# Patient Record
Sex: FEMALE | Race: WHITE | NOT HISPANIC OR LATINO | Employment: OTHER | URBAN - METROPOLITAN AREA
[De-identification: names, ages, dates, MRNs, and addresses within clinical notes are randomized per-mention and may not be internally consistent; named-entity substitution may affect disease eponyms.]

---

## 2017-03-09 ENCOUNTER — TELEPHONE (OUTPATIENT)
Dept: FAMILY MEDICINE | Facility: CLINIC | Age: 82
End: 2017-03-09

## 2017-03-09 DIAGNOSIS — R73.09 ELEVATED GLUCOSE: Primary | ICD-10-CM

## 2017-03-09 NOTE — TELEPHONE ENCOUNTER
Patient agrees with PCP recommendation and will have this done after October with her physical.  Naomie Mercer RN

## 2017-03-09 NOTE — TELEPHONE ENCOUNTER
Reason for Call:  Other     Detailed comments: pt is wanting to get orders for diabetic testing.  Family history of diabetes.    Would this be lab only or should she make an appointment?    Phone Number Patient can be reached at: Home number on file [phone] (home)    Best Time: any time    Can we leave a detailed message on this number? YES    Call taken on 3/9/2017 at 10:12 AM by Leeanna Cloud  .

## 2017-03-09 NOTE — TELEPHONE ENCOUNTER
To PCP:  Patient states she has her Hbg A1C tested every 6 months due to Strong Family history of diabetes.  Last A1C done in September.  Patient is asking for lab to check for diabetes.  Okay to A1C?  Thank you.  Tresa Mejia RN

## 2017-04-05 DIAGNOSIS — I10 BENIGN ESSENTIAL HYPERTENSION: Primary | ICD-10-CM

## 2017-04-05 RX ORDER — AMLODIPINE BESYLATE 2.5 MG/1
TABLET ORAL
Qty: 90 TABLET | Refills: 2 | Status: SHIPPED | OUTPATIENT
Start: 2017-04-05 | End: 2018-01-03

## 2017-04-05 NOTE — TELEPHONE ENCOUNTER
amLODIPine (NORVASC) 2.5 MG tablet      Last Written Prescription Date:  ?  Last Fill Quantity: ?,   # refills: ?  Last Office Visit with [facility], [facility] or [facility] prescribing provider: 10/7/2016  Future Office visit:       Routing refill request to provider for review/approval because:  Medication is reported/historical

## 2017-04-24 ENCOUNTER — TRANSFERRED RECORDS (OUTPATIENT)
Dept: HEALTH INFORMATION MANAGEMENT | Facility: CLINIC | Age: 82
End: 2017-04-24

## 2017-07-01 DIAGNOSIS — I10 BENIGN ESSENTIAL HYPERTENSION: Primary | ICD-10-CM

## 2017-07-03 RX ORDER — METOPROLOL SUCCINATE 50 MG/1
TABLET, EXTENDED RELEASE ORAL
Qty: 45 TABLET | Refills: 2 | Status: SHIPPED | OUTPATIENT
Start: 2017-07-03 | End: 2017-11-25

## 2017-07-03 RX ORDER — TRIAMTERENE/HYDROCHLOROTHIAZID 37.5-25 MG
TABLET ORAL
Qty: 45 TABLET | Refills: 2 | Status: SHIPPED | OUTPATIENT
Start: 2017-07-03 | End: 2018-05-06

## 2017-07-03 NOTE — TELEPHONE ENCOUNTER
I called and spoke with patient to clarify dosing of Metoprolol and Triamterene-HCTZ 37.5-25 mg    Verified Metoprolol 50 mg XL--taking 1/2 tablet daily  Verified Triamterene-HCTZ 37.5-25 mg--taking 1/2 tablet daily    I pended both medication for approval    Edwige Green RN

## 2017-07-03 NOTE — TELEPHONE ENCOUNTER
Medication Detail      Disp Refills Start End ELIOT   metoprolol (TOPROL-XL) 50 MG 24 hr tablet  3 6/29/2016  --   Sig: TAKE 1/2 TABLET BY MOUTH ONCE A DAY   Class: Historical       Last Office Visit with [facility], [facility] or  Health prescribing provider:  10/7/16   Future Office Visit:    Next 5 appointments (look out 90 days)     Sep 08, 2017  9:00 AM CDT   [facility]    [address] [facility] [address]   [phone]                    BP Readings from Last 3 Encounters:   10/27/16 106/56   10/07/16 138/79   09/01/16 121/67     Medication Detail      Disp Refills Start End ELIOT   triamterene-hydrochlorothiazide (MAXZIDE-25) 37.5-25 MG per tablet  2 5/15/2016  --   Sig: Take 1 tablet by mouth every morning   Class: Historical       Last Office Visit with [facility] or [facility] prescribing provider: 10/7/16  Next 5 appointments (look out 90 days)     Sep 08, 2017  9:00 AM CDT   [facility]    [address] [facility] [address]   [phone]                   Potassium   Date Value Ref Range Status   09/07/2016 3.8 3.4 - 5.3 mmol/L Final     Creatinine   Date Value Ref Range Status   09/07/2016 0.87 0.52 - 1.04 mg/dL Final     BP Readings from Last 3 Encounters:   10/27/16 106/56   10/07/16 138/79   09/01/16 121/67

## 2017-09-05 ENCOUNTER — DOCUMENTATION ONLY (OUTPATIENT)
Dept: LAB | Facility: CLINIC | Age: 82
End: 2017-09-05

## 2017-09-05 DIAGNOSIS — I10 BENIGN ESSENTIAL HYPERTENSION: ICD-10-CM

## 2017-09-05 DIAGNOSIS — R73.01 IMPAIRED FASTING GLUCOSE: ICD-10-CM

## 2017-09-05 NOTE — PROGRESS NOTES
Please review, associate diagnosis, and sign pending pre physical lab orders for patient's upcoming 9/8/17 lab appointment.     Thank you,  Lab

## 2017-09-08 DIAGNOSIS — R73.01 IMPAIRED FASTING GLUCOSE: ICD-10-CM

## 2017-09-08 DIAGNOSIS — I10 BENIGN ESSENTIAL HYPERTENSION: ICD-10-CM

## 2017-09-08 LAB
ALBUMIN SERPL-MCNC: 3.5 G/DL (ref 3.4–5)
ALP SERPL-CCNC: 90 U/L (ref 40–150)
ALT SERPL W P-5'-P-CCNC: 23 U/L (ref 0–50)
ANION GAP SERPL CALCULATED.3IONS-SCNC: 7 MMOL/L (ref 3–14)
AST SERPL W P-5'-P-CCNC: 25 U/L (ref 0–45)
BILIRUB SERPL-MCNC: 0.4 MG/DL (ref 0.2–1.3)
BUN SERPL-MCNC: 28 MG/DL (ref 7–30)
CALCIUM SERPL-MCNC: 9.8 MG/DL (ref 8.5–10.1)
CHLORIDE SERPL-SCNC: 105 MMOL/L (ref 94–109)
CHOLEST SERPL-MCNC: 238 MG/DL
CO2 SERPL-SCNC: 30 MMOL/L (ref 20–32)
CREAT SERPL-MCNC: 0.84 MG/DL (ref 0.52–1.04)
ERYTHROCYTE [DISTWIDTH] IN BLOOD BY AUTOMATED COUNT: 13.7 % (ref 10–15)
GFR SERPL CREATININE-BSD FRML MDRD: 65 ML/MIN/1.7M2
GLUCOSE SERPL-MCNC: 94 MG/DL (ref 70–99)
HBA1C MFR BLD: 5.4 % (ref 4.3–6)
HCT VFR BLD AUTO: 39.4 % (ref 35–47)
HDLC SERPL-MCNC: 124 MG/DL
HGB BLD-MCNC: 12.6 G/DL (ref 11.7–15.7)
LDLC SERPL CALC-MCNC: 98 MG/DL
MCH RBC QN AUTO: 29.7 PG (ref 26.5–33)
MCHC RBC AUTO-ENTMCNC: 32 G/DL (ref 31.5–36.5)
MCV RBC AUTO: 93 FL (ref 78–100)
NONHDLC SERPL-MCNC: 114 MG/DL
PLATELET # BLD AUTO: 251 10E9/L (ref 150–450)
POTASSIUM SERPL-SCNC: 4.4 MMOL/L (ref 3.4–5.3)
PROT SERPL-MCNC: 7.3 G/DL (ref 6.8–8.8)
RBC # BLD AUTO: 4.24 10E12/L (ref 3.8–5.2)
SODIUM SERPL-SCNC: 142 MMOL/L (ref 133–144)
TRIGL SERPL-MCNC: 78 MG/DL
TSH SERPL DL<=0.005 MIU/L-ACNC: 4.66 MU/L (ref 0.4–4)
WBC # BLD AUTO: 4.9 10E9/L (ref 4–11)

## 2017-09-08 PROCEDURE — 80061 LIPID PANEL: CPT | Performed by: INTERNAL MEDICINE

## 2017-09-08 PROCEDURE — 36415 COLL VENOUS BLD VENIPUNCTURE: CPT | Performed by: INTERNAL MEDICINE

## 2017-09-08 PROCEDURE — 85027 COMPLETE CBC AUTOMATED: CPT | Performed by: INTERNAL MEDICINE

## 2017-09-08 PROCEDURE — 83036 HEMOGLOBIN GLYCOSYLATED A1C: CPT | Performed by: INTERNAL MEDICINE

## 2017-09-08 PROCEDURE — 80053 COMPREHEN METABOLIC PANEL: CPT | Performed by: INTERNAL MEDICINE

## 2017-09-08 PROCEDURE — 84443 ASSAY THYROID STIM HORMONE: CPT | Performed by: INTERNAL MEDICINE

## 2017-09-08 NOTE — LETTER
[facility]  [address]  [address]  [place]  [address]  Tel: [phone]    September 28, 2017    Maryam Belen Quentin  [address] [facility] [address]        Dear Ms. Saavedra,    The following letter pertains to your most recent diagnostic tests:    As we discussed in clinic your lab results are stable.      If you have any further questions or problems, please contact our office.      Sincerely,    Truman Norman MD/ [place] MCNULTY CMA  Results for orders placed or performed in visit on 09/08/17   Lipid panel reflex to direct LDL   Result Value Ref Range    Cholesterol 238 (H) <200 mg/dL    Triglycerides 78 <150 mg/dL    HDL Cholesterol 124 >49 mg/dL    LDL Cholesterol Calculated 98 <100 mg/dL    Non HDL Cholesterol 114 <130 mg/dL   Comprehensive metabolic panel   Result Value Ref Range    Sodium 142 133 - 144 mmol/L    Potassium 4.4 3.4 - 5.3 mmol/L    Chloride 105 94 - 109 mmol/L    Carbon Dioxide 30 20 - 32 mmol/L    Anion Gap 7 3 - 14 mmol/L    Glucose 94 70 - 99 mg/dL    Urea Nitrogen 28 7 - 30 mg/dL    Creatinine 0.84 0.52 - 1.04 mg/dL    GFR Estimate 65 >60 mL/min/1.7m2    GFR Estimate If Black 78 >60 mL/min/1.7m2    Calcium 9.8 8.5 - 10.1 mg/dL    Bilirubin Total 0.4 0.2 - 1.3 mg/dL    Albumin 3.5 3.4 - 5.0 g/dL    Protein Total 7.3 6.8 - 8.8 g/dL    Alkaline Phosphatase 90 40 - 150 U/L    ALT 23 0 - 50 U/L    AST 25 0 - 45 U/L   CBC with platelets   Result Value Ref Range    WBC 4.9 4.0 - 11.0 10e9/L    RBC Count 4.24 3.8 - 5.2 10e12/L    Hemoglobin 12.6 11.7 - 15.7 g/dL    Hematocrit 39.4 35.0 - 47.0 %    MCV 93 78 - 100 fl    MCH 29.7 26.5 - 33.0 pg    MCHC 32.0 31.5 - 36.5 g/dL    RDW 13.7 10.0 - 15.0 %    Platelet Count 251 150 - 450 10e9/L   TSH   Result Value Ref Range    TSH 4.66 (H) 0.40 - 4.00 mU/L   Hemoglobin A1c   Result Value Ref Range    Hemoglobin A1C 5.4 4.3 - 6.0 %               Enclosure: Lab Results

## 2017-09-28 ENCOUNTER — OFFICE VISIT (OUTPATIENT)
Dept: FAMILY MEDICINE | Facility: CLINIC | Age: 82
End: 2017-09-28
Payer: MEDICARE

## 2017-09-28 VITALS
RESPIRATION RATE: 16 BRPM | SYSTOLIC BLOOD PRESSURE: 134 MMHG | WEIGHT: 102 LBS | HEIGHT: 61 IN | OXYGEN SATURATION: 97 % | HEART RATE: 66 BPM | DIASTOLIC BLOOD PRESSURE: 74 MMHG | BODY MASS INDEX: 19.26 KG/M2 | TEMPERATURE: 97 F

## 2017-09-28 DIAGNOSIS — I10 BENIGN ESSENTIAL HYPERTENSION: ICD-10-CM

## 2017-09-28 DIAGNOSIS — Z23 NEED FOR PROPHYLACTIC VACCINATION AND INOCULATION AGAINST INFLUENZA: ICD-10-CM

## 2017-09-28 DIAGNOSIS — Z91.81 AT RISK FOR FALLING: ICD-10-CM

## 2017-09-28 DIAGNOSIS — M53.3 SACROILIAC JOINT PAIN: ICD-10-CM

## 2017-09-28 DIAGNOSIS — Z23 NEED FOR VACCINATION: ICD-10-CM

## 2017-09-28 DIAGNOSIS — Z00.00 ROUTINE GENERAL MEDICAL EXAMINATION AT A HEALTH CARE FACILITY: Primary | ICD-10-CM

## 2017-09-28 DIAGNOSIS — Z23 NEED FOR PROPHYLACTIC VACCINATION AGAINST STREPTOCOCCUS PNEUMONIAE (PNEUMOCOCCUS): ICD-10-CM

## 2017-09-28 PROCEDURE — G0008 ADMIN INFLUENZA VIRUS VAC: HCPCS | Performed by: INTERNAL MEDICINE

## 2017-09-28 PROCEDURE — G0009 ADMIN PNEUMOCOCCAL VACCINE: HCPCS | Performed by: INTERNAL MEDICINE

## 2017-09-28 PROCEDURE — G0439 PPPS, SUBSEQ VISIT: HCPCS | Performed by: INTERNAL MEDICINE

## 2017-09-28 PROCEDURE — 90732 PPSV23 VACC 2 YRS+ SUBQ/IM: CPT | Performed by: INTERNAL MEDICINE

## 2017-09-28 PROCEDURE — 90662 IIV NO PRSV INCREASED AG IM: CPT | Performed by: INTERNAL MEDICINE

## 2017-09-28 RX ORDER — TRAMADOL HYDROCHLORIDE 50 MG/1
50 TABLET ORAL EVERY 8 HOURS PRN
Qty: 30 TABLET | Refills: 0 | Status: SHIPPED | OUTPATIENT
Start: 2017-09-28 | End: 2018-05-14

## 2017-09-28 NOTE — NURSING NOTE
Screening Questionnaire for Adult Immunization    Are you sick today?   No   Do you have allergies to medications, food, a vaccine component or latex?   No   Have you ever had a serious reaction after receiving a vaccination?   No   Do you have a long-term health problem with heart disease, lung disease, asthma, kidney disease, metabolic disease (e.g. diabetes), anemia, or other blood disorder?   No   Do you have cancer, leukemia, HIV/AIDS, or any other immune system problem?   No   In the past 3 months, have you taken medications that affect  your immune system, such as prednisone, other steroids, or anticancer drugs; drugs for the treatment of rheumatoid arthritis, Crohn s disease, or psoriasis; or have you had radiation treatments?   No   Have you had a seizure, or a brain or other nervous system problem?   No   During the past year, have you received a transfusion of blood or blood     products, or been given immune (gamma) globulin or antiviral drug?   No   For women: Are you pregnant or is there a chance you could become        pregnant during the next month?   No   Have you received any vaccinations in the past 4 weeks?   No     Immunization questionnaire answers were all negative.        Per orders of Dr. Norman, injection of Pneumovax 23 given by July Morris. Patient instructed to remain in clinic for 15 minutes afterwards, and to report any adverse reaction to me immediately.       Screening performed by July Morris on 9/28/2017 at 9:49 AM.      Prior to injection verified patient identity using patient's name and date of birth.

## 2017-09-28 NOTE — PROGRESS NOTES
The following letter pertains to your most recent diagnostic tests:    As we discussed in clinic your lab results are stable.          Sincerely,    Dr. Norman

## 2017-09-28 NOTE — NURSING NOTE
"Chief Complaint   Patient presents with     Wellness Visit     Not Fasting       Initial /74 (BP Location: Right arm, Patient Position: Chair, Cuff Size: Adult Small)  Pulse 66  Temp 97  F (36.1  C) (Oral)  Resp 16  Ht 5' 0.75\" (1.543 m)  Wt 102 lb (46.3 kg)  SpO2 97%  BMI 19.43 kg/m2 Estimated body mass index is 19.43 kg/(m^2) as calculated from the following:    Height as of this encounter: 5' 0.75\" (1.543 m).    Weight as of this encounter: 102 lb (46.3 kg).  Medication Reconciliation: complete   July Morris CMA (AAMA)      "

## 2017-09-28 NOTE — MR AVS SNAPSHOT
After Visit Summary   9/28/2017    Maryam Saavedra    MRN: [id number]           Patient Information     Date Of Birth          6/9/1932        Visit Information        Provider Department      9/28/2017 9:00 AM Truman Norman MD [facility]        Today's Diagnoses     Routine general medical examination at a health care facility    -  1    Sacroiliac joint pain        At risk for falling        Need for prophylactic vaccination and inoculation against influenza        Need for prophylactic vaccination against Streptococcus pneumoniae (pneumococcus)        Benign essential hypertension          Care Instructions      Preventive Health Recommendations    Female Ages 65 +    Yearly exam:     See your health care provider every year in order to  o Review health changes.   o Discuss preventive care.    o Review your medicines if your doctor has prescribed any.      You no longer need a yearly Pap test unless you've had an abnormal Pap test in the past 10 years. If you have vaginal symptoms, such as bleeding or discharge, be sure to talk with your provider about a Pap test.      Every 1 to 2 years, have a mammogram.  If you are over 69, talk with your health care provider about whether or not you want to continue having screening mammograms.      Every 10 years, have a colonoscopy. Or, have a yearly FIT test (stool test). These exams will check for colon cancer.       Have a cholesterol test every 5 years, or more often if your doctor advises it.       Have a diabetes test (fasting glucose) every three years. If you are at risk for diabetes, you should have this test more often.       At age 65, have a bone density scan (DEXA) to check for osteoporosis (brittle bone disease).    Shots:    Get a flu shot each year.    Get a tetanus shot every 10 years.    Talk to your doctor about your pneumonia vaccines. There are now two you should receive - Pneumovax (PPSV 23) and Prevnar (PCV 13).    Talk  "to your doctor about the shingles vaccine.    Talk to your doctor about the hepatitis B vaccine.    Nutrition:     Eat at least 5 servings of fruits and vegetables each day.      Eat whole-grain bread, whole-wheat pasta and brown rice instead of white grains and rice.      Talk to your provider about Calcium and Vitamin D.     Lifestyle    Exercise at least 150 minutes a week (30 minutes a day, 5 days a week). This will help you control your weight and prevent disease.      Limit alcohol to one drink per day.      No smoking.       Wear sunscreen to prevent skin cancer.       See your dentist twice a year for an exam and cleaning.      See your eye doctor every 1 to 2 years to screen for conditions such as glaucoma, macular degeneration and cataracts.          Follow-ups after your visit        Follow-up notes from your care team     Return in about 1 year (around 9/28/2018) for Physical Exam.      Who to contact     If you have questions or need follow up information about today's clinic visit or your schedule please contact [facility] directly at [phone].  Normal or non-critical lab and imaging results will be communicated to you by [employer], letter or phone within 4 business days after the clinic has received the results. If you do not hear from us within 7 days, please contact the clinic through [employer] or phone. If you have a critical or abnormal lab result, we will notify you by phone as soon as possible.  Submit refill requests through [employer] or call your pharmacy and they will forward the refill request to us. Please allow 3 business days for your refill to be completed.          Additional Information About Your Visit        [employer] Information     [employer] lets you send messages to your doctor, view your test results, renew your prescriptions, schedule appointments and more. To sign up, go to [email or web address] . Click on \"Log in\" on the left side of the screen, which will take you to " "the Welcome page. Then click on \"Sign up Now\" on the right side of the page.     You will be asked to enter the access code listed below, as well as some personal information. Please follow the directions to create your username and password.     Your access code is: [email or web address]  Expires: 2017  9:46 AM     Your access code will  in 90 days. If you need help or a new code, please call your [facility] or [phone].        Care EveryWhere ID     This is your Care EveryWhere ID. This could be used by other organizations to access your [place] medical records  [id number]        Your Vitals Were     Pulse Temperature Respirations Height Pulse Oximetry BMI (Body Mass Index)    66 97  F (36.1  C) (Oral) 16 5' 0.75\" (1.543 m) 97% 19.43 kg/m2       Blood Pressure from Last 3 Encounters:   17 134/74   10/27/16 106/56   10/07/16 138/79    Weight from Last 3 Encounters:   17 102 lb (46.3 kg)   10/27/16 102 lb (46.3 kg)   10/07/16 103 lb (46.7 kg)              We Performed the Following     ADMIN INFLUENZA (For MEDICARE Patients ONLY) []     FLU VACCINE, INCREASED ANTIGEN, PRESV FREE, AGE 65+ [13837]          Today's Medication Changes          These changes are accurate as of: 17  9:46 AM.  If you have any questions, ask your nurse or doctor.               Stop taking these medicines if you haven't already. Please contact your care team if you have questions.     HYDROcodone-acetaminophen 5-325 MG per tablet   Commonly known as:  NORCO   Stopped by:  Truman Norman MD                Where to get your medicines      Some of these will need a paper prescription and others can be bought over the counter.  Ask your nurse if you have questions.     Bring a paper prescription for each of these medications     traMADol 50 MG tablet                Primary Care Provider Office Phone # Fax #    Truman Norman [phone]       [place] [address]  [facility] [address]        Equal Access to Services     [facility] : Hadii jefferson trivedi srinath Somichoacano, waaxda luqadaha, qaybta kaalmagómez obrien, ben austin. So [facility] [phone].    ATENCIÓN: Si habla español, tiene a bond disposición servicios gratuitos de asistencia lingüística. Bean al [phone].    We comply with applicable federal civil rights laws and [place] laws. We do not discriminate on the basis of race, color, national origin, age, disability sex, sexual orientation or gender identity.            Thank you!     Thank you for choosing [facility]  for your care. Our goal is always to provide you with excellent care. Hearing back from our patients is one way we can continue to improve our services. Please take a few minutes to complete the written survey that you may receive in the mail after your visit with us. Thank you!             Your Updated Medication List - Protect others around you: Learn how to safely use, store and throw away your medicines at www.disposemymeds.org.          This list is accurate as of: 9/28/17  9:46 AM.  Always use your most recent med list.                   Brand Name Dispense Instructions for use Diagnosis    amLODIPine 2.5 MG tablet    NORVASC    90 tablet    TAKE 1 TABLET BY MOUTH EVERY DAY    Benign essential hypertension       aspirin 81 MG chewable tablet      Take 2 tablets (162 mg) by mouth daily        calcium-vitamin D 600-400 MG-UNIT per tablet    CALTRATE     Take 1 tablet by mouth 2 times daily.        metoprolol 50 MG 24 hr tablet    TOPROL-XL    45 tablet    TAKE 1/2 TABLET BY MOUTH ONCE A DAY    Benign essential hypertension       traMADol 50 MG tablet    ULTRAM    30 tablet    Take 1 tablet (50 mg) by mouth every 8 hours as needed for severe pain    Sacroiliac joint pain       triamterene-hydrochlorothiazide 37.5-25 MG per tablet    MAXZIDE-25    45 tablet    TAKE 1/2 TABLET BY MOUTH ONCE A DAY    Benign  essential hypertension

## 2017-09-28 NOTE — PROGRESS NOTES
SUBJECTIVE:   Maryam Saavedra is a 85 year old female who presents for Preventive Visit.    Are you in the first 12 months of your Medicare Part B coverage?  No    Healthy Habits:    Do you get at least three servings of calcium containing foods daily (dairy, green leafy vegetables, etc.)? no, taking calcium and/or vitamin D supplement: yes -     Amount of exercise or daily activities, outside of work: walking    Problems taking medications regularly No    Medication side effects: No    Have you had an eye exam in the past two years? yes    Do you see a dentist twice per year? yes    Do you have sleep apnea, excessive snoring or daytime drowsiness?no    COGNITIVE SCREEN  1) Repeat 3 items (Banana, Sunrise, Chair)    2) Clock draw: ABNORMAL   3) 3 item recall: Recalls 1 object   Results: ABNORMAL clock, 1-2 items recalled: PROBABLE COGNITIVE IMPAIRMENT, **INFORM PROVIDER**    Mini-CogTM Copyright S [employer]ey[employer]. Licensed by the author for use in [facility]; reprinted with permission (stacey@[facility]). All rights reserved.      Memory screen abnormal  Patient has no concerns about her memory.  She has not gotten lost driving, or left stove on or forgotten to pay bills      Reviewed and updated as needed this visit by clinical staffTobacco  Allergies  Meds  Med Hx  Surg Hx  Fam Hx  Soc Hx        Reviewed and updated as needed this visit by Provider        Social History   Substance Use Topics     Smoking status: Former Smoker     Smokeless tobacco: Former User     Quit date: 1/1/1997     Alcohol use 0.0 oz/week     0 Standard drinks or equivalent per week      Comment: occasional       The patient does not drink >3 drinks per day nor >7 drinks per week.    Today's PHQ-2 Score:   PHQ-2 ( 1999 Pf[employer]) 9/28/2017 9/1/2016   Q1: Little interest or pleasure in doing things 0 0   Q2: Feeling down, depressed or hopeless 0 0   PHQ-2 Score 0 0       Do you feel safe in your environment - Yes    Do you have a Health  Care Directive?: Yes: Advance Directive has been received and scanned.    Current providers sharing in care for this patient include:   Patient Care Team:  Truman Norman MD as PCP - General (Internal Medicine)      Hearing impairment: No    Ability to successfully perform activities of daily living: Yes, no assistance needed     Fall risk:  Fallen 2 or more times in the past year?: No  Any fall with injury in the past year?: No      Home safety:  none identified      The following health maintenance items are reviewed in Epic and correct as of today:Health Maintenance   Topic Date Due     ADVANCE DIRECTIVE PLANNING Q5 YRS  06/09/1987     INFLUENZA VACCINE (SYSTEM ASSIGNED)  09/01/2017     FALL RISK ASSESSMENT  10/07/2017     PNEUMOCOCCAL (2 of 2 - PPSV23) 10/07/2017     TETANUS IMMUNIZATION (SYSTEM ASSIGNED)  06/26/2023     Patient Active Problem List   Diagnosis     Benign essential hypertension     Impaired fasting glucose     Acute right-sided low back pain without sciatica     Past Surgical History:   Procedure Laterality Date     EYE SURGERY  cataracts     MOHS MICROGRAPHIC PROCEDURE      Left lower eyelid      MOHS MICROGRAPHIC PROCEDURE Left 10/27/2016    Procedure: MOHS MICROGRAPHIC PROCEDURE;  Surgeon: Jose Torrez MD;  Location: [facility]     ORTHOPEDIC SURGERY      bilateral wrists       Social History   Substance Use Topics     Smoking status: Former Smoker     Smokeless tobacco: Former User     Quit date: 1/1/1997     Alcohol use 0.0 oz/week     0 Standard drinks or equivalent per week      Comment: occasional     Family History   Problem Relation Age of Onset     DIABETES Mother      DIABETES Father          Current Outpatient Prescriptions   Medication Sig Dispense Refill     aspirin 81 MG chewable tablet Take 2 tablets (162 mg) by mouth daily       metoprolol (TOPROL-XL) 50 MG 24 hr tablet TAKE 1/2 TABLET BY MOUTH ONCE A DAY 45 tablet 2     triamterene-hydrochlorothiazide (MAXZIDE-25) 37.5-25 MG  "per tablet TAKE 1/2 TABLET BY MOUTH ONCE A DAY 45 tablet 2     amLODIPine (NORVASC) 2.5 MG tablet TAKE 1 TABLET BY MOUTH EVERY DAY 90 tablet 2     traMADol (ULTRAM) 50 MG tablet Take 1 tablet (50 mg) by mouth every 8 hours as needed for severe pain 30 tablet 0     calcium-vitamin D (CALTRATE) 600-400 MG-UNIT per tablet Take 1 tablet by mouth 2 times daily.       Allergies   Allergen Reactions     Ace Inhibitors      Angioedema       Cyclobenzaprine      Angioedema             ROS:  A 12 organ systems ROS is negative    OBJECTIVE:   /74 (BP Location: Right arm, Patient Position: Chair, Cuff Size: Adult Small)  Pulse 66  Temp 97  F (36.1  C) (Oral)  Resp 16  Ht 5' 0.75\" (1.543 m)  Wt 102 lb (46.3 kg)  SpO2 97%  BMI 19.43 kg/m2 Estimated body mass index is 19.43 kg/(m^2) as calculated from the following:    Height as of this encounter: 5' 0.75\" (1.543 m).    Weight as of this encounter: 102 lb (46.3 kg).  EXAM:   GENERAL APPEARANCE: healthy, alert and no distress  EYES: Eyes grossly normal to inspection, PERRL and conjunctivae and sclerae normal  HENT: ear canals and TM's normal, nose and mouth without ulcers or lesions, oropharynx clear and oral mucous membranes moist  NECK: no adenopathy, no asymmetry, masses, or scars and thyroid normal to palpation  RESP: lungs clear to auscultation - no rales, rhonchi or wheezes  BREAST: normal without masses, tenderness or nipple discharge and no palpable axillary masses or adenopathy  CV: regular rate and rhythm, normal S1 S2, no S3 or S4, no murmur, click or rub, no peripheral edema and peripheral pulses strong  ABDOMEN: soft, nontender, no hepatosplenomegaly, no masses and bowel sounds normal  MS: no musculoskeletal defects are noted and gait is age appropriate without ataxia  SKIN: no suspicious lesions or rashes  NEURO: Normal strength and tone, sensory exam grossly normal, mentation intact and speech normal  PSYCH: mentation appears normal and affect " "normal/bright    ASSESSMENT / PLAN:   1. Routine general medical examination at a health care facility    Discussed memory clinic referral  She thinks this will make her very nervous  She does not think he memory is a serious issue    I suspect that she failed the screening because she became very anxious about the test  I see no gross memory deficits upon my exam     2. Sacroiliac joint pain  She uses very small amount of tramadol for severe pain episodes without side effects   - traMADol (ULTRAM) 50 MG tablet; Take 1 tablet (50 mg) by mouth every 8 hours as needed for severe pain  Dispense: 30 tablet; Refill: 0    3. At risk for falling      4. Need for prophylactic vaccination and inoculation against influenza    - FLU VACCINE, INCREASED ANTIGEN, PRESV FREE, AGE 65+ [78651]  - ADMIN INFLUENZA (For MEDICARE Patients ONLY) []    5. Need for prophylactic vaccination against Streptococcus pneumoniae (pneumococcus)  P23 today     6. Benign essential hypertension  Well controlled today     She inquired about medications for OAB and I gave her some literature about myrbetriq; she will consider this       End of Life Planning:  Patient currently has an advanced directive: Yes.  Practitioner is supportive of decision.    COUNSELING:  Reviewed preventive health counseling, as reflected in patient instructions  Special attention given to:       Regular exercise       Healthy diet/nutrition       Immunizations    Vaccinated for: Influenza and Pneumococcal          Estimated body mass index is 19.43 kg/(m^2) as calculated from the following:    Height as of this encounter: 5' 0.75\" (1.543 m).    Weight as of this encounter: 102 lb (46.3 kg).     reports that she has quit smoking. She quit smokeless tobacco use about 20 years ago.        Appropriate preventive services were discussed with this patient, including applicable screening as appropriate for cardiovascular disease, diabetes, osteopenia/osteoporosis, and " glaucoma.  As appropriate for age/gender, discussed screening for colorectal cancer, prostate cancer, breast cancer, and cervical cancer. Checklist reviewing preventive services available has been given to the patient.    Reviewed patients plan of care and provided an AVS. The Basic Care Plan (routine screening as documented in Health Maintenance) for Maryam meets the Care Plan requirement. This Care Plan has been established and reviewed with the Patient.    Counseling Resources:  ATP IV Guidelines  Pooled Cohorts Equation Calculator  Breast Cancer Risk Calculator  FRAX Risk Assessment  ICSI Preventive Guidelines  Dietary Guidelines for Americans, 2010  PT [place] [employer]'s MyPlate  ASA Prophylaxis  Lung CA Screening    Truman Norman MD  [facility] EDINAInjectable Influenza Immunization Documentation    1.  Is the person to be vaccinated sick today?   No    2. Does the person to be vaccinated have an allergy to a component   of the vaccine?   No    3. Has the person to be vaccinated ever had a serious reaction   to influenza vaccine in the past?   No    4. Has the person to be vaccinated ever had Guillain-Barré syndrome?   No    Form completed by patient

## 2017-10-18 ENCOUNTER — HOSPITAL ENCOUNTER (OUTPATIENT)
Dept: MAMMOGRAPHY | Facility: CLINIC | Age: 82
Discharge: HOME | End: 2017-10-18
Attending: INTERNAL MEDICINE | Admitting: INTERNAL MEDICINE
Payer: MEDICARE

## 2017-10-18 DIAGNOSIS — Z12.31 ENCOUNTER FOR SCREENING MAMMOGRAM FOR HIGH-RISK PATIENT: ICD-10-CM

## 2017-10-18 PROCEDURE — G0202 SCR MAMMO BI INCL CAD: HCPCS

## 2017-11-25 ENCOUNTER — APPOINTMENT (OUTPATIENT)
Dept: MRI IMAGING | Facility: CLINIC | Age: 82
End: 2017-11-25
Attending: INTERNAL MEDICINE
Payer: MEDICARE

## 2017-11-25 ENCOUNTER — HOSPITAL ENCOUNTER (OUTPATIENT)
Facility: CLINIC | Age: 82
Setting detail: OBSERVATION
Discharge: HOME | End: 2017-11-26
Attending: EMERGENCY MEDICINE | Admitting: INTERNAL MEDICINE
Payer: MEDICARE

## 2017-11-25 DIAGNOSIS — R29.898 LEG WEAKNESS, BILATERAL: ICD-10-CM

## 2017-11-25 LAB
ALBUMIN SERPL-MCNC: 3.5 G/DL (ref 3.4–5)
ALBUMIN UR-MCNC: NEGATIVE MG/DL
ALP SERPL-CCNC: 101 U/L (ref 40–150)
ALT SERPL W P-5'-P-CCNC: 26 U/L (ref 0–50)
ANION GAP SERPL CALCULATED.3IONS-SCNC: 9 MMOL/L (ref 3–14)
APPEARANCE UR: CLEAR
AST SERPL W P-5'-P-CCNC: 28 U/L (ref 0–45)
BASOPHILS # BLD AUTO: 0 10E9/L (ref 0–0.2)
BASOPHILS NFR BLD AUTO: 0.2 %
BILIRUB SERPL-MCNC: 0.3 MG/DL (ref 0.2–1.3)
BILIRUB UR QL STRIP: NEGATIVE
BUN SERPL-MCNC: 40 MG/DL (ref 7–30)
CALCIUM SERPL-MCNC: 9.7 MG/DL (ref 8.5–10.1)
CHLORIDE SERPL-SCNC: 102 MMOL/L (ref 94–109)
CO2 SERPL-SCNC: 30 MMOL/L (ref 20–32)
COLOR UR AUTO: YELLOW
CREAT SERPL-MCNC: 0.8 MG/DL (ref 0.52–1.04)
CRP SERPL-MCNC: 4.2 MG/L (ref 0–8)
DIFFERENTIAL METHOD BLD: ABNORMAL
EOSINOPHIL # BLD AUTO: 0 10E9/L (ref 0–0.7)
EOSINOPHIL NFR BLD AUTO: 0 %
ERYTHROCYTE [DISTWIDTH] IN BLOOD BY AUTOMATED COUNT: 13.6 % (ref 10–15)
GFR SERPL CREATININE-BSD FRML MDRD: 68 ML/MIN/1.7M2
GLUCOSE SERPL-MCNC: 107 MG/DL (ref 70–99)
GLUCOSE UR STRIP-MCNC: NEGATIVE MG/DL
HCT VFR BLD AUTO: 34.7 % (ref 35–47)
HGB BLD-MCNC: 11.6 G/DL (ref 11.7–15.7)
HGB UR QL STRIP: NEGATIVE
IMM GRANULOCYTES # BLD: 0 10E9/L (ref 0–0.4)
IMM GRANULOCYTES NFR BLD: 0.1 %
KETONES UR STRIP-MCNC: NEGATIVE MG/DL
LEUKOCYTE ESTERASE UR QL STRIP: NEGATIVE
LYMPHOCYTES # BLD AUTO: 0.7 10E9/L (ref 0.8–5.3)
LYMPHOCYTES NFR BLD AUTO: 7.3 %
MCH RBC QN AUTO: 30.3 PG (ref 26.5–33)
MCHC RBC AUTO-ENTMCNC: 33.4 G/DL (ref 31.5–36.5)
MCV RBC AUTO: 91 FL (ref 78–100)
MONOCYTES # BLD AUTO: 0.6 10E9/L (ref 0–1.3)
MONOCYTES NFR BLD AUTO: 6 %
NEUTROPHILS # BLD AUTO: 8.4 10E9/L (ref 1.6–8.3)
NEUTROPHILS NFR BLD AUTO: 86.4 %
NITRATE UR QL: NEGATIVE
NRBC # BLD AUTO: 0 10*3/UL
NRBC BLD AUTO-RTO: 0 /100
PH UR STRIP: 7.5 PH (ref 5–7)
PLATELET # BLD AUTO: 236 10E9/L (ref 150–450)
POTASSIUM SERPL-SCNC: 3.7 MMOL/L (ref 3.4–5.3)
PROT SERPL-MCNC: 7 G/DL (ref 6.8–8.8)
RBC # BLD AUTO: 3.83 10E12/L (ref 3.8–5.2)
SODIUM SERPL-SCNC: 141 MMOL/L (ref 133–144)
SOURCE: ABNORMAL
SP GR UR STRIP: 1.02 (ref 1–1.03)
UROBILINOGEN UR STRIP-ACNC: 0.2 EU/DL (ref 0.2–1)
WBC # BLD AUTO: 9.7 10E9/L (ref 4–11)

## 2017-11-25 PROCEDURE — 99207 ZZC CDG-MDM COMPONENT: MEETS LOW - DOWN CODED: CPT | Performed by: INTERNAL MEDICINE

## 2017-11-25 PROCEDURE — 99285 EMERGENCY DEPT VISIT HI MDM: CPT | Mod: 25

## 2017-11-25 PROCEDURE — 84443 ASSAY THYROID STIM HORMONE: CPT | Performed by: INTERNAL MEDICINE

## 2017-11-25 PROCEDURE — 86140 C-REACTIVE PROTEIN: CPT | Performed by: EMERGENCY MEDICINE

## 2017-11-25 PROCEDURE — 36415 COLL VENOUS BLD VENIPUNCTURE: CPT | Performed by: INTERNAL MEDICINE

## 2017-11-25 PROCEDURE — 81003 URINALYSIS AUTO W/O SCOPE: CPT | Performed by: INTERNAL MEDICINE

## 2017-11-25 PROCEDURE — A9270 NON-COVERED ITEM OR SERVICE: HCPCS | Mod: GY | Performed by: INTERNAL MEDICINE

## 2017-11-25 PROCEDURE — 70553 MRI BRAIN STEM W/O & W/DYE: CPT

## 2017-11-25 PROCEDURE — 25000128 H RX IP 250 OP 636: Performed by: EMERGENCY MEDICINE

## 2017-11-25 PROCEDURE — A9585 GADOBUTROL INJECTION: HCPCS | Performed by: EMERGENCY MEDICINE

## 2017-11-25 PROCEDURE — G0378 HOSPITAL OBSERVATION PER HR: HCPCS

## 2017-11-25 PROCEDURE — 25000132 ZZH RX MED GY IP 250 OP 250 PS 637: Mod: GY | Performed by: INTERNAL MEDICINE

## 2017-11-25 PROCEDURE — 25000128 H RX IP 250 OP 636: Performed by: INTERNAL MEDICINE

## 2017-11-25 PROCEDURE — 80053 COMPREHEN METABOLIC PANEL: CPT | Performed by: EMERGENCY MEDICINE

## 2017-11-25 PROCEDURE — 80061 LIPID PANEL: CPT | Performed by: INTERNAL MEDICINE

## 2017-11-25 PROCEDURE — 99219 ZZC INITIAL OBSERVATION CARE,LEVL II: CPT | Performed by: INTERNAL MEDICINE

## 2017-11-25 PROCEDURE — 85025 COMPLETE CBC W/AUTO DIFF WBC: CPT | Performed by: EMERGENCY MEDICINE

## 2017-11-25 PROCEDURE — 86140 C-REACTIVE PROTEIN: CPT | Performed by: INTERNAL MEDICINE

## 2017-11-25 PROCEDURE — 82306 VITAMIN D 25 HYDROXY: CPT | Performed by: INTERNAL MEDICINE

## 2017-11-25 PROCEDURE — 84439 ASSAY OF FREE THYROXINE: CPT | Performed by: INTERNAL MEDICINE

## 2017-11-25 RX ORDER — ACETAMINOPHEN 650 MG/1
650 SUPPOSITORY RECTAL EVERY 4 HOURS PRN
Status: DISCONTINUED | OUTPATIENT
Start: 2017-11-25 | End: 2017-11-26 | Stop reason: HOSPADM

## 2017-11-25 RX ORDER — AMOXICILLIN 250 MG
1 CAPSULE ORAL 2 TIMES DAILY PRN
Status: DISCONTINUED | OUTPATIENT
Start: 2017-11-25 | End: 2017-11-26 | Stop reason: HOSPADM

## 2017-11-25 RX ORDER — SODIUM CHLORIDE 9 MG/ML
INJECTION, SOLUTION INTRAVENOUS ONCE
Status: COMPLETED | OUTPATIENT
Start: 2017-11-25 | End: 2017-11-25

## 2017-11-25 RX ORDER — POTASSIUM CL/LIDO/0.9 % NACL 10MEQ/0.1L
10 INTRAVENOUS SOLUTION, PIGGYBACK (ML) INTRAVENOUS
Status: DISCONTINUED | OUTPATIENT
Start: 2017-11-25 | End: 2017-11-26 | Stop reason: HOSPADM

## 2017-11-25 RX ORDER — POTASSIUM CHLORIDE 29.8 MG/ML
20 INJECTION INTRAVENOUS
Status: DISCONTINUED | OUTPATIENT
Start: 2017-11-25 | End: 2017-11-25 | Stop reason: RX

## 2017-11-25 RX ORDER — TRAMADOL HYDROCHLORIDE 50 MG/1
50 TABLET ORAL EVERY 8 HOURS PRN
Status: DISCONTINUED | OUTPATIENT
Start: 2017-11-25 | End: 2017-11-26 | Stop reason: HOSPADM

## 2017-11-25 RX ORDER — GADOBUTROL 604.72 MG/ML
4.5 INJECTION INTRAVENOUS ONCE
Status: COMPLETED | OUTPATIENT
Start: 2017-11-25 | End: 2017-11-25

## 2017-11-25 RX ORDER — POLYETHYLENE GLYCOL 3350 17 G/17G
17 POWDER, FOR SOLUTION ORAL DAILY PRN
Status: DISCONTINUED | OUTPATIENT
Start: 2017-11-25 | End: 2017-11-26 | Stop reason: HOSPADM

## 2017-11-25 RX ORDER — ONDANSETRON 4 MG/1
4 TABLET, ORALLY DISINTEGRATING ORAL EVERY 6 HOURS PRN
Status: DISCONTINUED | OUTPATIENT
Start: 2017-11-25 | End: 2017-11-26 | Stop reason: HOSPADM

## 2017-11-25 RX ORDER — HYDROMORPHONE HYDROCHLORIDE 1 MG/ML
0.2 INJECTION, SOLUTION INTRAMUSCULAR; INTRAVENOUS; SUBCUTANEOUS
Status: DISCONTINUED | OUTPATIENT
Start: 2017-11-25 | End: 2017-11-26 | Stop reason: HOSPADM

## 2017-11-25 RX ORDER — NALOXONE HYDROCHLORIDE 0.4 MG/ML
.1-.4 INJECTION, SOLUTION INTRAMUSCULAR; INTRAVENOUS; SUBCUTANEOUS
Status: DISCONTINUED | OUTPATIENT
Start: 2017-11-25 | End: 2017-11-26 | Stop reason: HOSPADM

## 2017-11-25 RX ORDER — POTASSIUM CHLORIDE 1500 MG/1
20-40 TABLET, EXTENDED RELEASE ORAL
Status: DISCONTINUED | OUTPATIENT
Start: 2017-11-25 | End: 2017-11-26 | Stop reason: HOSPADM

## 2017-11-25 RX ORDER — SODIUM CHLORIDE 9 MG/ML
INJECTION, SOLUTION INTRAVENOUS CONTINUOUS
Status: DISCONTINUED | OUTPATIENT
Start: 2017-11-25 | End: 2017-11-26 | Stop reason: HOSPADM

## 2017-11-25 RX ORDER — HYDRALAZINE HYDROCHLORIDE 20 MG/ML
10-20 INJECTION INTRAMUSCULAR; INTRAVENOUS
Status: DISCONTINUED | OUTPATIENT
Start: 2017-11-25 | End: 2017-11-26 | Stop reason: HOSPADM

## 2017-11-25 RX ORDER — ACETAMINOPHEN 325 MG/1
650 TABLET ORAL EVERY 4 HOURS PRN
Status: DISCONTINUED | OUTPATIENT
Start: 2017-11-25 | End: 2017-11-26 | Stop reason: HOSPADM

## 2017-11-25 RX ORDER — AMOXICILLIN 250 MG
2 CAPSULE ORAL 2 TIMES DAILY PRN
Status: DISCONTINUED | OUTPATIENT
Start: 2017-11-25 | End: 2017-11-26 | Stop reason: HOSPADM

## 2017-11-25 RX ORDER — POTASSIUM CHLORIDE 1.5 G/1.58G
20-40 POWDER, FOR SOLUTION ORAL
Status: DISCONTINUED | OUTPATIENT
Start: 2017-11-25 | End: 2017-11-26 | Stop reason: HOSPADM

## 2017-11-25 RX ORDER — ONDANSETRON 2 MG/ML
4 INJECTION INTRAMUSCULAR; INTRAVENOUS EVERY 6 HOURS PRN
Status: DISCONTINUED | OUTPATIENT
Start: 2017-11-25 | End: 2017-11-26 | Stop reason: HOSPADM

## 2017-11-25 RX ORDER — LABETALOL HYDROCHLORIDE 5 MG/ML
10-40 INJECTION, SOLUTION INTRAVENOUS EVERY 10 MIN PRN
Status: DISCONTINUED | OUTPATIENT
Start: 2017-11-25 | End: 2017-11-26 | Stop reason: HOSPADM

## 2017-11-25 RX ORDER — POTASSIUM CHLORIDE 7.45 MG/ML
10 INJECTION INTRAVENOUS
Status: DISCONTINUED | OUTPATIENT
Start: 2017-11-25 | End: 2017-11-26 | Stop reason: HOSPADM

## 2017-11-25 RX ORDER — ASPIRIN 300 MG/1
300 SUPPOSITORY RECTAL DAILY
Status: DISCONTINUED | OUTPATIENT
Start: 2017-11-25 | End: 2017-11-26 | Stop reason: HOSPADM

## 2017-11-25 RX ADMIN — GADOBUTROL 4.5 ML: 604.72 INJECTION INTRAVENOUS at 22:17

## 2017-11-25 RX ADMIN — ASPIRIN 325 MG: 325 TABLET, DELAYED RELEASE ORAL at 22:55

## 2017-11-25 RX ADMIN — SODIUM CHLORIDE: 9 INJECTION, SOLUTION INTRAVENOUS at 18:53

## 2017-11-25 RX ADMIN — SODIUM CHLORIDE: 9 INJECTION, SOLUTION INTRAVENOUS at 22:55

## 2017-11-25 ASSESSMENT — ENCOUNTER SYMPTOMS
JOINT SWELLING: 1
FEVER: 0
DIFFICULTY URINATING: 0
MYALGIAS: 0
COUGH: 0
SORE THROAT: 0
WEAKNESS: 1
RHINORRHEA: 0
NUMBNESS: 0
ABDOMINAL PAIN: 0

## 2017-11-25 NOTE — IP AVS SNAPSHOT
[facility]    [address]    [place] [address]    Phone:  [phone]                                       After Visit Summary   11/25/2017    Maryam Saavedra    MRN: [id number]           After Visit Summary Signature Page     I have received my discharge instructions, and my questions have been answered. I have discussed any challenges I see with this plan with the nurse or doctor.    ..........................................................................................................................................  Patient/Patient Representative Signature      ..........................................................................................................................................  Patient Representative Print Name and Relationship to Patient    ..................................................               ................................................  Date                                            Time    ..........................................................................................................................................  Reviewed by Signature/Title    ...................................................              ..............................................  Date                                                            Time

## 2017-11-25 NOTE — IP AVS SNAPSHOT
MRN:[id number]                      After Visit Summary   11/25/2017    Maryam Saavedra    MRN: [id number]           Thank you!     Thank you for choosing [facility] for your care. Our goal is always to provide you with excellent care. Hearing back from our patients is one way we can continue to improve our services. Please take a few minutes to complete the written survey that you may receive in the mail after you visit with us. Thank you!        Patient Information     Date Of Birth          6/9/1932        Designated Caregiver       Most Recent Value    Caregiver    Will someone help with your care after discharge? yes    Name of designated caregiver clare    Phone number of caregiver [phone]    Caregiver address [address] [facility] [address]      About your hospital stay     You were admitted on:  November 25, 2017 You last received care in the:  [facility] [address] [facility]    You were discharged on:  November 26, 2017        Reason for your hospital stay       Impaired gait/balance.                  Who to Call     For medical emergencies, please call 911.  For non-urgent questions about your medical care, please call your primary care provider or clinic, [phone]          Attending Provider     Provider Specialty    Lashell Mak MD Emergency Medicine    ArbabiTiffany MD Internal Medicine       Primary Care Provider Office Phone # Fax #    Truman Norman [phone]       When to contact your care team       Call your primary doctor if you have any of the following: Recurrence of symptoms.                  After Care Instructions     Activity       Your activity upon discharge: activity as tolerated            Diet       Follow this diet upon discharge: Orders Placed This Encounter      Regular Diet Adult                  Follow-up Appointments     Follow-up and recommended labs and tests        Follow up with primary care provider,  "Truman Norman, within 7 days for hospital follow- up.  No follow up labs or test are needed.                  Additional Services     Occupational Therapy Referral       *This therapy referral will be filtered to a centralized scheduling office at [facility] and the patient will receive a call to schedule an appointment at a [facility] location most convenient for them. *     [facility] provides Occupational Therapy evaluation and treatment and many specialty services across the [facility] system.  If requesting a specialty program, please choose from the list below.    If you have not heard from the scheduling office within 2 business days, please call [phone] for all locations, with the exception of [place], please call [phone].     Treatment: Evaluation & Treatment  Special Instructions/Modalities: to assess balance and gait deficits       Please be aware that coverage of these services is subject to the terms and limitations of your health insurance plan.  Call member services at your health plan with any benefit or coverage questions.      **Note to Provider:  If you are referring outside of [facility] for the therapy appointment, please list the name of the location in the \"special instructions\" above, print the referral and give to the patient to schedule the appointment.            Physical Therapy Referral       *This therapy referral will be filtered to a centralized scheduling office at [facility] and the patient will receive a call to schedule an appointment at a [facility] location most convenient for them. *     [facility] provides Physical Therapy evaluation and treatment and many specialty services across the [facility].  If requesting a specialty program, please choose from the list below.    If you have not heard from the scheduling office within 2 business days, please call [phone] for all " "locations, with the exception of [place], please call [phone].  Treatment: Evaluation & Treatment  Special Instructions/Modalities: skilled OT services to increase safety and IND in ADLs and IADLs and assist with recommendations for level of assist required      Please be aware that coverage of these services is subject to the terms and limitations of your health insurance plan.  Call member services at your health plan with any benefit or coverage questions.      **Note to Provider:  If you are referring outside of [facility] for the therapy appointment, please list the name of the location in the \"special instructions\" above, print the referral and give to the patient to schedule the appointment.                  Pending Results     Date and Time Order Name Status Description    11/26/2017 0954 Vitamin B12 In process             Statement of Approval     Ordered          11/26/17 1436  I have reviewed and agree with all the recommendations and orders detailed in this document.  EFFECTIVE NOW     Approved and electronically signed by:  Mert Mar MD             Admission Information     Date & Time Provider Department Dept. Phone    11/25/2017 Tiffany Rausch MD [address] [facility] [phone]      Your Vitals Were     Blood Pressure Pulse Temperature Respirations Height Weight    138/72 (BP Location: Left arm) 66 97.7  F (36.5  C) (Oral) 16 1.549 m (5' 1\") 47.5 kg (104 lb 12.8 oz)    Pulse Oximetry BMI (Body Mass Index)                99% 19.8 kg/m2          [employer] Information     [employer] lets you send messages to your doctor, view your test results, renew your prescriptions, schedule appointments and more. To sign up, go to [email or web address] . Click on \"Log in\" on the left side of the screen, which will take you to the Welcome page. Then click on \"Sign up Now\" on the right side of the page.     You will be asked to enter the access code listed below, as well as some " personal information. Please follow the directions to create your username and password.     Your access code is: [email or web address]  Expires: 2017  8:46 AM     Your access code will  in 90 days. If you need help or a new code, please call your [facility] clinic or [phone].        Care EveryWhere ID     This is your Care EveryWhere ID. This could be used by other organizations to access your [facility] medical records  [id number]        Equal Access to Services     MACKENZIE SCHULTZ : Hadii jefferson trivedi hadasho Soomaali, waaxda luqadaha, qaybta kaalmada adeegyada, waxyahaira paz . So [facility] [phone].    ATENCIÓN: Si habla español, tiene a bond disposición servicios gratuitos de asistencia lingüística. Llame al [phone].    We comply with applicable federal civil rights laws and [place] laws. We do not discriminate on the basis of race, color, national origin, age, disability, sex, sexual orientation, or gender identity.               Review of your medicines      START taking        Dose / Directions    order for DME   Used for:  Leg weakness, bilateral   Notes to Patient:  Please use your wheeled walker for your safety!        Equipment being ordered: Walker Wheels () and Walker () Treatment Diagnosis: impaired balance   Quantity:  1 each   Refills:  0         CONTINUE these medicines which have NOT CHANGED        Dose / Directions    amLODIPine 2.5 MG tablet   Commonly known as:  NORVASC   Used for:  Benign essential hypertension        TAKE 1 TABLET BY MOUTH EVERY DAY   Quantity:  90 tablet   Refills:  2       aspirin 81 MG chewable tablet        Dose:  162 mg   Take 2 tablets (162 mg) by mouth daily   Refills:  0       calcium-vitamin D 600-400 MG-UNIT per tablet   Commonly known as:  CALTRATE        Dose:  1 tablet   Take 1 tablet by mouth 2 times daily.   Refills:  0       TOPROL XL PO        Dose:  25 mg   Take 25 mg by mouth daily   Refills:  0       traMADol 50 MG  tablet   Commonly known as:  ULTRAM   Used for:  Sacroiliac joint pain        Dose:  50 mg   Take 1 tablet (50 mg) by mouth every 8 hours as needed for severe pain   Quantity:  30 tablet   Refills:  0       triamterene-hydrochlorothiazide 37.5-25 MG per tablet   Commonly known as:  MAXZIDE-25   Used for:  Benign essential hypertension        TAKE 1/2 TABLET BY MOUTH ONCE A DAY   Quantity:  45 tablet   Refills:  2            Where to get your medicines      Some of these will need a paper prescription and others can be bought over the counter. Ask your nurse if you have questions.     Bring a paper prescription for each of these medications     order for DME                Protect others around you: Learn how to safely use, store and throw away your medicines at www.disposemymeds.org.             Medication List: This is a list of all your medications and when to take them. Check marks below indicate your daily home schedule. Keep this list as a reference.      Medications           Morning Afternoon Evening Bedtime As Needed    amLODIPine 2.5 MG tablet   Commonly known as:  NORVASC   TAKE 1 TABLET BY MOUTH EVERY DAY   Last time this was given:  2.5 mg on 11/26/2017  8:52 AM   Next Dose Due:  Tomorrow morning.                                   aspirin 81 MG chewable tablet   Take 2 tablets (162 mg) by mouth daily   Next Dose Due:  Tomorrow morning.                                   calcium-vitamin D 600-400 MG-UNIT per tablet   Commonly known as:  CALTRATE   Take 1 tablet by mouth 2 times daily.   Next Dose Due:  This evening.                                      order for DME   Equipment being ordered: Walker Wheels () and Walker () Treatment Diagnosis: impaired balance   Notes to Patient:  Please use your wheeled walker for your safety!                                TOPROL XL PO   Take 25 mg by mouth daily   Last time this was given:  25 mg on 11/26/2017  8:52 AM   Next Dose Due:  Tomorrow morning.                                    traMADol 50 MG tablet   Commonly known as:  ULTRAM   Take 1 tablet (50 mg) by mouth every 8 hours as needed for severe pain   Next Dose Due:  Anytime today as needed for pain.                            Anytime today as needed for pain.         triamterene-hydrochlorothiazide 37.5-25 MG per tablet   Commonly known as:  MAXZIDE-25   TAKE 1/2 TABLET BY MOUTH ONCE A DAY   Next Dose Due:  Tomorrow morning.                                             More Information        Peripheral Neuropathy  Peripheral neuropathy is a condition that affects the nerves of the arms or legs. It causes a change in physical feeling. Sometimes it causes weakness in the muscles. You may feel tingling, numbness or shooting pains. Symptoms may be more common at night. Skin may be extra sensitive to light touch or temperature changes.  Neuropathy may be a complication of a chronic disease such as diabetes. A ruptured disk with pressure on the spinal nerve may also lead to the problem. Certain vitamin deficiencies may lead to it. It may also be caused by exposure to certain drugs or chemicals.  Home care    Tell the healthcare provider about all medicines you take. This includes prescription and over-the-counter medicines, vitamins, and herbs. Ask if any of the medicines may be causing your problems. Do not make any changes to prescription medicines without talking to your healthcare provider first.    You may be prescribed medicines to help relieve the tingling feeling or for pain. Take all medicines as directed.    A numb hand or foot may be more prone to injury. To help protect it:    Always use oven mitts.    Test water with an unaffected hand or foot.    Use caution when trimming nails. File sharp areas.    Wear shoes that fit well to avoid pressure points, blisters, and ulcers.    Inspect your hands and feet carefully (including the soles of your feet and between your toes) at least once a week. If you see  red areas, sores, or other problems, tell your healthcare provider.  Follow-up care  Follow up with your doctor or as advised by our staff. You may need further testing or evaluation.  When to seek medical advice  Call your healthcare provider right away if any of the following occur:    Redness, swelling, cracking, or ulcer on any numb area, especially the feet    New symptoms of numbness or muscle weakness numbness    Loss of bowel or bladder control    Slurred speech, confusion, or trouble speaking, walking, or seeing  Date Last Reviewed: 9/26/2015 2000-2017 [employer]. [address] [place] [address]. All rights reserved. This information is not intended as a substitute for professional medical care. Always follow your healthcare professional's instructions.                What is Peripheral Neuropathy?     Peripheral neuropathy symptoms often start in the toes and move up the foot.   Peripheral neuropathy is a disease of the nerves. It most often starts in your feet and may also eventually affect the arms. Sensory, motor, or both functions may be affected.  It may cause pain or make you unable to sense pain. Sometimes, weakness occurs as well. Lack of pain and weakness makes you more likely to injure yourself without knowing it. But you can learn ways to protect your feet from injury.  When nerves are diseased  Nerves in your feet carry signals to your brain. Your brain reads those signals and interprets them as sensations. When nerves in your feet are diseased, signals may be disrupted or changed. The result may be a lack of feeling (numbness) in your feet or other symptoms (tingling or pain) of peripheral neuropathy.  Symptoms mask pain  Symptoms of peripheral neuropathy usually begin in your toes. The symptoms slowly spread up your feet and legs as more nerve is affected. These symptoms may decrease sensation in your feet or mask pain. Without pain, you may not notice a cut or  even a bone fracture. Cuts may become infected. Fractures may heal poorly and lead to foot deformity.  Common causes of peripheral neuropathy  Some common causes of peripheral neuropathy include:    Diabetes or other endocrine disorders    Toxins (such as alcohol)    Nutritional deficiencies (such as Vitamin B-12)    Kidney disease    Injury    Repetitive stress (such as carpal tunnel syndrome)    Autoimmune disease    Cancer and tumors    Infection  Diagnosis and treatment  Diagnosis of peripheral neuropathy includes a complete history and physical exam.  Lab tests including blood work and imaging often help determine the cause.  Special nerve tests are often helpful including nerve conduction velocity studies (NCV), and electromyelography (EMG).  Treatment focuses on teating the underlying disorder and treating the symptoms using medications, injections, TENS (transcutaneous electrical nerve stimulation), acupuncture, massage, and others.  Date Last Reviewed: 10/19/2015    7945-4740 [employer]. [address] [place] [address]. All rights reserved. This information is not intended as a substitute for professional medical care. Always follow your healthcare professional's instructions.                Fall Prevention  Falls often occur due to slipping, tripping or losing your balance. Millions of people fall every year and injure themselves. Here are ways to reduce your risk of falling again.    Think about your fall, was there anything that caused your fall that can be fixed, removed, or replaced?    Make your home safe by keeping walkways clear of objects you may trip over.    Use non-slip pads under rugs. Do not use area rugs or small throw rugs.    Use non-slip mats in bathtubs and showers.    Install handrails and lights on staircases.    Do not walk in poorly lit areas.    Do not stand on chairs or wobbly ladders.    Use caution when reaching overhead or looking upward. This position  can cause a loss of balance.    Be sure your shoes fit properly, have non-slip bottoms and are in good condition.     Wear shoes both inside and out. Avoid going barefoot or wearing slippers.    Be cautious when going up and down stairs, curbs, and when walking on uneven sidewalks.    If your balance is poor, consider using a cane or walker.    If your fall was related to alcohol use, stop or limit alcohol intake.     If your fall was related to use of sleeping medicines, talk to your doctor about this. You may need to reduce your dosage at bedtime if you awaken during the night to go to the bathroom.      To reduce the need for nighttime bathroom trips:    Avoid drinking fluids for several hours before going to bed    Empty your bladder before going to bed    Men can keep a urinal at the bedside    Stay as active as you can. Balance, flexibility, strength, and endurance all come from exercise. They all play a role in preventing falls. Ask your healthcare provider which types of activity are right for you.    Get your vision checked on a regular basis.    If you have pets, know where they are before you stand up or walk so you don't trip over them.    Use night lights.  Date Last Reviewed: 11/5/2015 2000-2017 [employer]. [address] [facility], [place] [address]. All rights reserved. This information is not intended as a substitute for professional medical care. Always follow your healthcare professional's instructions.

## 2017-11-26 ENCOUNTER — APPOINTMENT (OUTPATIENT)
Dept: OCCUPATIONAL THERAPY | Facility: CLINIC | Age: 82
End: 2017-11-26
Attending: INTERNAL MEDICINE
Payer: MEDICARE

## 2017-11-26 ENCOUNTER — APPOINTMENT (OUTPATIENT)
Dept: PHYSICAL THERAPY | Facility: CLINIC | Age: 82
End: 2017-11-26
Attending: INTERNAL MEDICINE
Payer: MEDICARE

## 2017-11-26 VITALS
HEART RATE: 66 BPM | DIASTOLIC BLOOD PRESSURE: 72 MMHG | SYSTOLIC BLOOD PRESSURE: 138 MMHG | WEIGHT: 104.8 LBS | OXYGEN SATURATION: 99 % | BODY MASS INDEX: 19.79 KG/M2 | HEIGHT: 61 IN | RESPIRATION RATE: 16 BRPM | TEMPERATURE: 97.7 F

## 2017-11-26 LAB
ANION GAP SERPL CALCULATED.3IONS-SCNC: 9 MMOL/L (ref 3–14)
BUN SERPL-MCNC: 24 MG/DL (ref 7–30)
CALCIUM SERPL-MCNC: 9.2 MG/DL (ref 8.5–10.1)
CHLORIDE SERPL-SCNC: 105 MMOL/L (ref 94–109)
CHOLEST SERPL-MCNC: 216 MG/DL
CO2 SERPL-SCNC: 28 MMOL/L (ref 20–32)
CREAT SERPL-MCNC: 0.74 MG/DL (ref 0.52–1.04)
CRP SERPL-MCNC: 5 MG/L (ref 0–8)
DEPRECATED CALCIDIOL+CALCIFEROL SERPL-MC: 51 UG/L (ref 20–75)
ERYTHROCYTE [DISTWIDTH] IN BLOOD BY AUTOMATED COUNT: 13.9 % (ref 10–15)
GFR SERPL CREATININE-BSD FRML MDRD: 75 ML/MIN/1.7M2
GLUCOSE BLDC GLUCOMTR-MCNC: 113 MG/DL (ref 70–99)
GLUCOSE BLDC GLUCOMTR-MCNC: 88 MG/DL (ref 70–99)
GLUCOSE SERPL-MCNC: 106 MG/DL (ref 70–99)
HCT VFR BLD AUTO: 35.4 % (ref 35–47)
HDLC SERPL-MCNC: 120 MG/DL
HGB BLD-MCNC: 11.6 G/DL (ref 11.7–15.7)
INTERPRETATION ECG - MUSE: NORMAL
LDLC SERPL CALC-MCNC: 85 MG/DL
MCH RBC QN AUTO: 29.7 PG (ref 26.5–33)
MCHC RBC AUTO-ENTMCNC: 32.8 G/DL (ref 31.5–36.5)
MCV RBC AUTO: 91 FL (ref 78–100)
NONHDLC SERPL-MCNC: 96 MG/DL
PLATELET # BLD AUTO: 217 10E9/L (ref 150–450)
POTASSIUM SERPL-SCNC: 3.5 MMOL/L (ref 3.4–5.3)
RBC # BLD AUTO: 3.9 10E12/L (ref 3.8–5.2)
SODIUM SERPL-SCNC: 142 MMOL/L (ref 133–144)
T4 FREE SERPL-MCNC: 1.05 NG/DL (ref 0.76–1.46)
TRIGL SERPL-MCNC: 55 MG/DL
TSH SERPL DL<=0.005 MIU/L-ACNC: 4.2 MU/L (ref 0.4–4)
VIT B12 SERPL-MCNC: 1055 PG/ML (ref 193–986)
WBC # BLD AUTO: 6.3 10E9/L (ref 4–11)

## 2017-11-26 PROCEDURE — G8988 SELF CARE GOAL STATUS: HCPCS | Mod: GO,CJ | Performed by: OCCUPATIONAL THERAPIST

## 2017-11-26 PROCEDURE — 40000133 ZZH STATISTIC OT WARD VISIT: Performed by: OCCUPATIONAL THERAPIST

## 2017-11-26 PROCEDURE — 97116 GAIT TRAINING THERAPY: CPT | Mod: GP | Performed by: PHYSICAL THERAPIST

## 2017-11-26 PROCEDURE — A9270 NON-COVERED ITEM OR SERVICE: HCPCS | Mod: GY | Performed by: INTERNAL MEDICINE

## 2017-11-26 PROCEDURE — 80048 BASIC METABOLIC PNL TOTAL CA: CPT | Performed by: INTERNAL MEDICINE

## 2017-11-26 PROCEDURE — 97535 SELF CARE MNGMENT TRAINING: CPT | Mod: GO | Performed by: OCCUPATIONAL THERAPIST

## 2017-11-26 PROCEDURE — 85027 COMPLETE CBC AUTOMATED: CPT | Performed by: INTERNAL MEDICINE

## 2017-11-26 PROCEDURE — 00000146 ZZHCL STATISTIC GLUCOSE BY METER IP

## 2017-11-26 PROCEDURE — 99217 ZZC OBSERVATION CARE DISCHARGE: CPT | Performed by: HOSPITALIST

## 2017-11-26 PROCEDURE — 82607 VITAMIN B-12: CPT | Performed by: PSYCHIATRY & NEUROLOGY

## 2017-11-26 PROCEDURE — 97161 PT EVAL LOW COMPLEX 20 MIN: CPT | Mod: GP | Performed by: PHYSICAL THERAPIST

## 2017-11-26 PROCEDURE — G8987 SELF CARE CURRENT STATUS: HCPCS | Mod: GO,CJ | Performed by: OCCUPATIONAL THERAPIST

## 2017-11-26 PROCEDURE — 40000193 ZZH STATISTIC PT WARD VISIT: Performed by: PHYSICAL THERAPIST

## 2017-11-26 PROCEDURE — 40000895 ZZH STATISTIC SLP IP EVAL DEFER: Performed by: SPEECH-LANGUAGE PATHOLOGIST

## 2017-11-26 PROCEDURE — G0378 HOSPITAL OBSERVATION PER HR: HCPCS

## 2017-11-26 PROCEDURE — 36415 COLL VENOUS BLD VENIPUNCTURE: CPT | Performed by: INTERNAL MEDICINE

## 2017-11-26 PROCEDURE — 97530 THERAPEUTIC ACTIVITIES: CPT | Mod: GP | Performed by: PHYSICAL THERAPIST

## 2017-11-26 PROCEDURE — 25000132 ZZH RX MED GY IP 250 OP 250 PS 637: Mod: GY | Performed by: INTERNAL MEDICINE

## 2017-11-26 PROCEDURE — 97165 OT EVAL LOW COMPLEX 30 MIN: CPT | Mod: GO | Performed by: OCCUPATIONAL THERAPIST

## 2017-11-26 PROCEDURE — G8989 SELF CARE D/C STATUS: HCPCS | Mod: GO,CJ | Performed by: OCCUPATIONAL THERAPIST

## 2017-11-26 PROCEDURE — 25000128 H RX IP 250 OP 636: Performed by: INTERNAL MEDICINE

## 2017-11-26 PROCEDURE — 36415 COLL VENOUS BLD VENIPUNCTURE: CPT | Performed by: PSYCHIATRY & NEUROLOGY

## 2017-11-26 RX ORDER — METOPROLOL SUCCINATE 25 MG/1
25 TABLET, EXTENDED RELEASE ORAL DAILY
Status: DISCONTINUED | OUTPATIENT
Start: 2017-11-26 | End: 2017-11-26 | Stop reason: HOSPADM

## 2017-11-26 RX ORDER — AMLODIPINE BESYLATE 2.5 MG/1
2.5 TABLET ORAL DAILY
Status: DISCONTINUED | OUTPATIENT
Start: 2017-11-26 | End: 2017-11-26 | Stop reason: HOSPADM

## 2017-11-26 RX ADMIN — SODIUM CHLORIDE: 9 INJECTION, SOLUTION INTRAVENOUS at 06:00

## 2017-11-26 RX ADMIN — METOPROLOL SUCCINATE 25 MG: 25 TABLET, EXTENDED RELEASE ORAL at 08:52

## 2017-11-26 RX ADMIN — AMLODIPINE BESYLATE 2.5 MG: 2.5 TABLET ORAL at 08:52

## 2017-11-26 RX ADMIN — ASPIRIN 325 MG: 325 TABLET, DELAYED RELEASE ORAL at 08:56

## 2017-11-26 NOTE — PLAN OF CARE
Problem: Patient Care Overview  Goal: Plan of Care/Patient Progress Review  PT: Orders received, eval and treatment completed.  Pt admitted after episode of unsteady gait, work up so far unremarkable.  Pt resides in house with son (son works during the day and pt home alone during these times); reports IND with all functional mobility and ADLs at baseline.  Admits to x1 fall in last 6 months.    Discharge Planner PT   Patient plan for discharge: None stated  Current status: IND supine <> sit, SBA/CGA sit <> stand, CGA with ambulation without AD, with FWW, progressed to SBA x130'.  CGA x3 steps with 1-2 rails  Barriers to return to prior living situation: None anticipated  Recommendations for discharge: Home with assist, use FWW, and OPPT   Rationale for recommendations: Pt would benefit from assistance at home with the stairs and OPPT to address balance and gait deficits.  Pt will need FWW prior to discharge, order placed in chart.  Please call [phone] prior to pt's departure and before 1630 to have rehab aide issue.       Entered by: Fany Silvestre 11/26/2017 9:59 AM     Physical Therapy Discharge Summary    Reason for therapy discharge:    All goals and outcomes met, no further needs identified.    Progress towards therapy goal(s). See goals on Care Plan in [facility] electronic health record for goal details.  Goals met    Therapy recommendation(s):    Continued therapy is recommended.  Rationale/Recommendations:  OPPT to address balance and gait deficits..

## 2017-11-26 NOTE — ED NOTES
"[facility]  ED Nurse Handoff Report    ED Chief complaint: Extremity Weakness      ED Diagnosis:   Final diagnoses:   Leg weakness, bilateral       Code Status: Full Code    Allergies:   Allergies   Allergen Reactions     Ace Inhibitors      Angioedema       Cyclobenzaprine      Angioedema       Activity level - Baseline/Home:  Independent    Activity Level - Current:   Stand with Assist of 2     Needed?: No    Isolation: No  Infection: Not Applicable    Bariatric?: No    Vital Signs:   Vitals:    11/25/17 1833 11/25/17 2032   BP: 143/89 (!) 143/101   Pulse: 96 101   Resp: 20 16   Temp: 98.2  F (36.8  C)    TempSrc: Oral    SpO2: 93% 96%   Weight: 45.4 kg (100 lb)    Height: 1.6 m (5' 3\")        Cardiac Rhythm: ,        Pain level:      Is this patient confused?: No    Patient Report: Initial Complaint: leg weakness  Focused Assessment: Maryam Saavedra is a 85 year old female, with a history of hypertension, osteoarthritis, and on baby Aspirin, who presents with her daughter to the ED for evaluation of lower extremity weakness. Per EMS, the patient felt weak today. The patient went shopping and came home at 2:00PM with increased weakness. The patient has bilateral leg weakness that caused ambulation problems; the patient does not use a cane or walker. The patient could lift her legs, but needed assistance with ambulation. The patient s upper body strength is normal; the patient reports she feels strong. During transport, the patient s vitals were temp 97, blood glucose 151, and blood pressure 130/60. Upon evaluation, the patient reports she felt fine yesterday. The patient notes her weakness began today. The patient reports her ankles are more swollen than normal, left more than right. The patient notes she had ear pain a couple days ago which resolved. The patient denies any fall, loss of consciousness, double vision, fevers, cough, sore throat, congestion, rhinorrhea, arm pain, " abdominal pain, difficulty urinating, or genital numbness.   Tests Performed: blood work, ekg  Abnormal Results: none  Treatments provided: IV fluids, walked and continues to have generalized weakness    Family Comments: daughter at bedside    OBS brochure/video discussed/provided to patient: Yes    ED Medications:   Medications   0.9% sodium chloride infusion ( Intravenous New Bag 11/25/17 2326)       Drips infusing?:  Yes      ED NURSE PHONE NUMBER: *[phone]

## 2017-11-26 NOTE — PROGRESS NOTES
[facility]      OUTPATIENT OCCUPATIONAL THERAPY  EVALUATION  PLAN OF TREATMENT FOR OUTPATIENT REHABILITATION  (COMPLETE FOR INITIAL CLAIMS ONLY)  Patient's Last Name, First Name, M.I.  YOB: 1932  Maryam Saavedra                          Provider's Name  [facility] Medical Record No.  [id number]                               Onset Date:     11/25/17 Start of Care Date:   11/26/17     Type:     ___PT   _X_OT   ___SLP Medical Diagnosis:   LE weakness                        OT Diagnosis:     Reduced IND in ADLs Visits from SOC:  1   _________________________________________________________________________________  Plan of Treatment/Functional Goals    Planned Interventions:  ,       Goals: See Occupational Therapy Goals on Care Plan in [facility] electronic health record.    Therapy Frequency:    Predicted Duration of Therapy Intervention:    _________________________________________________________________________________    I CERTIFY THE NEED FOR THESE SERVICES FURNISHED UNDER        THIS PLAN OF TREATMENT AND WHILE UNDER MY CARE     (Physician co-signature of this document indicates review and certification of the therapy plan).                   ,                   Initial Assessment        See Occupational Therapy evaluation dated   in Epic electronic health record.

## 2017-11-26 NOTE — PLAN OF CARE
A/O x4; needs repetition.VSS, CMS intact, LS are clear, BS present, denies pain. RIGHT leg weakness as baseline. Regular diet; up A1 +GB. Tele NSR. Plan to DC home with outside PT/OT

## 2017-11-26 NOTE — PLAN OF CARE
Problem: Patient Care Overview  Goal: Plan of Care/Patient Progress Review  Outcome: No Change  Patient arrived at the unit around 2230 with the daughter. Patient was settled. NIH done. High Bp. Patient passed swallowing test. Regular diet. Denied pain. Continue to monitor

## 2017-11-26 NOTE — PHARMACY-ADMISSION MEDICATION HISTORY
Admission medication history interview status for the 11/25/2017  admission is complete. See EPIC admission navigator for prior to admission medications     Medication history source reliability:Good    Actions taken by pharmacist (provider contacted, etc):None     Additional medication history information not noted on PTA med list :None    Medication reconciliation/reorder completed by provider prior to medication history? No    Time spent in this activity: 15 minutes    Prior to Admission medications    Medication Sig Last Dose Taking? Auth Provider   Metoprolol Succinate (TOPROL XL PO) Take 25 mg by mouth daily 11/24/2017 at pm Yes Unknown, Entered By History   traMADol (ULTRAM) 50 MG tablet Take 1 tablet (50 mg) by mouth every 8 hours as needed for severe pain 11/25/2017 at am Yes Truman Norman MD   aspirin 81 MG chewable tablet Take 2 tablets (162 mg) by mouth daily 11/24/2017 at pm Yes Truman Norman MD   triamterene-hydrochlorothiazide (MAXZIDE-25) 37.5-25 MG per tablet TAKE 1/2 TABLET BY MOUTH ONCE A DAY 11/25/2017 at am Yes Truman Norman MD   amLODIPine (NORVASC) 2.5 MG tablet TAKE 1 TABLET BY MOUTH EVERY DAY 11/25/2017 at am Yes Truman Norman MD   calcium-vitamin D (CALTRATE) 600-400 MG-UNIT per tablet Take 1 tablet by mouth 2 times daily. 11/25/2017 at am Yes Reported, Patient

## 2017-11-26 NOTE — PLAN OF CARE
Problem: Patient Care Overview  Goal: Plan of Care/Patient Progress Review    SLP - Chart was reviewed, and RN was consulted this am.  RN reports no speech-language or swallowing deficits.  Patient is currently tolerating a regular diet.  MRI was also negative for CVI.  Plan to discontinue SLP orders given no SLP needs.

## 2017-11-26 NOTE — ED NOTES
Bed: ED26  Expected date:   Expected time:   Means of arrival:   Comments:  425 increase leg weakness

## 2017-11-26 NOTE — PROGRESS NOTES
11/26/17 0900   Quick Adds   Type of Visit Initial PT Evaluation   Living Environment   Lives With child(staci), adult   Living Arrangements house   Home Accessibility stairs to enter home   Number of Stairs to Enter Home 3  (no rail)   Number of Stairs Within Home (needs met on main level)   Transportation Available car;family or friend will provide  (pt drives)   Living Environment Comment Lives with adult son, son works and pt is home alone during those times, pt still drives this date   Self-Care   Usual Activity Tolerance good   Current Activity Tolerance moderate   Equipment Currently Used at Home none   Functional Level Prior   Ambulation 0-->independent   Transferring 0-->independent   Toileting 0-->independent   Bathing 0-->independent   Dressing 0-->independent   Eating 0-->independent   Communication 0-->understands/communicates without difficulty   Swallowing 0-->swallows foods/liquids without difficulty   Cognition 0 - no cognition issues reported   Fall history within last six months yes   Number of times patient has fallen within last six months 1   Which of the above functional risks had a recent onset or change? ambulation;fall history   Prior Functional Level Comment Reports IND with functional mobility and ADLs at baseline, admits to having difficulty with vern-cares recently   General Information   Onset of Illness/Injury or Date of Surgery - Date 11/25/17   Referring Physician Tiffany Rausch MD   Patient/Family Goals Statement none stated   Pertinent History of Current Problem (include personal factors and/or comorbidities that impact the POC) Pt admitted after episode of unsteady gait, so far, work up unremarkable, see chart for PMH   Precautions/Limitations fall precautions   General Info Comments Activity: Ambulate with assist   Cognitive Status Examination   Orientation orientation to person, place and time   Level of Consciousness alert   Follows Commands and Answers Questions 75% of  "the time   Personal Safety and Judgment impaired   Memory impaired   Cognitive Comment Noted to have difficulty sequencing vern-cares, clothing management, and washing hands in bathroom, defer to OT for higher level cog assessment   Pain Assessment   Patient Currently in Pain No   Posture    Posture Forward head position;Protracted shoulders;Kyphosis   Range of Motion (ROM)   ROM Comment WFL in BLE   Strength   Strength Comments Appeared WFL in BLE, reports RLE baseline weaker than LLE d/t past leg injuries   Bed Mobility   Bed Mobility Comments IND supine <> sit   Transfer Skills   Transfer Comments SBA sit <> Stand, supervision with FWW   Gait   Gait Comments CGA without AD, SBA with FWW x130', SBA on steps with 1 rail   Balance   Balance Comments poor without AD, improved overal balance with FWW, less path deviations noted   Sensory Examination   Sensory Perception Comments denies N/T   General Therapy Interventions   Planned Therapy Interventions balance training;gait training;risk factor education;home program guidelines;progressive activity/exercise   Clinical Impression   Criteria for Skilled Therapeutic Intervention yes, treatment indicated   PT Diagnosis impaired balance   Influenced by the following impairments functional weakness, balance, cognition?   Functional limitations due to impairments ambulation, stairs   Clinical Presentation Stable/Uncomplicated   Clinical Presentation Rationale stable clinical picture this date (pain, vitals, neuro)   Clinical Decision Making (Complexity) Low complexity   Therapy Frequency` daily   Predicted Duration of Therapy Intervention (days/wks) Eval + 1 treat   Anticipated Equipment Needs at Discharge walker   Anticipated Discharge Disposition Home with Assist;Home with Outpatient Therapy   Risk & Benefits of therapy have been explained Yes   Patient, Family & other staff in agreement with plan of care Yes   [facility] AM-PAC TM \"6 Clicks\"   2016, Trustees of " "[facility], under license to [employer].  All rights reserved.   6 Clicks Short Forms Basic Mobility Inpatient Short Form   [facility] AM-PAC  \"6 Clicks\" V.2 Basic Mobility Inpatient Short Form   1. Turning from your back to your side while in a flat bed without using bedrails? 4 - None   2. Moving from lying on your back to sitting on the side of a flat bed without using bedrails? 4 - None   3. Moving to and from a bed to a chair (including a wheelchair)? 3 - A Little   4. Standing up from a chair using your arms (e.g., wheelchair, or bedside chair)? 3 - A Little   5. To walk in hospital room? 3 - A Little   6. Climbing 3-5 steps with a railing? 3 - A Little   Basic Mobility Raw Score (Score out of 24.Lower scores equate to lower levels of function) 20   Total Evaluation Time   Total Evaluation Time (Minutes) 21     "

## 2017-11-26 NOTE — CONSULTS
[facility]  [facility]    Neurology Consultation    Maryam Saavedra MRN# [id number]   YOB: 1932 Age: 85 year old    Code Status:Full Code   Date of Admission: 11/25/2017  Date of Consult:11/26/2017                                                                                       Assessment and Plan:                                         #.Acute decompensation of balance/walking/weakness yesterday, much improved today, but not at baseline.    Findings of significant peripheral neuropathy, likely chronic.  Lumbar degenerative spine and osteoarthritis are contributory.   --As workup is negative for acute stroke, and she is returning to her usual baseline, it is reasonable for her to be discharged  And follow up with her primary provider as an outpatient.  As workup of Peripheral neuropathy, I have added on vitamin B-12.    Agree with plan for use of walker and physical therapyAs outpatient.  I reviewed the diagnosis of peripheral neuropathy and Gait disorder with the patient and her daughter.  We discussed safety concerns with regard to fall risk  And other long-term concerns of peripheral neuropathy.  Impaired glucose tolerance has been noted on the patient's previous record-prediabetes can be associated with peripheral neuropathy and would recommend that this continue to be optimized    Reasonable for patient to be discharged, if Dr. Norman or  patient/her family would find it necessary, I can see and follow up as an outpatient        ----------------------------------------------------------------------------------  ----------------------------------------------------------------------------------  Reason for consult: I was asked by Dr. Rausch  to evaluate this patient for acute walking difficulty.       Chief Complaint:   Balance difficulty  History is obtained from the patient / chart       History of Present Illness:   This patient is a 85 year  old female who presents with acute balance change yesterday at about 2pm. She had had a busy morning and had not had anything to eat since the night before.  She began to feel quite shaky and weak and found that her balance was quite severely affected.  She also recalls that earlier that morning she had back pain for which he took a dose of tramadol.  Patient and her daughter were interviewed at the bedside.  She has had balance difficulty and a few falls over the last few years.  She has chronic issues with low back pain and arthritis affecting the right greater than left leg.  She does not use a cane or a walker.  She lives with her son.  Her living situation does not require her to go up stairs.  Denies any neck pain.  Denies any focal weakness, slurred speech or visual changes.   Overnight, She reports that she has much improved.   She was up with physical therapy using a walker.  The physical therapist has recommended that she use a walker consistently for balance..       Past Medical History:     Past Medical History:   Diagnosis Date     Benign essential hypertension 9/1/2016     Chronic low back pain      Hypertension      Impaired fasting glucose     borderline     Osteoarthritis      Tricuspid regurgitation          Past Surgical History:     Past Surgical History:   Procedure Laterality Date     EYE SURGERY  cataracts     MOHS MICROGRAPHIC PROCEDURE      Left lower eyelid      MOHS MICROGRAPHIC PROCEDURE Left 10/27/2016    Procedure: MOHS MICROGRAPHIC PROCEDURE;  Surgeon: Jose Torrez MD;  Location: [facility]     ORTHOPEDIC SURGERY      bilateral wrists          Social History:     Social History     Social History     Marital status:      Spouse name: N/A     Number of children: N/A     Years of education: N/A     Social History Main Topics     Smoking status: Former Smoker     Smokeless tobacco: Former User     Quit date: 1/1/1997     Alcohol use 0.0 oz/week     0 Standard drinks or equivalent  per week      Comment: occasional     Drug use: No     Sexual activity: No     Other Topics Concern     Not on file     Social History Narrative    Lives with son     Patient denies smoking, no significant alcohol intake, denies illicit drugs use       Family History:     Family History   Problem Relation Age of Onset     DIABETES Mother      DIABETES Father      Reviewed and not felt to be contributory.        Home Medications:     Prior to Admission Medications   Prescriptions Last Dose Informant Patient Reported? Taking?   Metoprolol Succinate (TOPROL XL PO) 11/24/2017 at pm Self Yes Yes   Sig: Take 25 mg by mouth daily   amLODIPine (NORVASC) 2.5 MG tablet 11/25/2017 at am Self No Yes   Sig: TAKE 1 TABLET BY MOUTH EVERY DAY   aspirin 81 MG chewable tablet 11/24/2017 at pm Self Yes Yes   Sig: Take 2 tablets (162 mg) by mouth daily   calcium-vitamin D (CALTRATE) 600-400 MG-UNIT per tablet 11/25/2017 at am Self Yes Yes   Sig: Take 1 tablet by mouth 2 times daily.   traMADol (ULTRAM) 50 MG tablet 11/25/2017 at am Self No Yes   Sig: Take 1 tablet (50 mg) by mouth every 8 hours as needed for severe pain   triamterene-hydrochlorothiazide (MAXZIDE-25) 37.5-25 MG per tablet 11/25/2017 at am Self No Yes   Sig: TAKE 1/2 TABLET BY MOUTH ONCE A DAY      Facility-Administered Medications: None          Allergy:     Allergies   Allergen Reactions     Ace Inhibitors      Angioedema       Cyclobenzaprine      Angioedema          Inpatient Medications:   Scheduled Meds:    amLODIPine  2.5 mg Oral Daily     metoprolol (TOPROL-XL) 24 hr tablet 25 mg  25 mg Oral Daily     aspirin EC  325 mg Oral Daily    Or     aspirin  300 mg Rectal Daily     PRN Meds: acetaminophen, acetaminophen, naloxone, ondansetron **OR** ondansetron, - MEDICATION INSTRUCTIONS -, senna-docusate **OR** senna-docusate, polyethylene glycol, labetalol, hydrALAZINE, HYDROmorphone, melatonin, potassium chloride, potassium chloride, potassium chloride, potassium  "chloride with lidocaine, traMADol        Review of Systems    Chronic bladder control problems for which he uses several pads per day-has done so for the last few years, denies memory problem  A comprehensive review of  10 systems was performed and found to be negative except as described in this note  CONSTITUTIONAL: negative for fever, chills, change in weight  INTEGUMENTARY/SKIN: no rash or obvious new lesions  ENT/MOUTH: no sore throat, new sinus pain or nasal drainage, no neck mass noted  RESP: No pleuretic pain, No cough, no hemoptysis, No SOB   CV: negative for chest pain, palpitations or peripheral edema  GI: no nausea, vomiting, change in stools  : no dysuria or hematuria  MUSCULOSKELETAL: no myalgias, arthralgias or join efffusion  ENDOCRINE: no history of polyuria, polydyspsia or symptoms of thyroid dysfunction  PSYCHIATRIC: no change in mood stable  LYMPHATIC: no new lymphadenopathy  HEME: no bleeding or easy bruisability  NEURO: see HPI       Physical Exam:   Physical Exam   Vitals:  Height:5' 1\"  Weight:104 lbs 12.8 oz   Temp: 98.4  F (36.9  C) Temp src: Oral BP: (!) 141/96 (prior to medication) Pulse: 68   Resp: 17 SpO2: 95 % O2 Device: None (Room air)    General Appearance:  No acute distress  Neuro:       Mental Status Exam:    Alert and oriented.  Language and speech intact       Cranial Nerves:   Two through 12 are intact.  Fundus technically difficult         Motor:   Interossei/digit extensors 4/4,deltoid 4/3, biceps, triceps 5/5, toe extensors 4/4, right proximal leg weakness related to pain otherwise lower extremity strength intact.  No atrophy, toe normal ×4          Reflexes:  Absent       Sensory:   Reflexes at the knees and ankles, reduced in the arms symmetrically, plantar signs normal bilaterally, no clonus.  Vibration  Is absent at the ankles and toes bilaterally, present at the knees and hands, pinprick is reduced in a stocking-like distribution bilaterally                 " Coordination:   Mild ataxia with heel-to-shin testing, finger to nose is intact       Gait:  With walker-unsteady,, but walks independently with a walker, antalgic  Neck: no nuchal rigidity, normal thyroid. No carotid bruits.    Cardiovascular: Regular rate and rhythm, no m/r/g    Extremities: No clubbing, no cyanosis, no edema       Data:   ROUTINE IP LABS   CBC RESULTS:     Recent Labs  Lab 11/26/17 0702 11/25/17 1846   WBC 6.3 9.7   RBC 3.90 3.83   HGB 11.6* 11.6*   HCT 35.4 34.7*    236     Basic Metabolic Panel:   Recent Labs   Lab Test  11/26/17 0702 11/25/17 1846 09/08/17   0900   NA  142  141  142   POTASSIUM  3.5  3.7  4.4   CHLORIDE  105  102  105   CO2  28  30  30   BUN  24  40*  28   CR  0.74  0.80  0.84   GLC  106*  107*  94   NOHEMY  9.2  9.7  9.8     Liver panel:  Recent Labs   Lab Test  11/25/17 1846 09/08/17   0900   PROTTOTAL  7.0  7.3   ALBUMIN  3.5  3.5   BILITOTAL  0.3  0.4   ALKPHOS  101  90   AST  28  25   ALT  26  23     INR:No lab results found.   Lipid Profile:  Recent Labs   Lab Test  11/25/17 2027 09/08/17   0900 09/07/16   0841 08/28/15 04/11/14   CHOL  216*  238*  262*  258*  255*   HDL  120  124  124  132  132   LDL  85  98  123*  113  111*   TRIG  55  78  75  66  62     Thyroid Panel:  Recent Labs   Lab Test  11/25/17 2027 09/08/17   0900   TSH  4.20*  4.66*   T4  1.05   --       A1C:   Recent Labs   Lab Test  09/08/17   0900  09/07/16   0841 04/13/15 10/08/14 10/09/13   A1C  5.4  5.6  4.9  5.4  5.5     Troponin I: No lab results found.  CRP inflammation:   Recent Labs   Lab Test  11/25/17 2027 11/25/17 1846   CRP  5.0  4.2     ESR: No lab results found.    MORA: No lab results found.    ANCA: No lab results found.      IMAGING:   All imaging studies were reviewed personally    MRI brain:   MRI BRAIN WITHOUT AND WITH CONTRAST  11/25/2017 10:19 PM     HISTORY: Stroke/TIA.     TECHNIQUE: Multiplanar, multisequence MRI of the brain without and  with 4.5 mL  Gadavist.     COMPARISON: None.     FINDINGS: There is no evidence of hemorrhage, mass, acute infarct, or  anomaly. Moderate diffuse parenchymal volume loss. Moderate burden of  patchy deep and subcortical white matter T2 hyperintensities which are  nonspecific, but likely related to chronic microvascular ischemic  disease. Ventricular size is proportional to the cerebral sulci. There  are no gadolinium enhancing lesions.     The facial structures appear normal. The arteries at the base of the  brain and the dural venous sinuses appear patent.          IMPRESSION:    1. No evidence of acute infarct, mass, hemorrhage, or herniation.    2. Moderate diffuse parenchymal volume loss and moderate white matter  changes likely due to chronic microvascular ischemic disease

## 2017-11-26 NOTE — ED PROVIDER NOTES
History     Chief Complaint:  Extremity Weakness    HPI   Maryam Saavedra is a 85 year old female, with a history of hypertension, osteoarthritis, and on baby Aspirin, who presents with her daughter to the ED for evaluation of lower extremity weakness. Per EMS, the patient felt weak today. The patient went shopping and came home at 2:00PM with increased weakness. The patient has bilateral leg weakness that caused ambulation problems; the patient does not use a cane or walker. The patient could lift her legs, but needed assistance with ambulation. The patient s upper body strength is normal; the patient reports she feels strong. During transport, the patient s vitals were temp 97, blood glucose 151, and blood pressure 130/60. Upon evaluation, the patient reports she felt fine yesterday. The patient notes her weakness began today. The patient reports her ankles are more swollen than normal, left more than right. The patient notes she had ear pain a couple days ago which resolved. The patient denies any fall, loss of consciousness, double vision, fevers, cough, sore throat, congestion, rhinorrhea, arm pain, abdominal pain, difficulty urinating, or genital numbness.     Allergies:  Ace inhibitors  Cyclobenzaprine     Medications:    traMADol (ULTRAM) 50 MG tablet   aspirin 81 MG chewable tablet   metoprolol (TOPROL-XL) 50 MG 24 hr tablet   triamterene-hydrochlorothiazide (MAXZIDE-25) 37.5-25 MG per tablet   amLODIPine (NORVASC) 2.5 MG tablet   calcium-vitamin D (CALTRATE) 600-400 MG-UNIT per tablet     Past Medical History:    HTN  Chronic low back pain  Osteoarthritis  Tricuspid regurgitation     Past Surgical History:    Bilateral wrists surgery  Left lower eyelid MOHS   Cataracts surgery    Family History:    Diabetes     Social History:  Smoking status: Former smoker, Quit date 1/1/1997  Alcohol use: Occasional  Presents to ED with daughter   Marital Status:   [5]     Review of Systems   Constitutional:  "Negative for fever.   HENT: Negative for congestion, rhinorrhea and sore throat.    Eyes: Negative for visual disturbance.   Respiratory: Negative for cough.    Gastrointestinal: Negative for abdominal pain.   Genitourinary: Negative for difficulty urinating.   Musculoskeletal: Positive for gait problem and joint swelling. Negative for myalgias.   Neurological: Positive for weakness. Negative for syncope and numbness.   All other systems reviewed and are negative.    Physical Exam     Patient Vitals for the past 24 hrs:   BP Temp Temp src Pulse Resp SpO2 Height Weight   11/25/17 2032 (!) 143/101 - - 101 16 96 % - -   11/25/17 1833 143/89 98.2  F (36.8  C) Oral 96 20 93 % 1.6 m (5' 3\") 45.4 kg (100 lb)     Physical Exam  Nursing note and vitals reviewed.  Constitutional:  Appears well-developed and well-nourished, comfortable.   HENT:   Head:   No evidence of facial or scalp trauma.  Mouth/Throat:  Mucosa is moist.  Eyes:    Conjunctivae are normal.      Pupils are equal, round, and reactive to light.      Right eye exhibits no discharge. Left eye exhibits no discharge.      No scleral icterus.   Cardiovascular:  Normal rate, regular rhythm.      Normal heart sounds and intact distal pulses.       No murmur heard.  Pulmonary/Chest:  Effort normal and breath sounds normal. No respiratory distress.     No wheezes. No rales. No chest wall tenderness. No stridor.   Abdominal:   Soft. No distension and no mass. No tenderness.      No rebound and no guarding. No flank pain.  Musculoskeletal:  Normal range of motion.      No edema and no tenderness.                                       Neck supple, no midline cervical tenderness.   Neurological:   GCS 15. NIH stroke scale score 0. O X 3.  No sensory deficit. Normal strength and sensation. Normal coordination. Normal finger to nose.      Normal heel-knee-shin. No hand drift. No leg drift. Visual fields full. EOMs intact. No diplopia. No facial droop. Reflex of the left knee " was one to 2+. I could not get a reflex of the right knee but she has an abnormal patellar tendon on the right. Ankle    jerks were absent bilaterally. She had good gluteal muscle contractions and sphincter tone indirectly.       No slurred speech. Mental status and memory normal. Comprehension and expression of speech is normal. First few steps of her gait was normal, however her legs became weak after 2 or 3 steps and could not bear her weight.  Skin:    Skin is warm and dry. No rash noted. No diaphoresis.      No erythema. No pallor.   Psychiatric:   Behavior is normal. Judgment and thought content normal.     Emergency Department Course     Laboratory:  CRP inflammation: 4.2  CBC: HGB 11.6(L) o/w WNL (WBC 9.7, )  CMP: Glucose 107(H), BUN 40(H), o/w WNL (Creatinine 0.80)    UA: In process     Interventions:  1853: NS 125mL/hr Bolus IV     Emergency Department Course:  Patient arrived by EMS.     1823: I performed an exam of the patient and obtained history, as documented above.    Past medical records, nursing notes, and vitals reviewed.    IV inserted and blood drawn.    1954: I rechecked the patient. Explained findings to patient and daughter.     2004: I consulted with Dr. Degroot of neurology.     2021: I spoke to Dr. Rausch of the hospitalist service who accepts the patient for admission.     2043: I spoke with Dr. Rausch after his evaluation of the patient in the ED.     Findings and plan explained to the Patient and daughter who consents to admission. Discussed the patient with Dr. Rausch, who will admit the patient to a neuro obs bed for further monitoring, evaluation, and treatment.     Impression & Plan      Medical Decision Making:  The patient has bilateral leg weakness. She is not having fevers, no findings to suggest Guillain-Barré syndrome. She has some intermittent back pain which is chronic and took a tramadol this morning for that and her knee pain, but it was no different than normal  and she has no pain at this time. No urinary retention or saddle anesthesia symptoms, she has good ability to contract her sphincter. Her neurologic exam is normal while she lays on the bed with a NIH score of 0. No findings to suggest a stroke. She does not have a headache. However when she got up to walk, after a couple of steps, her legs became very weak and could not bear her weight so she went back into the bed.  It seemed like weakness to me rather than a coordination problem.  Finger to nose and heel knee shin are normal.  Her blood work is normal including her electrolytes and calcium, CBC is normal. I discussed the case with Dr. Degroot from neurology.  Further workup with more testing may be indicated such as an MRI.  Frequent neurologic checks overnight and he will see her in the morning. Dr. Rausch is here seeing the patient from medicine.      Diagnosis:    ICD-10-CM   1. Leg weakness, bilateral R29.898     Disposition: Patient admitted to a neuro obs bed by Dr. Rausch.  Frequent neuro checks, neurology consultation in the morning.      Laura Goncalves  11/25/2017    EMERGENCY DEPARTMENT    I, Laura Goncalves, am serving as a scribe at 6:23 PM on 11/25/2017 to document services personally performed by Lashell Mak MD based on my observations and the provider's statements to me.        Lashell Mak MD  11/25/17 2057

## 2017-11-26 NOTE — PLAN OF CARE
Problem: Patient Care Overview  Goal: Plan of Care/Patient Progress Review  OT: Orders received, eval and treatment completed.  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  Pt resides in house with son (son works during the day and pt home alone during these times); reports IND with all functional mobility and ADLs at baseline.  Admits to x1 fall in last 6 months. Pt manages her own meds, and drives (self limits). Pt has a tub/shower combo.     Discharge Planner OT   Patient plan for discharge: Home with assist from family  Current status: Pt demonstrates difficulty with sequencing, family reports noticing some difficulty. Pt completed Alex Cognitive Assessment with a score of 20/30, indicative of moderate cognitive impairment. Pt demonstrated difficulty with visuospacial/executive function, naming, sequencing/attention, and delayed recall. Extensive ed provided regarding results of testing, pt and family verbalized understanding.   Barriers to return to prior living situation: Cognition, increased safety risk  Recommendations for discharge: OP OT for continued assessment of cognition, Increased supervision/check ins during day, medication management supervision, 's assessment prior to resumption of driving. AE for ADLs, family plans to follow up with OP OT for equipment needs.   Rationale for recommendations: Pt would benefit from skilled OT services to increase safety and IND in ADLs and IADLs and assist with recommendations for level of assist required.        Entered by: Sobeida Garcia 11/26/2017 12:22 PM     Occupational Therapy Discharge Summary    Reason for therapy discharge:    Discharged to home with outpatient therapy.    Progress towards therapy goal(s). See goals on Care Plan in [facility] electronic health record for goal details.  Goals met    Therapy recommendation(s):    Continued therapy is recommended.  Rationale/Recommendations:  To maximize  safety and IND in ADLs/IADLs.

## 2017-11-26 NOTE — H&P
PRIMARY CARE PHYSICIAN:  Dr. Truman Norman.      CHIEF COMPLAINT:  Unsteady gait.      HISTORY OF PRESENT ILLNESS:  Ms. Maryam Saavedra is a delightful 85-year-old  lady with a past medical history notable for hypertension, chronic low back pain, osteoarthritis of the hip and knee, mild mitral valve prolapse and benign vertigo who has presented to the Emergency Department via ambulance for evaluation of unsteady gait.  She appears to be a reliable historian.  She states earlier she went out shopping at the [employer].  Later, around 2-3 p.m. while she was trying to get shopping items out of the car, she felt unsteady and wobbly to the point that she was not able to stand up.  Her symptoms were persistent, which eventually prompted her daughter to call 911.  She was subsequently transported to the Emergency Department at [facility].  At baseline, she has some right leg weakness due to osteoarthritis. She also has previous history of vertigo, but her symptoms today are quite distinct from her previous dizzy spells.  On direct questioning, she reports no abnormal speech, double vision, blurry vision, focal weakness or paresthesia.  She also reports no chest pain, palpitations, nausea, vomiting, recent fall or any trauma.      In the Emergency Department, the patient has been evaluated by Dr. Lashell Mak, the ER attending physician.  The chemistry and hematology profile are essentially unremarkable, except for a slightly low hemoglobin of 11.6.  Blood glucose is 107.  C-reactive protein is 4.2 and normal.  Liver function panel is normal.  She is being admitted to the hospital under observation status.      PAST MEDICAL HISTORY:   1.  Hypertension.   2.  Benign vertigo.   3.  Chronic low back pain.   4.  Osteoarthritis of the hip and knee, right more than left.   5.  Mild mitral valve prolapse.      PAST SURGICAL HISTORY:   1.  Bilateral wrist open reduction and internal fixation for  fracture.   2.  Left lower eyelid Moh's procedure for BCC.   4.  Bilateral cataract surgery.      FAMILY HISTORY:  Significant for diabetes.      SOCIAL HISTORY:  The patient is .  She is a former smoker and quit smoking in 01/1997.  She drinks alcohol on occasions.  She does not use any assistive devices for ambulation.      MEDICATIONS:    Prior to Admission Medications   Prescriptions Last Dose Informant Patient Reported? Taking?   Metoprolol Succinate (TOPROL XL PO) 11/24/2017 at pm Self Yes Yes   Sig: Take 25 mg by mouth daily   amLODIPine (NORVASC) 2.5 MG tablet 11/25/2017 at am Self No Yes   Sig: TAKE 1 TABLET BY MOUTH EVERY DAY   aspirin 81 MG chewable tablet 11/24/2017 at pm Self Yes Yes   Sig: Take 2 tablets (162 mg) by mouth daily   calcium-vitamin D (CALTRATE) 600-400 MG-UNIT per tablet 11/25/2017 at am Self Yes Yes   Sig: Take 1 tablet by mouth 2 times daily.   traMADol (ULTRAM) 50 MG tablet 11/25/2017 at am Self No Yes   Sig: Take 1 tablet (50 mg) by mouth every 8 hours as needed for severe pain   triamterene-hydrochlorothiazide (MAXZIDE-25) 37.5-25 MG per tablet 11/25/2017 at am Self No Yes   Sig: TAKE 1/2 TABLET BY MOUTH ONCE A DAY      Facility-Administered Medications: None      ALLERGIES AND INTOLERANCES:  Ace inhibitors with reaction of angioedema and cyclobenzaprine with reaction of angioedema.      REVIEW OF SYSTEMS:  A 10-point review of system was performed thoroughly and was negative, except as stated in history of present illness.      PHYSICAL EXAMINATION:   GENERAL:  This patient is a very pleasant elderly lady who is in no acute distress.   VITAL SIGNS:  Blood pressure 143/89, heart rate 96, respiratory rate 20, temperature 98.2 degrees Fahrenheit, oxygen saturation 93% on room air.   HEENT:  The pupils are round, equal, reactive to light bilaterally.  There is no conjunctival pallor or scleral icterus.  The oral mucosa appears slightly dry.   NECK:  Supple.  There is no elevated  JVP.   LUNGS:  Clear to auscultation bilaterally.  No crackles, wheezing or rhonchi.   CARDIOVASCULAR:  There is normal S1 and S2 with regular rate and rhythm.  No S3, S4 or murmur is audible.   ABDOMEN:  Soft, nontender, nondistended.  Bowel sounds are present.   EXTREMITIES:  There is no calf tenderness, joint swelling or lower extremity edema.   SKIN:  There is no jaundice, cyanosis or acute rash.   NEUROLOGIC:  She is awake, alert, oriented x3.  On examination of cranial nerves II-XII:  The speech is normal.  There is no facial asymmetry.  She is able to protrude her tongue in the midline with no deviations.  There is no nystagmus.  The motor strength is 4+/5 on the right lower extremity and 5/5 for the remainder of extremities.  The finger-to-nose test is slightly dysmetric on the left side.  The heel-to-shin test is essentially normal.  On gait examination, the patient is quite unsteady and has difficulty keeping her balance without assistance.      LABORATORY DATA:  Chemistry profile:  Sodium 141, potassium 2.7, BUN 40, creatinine 0.8, calcium 9.7, albumin 3.5, total protein 7, total bilirubin 0.3, alkaline phos is 101, ALT 26, AST 28, C-reactive protein 4.2, glucose 107.      Hematology profile:  White count 9.7, hemoglobin 11.6, platelet count 236,000, MCV 91.  Differential 86.4% neutrophils.      ASSESSMENT AND PLAN:  Ms. Maryam Saavedra is a delightful 85-year-old  lady with a past medical history notable for hypertension, benign vertigo, chronic low back pain, osteoarthritis of the hip and knee and mild mitral valve prolapse who has presented with acute onset of unsteady gait, which has been persistent.  She is being admitted to the hospital under observation status.   1.  Unsteady gait/difficulty with balance:  The etiology is unclear; however, this is quite concerning for possible transient ischemic attack/stroke in the posterior circulation and possibly in the cerebellum.  The initial workup,  including CBC, LFT, BMP, are unremarkable.  I asked the emergency room attending physician to obtain a 12-lead electrocardiogram; however, on my examination, the heart sounds regular.  I will place the patient on full dose ASA. I will order MRI of the brain with and without contrast stat.  I will place a consult for Neurology service.  I will order fall precautions as well as telemetry.  I will hold her prior to admission blood pressure medication to rule out acute stroke for permissive hypertension.  I will order physical therapy/occupational therapy/swallow evaluations.  If MRI of the brain confirms a diagnosis of an acute stroke, will proceed with echocardiogram with bubble study as well as MR angiography of the head and neck. Notably, the case has been communicated to on-call neurologist, Dr. Degroot.  In the differential, we have to rule out lumber spine pathologies and further imaging might be indicated if MRI of the brain is unremarkable.  I will await further input from Neurology.  At this juncture, I have a low suspicion for Guillain-[place] syndrome.   2.  Dehydration:  The patient is mild to moderately volume depleted, which is also reflected in elevated BUN:creatinine ratio of 40:0.8. She will be treated with intravenous normal saline at a rate of 100 mL per hour.  I will hold her prior to admission Maxzide.   3.  Hypertension:  The blood pressure is currently 143/89.  Until we rule out acute stroke, I will hold her prior to admission amlodipine 2.5 mg p.o. daily; metoprolol XL 25 mg p.o. daily and Maxzide 37.5/25 mg p.o. daily.  I will have IV labetalol and hydralazine available p.r.n. per acute stroke protocol.  Her blood pressure will be closely monitored.   4.  Osteoarthritis of the hip and knee:  This is more severe on the right side.  I will have p.r.n. analgesics and narcotics available.   5.  Deep venous thrombosis prophylaxis:  Pneumatic compression device.   6.  Code status:  It was discussed  and a full code was established.   7.  Disposition:  The patient will be admitted under observation status, but if MRI of the brain shows acute stroke, she will be transitioned to inpatient.     I would like to thank Dr. Truman Norman for allowing the hospitalist service to participate in the care of this patient.         SRINI ARGUETA MD             D: 2017 21:11   T: 2017 22:25   MT: TD      Name:     DONATO CARMONA   MRN:      [id number]        Account:      [id number]   :      1932           Admitted:     [id number]      Document: [id number]       cc: Truman Norman MD

## 2017-11-26 NOTE — PLAN OF CARE
Problem: Fall Risk (Adult)  Goal: Identify Related Risk Factors and Signs and Symptoms  Related risk factors and signs and symptoms are identified upon initiation of Human Response Clinical Practice Guideline (CPG).   Outcome: Adequate for Discharge Date Met: 11/26/17  Patient will discharge to home via daughter.   All patient belongings accounted for and sent home with patient.  IV access removed.   Patient educated on DC instructions and AVS.  Given handouts regarding peripheral neuropathy and falls.  Patient verbalized understanding and had no further questions.

## 2017-11-26 NOTE — PROGRESS NOTES
11/26/17 1300   Quick Adds   Type of Visit Initial Occupational Therapy Evaluation   Living Environment   Lives With child(staci), adult   Living Arrangements house   Home Accessibility stairs to enter home   Number of Stairs to Enter Home 3  (no rail)   Number of Stairs Within Home (needs met on main level)   Self-Care   Usual Activity Tolerance good   Functional Level Prior   Ambulation 0-->independent   Transferring 0-->independent   Toileting 0-->independent   Bathing 0-->independent   Dressing 0-->independent   Eating 0-->independent   Communication 0-->understands/communicates without difficulty   Swallowing 0-->swallows foods/liquids without difficulty   Cognition 0 - no cognition issues reported   Fall history within last six months yes   Number of times patient has fallen within last six months 1   Which of the above functional risks had a recent onset or change? ambulation;fall history   Prior Functional Level Comment Reports IND with functional mobility and ADLs at baseline, admits to having difficulty with vern-cares recently   General Information   Onset of Illness/Injury or Date of Surgery - Date 11/25/17   Referring Physician Tiffany Rausch MD   Patient/Family Goals Statement To go home   Additional Occupational Profile Info/Pertinent History of Current Problem Pt is an 84 yo female who presented to ER with LE weakness   Precautions/Limitations fall precautions   Cognitive Status Examination   Orientation orientation to person, place and time   Cognitive Comment PT noted difficulty with sequencing and cognitive concerns   Visual Perception   Visual Perception Wears glasses   Range of Motion (ROM)   ROM Comment BUE WFL   Strength   Strength Comments BUE WFL   Coordination   Upper Extremity Coordination No deficits were identified   Mobility   Bed Mobility Comments SBA   Transfer Skill: Bed to Chair/Chair to Bed   Level of Chase: Bed to Chair stand-by assist   Physical Assist/Nonphysical  "Assist: Bed to Chair verbal cues;supervision   Weight-Bearing Restrictions full weight-bearing   Assistive Device - Transfer Skill Bed to Chair Chair to Bed Rehab Eval rolling walker   Lower Body Dressing   Level of Jackman: Dress Lower Body stand-by assist   Toileting   Level of Jackman: Toilet minimum assist (75% patients effort)   Physical Assist/Nonphysical Assist: Toilet verbal cues;supervision   General Therapy Interventions   Planned Therapy Interventions cognition   Clinical Impression   Criteria for Skilled Therapeutic Interventions Met yes, treatment indicated   OT Diagnosis REduced cognition with reduced IND in ADLs and IADLs   Influenced by the following impairments cognition, LE weakness, reduced balance   Assessment of Occupational Performance 3-5 Performance Deficits   Identified Performance Deficits dressing, toleting, transfers/ambulation   Clinical Decision Making (Complexity) Low complexity   Therapy Frequency other (see comments)  (1 time/obs)   Predicted Duration of Therapy Intervention (days/wks) 1 day   Anticipated Discharge Disposition Home with Outpatient Therapy   Risks and Benefits of Treatment have been explained. Yes   Patient, Family & other staff in agreement with plan of care Yes   [facility] [employer] TM \"6 Clicks\"   2016, Trustees of [facility], under license to [employer].  All rights reserved.   6 Clicks Short Forms Basic Mobility Inpatient Short Form   [facility] AM-PAC  \"6 Clicks\" Daily Activity Inpatient Short Form   1. Putting on and taking off regular lower body clothing? 3 - A Little   2. Bathing (including washing, rinsing, drying)? 3 - A Little   3. Toileting, which includes using toilet, bedpan or urinal? 3 - A Little   4. Putting on and taking off regular upper body clothing? 4 - None   5. Taking care of personal grooming such as brushing teeth? 4 - None   6. Eating meals? 4 - None   Daily Activity Raw Score (Score out of 24.Lower scores " equate to lower levels of function) 21   Total Evaluation Time   Total Evaluation Time (Minutes) 5

## 2017-11-26 NOTE — ED NOTES
ERT road tested pt per order to hallway and back to room. Pt walked with walker with assistance of ERT and family member. Pt was able to standup and walk to door with assistive device and assistance. Pt started having difficulty maintaining balance. Pt is back in bed with family by bedside. Notified Rn

## 2017-11-26 NOTE — PLAN OF CARE
Problem: Patient Care Overview  Goal: Plan of Care/Patient Progress Review  Outcome: No Change  Pt A&O RA. VSS CMS intact neurons stable with exception of lower extremities weakness. Regular diet Tolerated. Up with 1 with WGB . void adequately,  denies pain. Tele NSR. Continue monitor

## 2017-11-26 NOTE — ED NOTES
Brought in by [facility] from home for generalized leg weakness.  Was out shopping earlier today and when returned home legs started to become weak and shaky.  EMS had to carry to cot.  Upper body strength is there.  May have had a similar episode a while back.  Blood glucose 151 mg/dL.  Does have some slight swelling to ankles with more to left ankle.

## 2017-11-27 ENCOUNTER — TELEPHONE (OUTPATIENT)
Dept: FAMILY MEDICINE | Facility: CLINIC | Age: 82
End: 2017-11-27

## 2017-11-27 NOTE — DISCHARGE SUMMARY
DATE OF ADMISSION:  11/25/2017       DATE OF DISCHARGE:  11/26/2017      PRIMARY CARE PHYSICIAN:  Dr. Truman Norman.      DISCHARGE DIAGNOSES:     Unsteady gait and difficulty with balance probably due to dehydration from poor PO intake, acute deconditioning, peripheral neuropathy and osteoarthritis.        ADDITIONAL DIAGNOSES MANAGED IN HOSPITAL:      1.  Benign essential hypertension.   2.  Benign vertigo.   3.  Chronic low back pain.   4.  Osteoarthritis left hip and knee.   5.  Mitral valve prolapse.      SIGNIFICANT TESTS DONE IN HOSPITAL:  Included labs which included normal BNP, vitamin B12 level, normal CBC except mildly decreased hemoglobin of 11.6, negative UA.  A 12-lead EKG without findings of ischemia and MRI of the brain which did not show acute intracranial process especially stroke or mass lesion.      HOSPITAL COURSE:  Maryam Saavedra is an 85-year-old very pleasant woman with medical history significant for chronic low back pain, osteoarthritis of hips and knees and benign vertigo who presented to Emergency Department for evaluation of unsteady gait.  Patient felt unsteady and wobbly [place]  She does have a history of vertigo but this episode was different than those.  Patient reported that she had decreased p.o. intake through that day.  She appeared somewhat dehydrated though most of the lab workup were unremarkable.  Given the concern for TIA as well, she was placed in observation and monitored with serial neuro check, telemetry and was evaluated by PT, OT and speech therapy.  MRI was done which was negative and neuro service as well was consulted and evaluated patient.  It was felt that the episode of unsteadiness probably was acute decompensation of her chronic issues including arthritis, neuropathy and also due to decreased p.o. intake.  Patient improved today and felt near her baseline.  The only intervention that was done since hospitalization was IV hydration  alone.  She was discharged home with outpatient PT and OT.      All of her prior to admission medications were continued as listed below and no change was made.      PHYSICAL EXAMINATION:  On day of discharge:     GENERAL:  Alert, awake, oriented x3, does not appear to be in distress.   VITAL SIGNS:  Blood pressure 138/72, pulse 66, temperature 99.7, respirations 16, pulse ox 99 on room air.   HEENT:  PERRLA, EOMI.  Oral mucosa dry.   NECK:  Supple.   LUNGS:  Bilateral equal air entry, clear to auscultation.   CARDIOVASCULAR:  S1, S2, no murmur, rub, gallop.   ABDOMEN:  Soft, nontender, bowel sounds active.   MUSCULOSKELETAL:  No edema.   NEUROLOGIC:  Nonfocal.      DISCHARGE MEDICATIONS:  Included:   1.  Amlodipine 2.5 mg by mouth daily.   2.  Aspirin 81 mg 2 tablets by mouth daily.   3.  Calcium and vitamin D 600 mg/400 units 1 tab by mouth twice a day.   4.  Metoprolol XL25mg p.o. daily   5.  Tramadol 50 mg by mouth every 8 hours as needed.   6.  Triamterene and hydrochlorothiazide combination 37.5/25 mg half tablet by mouth once a day.      DISCHARGE PLAN:     1.  Patient was discharged home, was evaluated by OT for cognitive assessment and was found to have cognitive impairment, referred to primary care provider to assess further and to determine if she should continue to drive.  She was suggested not to drive until evaluated by primary care provider.   2.  Time spent on day of discharge was greater than 35 minutes.         MAJO MORGAN MD             D: 2017 20:43   T: 2017 22:08   MT: TD      Name:     DONATO CARMONA   MRN:      [id number]        Account:        [id number]   :      1932           Admit Date:     [id number]                                  Discharge Date: 2017      Document: [id number]       cc: Truman Norman MD

## 2017-11-28 ENCOUNTER — HOSPITAL ENCOUNTER (OUTPATIENT)
Dept: PHYSICAL THERAPY | Facility: CLINIC | Age: 82
Setting detail: THERAPIES SERIES
End: 2017-11-28
Attending: HOSPITALIST
Payer: MEDICARE

## 2017-11-28 PROCEDURE — 40000185 ZZHC STATISTIC PT OUTPT VISIT

## 2017-11-28 PROCEDURE — G8979 MOBILITY GOAL STATUS: HCPCS | Mod: GP,CI

## 2017-11-28 PROCEDURE — 97110 THERAPEUTIC EXERCISES: CPT | Mod: GP

## 2017-11-28 PROCEDURE — G8978 MOBILITY CURRENT STATUS: HCPCS | Mod: GP,CJ

## 2017-11-28 PROCEDURE — 97161 PT EVAL LOW COMPLEX 20 MIN: CPT | Mod: GP

## 2017-11-28 NOTE — TELEPHONE ENCOUNTER
"ED/Discharge Protocol    \"Hi, my name is Duyen Livingston, a registered nurse, and I am calling on behalf of Dr. Norman's office at [facility].  I am calling to follow up and see how things are going for you after your recent visit.\"    \"I see that you were in the (ER/UC/IP) on 11/25 -11/26   How are you doing now that you are home?\" I am doing just fine    Is patient experiencing symptoms that may require a hospital visit?  No    Discharge Instructions    \"Let's review your discharge instructions.  What is/are the follow-up recommendations?  Pt. Response: Verbalizes understanding. Denies any questions    \"Were you instructed to make a follow-up appointment?\"  Pt. Response: Yes.  Has appointment been made?   No.  \"Can I help you schedule that appointment?\" Patient wants to wait until she sees her daughter today and will have daughter call to scheduled hospital follow up      \"When you see the provider, I would recommend that you bring your discharge instructions with you.    Medications    \"How many new medications are you on since your hospitalization/ED visit?\"    0-1  \"How many of your current medicines changed (dose, timing, name, etc.) while you were in the hospital/ED visit?\"   0-1  \"Do you have questions about your medications?\"   No  \"Were you newly diagnosed with heart failure, COPD, diabetes or did you have a heart attack?\"   No  For patients on insulin: \"Did you start on insulin in the hospital or did you have your insulin dose changed?\"   No    Medication reconciliation completed? Yes    Was MTM referral placed (*Make sure to put transitions as reason for referral)?   No    Call Summary    \"Do you have any questions or concerns about your condition or care plan at the moment?\"    No      Patient was in ER Once in the past year (assess appropriateness of ER visits.)      \"If you have questions or things don't continue to improve, we encourage you contact us through the main clinic number,  [phone].  " "Even if the clinic is not open, triage nurses are available 24/7 to help you.     We would like you to know that our clinic has extended hours (provide information).  We also have urgent care (provide details on closest location and hours/contact info)\"      \"Thank you for your time and take care!\"  Amy Livingston RN          "

## 2017-11-28 NOTE — PROGRESS NOTES
11/28/17 1500   Quick Adds   Quick Adds Certification   Type of Visit Initial OP PT Evaluation   General Information   Start of Care Date 11/28/17   Referring Physician Mert Mar MD   Orders Evaluate and Treat as Indicated   Additional Orders to assess balance and gait deficits   Order Date 11/26/17   Medical Diagnosis Leg weakness, bilateral R29.898    Onset of illness/injury or Date of Surgery 11/25/17   Precautions/Limitations fall precautions   Surgical/Medical history reviewed Yes   Pertinent history of current problem (include personal factors and/or comorbidities that impact the POC) Maryam Saavedra is an 85-year-old very pleasant woman with medical history significant for chronic low back pain, osteoarthritis of hips and knees and benign vertigo who presented to Emergency Department for evaluation of unsteady gait. There was concern for TIA so she was placed in observation and monitored with serial neuro check, telemetry and was evaluated by PT, OT and speech therapy.  MRI was done which was negative and neuro service as well was consulted and evaluated patient.  It was felt that the episode of unsteadiness probably was acute decompensation of her chronic issues including arthritis, neuropathy and also due to decreased p.o. intake.  Patient improved and felt to be near her baseline and was discharged home with OP PT and OT.    Pertinent Visual History  Wears glasses.   Prior level of function comment Independent with mobility and ADL's and was driving.  When pt was in the hospital, she was instructed to have an OT do a driving assessment before she resumes driving.    Current Community Support Family/friend caregiver  (lives with son, has a supportive daughter who is here at apt)   Patient role/Employment history Retired   Living environment House/[facility]   Home/Community Accessibility Comments Pt lives with her son in a home with 2 stairs without handrails to enter (they plan to install a grab  "bar).  There is a full flight of stairs with a handrail to access the basement where pt does her laundry.  Pt's son works during the day so she is at home alone.     Current Assistive Devices (none - has FWW)   ADL Devices (none )   Assistive Devices Comments Pt reports she was using a FWW in the hospital and was discharged home with one and used it for the first day home but not since.    Patient/Family Goals Statement To be able to confidently walk.   General Information Comments Pt reports she was grocery shopping on 11/25/17 and she felt \"sluggish.\"  She also notes she hadn't eaten much that day and had taken some pain medication.  When she returned home, she was carrying her groceries inside her home and she felt really weak and felt like her whole body was shaking and she called her daughter and was taken to the hospital.     Fall Risk Screen   Fall screen completed by PT   Have you fallen 2 or more times in the past year? Yes   Have you fallen and had an injury in the past year? No   Timed Up and Go score (seconds) 11.72   Is patient a fall risk? Yes   Pain   Patient currently in pain No   Pain comments Pt reports she is not in pain today but she often has R hip and knee pain and back pain due to arthritis.    Cognitive Status Examination   Orientation orientation to person, place and time   Level of Consciousness alert   Follows Commands and Answers Questions 100% of the time   Personal Safety and Judgment intact   Posture   Posture Forward head position;Protracted shoulders   Strength   Strength Comments functional weakness demonstrated with sit to stand assessment.    Transfer Skills   Transfer Comments IND   Gait   Gait Comments Pt ambulates independently demonstrating decreased stance time on right LE and moderate pace.  No balance deficits noted without balance challenges.    Gait Special Tests   Gait Special Tests DYNAMIC GAIT INDEX   Gait Special Tests Dynamic Gait Index   Score out of 24 18/24 " "  Balance Special Tests   Balance Special Tests Timed up and go;Sit to stand reps   Balance Special Tests Timed Up and Go   Seconds 11.72 Seconds   Balance Special Tests Sit to Stand Reps in 30 Seconds   Reps in 30 seconds 6   Height 16\"   Sensory Examination   Sensory Perception no deficits were identified   Sensory Perception Comments Pt reports a hx of peripheral neuropathy and says she has been told she has diminished sensation in her feet.  Based on PT assessment, sensation is intact to light touch in bilateral LE's including feet.    Planned Therapy Interventions   Planned Therapy Interventions balance training;gait training;neuromuscular re-education;stretching;strengthening   Clinical Impression   Criteria for Skilled Therapeutic Interventions Met yes, treatment indicated   PT Diagnosis impaired balance, impaired gait, generalized weakness.   Influenced by the following impairments impaired balance, impaired gait, generalized weakness.   Functional limitations due to impairments impaired functional mobility, at risk for falls.   Clinical Presentation Stable/Uncomplicated   Clinical Presentation Rationale medically stable.   Clinical Decision Making (Complexity) Low complexity   Therapy Frequency 2 times/Week   Predicted Duration of Therapy Intervention (days/wks) 4 weeks   Risk & Benefits of therapy have been explained Yes   Patient, Family & other staff in agreement with plan of care Yes   Education Assessment   Barriers to Learning No barriers   GOALS   PT Eval Goals 1;2;3;4   Goal 1   Goal Identifier 1   Goal Description Pt will complete at least 10 sit to stands in 30 seconds demonstrating improved functional strength to allow completion of daily tasks with greater ease.    Target Date 12/26/17   Goal 2   Goal Identifier 2   Goal Description Pt will complete TUG in 10 seconds or less demonstrating decreased fall risk to allow safe community ambulation.   Target Date 12/26/17   Goal 3   Goal Identifier 3 "   Goal Description Pt will score at least 20/24 on DGI demonstrating low fall risk to allow safe community ambulation.    Target Date 12/26/17   Goal 4   Goal Identifier 4   Goal Description Pt will demonstrate (I) with strengthening and balance HEP to facilitate decreased fall risk and safe community ambulation.    Target Date 12/26/17   Total Evaluation Time   Total Evaluation Time (Minutes) 51   Therapy Certification   Certification date from 11/28/17   Certification date to 01/09/18   Medical Diagnosis Leg weakness, bilateral R29.898

## 2017-11-28 NOTE — PROGRESS NOTES
[facility]        OUTPATIENT PHYSICAL THERAPY FUNCTIONAL EVALUATION  PLAN OF TREATMENT FOR OUTPATIENT REHABILITATION  (COMPLETE FOR INITIAL CLAIMS ONLY)  Patient's Last Name, First Name, M.I.  YOB: 1932  Maryam Saavedra     Provider's Name   [facility]   Medical Record No.  [id number]     Start of Care Date:  11/28/17   Onset Date:  11/25/17   Type:     _X__PT   ____OT  ____SLP Medical Diagnosis:  Leg weakness, bilateral R29.898      PT Diagnosis:  impaired balance, impaired gait, generalized weakness. Visits from SOC:  1                              __________________________________________________________________________________  Plan of Treatment/Functional Goals:  balance training, gait training, neuromuscular re-education, stretching, strengthening           GOALS  1  Pt will complete at least 10 sit to stands in 30 seconds demonstrating improved functional strength to allow completion of daily tasks with greater ease.   12/26/17    2  Pt will complete TUG in 10 seconds or less demonstrating decreased fall risk to allow safe community ambulation.  12/26/17    3  Pt will score at least 20/24 on DGI demonstrating low fall risk to allow safe community ambulation.   12/26/17    4  Pt will demonstrate (I) with strengthening and balance HEP to facilitate decreased fall risk and safe community ambulation.   12/26/17                                                Therapy Frequency:  2 times/Week   Predicted Duration of Therapy Intervention:  4 weeks    Jennifer Mejia, PT                                    I CERTIFY THE NEED FOR THESE SERVICES FURNISHED UNDER        THIS PLAN OF TREATMENT AND WHILE UNDER MY CARE     (Physician co-signature of this document indicates review and certification of the therapy plan).                  Certification Date From:  11/28/17    Certification Date To:  01/09/18    Referring Provider:  Mert Mar MD    Initial Assessment  See Epic Evaluation- Start of Care Date: 11/28/17

## 2017-11-30 ENCOUNTER — OFFICE VISIT (OUTPATIENT)
Dept: FAMILY MEDICINE | Facility: CLINIC | Age: 82
End: 2017-11-30
Payer: MEDICARE

## 2017-11-30 VITALS
HEIGHT: 61 IN | DIASTOLIC BLOOD PRESSURE: 68 MMHG | WEIGHT: 106 LBS | OXYGEN SATURATION: 100 % | HEART RATE: 59 BPM | BODY MASS INDEX: 20.01 KG/M2 | SYSTOLIC BLOOD PRESSURE: 124 MMHG | TEMPERATURE: 97 F

## 2017-11-30 DIAGNOSIS — R27.0 ATAXIA: ICD-10-CM

## 2017-11-30 DIAGNOSIS — I10 BENIGN ESSENTIAL HYPERTENSION: ICD-10-CM

## 2017-11-30 DIAGNOSIS — R41.3 MEMORY LOSS: Primary | ICD-10-CM

## 2017-11-30 PROCEDURE — 99496 TRANSJ CARE MGMT HIGH F2F 7D: CPT | Performed by: INTERNAL MEDICINE

## 2017-11-30 NOTE — MR AVS SNAPSHOT
After Visit Summary   11/30/2017    Maryam Saavedra    MRN: [id number]           Patient Information     Date Of Birth          6/9/1932        Visit Information        Provider Department      11/30/2017 12:30 PM Truman Norman MD [facility]        Today's Diagnoses     Memory loss    -  1    Ataxia        Benign essential hypertension           Follow-ups after your visit        Additional Services     MEMORY CLINIC REFERRAL       Your provider has referred you to: [facility] - [phone]    Please answer the following questions to ensure a successful referral:    Has the patient been diagnosed with any positive neuro-cognitive impairment? Yes, if so what abnormal MINICOG, ABNORMAL MOCA TEST    Primary objective or goal of consultation (please provide some detail):  Other abnormal memory screening tests    What is the name of the person that should be contacted to schedule the appt?  [phone]     What is the relationship to the patient? Other relative :    What is the best number to reach them at?  Sinan So     The patient/family will be contacted within 1-2 business days to schedule your appointment. If you haven't heard from us within that timeframe, please call the number above.     Please be aware that coverage of these services is subject to the terms and limitations of your health insurance plan.  Call member services at your health plan with any benefit or coverage questions.      Please bring the following to your appointment:  >>   Any x-rays, CTs or MRIs which have been performed.  Contact the facility where they were done to arrange for  prior to your scheduled appointment.  Any new CT, MRI or other procedures ordered by your specialist must be performed at a [facility] facility or coordinated by your clinic's referral office.    >>   List of current medications   >>   This referral request   >>   Any documents/labs given to you for  this referral                  Your next 10 appointments already scheduled     Dec 05, 2017  1:30 PM CST   Neuro Treatment with Jennifer Mejia, PT   [facility] ([facility])    [address]  [address]  [place] [address]   [phone]            Dec 07, 2017  3:30 PM CST   Neuro Treatment with Jennifer Mejia, PT   [facility] ([facility])    [address]  [address]  [place] [address]   [phone]            Dec 12, 2017  2:15 PM CST   Neuro Treatment with Jennifer Mejia, PT   [facility] ([facility])    [address]  [address]  [place] [address]   [phone]            Dec 14, 2017  2:15 PM CST   Neuro Treatment with Jennifer Mejia, PT   [facility] ([facility])    [address]  [address]  [place] [address]   [phone]            Dec 19, 2017 11:45 AM CST   Neuro Treatment with Jennifer Mejia, PT   [facility] ([facility])    [address]  [address]  [place] [address]   [phone]            Dec 21, 2017  3:00 PM CST   Neuro Treatment with Jennifer Mejia, PT   [facility] ([facility])    [address]  [address]  [place] [address]   [phone]            Dec 26, 2017  2:15 PM CST   Neuro Treatment with Jennifer Mejia, PT   [facility] ([facility])    [address]  [address]  [place] [address]   [phone]              Who to contact     If you have questions or need follow up information about today's clinic visit or your schedule please contact [facility] directly at [phone].  Normal or non-critical lab and imaging results will be communicated to you by MyChart, letter or phone within 4 business days after the clinic has received the results. If you do not hear from us within 7 days, please  "contact the clinic through [employer] or phone. If you have a critical or abnormal lab result, we will notify you by phone as soon as possible.  Submit refill requests through [employer] or call your pharmacy and they will forward the refill request to us. Please allow 3 business days for your refill to be completed.          Additional Information About Your Visit        [address] Information     [employer] lets you send messages to your doctor, view your test results, renew your prescriptions, schedule appointments and more. To sign up, go to [email or web address] . Click on \"Log in\" on the left side of the screen, which will take you to the Welcome page. Then click on \"Sign up Now\" on the right side of the page.     You will be asked to enter the access code listed below, as well as some personal information. Please follow the directions to create your username and password.     Your access code is: [email or web address]  Expires: 2017  8:46 AM     Your access code will  in 90 days. If you need help or a new code, please call your [facility] clinic or [phone].        Care EveryWhere ID     This is your Care EveryWhere ID. This could be used by other organizations to access your [facility] medical records  [id number]        Your Vitals Were     Pulse Temperature Height Pulse Oximetry Breastfeeding? BMI (Body Mass Index)    59 97  F (36.1  C) (Oral) 5' 1\" (1.549 m) 100% No 20.03 kg/m2       Blood Pressure from Last 3 Encounters:   17 124/68   17 138/72   17 134/74    Weight from Last 3 Encounters:   17 106 lb (48.1 kg)   17 104 lb 12.8 oz (47.5 kg)   17 102 lb (46.3 kg)              We Performed the Following     MEMORY CLINIC REFERRAL        Primary Care Provider Office Phone # Fax #    Truman Norman [phone]       [facility] [address]  [place] [address]        Equal Access to Services     MACKENZIE SCHULTZ AH: Yen Dey, " wakathyda luclementadaha, qaybta kaleydi obrien, ben parraavincent ah. So [facility] [phone].    ATENCIÓN: Si carolyn mcfadden, tiene a bond disposición servicios gratuitos de asistencia lingüística. Bean al [phone].    We comply with applicable federal civil rights laws and [place] laws. We do not discriminate on the basis of race, color, national origin, age, disability, sex, sexual orientation, or gender identity.            Thank you!     Thank you for choosing [facility]  for your care. Our goal is always to provide you with excellent care. Hearing back from our patients is one way we can continue to improve our services. Please take a few minutes to complete the written survey that you may receive in the mail after your visit with us. Thank you!             Your Updated Medication List - Protect others around you: Learn how to safely use, store and throw away your medicines at www.disposemymeds.org.          This list is accurate as of: 11/30/17  1:17 PM.  Always use your most recent med list.                   Brand Name Dispense Instructions for use Diagnosis    amLODIPine 2.5 MG tablet    NORVASC    90 tablet    TAKE 1 TABLET BY MOUTH EVERY DAY    Benign essential hypertension       aspirin 81 MG chewable tablet      Take 2 tablets (162 mg) by mouth daily        calcium-vitamin D 600-400 MG-UNIT per tablet    CALTRATE     Take 1 tablet by mouth 2 times daily.        order for DME     1 each    Equipment being ordered: Walker Wheels () and Walker () Treatment Diagnosis: impaired balance    Leg weakness, bilateral       TOPROL XL PO      Take 25 mg by mouth daily        traMADol 50 MG tablet    ULTRAM    30 tablet    Take 1 tablet (50 mg) by mouth every 8 hours as needed for severe pain    Sacroiliac joint pain       triamterene-hydrochlorothiazide 37.5-25 MG per tablet    MAXZIDE-25    45 tablet    TAKE 1/2 TABLET BY MOUTH ONCE A DAY    Benign essential hypertension

## 2017-11-30 NOTE — NURSING NOTE
"Chief Complaint   Patient presents with     Hospital F/U       Initial /83 (BP Location: Left arm)  Pulse 59  Temp 97  F (36.1  C) (Oral)  Ht 5' 1\" (1.549 m)  Wt 106 lb (48.1 kg)  SpO2 100%  Breastfeeding? No  BMI 20.03 kg/m2 Estimated body mass index is 20.03 kg/(m^2) as calculated from the following:    Height as of this encounter: 5' 1\" (1.549 m).    Weight as of this encounter: 106 lb (48.1 kg).  Medication Reconciliation: complete     UYEN Limon      "

## 2017-11-30 NOTE — PROGRESS NOTES
SUBJECTIVE:   Maryam Saavedra is a 85 year old female who presents to clinic today for the following health issues:          Hospital Follow-up Visit:    Hospital/Nursing Home/IP Rehab Facility: [facility]  Date of Admission: 11/25/17  Date of Discharge: 11/26/17  Reason(s) for Admission: bilateral leg weakness            Problems taking medications regularly:  None       Medication changes since discharge: See Med List       Problems adhering to non-medication therapy:  None    Summary of hospitalization:  [facility] discharge summary reviewed  Diagnostic Tests/Treatments reviewed.  Follow up needed: none  Other Healthcare Providers Involved in Patient s Care:         None  Update since discharge: improved.     Post Discharge Medication Reconciliation: discharge medications reconciled and changed, per note/orders (see AVS).  Plan of care communicated with patient and family     Coding guidelines for this visit:  Type of Medical   Decision Making Face-to-Face Visit       within 7 Days of discharge Face-to-Face Visit        within 14 days of discharge   Moderate Complexity [id number]   High Complexity [id number]              Hospitalized for unsteadiness, weakness  Extensive evaluation  Neuro consult  Brain imaging  No specific diagnosis to explain decompensation  Dehydration was contributing factor  Cognitive testing abnormal  Concern by family about patient driving     Problem list and histories reviewed & adjusted, as indicated.  Additional history: as documented    Patient Active Problem List   Diagnosis     Benign essential hypertension     Impaired fasting glucose     Acute right-sided low back pain without sciatica     Unsteady gait     Past Surgical History:   Procedure Laterality Date     EYE SURGERY  cataracts     MOHS MICROGRAPHIC PROCEDURE      Left lower eyelid      MOHS MICROGRAPHIC PROCEDURE Left 10/27/2016    Procedure: MOHS MICROGRAPHIC PROCEDURE;  Surgeon: Shan  "MD Jose;  Location: [facility]     ORTHOPEDIC SURGERY      bilateral wrists       Social History   Substance Use Topics     Smoking status: Former Smoker     Smokeless tobacco: Former User     Quit date: 1/1/1997     Alcohol use 0.0 oz/week     0 Standard drinks or equivalent per week      Comment: occasional     Family History   Problem Relation Age of Onset     DIABETES Mother      DIABETES Father          Current Outpatient Prescriptions   Medication Sig Dispense Refill     order for DME Equipment being ordered: Walker Wheels () and Walker ()  Treatment Diagnosis: impaired balance 1 each 0     Metoprolol Succinate (TOPROL XL PO) Take 25 mg by mouth daily       traMADol (ULTRAM) 50 MG tablet Take 1 tablet (50 mg) by mouth every 8 hours as needed for severe pain 30 tablet 0     aspirin 81 MG chewable tablet Take 2 tablets (162 mg) by mouth daily       triamterene-hydrochlorothiazide (MAXZIDE-25) 37.5-25 MG per tablet TAKE 1/2 TABLET BY MOUTH ONCE A DAY 45 tablet 2     amLODIPine (NORVASC) 2.5 MG tablet TAKE 1 TABLET BY MOUTH EVERY DAY 90 tablet 2     calcium-vitamin D (CALTRATE) 600-400 MG-UNIT per tablet Take 1 tablet by mouth 2 times daily.       Allergies   Allergen Reactions     Ace Inhibitors      Angioedema       Cyclobenzaprine      Angioedema         Reviewed and updated as needed this visit by clinical staff       Reviewed and updated as needed this visit by Provider         ROS:  Constitutional, HEENT, cardiovascular, pulmonary, gi and gu systems are negative, except as otherwise noted.      OBJECTIVE:   /83 (BP Location: Left arm)  Pulse 59  Temp 97  F (36.1  C) (Oral)  Ht 5' 1\" (1.549 m)  Wt 106 lb (48.1 kg)  SpO2 100%  Breastfeeding? No  BMI 20.03 kg/m2  Body mass index is 20.03 kg/(m^2).  GENERAL: healthy, alert and no distress  EYES: Eyes grossly normal to inspection, PERRL and conjunctivae and sclerae normal  HENT: ear canals and TM's normal, nose and mouth without ulcers or " "lesions  NECK: no adenopathy, no asymmetry, masses, or scars and thyroid normal to palpation  RESP: lungs clear to auscultation - no rales, rhonchi or wheezes  CV: Heart with regular rate and rhythm  ABDOMEN: soft, nontender, no hepatosplenomegaly, no masses and bowel sounds normal  MS: Trace edema in both legs  NEURO: Alert and oriented to person, place and time. No obvious memory deficits.  Cranial nerves 2-12 appear grossly intact.   No pronator drift.  Normal strength in both legs.  DTRrs2+ bilateral at patella tendons.  She FAILS the \"Get up and go\" test   PSYCH: mentation appears normal, affect normal/bright    Diagnostic Test Results:  Results for orders placed or performed during the hospital encounter of 11/25/17   MR Brain w/o & w Contrast    Narrative    MRI BRAIN WITHOUT AND WITH CONTRAST  11/25/2017 10:19 PM    HISTORY: Stroke/TIA.    TECHNIQUE: Multiplanar, multisequence MRI of the brain without and  with 4.5 mL Gadavist.    COMPARISON: None.    FINDINGS: There is no evidence of hemorrhage, mass, acute infarct, or  anomaly. Moderate diffuse parenchymal volume loss. Moderate burden of  patchy deep and subcortical white matter T2 hyperintensities which are  nonspecific, but likely related to chronic microvascular ischemic  disease. Ventricular size is proportional to the cerebral sulci. There  are no gadolinium enhancing lesions.    The facial structures appear normal. The arteries at the base of the  brain and the dural venous sinuses appear patent.       Impression    IMPRESSION:    1. No evidence of acute infarct, mass, hemorrhage, or herniation.    2. Moderate diffuse parenchymal volume loss and moderate white matter  changes likely due to chronic microvascular ischemic disease.      FREDA FORBES MD   Comprehensive metabolic panel   Result Value Ref Range    Sodium 141 133 - 144 mmol/L    Potassium 3.7 3.4 - 5.3 mmol/L    Chloride 102 94 - 109 mmol/L    Carbon Dioxide 30 20 - 32 mmol/L    Anion Gap " 9 3 - 14 mmol/L    Glucose 107 (H) 70 - 99 mg/dL    Urea Nitrogen 40 (H) 7 - 30 mg/dL    Creatinine 0.80 0.52 - 1.04 mg/dL    GFR Estimate 68 >60 mL/min/1.7m2    GFR Estimate If Black 82 >60 mL/min/1.7m2    Calcium 9.7 8.5 - 10.1 mg/dL    Bilirubin Total 0.3 0.2 - 1.3 mg/dL    Albumin 3.5 3.4 - 5.0 g/dL    Protein Total 7.0 6.8 - 8.8 g/dL    Alkaline Phosphatase 101 40 - 150 U/L    ALT 26 0 - 50 U/L    AST 28 0 - 45 U/L   CBC with platelets + differential   Result Value Ref Range    WBC 9.7 4.0 - 11.0 10e9/L    RBC Count 3.83 3.8 - 5.2 10e12/L    Hemoglobin 11.6 (L) 11.7 - 15.7 g/dL    Hematocrit 34.7 (L) 35.0 - 47.0 %    MCV 91 78 - 100 fl    MCH 30.3 26.5 - 33.0 pg    MCHC 33.4 31.5 - 36.5 g/dL    RDW 13.6 10.0 - 15.0 %    Platelet Count 236 150 - 450 10e9/L    Diff Method Automated Method     % Neutrophils 86.4 %    % Lymphocytes 7.3 %    % Monocytes 6.0 %    % Eosinophils 0.0 %    % Basophils 0.2 %    % Immature Granulocytes 0.1 %    Nucleated RBCs 0 0 /100    Absolute Neutrophil 8.4 (H) 1.6 - 8.3 10e9/L    Absolute Lymphocytes 0.7 (L) 0.8 - 5.3 10e9/L    Absolute Monocytes 0.6 0.0 - 1.3 10e9/L    Absolute Eosinophils 0.0 0.0 - 0.7 10e9/L    Absolute Basophils 0.0 0.0 - 0.2 10e9/L    Abs Immature Granulocytes 0.0 0 - 0.4 10e9/L    Absolute Nucleated RBC 0.0    CRP inflammation   Result Value Ref Range    CRP Inflammation 4.2 0.0 - 8.0 mg/L   *UA reflex to Microscopic   Result Value Ref Range    Color Urine Yellow     Appearance Urine Clear     Glucose Urine Negative NEG^Negative mg/dL    Bilirubin Urine Negative NEG^Negative    Ketones Urine Negative NEG^Negative mg/dL    Specific Gravity Urine 1.020 1.003 - 1.035    Blood Urine Negative NEG^Negative    pH Urine 7.5 (H) 5.0 - 7.0 pH    Protein Albumin Urine Negative NEG^Negative mg/dL    Urobilinogen Urine 0.2 0.2 - 1.0 EU/dL    Nitrite Urine Negative NEG^Negative    Leukocyte Esterase Urine Negative NEG^Negative    Source Midstream Urine    CRP inflammation    Result Value Ref Range    CRP Inflammation 5.0 0.0 - 8.0 mg/L   Lipid panel reflex to direct LDL   Result Value Ref Range    Cholesterol 216 (H) <200 mg/dL    Triglycerides 55 <150 mg/dL    HDL Cholesterol 120 >49 mg/dL    LDL Cholesterol Calculated 85 <100 mg/dL    Non HDL Cholesterol 96 <130 mg/dL   Vitamin D Deficiency   Result Value Ref Range    Vitamin D Deficiency screening 51 20 - 75 ug/L   TSH with free T4 reflex   Result Value Ref Range    TSH 4.20 (H) 0.40 - 4.00 mU/L   Basic metabolic panel   Result Value Ref Range    Sodium 142 133 - 144 mmol/L    Potassium 3.5 3.4 - 5.3 mmol/L    Chloride 105 94 - 109 mmol/L    Carbon Dioxide 28 20 - 32 mmol/L    Anion Gap 9 3 - 14 mmol/L    Glucose 106 (H) 70 - 99 mg/dL    Urea Nitrogen 24 7 - 30 mg/dL    Creatinine 0.74 0.52 - 1.04 mg/dL    GFR Estimate 75 >60 mL/min/1.7m2    GFR Estimate If Black >90 >60 mL/min/1.7m2    Calcium 9.2 8.5 - 10.1 mg/dL   CBC with platelets   Result Value Ref Range    WBC 6.3 4.0 - 11.0 10e9/L    RBC Count 3.90 3.8 - 5.2 10e12/L    Hemoglobin 11.6 (L) 11.7 - 15.7 g/dL    Hematocrit 35.4 35.0 - 47.0 %    MCV 91 78 - 100 fl    MCH 29.7 26.5 - 33.0 pg    MCHC 32.8 31.5 - 36.5 g/dL    RDW 13.9 10.0 - 15.0 %    Platelet Count 217 150 - 450 10e9/L   T4 free   Result Value Ref Range    T4 Free 1.05 0.76 - 1.46 ng/dL   Glucose by meter   Result Value Ref Range    Glucose 113 (H) 70 - 99 mg/dL   Glucose by meter   Result Value Ref Range    Glucose 88 70 - 99 mg/dL   Vitamin B12   Result Value Ref Range    Vitamin B12 1055 (H) 193 - 986 pg/mL   EKG 12 lead   Result Value Ref Range    Interpretation ECG Click View Image link to view waveform and result    Neurology IP Consult: Patient to be seen: Routine - within 24 hours; Acute onset of unsteady gait; Consultant may enter orders: Yes    Narrative    Tahmina Elliott MD     11/26/2017 10:17 AM            [facility]  [facility]    Neurology  Consultation    Maryam Saavedra MRN# [id number]   YOB: 1932 Age: 85 year old    Code Status:Full Code   Date of Admission: 11/25/2017  Date of Consult:11/26/2017                                                                                         Assessment and Plan:                                         #.Acute decompensation of balance/walking/weakness yesterday,   much improved today, but not at baseline.    Findings of significant peripheral neuropathy, likely chronic.    Lumbar degenerative spine and osteoarthritis are contributory.   --As workup is negative for acute stroke, and she is returning to   her usual baseline, it is reasonable for her to be discharged    And follow up with her primary provider as an outpatient.  As workup of Peripheral neuropathy, I have added on vitamin B-12.    Agree with plan for use of walker and physical therapyAs   outpatient.  I reviewed the diagnosis of peripheral neuropathy and Gait   disorder with the patient and her daughter.  We discussed safety concerns with regard to fall risk  And other   long-term concerns of peripheral neuropathy.  Impaired glucose tolerance has been noted on the patient's   previous record-prediabetes can be associated with peripheral   neuropathy and would recommend that this continue to be optimized    Reasonable for patient to be discharged, if Dr. Norman or    patient/her family would find it necessary, I can see and follow   up as an outpatient        ------------------------------------------------------------------  ----------------  ------------------------------------------------------------------  ----------------  Reason for consult: I was asked by Dr. Rausch  to evaluate this   patient for acute walking difficulty.       Chief Complaint:   Balance difficulty  History is obtained from the patient / chart       History of Present Illness:   This patient is a 85 year old female who presents with acute   balance change  yesterday at about 2pm. She had had a busy morning   and had not had anything to eat since the night before.  She   began to feel quite shaky and weak and found that her balance was   quite severely affected.  She also recalls that earlier that   morning she had back pain for which he took a dose of tramadol.  Patient and her daughter were interviewed at the bedside.  She   has had balance difficulty and a few falls over the last few   years.  She has chronic issues with low back pain and arthritis   affecting the right greater than left leg.  She does not use a   cane or a walker.  She lives with her son.  Her living situation   does not require her to go up stairs.  Denies any neck pain.  Denies any focal weakness, slurred speech   or visual changes.   Overnight, She reports that she has much improved.   She was up   with physical therapy using a walker.  The physical therapist has   recommended that she use a walker consistently for balance..       Past Medical History:     Past Medical History:   Diagnosis Date     Benign essential hypertension 9/1/2016     Chronic low back pain      Hypertension      Impaired fasting glucose     borderline     Osteoarthritis      Tricuspid regurgitation          Past Surgical History:     Past Surgical History:   Procedure Laterality Date     EYE SURGERY  cataracts     MOHS MICROGRAPHIC PROCEDURE      Left lower eyelid      MOHS MICROGRAPHIC PROCEDURE Left 10/27/2016    Procedure: MOHS MICROGRAPHIC PROCEDURE;  Surgeon: Jose Torrez MD;  Location: [facility]     ORTHOPEDIC SURGERY      bilateral wrists          Social History:     Social History     Social History     Marital status:      Spouse name: N/A     Number of children: N/A     Years of education: N/A     Social History Main Topics     Smoking status: Former Smoker     Smokeless tobacco: Former User     Quit date: 1/1/1997     Alcohol use 0.0 oz/week     0 Standard drinks or equivalent per week      Comment:  occasional     Drug use: No     Sexual activity: No     Other Topics Concern     Not on file     Social History Narrative    Lives with son     Patient denies smoking, no significant alcohol intake, denies   illicit drugs use       Family History:     Family History   Problem Relation Age of Onset     DIABETES Mother      DIABETES Father      Reviewed and not felt to be contributory.        Home Medications:     Prior to Admission Medications   Prescriptions Last Dose Informant Patient Reported? Taking?   Metoprolol Succinate (TOPROL XL PO) 11/24/2017 at pm Self Yes Yes     Sig: Take 25 mg by mouth daily   amLODIPine (NORVASC) 2.5 MG tablet 11/25/2017 at am Self No Yes   Sig: TAKE 1 TABLET BY MOUTH EVERY DAY   aspirin 81 MG chewable tablet 11/24/2017 at pm Self Yes Yes   Sig: Take 2 tablets (162 mg) by mouth daily   calcium-vitamin D (CALTRATE) 600-400 MG-UNIT per tablet   11/25/2017 at am Self Yes Yes   Sig: Take 1 tablet by mouth 2 times daily.   traMADol (ULTRAM) 50 MG tablet 11/25/2017 at am Self No Yes   Sig: Take 1 tablet (50 mg) by mouth every 8 hours as needed for   severe pain   triamterene-hydrochlorothiazide (MAXZIDE-25) 37.5-25 MG per   tablet 11/25/2017 at am Self No Yes   Sig: TAKE 1/2 TABLET BY MOUTH ONCE A DAY      Facility-Administered Medications: None          Allergy:     Allergies   Allergen Reactions     Ace Inhibitors      Angioedema       Cyclobenzaprine      Angioedema          Inpatient Medications:   Scheduled Meds:    amLODIPine  2.5 mg Oral Daily     metoprolol (TOPROL-XL) 24 hr tablet 25 mg  25 mg Oral Daily     aspirin EC  325 mg Oral Daily    Or     aspirin  300 mg Rectal Daily     PRN Meds: acetaminophen, acetaminophen, naloxone, ondansetron   **OR** ondansetron, - MEDICATION INSTRUCTIONS -, senna-docusate   **OR** senna-docusate, polyethylene glycol, labetalol,   hydrALAZINE, HYDROmorphone, melatonin, potassium chloride,   potassium chloride, potassium chloride, potassium chloride  "with   lidocaine, traMADol        Review of Systems    Chronic bladder control problems for which he uses several pads   per day-has done so for the last few years, denies memory problem  A comprehensive review of  10 systems was performed and found to   be negative except as described in this note  CONSTITUTIONAL: negative for fever, chills, change in weight  INTEGUMENTARY/SKIN: no rash or obvious new lesions  ENT/MOUTH: no sore throat, new sinus pain or nasal drainage, no   neck mass noted  RESP: No pleuretic pain, No cough, no hemoptysis, No SOB   CV: negative for chest pain, palpitations or peripheral edema  GI: no nausea, vomiting, change in stools  : no dysuria or hematuria  MUSCULOSKELETAL: no myalgias, arthralgias or join efffusion  ENDOCRINE: no history of polyuria, polydyspsia or symptoms of   thyroid dysfunction  PSYCHIATRIC: no change in mood stable  LYMPHATIC: no new lymphadenopathy  HEME: no bleeding or easy bruisability  NEURO: see HPI       Physical Exam:   Physical Exam   Vitals:  Height:5' 1\"  Weight:104 lbs 12.8 oz   Temp: 98.4  F (36.9  C) Temp src: Oral BP: (!) 141/96 (prior to   medication) Pulse: 68   Resp: 17 SpO2: 95 % O2 Device: None (Room   air)    General Appearance:  No acute distress  Neuro:       Mental Status Exam:    Alert and oriented.  Language and   speech intact       Cranial Nerves:   Two through 12 are intact.  Fundus   technically difficult         Motor:   Interossei/digit extensors 4/4,deltoid 4/3, biceps,   triceps 5/5, toe extensors 4/4, right proximal leg weakness   related to pain otherwise lower extremity strength intact.  No   atrophy, toe normal ×4          Reflexes:  Absent       Sensory:   Reflexes at the knees and ankles, reduced in the   arms symmetrically, plantar signs normal bilaterally, no clonus.    Vibration  Is absent at the ankles and toes bilaterally, present   at the knees and hands, pinprick is reduced in a stocking-like   distribution bilaterally     "             Coordination:   Mild ataxia with heel-to-shin testing,   finger to nose is intact       Gait:  With walker-unsteady,, but walks independently with a   walker, antalgic  Neck: no nuchal rigidity, normal thyroid. No carotid bruits.    Cardiovascular: Regular rate and rhythm, no m/r/g    Extremities: No clubbing, no cyanosis, no edema       Data:   ROUTINE IP LABS   CBC RESULTS:     Recent Labs  Lab 11/26/17 0702 11/25/17 1846   WBC 6.3 9.7   RBC 3.90 3.83   HGB 11.6* 11.6*   HCT 35.4 34.7*    236     Basic Metabolic Panel:   Recent Labs   Lab Test  11/26/17 0702 11/25/17 1846 09/08/17   0900   NA  142  141  142   POTASSIUM  3.5  3.7  4.4   CHLORIDE  105  102  105   CO2  28  30  30   BUN  24  40*  28   CR  0.74  0.80  0.84   GLC  106*  107*  94   NOHEMY  9.2  9.7  9.8     Liver panel:  Recent Labs   Lab Test  11/25/17 1846 09/08/17   0900   PROTTOTAL  7.0  7.3   ALBUMIN  3.5  3.5   BILITOTAL  0.3  0.4   ALKPHOS  101  90   AST  28  25   ALT  26  23     INR:No lab results found.   Lipid Profile:  Recent Labs   Lab Test  11/25/17 2027 09/08/17   0900 09/07/16   0841 08/28/15 04/11/14   CHOL  216*  238*  262*  258*  255*   HDL  120  124  124  132  132   LDL  85  98  123*  113  111*   TRIG  55  78  75  66  62     Thyroid Panel:  Recent Labs   Lab Test  11/25/17 2027 09/08/17   0900   TSH  4.20*  4.66*   T4  1.05   --       A1C:   Recent Labs   Lab Test  09/08/17   0900  09/07/16   0841 04/13/15 10/08/14 10/09/13   A1C  5.4  5.6  4.9  5.4  5.5     Troponin I: No lab results found.  CRP inflammation:   Recent Labs   Lab Test  11/25/17 2027 11/25/17 1846   CRP  5.0  4.2     ESR: No lab results found.    MORA: No lab results found.    ANCA: No lab results found.      IMAGING:   All imaging studies were reviewed personally    MRI brain:   MRI BRAIN WITHOUT AND WITH CONTRAST  11/25/2017 10:19 PM     HISTORY: Stroke/TIA.     TECHNIQUE: Multiplanar, multisequence MRI of the brain without    and  with 4.5 mL Gadavist.     COMPARISON: None.     FINDINGS: There is no evidence of hemorrhage, mass, acute   infarct, or  anomaly. Moderate diffuse parenchymal volume loss. Moderate   burden of  patchy deep and subcortical white matter T2 hyperintensities   which are  nonspecific, but likely related to chronic microvascular ischemic  disease. Ventricular size is proportional to the cerebral sulci.   There  are no gadolinium enhancing lesions.     The facial structures appear normal. The arteries at the base of   the  brain and the dural venous sinuses appear patent.          IMPRESSION:    1. No evidence of acute infarct, mass, hemorrhage, or herniation.      2. Moderate diffuse parenchymal volume loss and moderate white   matter  changes likely due to chronic microvascular ischemic disease         ASSESSMENT/PLAN:       1. Memory loss  Memory clinic referral could be useful to characterize cognitive screen abnormalities and inform prognosis and treatment if amenable    OT driving test is strongly recommended to patient and daughter   - MEMORY CLINIC REFERRAL    2. Ataxia  Continue PT    Strongly recommended use of walker at very least cane, discussed at length with daughter and patient     3. Benign essential hypertension  Under good control on second check       FUTURE APPOINTMENTS:       - Pending memory clinic findings and symptoms     Truman Norman MD  [facility]

## 2017-12-07 ENCOUNTER — HOSPITAL ENCOUNTER (OUTPATIENT)
Dept: PHYSICAL THERAPY | Facility: CLINIC | Age: 82
Setting detail: THERAPIES SERIES
End: 2017-12-07
Attending: HOSPITALIST
Payer: MEDICARE

## 2017-12-07 PROCEDURE — 40000185 ZZHC STATISTIC PT OUTPT VISIT

## 2017-12-07 PROCEDURE — 97112 NEUROMUSCULAR REEDUCATION: CPT | Mod: GP

## 2017-12-07 PROCEDURE — 97110 THERAPEUTIC EXERCISES: CPT | Mod: GP

## 2017-12-12 ENCOUNTER — HOSPITAL ENCOUNTER (OUTPATIENT)
Dept: PHYSICAL THERAPY | Facility: CLINIC | Age: 82
Setting detail: THERAPIES SERIES
End: 2017-12-12
Attending: HOSPITALIST
Payer: MEDICARE

## 2017-12-12 PROCEDURE — 97110 THERAPEUTIC EXERCISES: CPT | Mod: GP

## 2017-12-12 PROCEDURE — 97112 NEUROMUSCULAR REEDUCATION: CPT | Mod: GP

## 2017-12-12 PROCEDURE — 40000185 ZZHC STATISTIC PT OUTPT VISIT

## 2017-12-14 ENCOUNTER — HOSPITAL ENCOUNTER (OUTPATIENT)
Dept: PHYSICAL THERAPY | Facility: CLINIC | Age: 82
Setting detail: THERAPIES SERIES
End: 2017-12-14
Attending: HOSPITALIST
Payer: MEDICARE

## 2017-12-14 PROCEDURE — 97116 GAIT TRAINING THERAPY: CPT | Mod: GP

## 2017-12-14 PROCEDURE — 97110 THERAPEUTIC EXERCISES: CPT | Mod: GP

## 2017-12-14 PROCEDURE — 40000185 ZZHC STATISTIC PT OUTPT VISIT

## 2017-12-21 ENCOUNTER — HOSPITAL ENCOUNTER (OUTPATIENT)
Dept: PHYSICAL THERAPY | Facility: CLINIC | Age: 82
Setting detail: THERAPIES SERIES
End: 2017-12-21
Attending: HOSPITALIST
Payer: MEDICARE

## 2017-12-21 PROCEDURE — 97110 THERAPEUTIC EXERCISES: CPT | Mod: GP

## 2017-12-21 PROCEDURE — 40000185 ZZHC STATISTIC PT OUTPT VISIT

## 2017-12-26 ENCOUNTER — HOSPITAL ENCOUNTER (OUTPATIENT)
Dept: PHYSICAL THERAPY | Facility: CLINIC | Age: 82
Setting detail: THERAPIES SERIES
End: 2017-12-26
Attending: HOSPITALIST
Payer: MEDICARE

## 2017-12-26 PROCEDURE — 97116 GAIT TRAINING THERAPY: CPT | Mod: GP

## 2017-12-26 PROCEDURE — 40000185 ZZHC STATISTIC PT OUTPT VISIT

## 2017-12-26 PROCEDURE — 97750 PHYSICAL PERFORMANCE TEST: CPT | Mod: GP

## 2017-12-26 NOTE — PROGRESS NOTES
12/26/17 1400   Signing Clinician's Name / Credentials   Signing clinician's name / credentials Jennifer Mejia, PT, DPT   Dynamic Gait Index (Tomi and Simon Ale, 1995)   Gait Level Surface 3   Change in Gait Speed 2   Gait and Horizontal Head Turns 2   Gait with Vertical Head Turns 3   Gait and Pivot Turns 3   Step Over Obstacle 3   Step Around Obstacles 2   Steps 2   Total Dynamic Gait Index Score  (A score of 19 or less has been correlated to an increased risk of falls in community dwelling older adults, patients with vestibular disorders, and patients with MS.)   Total Score (out of 24) 20

## 2017-12-26 NOTE — DISCHARGE SUMMARY
"Outpatient Physical Therapy Discharge Note     Patient: Maryam Saavedra  : 1932    Beginning/End Dates of Reporting Period:  17 to 2017    Referring Provider:  Mert Mar MD    Therapy Diagnosis:  Impaired balance, impaired gait, generalized weakness.       Client Self Report: Pt reports she is having pain in both of her hips now as well as some low back pain.  Planning to contact [facility] for x-rays.     Objective Measurements:  Objective Measure: TUG  Details: 9.59  Objective Measure: 30 second sit to stand  Details: 9 reps from 19\", unable to perform any reps from 16\".   Objective Measure: DGI  Details:      Goals:  Goal Identifier 1   Goal Description Pt will complete at least 10 sit to stands in 30 seconds demonstrating improved functional strength to allow completion of daily tasks with greater ease.    Target Date 17   Date Met      Progress:      Goal Identifier 2   Goal Description Pt will complete TUG in 10 seconds or less demonstrating decreased fall risk to allow safe community ambulation.   Target Date 17 (goal met 17)   Date Met  17    Progress:     Goal Identifier 3   Goal Description Pt will score at least 20/24 on DGI demonstrating low fall risk to allow safe community ambulation.    Target Date 17 (goal met 17)   Date Met  17    Progress:     Goal Identifier 4   Goal Description Pt will demonstrate (I) with strengthening and balance HEP to facilitate decreased fall risk and safe community ambulation.    Target Date 17 (goal met 17 )   Date Met  17    Progress:     Goal Identifier     Goal Description     Target Date     Date Met      Progress:     Goal Identifier     Goal Description     Target Date     Date Met      Progress:     Goal Identifier     Goal Description     Target Date     Date Met      Progress:     Goal Identifier     Goal Description     Target Date     Date Met      Progress:     Progress Toward " "Goals:   Progress this reporting period:  Pt improved her TUG and DGI scores and met these goals.  Pt was unable to complete sit to stands without UE use from 16\" chair today but was able to complete 6 reps on day of initial evaluation.  This is most likely due to pain.         Plan:  Discharge from therapy.    Discharge:  Yes    Reason for Discharge:  Pt has met all goals with the exception of goal 1 which is most likely due to pain.      Equipment Issued:  None - pt's daughter bought her a standard cane.      Discharge Plan: Patient to continue home program.  Pt to continue home program within her tolerance (stop if having increased pain).  Plans to contact [facility] for x-rays of her back, hips, and knees due to increased pain.    "

## 2018-01-03 DIAGNOSIS — I10 BENIGN ESSENTIAL HYPERTENSION: ICD-10-CM

## 2018-01-03 RX ORDER — AMLODIPINE BESYLATE 2.5 MG/1
TABLET ORAL
Qty: 90 TABLET | Refills: 2 | Status: SHIPPED | OUTPATIENT
Start: 2018-01-03 | End: 2018-09-30

## 2018-01-03 NOTE — TELEPHONE ENCOUNTER
Prescription approved per [facility] Refill Protocol.    Nathalie SHANKS RN    Requested Prescriptions   Signed Prescriptions Disp Refills     amLODIPine (NORVASC) 2.5 MG tablet 90 tablet 2     Sig: TAKE 1 TABLET BY MOUTH EVERY DAY    Calcium Channel Blockers Protocol  Passed    1/3/2018  1:15 PM       Passed - Blood pressure under 140/90    BP Readings from Last 3 Encounters:   11/30/17 124/68   11/26/17 138/72   09/28/17 134/74                Passed - Recent or future visit with authorizing provider    Patient had office visit in the last year or has a visit in the next 30 days with authorizing provider.  See chart review.              Passed - Patient is age 18 or older       Passed - No active pregnancy on record       Passed - Normal serum creatinine on file in past 12 months    Recent Labs   Lab Test  11/26/17   0702   CR  0.74            Passed - No positive pregnancy test in past 12 months

## 2018-01-03 NOTE — TELEPHONE ENCOUNTER
Requested Prescriptions   Pending Prescriptions Disp Refills     amLODIPine (NORVASC) 2.5 MG tablet [Pharmacy Med Name: AMLODIPINE BESYLATE 2.5 MG TAB] 90 tablet 2    Last Written Prescription Date:  04/05/17  Last Fill Quantity: 90,  # refills: 2   Last Office Visit with [facility], [facility] or [facility] prescribing provider:  11/30/17   Future Office Visit:      Sig: TAKE 1 TABLET BY MOUTH EVERY DAY    Calcium Channel Blockers Protocol  Passed    1/3/2018 12:59 AM       Passed - Blood pressure under 140/90    BP Readings from Last 3 Encounters:   11/30/17 124/68   11/26/17 138/72   09/28/17 134/74                Passed - Recent or future visit with authorizing provider    Patient had office visit in the last year or has a visit in the next 30 days with authorizing provider.  See chart review.              Passed - Patient is age 18 or older       Passed - No active pregnancy on record       Passed - Normal serum creatinine on file in past 12 months    Recent Labs   Lab Test  11/26/17   0702   CR  0.74            Passed - No positive pregnancy test in past 12 months

## 2018-03-26 DIAGNOSIS — I10 BENIGN ESSENTIAL HYPERTENSION: ICD-10-CM

## 2018-03-26 RX ORDER — METOPROLOL SUCCINATE 50 MG/1
TABLET, EXTENDED RELEASE ORAL
Qty: 45 TABLET | Refills: 2 | Status: SHIPPED | OUTPATIENT
Start: 2018-03-26 | End: 2018-11-09

## 2018-03-26 NOTE — TELEPHONE ENCOUNTER
Disp Refills Start End ELIOT   Metoprolol Succinate (TOPROL XL PO)     --   Sig: Take 25 mg by mouth daily   Class: Historical     Last Written Prescription Date:  HISTORICAL entry 11/2017  Last Fill Quantity: --,  # refills: ---   Last office visit: 11/30/2017 with prescribing provider:     Future Office Visit:      Routing refill request to provider for review/approval because:  Medication is reported/historical

## 2018-05-14 ENCOUNTER — OFFICE VISIT (OUTPATIENT)
Dept: FAMILY MEDICINE | Facility: CLINIC | Age: 83
End: 2018-05-14
Payer: MEDICARE

## 2018-05-14 VITALS
DIASTOLIC BLOOD PRESSURE: 70 MMHG | SYSTOLIC BLOOD PRESSURE: 127 MMHG | HEART RATE: 61 BPM | OXYGEN SATURATION: 99 % | WEIGHT: 107 LBS | BODY MASS INDEX: 19.69 KG/M2 | HEIGHT: 62 IN | TEMPERATURE: 97 F

## 2018-05-14 DIAGNOSIS — Z01.818 PREOP GENERAL PHYSICAL EXAM: Primary | ICD-10-CM

## 2018-05-14 DIAGNOSIS — R73.01 IMPAIRED FASTING GLUCOSE: ICD-10-CM

## 2018-05-14 DIAGNOSIS — C44.320 SCC (SQUAMOUS CELL CARCINOMA), FACE: ICD-10-CM

## 2018-05-14 LAB
ANION GAP SERPL CALCULATED.3IONS-SCNC: 6 MMOL/L (ref 3–14)
BUN SERPL-MCNC: 29 MG/DL (ref 7–30)
CALCIUM SERPL-MCNC: 9.9 MG/DL (ref 8.5–10.1)
CHLORIDE SERPL-SCNC: 105 MMOL/L (ref 94–109)
CO2 SERPL-SCNC: 29 MMOL/L (ref 20–32)
CREAT SERPL-MCNC: 0.85 MG/DL (ref 0.52–1.04)
GFR SERPL CREATININE-BSD FRML MDRD: 63 ML/MIN/1.7M2
GLUCOSE SERPL-MCNC: 93 MG/DL (ref 70–99)
HBA1C MFR BLD: 5.9 % (ref 0–5.6)
HGB BLD-MCNC: 13.3 G/DL (ref 11.7–15.7)
POTASSIUM SERPL-SCNC: 4 MMOL/L (ref 3.4–5.3)
SODIUM SERPL-SCNC: 140 MMOL/L (ref 133–144)

## 2018-05-14 PROCEDURE — 85018 HEMOGLOBIN: CPT | Performed by: NURSE PRACTITIONER

## 2018-05-14 PROCEDURE — 99214 OFFICE O/P EST MOD 30 MIN: CPT | Performed by: NURSE PRACTITIONER

## 2018-05-14 PROCEDURE — 36415 COLL VENOUS BLD VENIPUNCTURE: CPT | Performed by: NURSE PRACTITIONER

## 2018-05-14 PROCEDURE — 83036 HEMOGLOBIN GLYCOSYLATED A1C: CPT | Performed by: NURSE PRACTITIONER

## 2018-05-14 PROCEDURE — 80048 BASIC METABOLIC PNL TOTAL CA: CPT | Performed by: NURSE PRACTITIONER

## 2018-05-14 NOTE — MR AVS SNAPSHOT
After Visit Summary   5/14/2018    Maryam Saavedra    MRN: [id number]           Patient Information     Date Of Birth          6/9/1932        Visit Information        Provider Department      5/14/2018 10:00 AM Shaylee Levine APRN CNP [facility]        Today's Diagnoses     Preop general physical exam    -  1    SCC (squamous cell carcinoma), face        Impaired fasting glucose          Care Instructions      Before Your Surgery      Call your surgeon if there is any change in your health. This includes signs of a cold or flu (such as a sore throat, runny nose, cough, rash or fever).    Do not smoke, drink alcohol or take over the counter medicine (unless your surgeon or primary care doctor tells you to) for the 24 hours before and after surgery.    If you take prescribed drugs: Follow your doctor s orders about which medicines to take and which to stop until after surgery.    Eating and drinking prior to surgery: follow the instructions from your surgeon    Take a shower or bath the night before surgery. Use the soap your surgeon gave you to gently clean your skin. If you do not have soap from your surgeon, use your regular soap. Do not shave or scrub the surgery site.  Wear clean pajamas and have clean sheets on your bed.           Follow-ups after your visit        Your next 10 appointments already scheduled     May 24, 2018   Procedure with Jose Torrez MD   [facility] (--)    [address]  [facility] [id number] [phone]              Who to contact     If you have questions or need follow up information about today's clinic visit or your schedule please contact [facility] directly at [phone].  Normal or non-critical lab and imaging results will be communicated to you by MyChart, letter or phone within 4 business days after the clinic has received the results. If you do not hear from us within 7 days, please contact  "the clinic through [employer] or phone. If you have a critical or abnormal lab result, we will notify you by phone as soon as possible.  Submit refill requests through [employer] or call your pharmacy and they will forward the refill request to us. Please allow 3 business days for your refill to be completed.          Additional Information About Your Visit        [employer] Information     [employer] gives you secure access to your electronic health record. If you see a primary care provider, you can also send messages to your care team and make appointments. If you have questions, please call your primary care clinic.  If you do not have a primary care provider, please call [phone] and they will assist you.        Care EveryWhere ID     This is your Care EveryWhere ID. This could be used by other organizations to access your [facility] medical records  [id number]        Your Vitals Were     Pulse Temperature Height Pulse Oximetry BMI (Body Mass Index)       61 97  F (36.1  C) (Tympanic) 5' 1.5\" (1.562 m) 99% 19.89 kg/m2        Blood Pressure from Last 3 Encounters:   05/14/18 127/70   11/30/17 124/68   11/26/17 138/72    Weight from Last 3 Encounters:   05/14/18 107 lb (48.5 kg)   11/30/17 106 lb (48.1 kg)   11/25/17 104 lb 12.8 oz (47.5 kg)              We Performed the Following     Basic metabolic panel  (Ca, Cl, CO2, Creat, Gluc, K, Na, BUN)     Hemoglobin A1c     Hemoglobin        Primary Care Provider Office Phone # Fax #    Truman C MD [place] [phone] [address] [facility] [address]  [facility] [address]        Equal Access to Services     [facility] HospitalSYLWIA : Hadii jefferson Dey, wakathyda leslie, qaybta ben ramirez. So [facility] [phone].    ATENCIÓN: Si habla español, tiene a bond disposición servicios gratuitos de asistencia lingüística. Bean al [phone].    We comply with applicable federal civil rights laws and [place] laws. We do not " discriminate on the basis of race, color, national origin, age, disability, sex, sexual orientation, or gender identity.            Thank you!     Thank you for choosing [facility]  for your care. Our goal is always to provide you with excellent care. Hearing back from our patients is one way we can continue to improve our services. Please take a few minutes to complete the written survey that you may receive in the mail after your visit with us. Thank you!             Your Updated Medication List - Protect others around you: Learn how to safely use, store and throw away your medicines at www.disposemymeds.org.          This list is accurate as of 5/14/18 11:59 PM.  Always use your most recent med list.                   Brand Name Dispense Instructions for use Diagnosis    amLODIPine 2.5 MG tablet    NORVASC    90 tablet    TAKE 1 TABLET BY MOUTH EVERY DAY    Benign essential hypertension       aspirin 81 MG chewable tablet      Take 2 tablets (162 mg) by mouth daily        calcium-vitamin D 600-400 MG-UNIT per tablet    CALTRATE     Take 1 tablet by mouth 2 times daily.        metoprolol succinate 50 MG 24 hr tablet    TOPROL-XL    45 tablet    TAKE 1/2 TABLET BY MOUTH ONCE A DAY    Benign essential hypertension       triamterene-hydrochlorothiazide 37.5-25 MG per tablet    MAXZIDE-25    45 tablet    TAKE 1/2 TABLET BY MOUTH ONCE A DAY    Benign essential hypertension

## 2018-05-14 NOTE — PROGRESS NOTES
[facility]  [address] [facility] [address]  [phone]  Dept: [phone]    PRE-OP EVALUATION:  Today's date: 2018    Maryam Saavedra (: 1932) presents for pre-operative evaluation assessment as requested by Jose Cooper MD.  She requires evaluation and anesthesia risk assessment prior to undergoing RIGHT MEDIAL CANTHUS MOHS CLOSURE    Proposed Surgery/ Procedure: RIGHT MEDIAL CANTHUS MOHS CLOSURE  Date of Surgery/ Procedure: 18  Time of Surgery/ Procedure: 12:50  Hospital/Surgical Facility: [facility] SAME DAY SURGERY [phone]  Primary Physician: Truman Norman  Type of Anesthesia Anticipated: MAC    Patient has a Health Care Directive or Living Will:  YES     1. NO - Do you have a history of heart attack, stroke, stent, bypass or surgery on an artery in the head, neck, heart or legs?  2. NO - Do you ever have any pain or discomfort in your chest?  3. NO - Do you have a history of  Heart Failure?  4. NO - Are you troubled by shortness of breath when: walking on the level, up a slight hill or at night?  5. NO - Do you currently have a cold, bronchitis or other respiratory infection?  6. NO - Do you have a cough, shortness of breath or wheezing?  7. NO - Do you sometimes get pains in the calves of your legs when you walk?  8. NO - Do you or anyone in your family have previous history of blood clots?  9. NO - Do you or does anyone in your family have a serious bleeding problem such as prolonged bleeding following surgeries or cuts?  10. NO - Have you ever had problems with anemia or been told to take iron pills?  11. NO - Have you had any abnormal blood loss such as black, tarry or bloody stools, or abnormal vaginal bleeding?  12. NO - Have you ever had a blood transfusion?  13. NO - Have you or any of your relatives ever had problems with anesthesia?  14. NO - Do you have sleep apnea, excessive snoring or daytime drowsiness?  15. NO - Do you have any  prosthetic heart valves?  16. NO - Do you have prosthetic joints?  17. NO - Is there any chance that you may be pregnant?      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  Problems all longstanding and stable, except as noted/documented.  See ROS for pertinent symptoms related to these conditions.                                                                                                                                                          .    MEDICAL HISTORY:     Patient Active Problem List    Diagnosis Date Noted     Unsteady gait 11/25/2017     Priority: Medium     Acute right-sided low back pain without sciatica 09/18/2016     Priority: Medium     Benign essential hypertension 09/01/2016     Priority: Medium     Impaired fasting glucose 09/01/2016     Priority: Medium      Past Medical History:   Diagnosis Date     Benign essential hypertension 9/1/2016     Chronic low back pain      Hypertension      Impaired fasting glucose     borderline     Osteoarthritis      Tricuspid regurgitation      Past Surgical History:   Procedure Laterality Date     EYE SURGERY  cataracts     MOHS MICROGRAPHIC PROCEDURE      Left lower eyelid      MOHS MICROGRAPHIC PROCEDURE Left 10/27/2016    Procedure: MOHS MICROGRAPHIC PROCEDURE;  Surgeon: Jose Torrez MD;  [facility]: [facility]     ORTHOPEDIC SURGERY      bilateral wrists     Current Outpatient Prescriptions   Medication Sig Dispense Refill     amLODIPine (NORVASC) 2.5 MG tablet TAKE 1 TABLET BY MOUTH EVERY DAY 90 tablet 2     aspirin 81 MG chewable tablet Take 2 tablets (162 mg) by mouth daily       calcium-vitamin D (CALTRATE) 600-400 MG-UNIT per tablet Take 1 tablet by mouth 2 times daily.       metoprolol succinate (TOPROL-XL) 50 MG 24 hr tablet TAKE 1/2 TABLET BY MOUTH ONCE A DAY 45 tablet 2     triamterene-hydrochlorothiazide (MAXZIDE-25) 37.5-25 MG per tablet TAKE  "1/2 TABLET BY MOUTH ONCE A DAY 45 tablet 1     [DISCONTINUED] Metoprolol Succinate (TOPROL XL PO) Take 25 mg by mouth daily       OTC products: None, except as noted above    Allergies   Allergen Reactions     Ace Inhibitors      Angioedema       Cyclobenzaprine      Angioedema      Latex Allergy: NO    Social History   Substance Use Topics     Smoking status: Former Smoker     Smokeless tobacco: Former User     Quit date: 1997     Alcohol use 0.0 oz/week     0 Standard drinks or equivalent per week      Comment: occasional     History   Drug Use No       REVIEW OF SYSTEMS:   CONSTITUTIONAL: NEGATIVE for fever, chills, change in weight  ENT/MOUTH: NEGATIVE for ear, mouth and throat problems  RESP: NEGATIVE for significant cough or SOB  CV: NEGATIVE for chest pain, palpitations or peripheral edema  ROS otherwise negative    EXAM:   /70 (BP Location: Right arm, Patient Position: Chair, Cuff Size: Adult Regular)  Pulse 61  Temp 97  F (36.1  C) (Tympanic)  Ht 5' 1.5\" (1.562 m)  Wt 107 lb (48.5 kg)  SpO2 99%  BMI 19.89 kg/m2    GENERAL APPEARANCE: healthy, alert and no distress  HENT: ear canals and TM's normal and nose and mouth without ulcers or lesions  RESP: lungs clear to auscultation - no rales, rhonchi or wheezes  CV: regular rate and rhythm, normal S1 S2, no S3 or S4 and no murmur, click or rub   ABDOMEN: soft, nontender, no HSM or masses and bowel sounds normal  MS: right hip and knee pain with instability  NEURO: Normal strength and tone, sensory exam grossly normal, mentation intact and speech normal    DIAGNOSTICS:   EKG not required for non vascular low risk surgery in asymptomatic patient without CAD  EK/17 in [facility]      Recent Labs   Lab Test  17   0702  17   1846  17   0900   16   0841   HGB  11.6*  11.6*  12.6   < >   --    PLT  217  236  251   < >   --    NA  142  141  142   --   140   POTASSIUM  3.5  3.7  4.4   --   3.8   CR  0.74  0.80  0.84   --   0.87 "   A1C   --    --   5.4   --   5.6    < > = values in this interval not displayed.     Results for orders placed or performed in visit on 05/14/18   Hemoglobin   Result Value Ref Range    Hemoglobin 13.3 11.7 - 15.7 g/dL   Basic metabolic panel  (Ca, Cl, CO2, Creat, Gluc, K, Na, BUN)   Result Value Ref Range    Sodium 140 133 - 144 mmol/L    Potassium 4.0 3.4 - 5.3 mmol/L    Chloride 105 94 - 109 mmol/L    Carbon Dioxide 29 20 - 32 mmol/L    Anion Gap 6 3 - 14 mmol/L    Glucose 93 70 - 99 mg/dL    Urea Nitrogen 29 7 - 30 mg/dL    Creatinine 0.85 0.52 - 1.04 mg/dL    GFR Estimate 63 >60 mL/min/1.7m2    GFR Estimate If Black 77 >60 mL/min/1.7m2    Calcium 9.9 8.5 - 10.1 mg/dL   Hemoglobin A1c   Result Value Ref Range    Hemoglobin A1C 5.9 (H) 0 - 5.6 %        IMPRESSION:   Reason for surgery/procedure: SCC of right inner canthus  Diagnosis/reason for consult: preop consult    The proposed surgical procedure is considered LOW risk.    REVISED CARDIAC RISK INDEX  The patient has the following serious cardiovascular risks for perioperative complications such as (MI, PE, VFib and 3  AV Block):  No serious cardiac risks  INTERPRETATION: 0 risks: Class I (very low risk - 0.4% complication rate)    The patient has the following additional risks for perioperative complications:  No identified additional risks      ICD-10-CM    1. Preop general physical exam Z01.818    2. 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    [facility] Preop Guidelines    Revised Cardiac Risk Index

## 2018-05-22 NOTE — H&P (VIEW-ONLY)
[facility]  [address] [facility] [address]  [phone]  Dept: [phone]    PRE-OP EVALUATION:  Today's date: 2018    Maryam Saavedra (: 1932) presents for pre-operative evaluation assessment as requested by Jose Cooper MD.  She requires evaluation and anesthesia risk assessment prior to undergoing RIGHT MEDIAL CANTHUS MOHS CLOSURE    Proposed Surgery/ Procedure: RIGHT MEDIAL CANTHUS MOHS CLOSURE  Date of Surgery/ Procedure: 18  Time of Surgery/ Procedure: 12:50  Hospital/Surgical Facility: [facility] SAME DAY SURGERY [phone]  Primary Physician: Truman Norman  Type of Anesthesia Anticipated: MAC    Patient has a Health Care Directive or Living Will:  YES     1. NO - Do you have a history of heart attack, stroke, stent, bypass or surgery on an artery in the head, neck, heart or legs?  2. NO - Do you ever have any pain or discomfort in your chest?  3. NO - Do you have a history of  Heart Failure?  4. NO - Are you troubled by shortness of breath when: walking on the level, up a slight hill or at night?  5. NO - Do you currently have a cold, bronchitis or other respiratory infection?  6. NO - Do you have a cough, shortness of breath or wheezing?  7. NO - Do you sometimes get pains in the calves of your legs when you walk?  8. NO - Do you or anyone in your family have previous history of blood clots?  9. NO - Do you or does anyone in your family have a serious bleeding problem such as prolonged bleeding following surgeries or cuts?  10. NO - Have you ever had problems with anemia or been told to take iron pills?  11. NO - Have you had any abnormal blood loss such as black, tarry or bloody stools, or abnormal vaginal bleeding?  12. NO - Have you ever had a blood transfusion?  13. NO - Have you or any of your relatives ever had problems with anesthesia?  14. NO - Do you have sleep apnea, excessive snoring or daytime drowsiness?  15. NO - Do you have any  prosthetic heart valves?  16. NO - Do you have prosthetic joints?  17. NO - Is there any chance that you may be pregnant?      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  Problems all longstanding and stable, except as noted/documented.  See ROS for pertinent symptoms related to these conditions.                                                                                                                                                          .    MEDICAL HISTORY:     Patient Active Problem List    Diagnosis Date Noted     Unsteady gait 11/25/2017     Priority: Medium     Acute right-sided low back pain without sciatica 09/18/2016     Priority: Medium     Benign essential hypertension 09/01/2016     Priority: Medium     Impaired fasting glucose 09/01/2016     Priority: Medium      Past Medical History:   Diagnosis Date     Benign essential hypertension 9/1/2016     Chronic low back pain      Hypertension      Impaired fasting glucose     borderline     Osteoarthritis      Tricuspid regurgitation      Past Surgical History:   Procedure Laterality Date     EYE SURGERY  cataracts     MOHS MICROGRAPHIC PROCEDURE      Left lower eyelid      MOHS MICROGRAPHIC PROCEDURE Left 10/27/2016    Procedure: MOHS MICROGRAPHIC PROCEDURE;  Surgeon: Jose Torrez MD;  Location: [facility]     ORTHOPEDIC SURGERY      bilateral wrists     Current Outpatient Prescriptions   Medication Sig Dispense Refill     amLODIPine (NORVASC) 2.5 MG tablet TAKE 1 TABLET BY MOUTH EVERY DAY 90 tablet 2     aspirin 81 MG chewable tablet Take 2 tablets (162 mg) by mouth daily       calcium-vitamin D (CALTRATE) 600-400 MG-UNIT per tablet Take 1 tablet by mouth 2 times daily.       metoprolol succinate (TOPROL-XL) 50 MG 24 hr tablet TAKE 1/2 TABLET BY MOUTH ONCE A DAY 45 tablet 2     triamterene-hydrochlorothiazide (MAXZIDE-25) 37.5-25 MG per tablet TAKE  "1/2 TABLET BY MOUTH ONCE A DAY 45 tablet 1     [DISCONTINUED] Metoprolol Succinate (TOPROL XL PO) Take 25 mg by mouth daily       OTC products: None, except as noted above    Allergies   Allergen Reactions     Ace Inhibitors      Angioedema       Cyclobenzaprine      Angioedema      Latex Allergy: NO    Social History   Substance Use Topics     Smoking status: Former Smoker     Smokeless tobacco: Former User     Quit date: 1997     Alcohol use 0.0 oz/week     0 Standard drinks or equivalent per week      Comment: occasional     History   Drug Use No       REVIEW OF SYSTEMS:   CONSTITUTIONAL: NEGATIVE for fever, chills, change in weight  ENT/MOUTH: NEGATIVE for ear, mouth and throat problems  RESP: NEGATIVE for significant cough or SOB  CV: NEGATIVE for chest pain, palpitations or peripheral edema  ROS otherwise negative    EXAM:   /70 (BP Location: Right arm, Patient Position: Chair, Cuff Size: Adult Regular)  Pulse 61  Temp 97  F (36.1  C) (Tympanic)  Ht 5' 1.5\" (1.562 m)  Wt 107 lb (48.5 kg)  SpO2 99%  BMI 19.89 kg/m2    GENERAL APPEARANCE: healthy, alert and no distress  HENT: ear canals and TM's normal and nose and mouth without ulcers or lesions  RESP: lungs clear to auscultation - no rales, rhonchi or wheezes  CV: regular rate and rhythm, normal S1 S2, no S3 or S4 and no murmur, click or rub   ABDOMEN: soft, nontender, no HSM or masses and bowel sounds normal  MS: right hip and knee pain with instability  NEURO: Normal strength and tone, sensory exam grossly normal, mentation intact and speech normal    DIAGNOSTICS:   EKG not required for non vascular low risk surgery in asymptomatic patient without CAD  EK/17 in [facility]      Recent Labs   Lab Test  17   0702  17   1846  17   0900   16   0841   HGB  11.6*  11.6*  12.6   < >   --    PLT  217  236  251   < >   --    NA  142  141  142   --   140   POTASSIUM  3.5  3.7  4.4   --   3.8   CR  0.74  0.80  0.84   --   0.87 "   A1C   --    --   5.4   --   5.6    < > = values in this interval not displayed.     Results for orders placed or performed in visit on 05/14/18   Hemoglobin   Result Value Ref Range    Hemoglobin 13.3 11.7 - 15.7 g/dL   Basic metabolic panel  (Ca, Cl, CO2, Creat, Gluc, K, Na, BUN)   Result Value Ref Range    Sodium 140 133 - 144 mmol/L    Potassium 4.0 3.4 - 5.3 mmol/L    Chloride 105 94 - 109 mmol/L    Carbon Dioxide 29 20 - 32 mmol/L    Anion Gap 6 3 - 14 mmol/L    Glucose 93 70 - 99 mg/dL    Urea Nitrogen 29 7 - 30 mg/dL    Creatinine 0.85 0.52 - 1.04 mg/dL    GFR Estimate 63 >60 mL/min/1.7m2    GFR Estimate If Black 77 >60 mL/min/1.7m2    Calcium 9.9 8.5 - 10.1 mg/dL   Hemoglobin A1c   Result Value Ref Range    Hemoglobin A1C 5.9 (H) 0 - 5.6 %        IMPRESSION:   Reason for surgery/procedure: SCC of right inner canthus  Diagnosis/reason for consult: preop consult    The proposed surgical procedure is considered LOW risk.    REVISED CARDIAC RISK INDEX  The patient has the following serious cardiovascular risks for perioperative complications such as (MI, PE, VFib and 3  AV Block):  No serious cardiac risks  INTERPRETATION: 0 risks: Class I (very low risk - 0.4% complication rate)    The patient has the following additional risks for perioperative complications:  No identified additional risks      ICD-10-CM    1. Preop general physical exam Z01.818    2. 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    [facility] Preop Guidelines    Revised Cardiac Risk Index

## 2018-05-24 ENCOUNTER — SURGERY (OUTPATIENT)
Age: 83
End: 2018-05-24

## 2018-05-24 ENCOUNTER — ANESTHESIA (OUTPATIENT)
Dept: SURGERY | Facility: CLINIC | Age: 83
End: 2018-05-24
Payer: MEDICARE

## 2018-05-24 ENCOUNTER — HOSPITAL ENCOUNTER (OUTPATIENT)
Facility: CLINIC | Age: 83
Discharge: HOME | End: 2018-05-24
Attending: OPHTHALMOLOGY | Admitting: OPHTHALMOLOGY
Payer: MEDICARE

## 2018-05-24 ENCOUNTER — ANESTHESIA EVENT (OUTPATIENT)
Dept: SURGERY | Facility: CLINIC | Age: 83
End: 2018-05-24
Payer: MEDICARE

## 2018-05-24 VITALS
DIASTOLIC BLOOD PRESSURE: 83 MMHG | WEIGHT: 106.1 LBS | TEMPERATURE: 97.9 F | SYSTOLIC BLOOD PRESSURE: 146 MMHG | HEIGHT: 60 IN | OXYGEN SATURATION: 98 % | BODY MASS INDEX: 20.83 KG/M2 | RESPIRATION RATE: 16 BRPM

## 2018-05-24 DIAGNOSIS — Z98.890 POSTOPERATIVE EYE STATE: Primary | ICD-10-CM

## 2018-05-24 PROCEDURE — 37000009 ZZH ANESTHESIA TECHNICAL FEE, EACH ADDTL 15 MIN: Performed by: OPHTHALMOLOGY

## 2018-05-24 PROCEDURE — 71000012 ZZH RECOVERY PHASE 1 LEVEL 1 FIRST HR: Performed by: OPHTHALMOLOGY

## 2018-05-24 PROCEDURE — 40000170 ZZH STATISTIC PRE-PROCEDURE ASSESSMENT II: Performed by: OPHTHALMOLOGY

## 2018-05-24 PROCEDURE — 71000027 ZZH RECOVERY PHASE 2 EACH 15 MINS: Performed by: OPHTHALMOLOGY

## 2018-05-24 PROCEDURE — 25000128 H RX IP 250 OP 636: Performed by: NURSE ANESTHETIST, CERTIFIED REGISTERED

## 2018-05-24 PROCEDURE — 37000008 ZZH ANESTHESIA TECHNICAL FEE, 1ST 30 MIN: Performed by: OPHTHALMOLOGY

## 2018-05-24 PROCEDURE — 25000125 ZZHC RX 250: Performed by: NURSE ANESTHETIST, CERTIFIED REGISTERED

## 2018-05-24 PROCEDURE — 27210794 ZZH OR GENERAL SUPPLY STERILE: Performed by: OPHTHALMOLOGY

## 2018-05-24 PROCEDURE — 25000125 ZZHC RX 250: Performed by: OPHTHALMOLOGY

## 2018-05-24 PROCEDURE — 36000052 ZZH SURGERY LEVEL 2 EA 15 ADDTL MIN: Performed by: OPHTHALMOLOGY

## 2018-05-24 PROCEDURE — 36000050 ZZH SURGERY LEVEL 2 1ST 30 MIN: Performed by: OPHTHALMOLOGY

## 2018-05-24 RX ORDER — ERYTHROMYCIN 5 MG/G
OINTMENT OPHTHALMIC PRN
Status: DISCONTINUED | OUTPATIENT
Start: 2018-05-24 | End: 2018-05-24 | Stop reason: HOSPADM

## 2018-05-24 RX ORDER — PHYSOSTIGMINE SALICYLATE 1 MG/ML
1.2 INJECTION INTRAVENOUS
Status: DISCONTINUED | OUTPATIENT
Start: 2018-05-24 | End: 2018-05-24 | Stop reason: HOSPADM

## 2018-05-24 RX ORDER — HYDROCODONE BITARTRATE AND ACETAMINOPHEN 5; 325 MG/1; MG/1
1 TABLET ORAL EVERY 6 HOURS PRN
Qty: 10 TABLET | Refills: 0 | Status: SHIPPED | OUTPATIENT
Start: 2018-05-24 | End: 2018-07-13

## 2018-05-24 RX ORDER — HYDROMORPHONE HYDROCHLORIDE 1 MG/ML
.3-.5 INJECTION, SOLUTION INTRAMUSCULAR; INTRAVENOUS; SUBCUTANEOUS EVERY 10 MIN PRN
Status: DISCONTINUED | OUTPATIENT
Start: 2018-05-24 | End: 2018-05-24 | Stop reason: HOSPADM

## 2018-05-24 RX ORDER — SODIUM CHLORIDE, SODIUM LACTATE, POTASSIUM CHLORIDE, CALCIUM CHLORIDE 600; 310; 30; 20 MG/100ML; MG/100ML; MG/100ML; MG/100ML
INJECTION, SOLUTION INTRAVENOUS CONTINUOUS PRN
Status: DISCONTINUED | OUTPATIENT
Start: 2018-05-24 | End: 2018-05-24

## 2018-05-24 RX ORDER — DEXAMETHASONE SODIUM PHOSPHATE 4 MG/ML
INJECTION, SOLUTION INTRA-ARTICULAR; INTRALESIONAL; INTRAMUSCULAR; INTRAVENOUS; SOFT TISSUE PRN
Status: DISCONTINUED | OUTPATIENT
Start: 2018-05-24 | End: 2018-05-24

## 2018-05-24 RX ORDER — ERYTHROMYCIN 5 MG/G
OINTMENT OPHTHALMIC
Qty: 3.5 G | Refills: 0 | Status: SHIPPED | OUTPATIENT
Start: 2018-05-24 | End: 2018-11-09

## 2018-05-24 RX ORDER — FENTANYL CITRATE 50 UG/ML
25-50 INJECTION, SOLUTION INTRAMUSCULAR; INTRAVENOUS EVERY 5 MIN PRN
Status: DISCONTINUED | OUTPATIENT
Start: 2018-05-24 | End: 2018-05-24 | Stop reason: HOSPADM

## 2018-05-24 RX ORDER — PROPOFOL 10 MG/ML
INJECTION, EMULSION INTRAVENOUS PRN
Status: DISCONTINUED | OUTPATIENT
Start: 2018-05-24 | End: 2018-05-24

## 2018-05-24 RX ORDER — NALOXONE HYDROCHLORIDE 0.4 MG/ML
.1-.4 INJECTION, SOLUTION INTRAMUSCULAR; INTRAVENOUS; SUBCUTANEOUS
Status: DISCONTINUED | OUTPATIENT
Start: 2018-05-24 | End: 2018-05-24 | Stop reason: HOSPADM

## 2018-05-24 RX ORDER — FENTANYL CITRATE 50 UG/ML
25-50 INJECTION, SOLUTION INTRAMUSCULAR; INTRAVENOUS
Status: DISCONTINUED | OUTPATIENT
Start: 2018-05-24 | End: 2018-05-24 | Stop reason: HOSPADM

## 2018-05-24 RX ORDER — SODIUM CHLORIDE, SODIUM LACTATE, POTASSIUM CHLORIDE, CALCIUM CHLORIDE 600; 310; 30; 20 MG/100ML; MG/100ML; MG/100ML; MG/100ML
INJECTION, SOLUTION INTRAVENOUS CONTINUOUS
Status: DISCONTINUED | OUTPATIENT
Start: 2018-05-24 | End: 2018-05-24 | Stop reason: HOSPADM

## 2018-05-24 RX ORDER — FENTANYL CITRATE 50 UG/ML
INJECTION, SOLUTION INTRAMUSCULAR; INTRAVENOUS PRN
Status: DISCONTINUED | OUTPATIENT
Start: 2018-05-24 | End: 2018-05-24

## 2018-05-24 RX ORDER — LIDOCAINE HYDROCHLORIDE AND EPINEPHRINE 10; 10 MG/ML; UG/ML
INJECTION, SOLUTION INFILTRATION; PERINEURAL PRN
Status: DISCONTINUED | OUTPATIENT
Start: 2018-05-24 | End: 2018-05-24 | Stop reason: HOSPADM

## 2018-05-24 RX ORDER — ONDANSETRON 4 MG/1
4 TABLET, ORALLY DISINTEGRATING ORAL EVERY 30 MIN PRN
Status: DISCONTINUED | OUTPATIENT
Start: 2018-05-24 | End: 2018-05-24 | Stop reason: HOSPADM

## 2018-05-24 RX ORDER — TETRACAINE HYDROCHLORIDE 5 MG/ML
SOLUTION OPHTHALMIC PRN
Status: DISCONTINUED | OUTPATIENT
Start: 2018-05-24 | End: 2018-05-24 | Stop reason: HOSPADM

## 2018-05-24 RX ORDER — MEPERIDINE HYDROCHLORIDE 25 MG/ML
12.5 INJECTION INTRAMUSCULAR; INTRAVENOUS; SUBCUTANEOUS
Status: DISCONTINUED | OUTPATIENT
Start: 2018-05-24 | End: 2018-05-24 | Stop reason: HOSPADM

## 2018-05-24 RX ORDER — ONDANSETRON 2 MG/ML
4 INJECTION INTRAMUSCULAR; INTRAVENOUS EVERY 30 MIN PRN
Status: DISCONTINUED | OUTPATIENT
Start: 2018-05-24 | End: 2018-05-24 | Stop reason: HOSPADM

## 2018-05-24 RX ADMIN — PROPOFOL 15 MG: 10 INJECTION, EMULSION INTRAVENOUS at 13:04

## 2018-05-24 RX ADMIN — ERYTHROMYCIN 1 G: 5 OINTMENT OPHTHALMIC at 13:08

## 2018-05-24 RX ADMIN — FENTANYL CITRATE 25 MCG: 50 INJECTION, SOLUTION INTRAMUSCULAR; INTRAVENOUS at 12:59

## 2018-05-24 RX ADMIN — DEXAMETHASONE SODIUM PHOSPHATE 4 MG: 4 INJECTION, SOLUTION INTRA-ARTICULAR; INTRALESIONAL; INTRAMUSCULAR; INTRAVENOUS; SOFT TISSUE at 13:29

## 2018-05-24 RX ADMIN — LIDOCAINE HYDROCHLORIDE AND EPINEPHRINE 3 ML: 10; 10 INJECTION, SOLUTION INFILTRATION; PERINEURAL at 13:05

## 2018-05-24 RX ADMIN — SODIUM CHLORIDE, POTASSIUM CHLORIDE, SODIUM LACTATE AND CALCIUM CHLORIDE: 600; 310; 30; 20 INJECTION, SOLUTION INTRAVENOUS at 12:54

## 2018-05-24 RX ADMIN — FENTANYL CITRATE 25 MCG: 50 INJECTION, SOLUTION INTRAMUSCULAR; INTRAVENOUS at 12:56

## 2018-05-24 RX ADMIN — DEXMEDETOMIDINE HYDROCHLORIDE 8 MCG: 100 INJECTION, SOLUTION INTRAVENOUS at 12:57

## 2018-05-24 RX ADMIN — TETRACAINE HYDROCHLORIDE 1 DROP: 5 SOLUTION OPHTHALMIC at 13:00

## 2018-05-24 ASSESSMENT — LIFESTYLE VARIABLES: TOBACCO_USE: 1

## 2018-05-24 NOTE — DISCHARGE INSTRUCTIONS
Same Day Surgery Discharge Instructions for  Sedation and General Anesthesia       It's not unusual to feel dizzy, light-headed or faint for up to 24 hours after surgery or while taking pain medication.  If you have these symptoms: sit for a few minutes before standing and have someone assist you when you get up to walk or use the bathroom.      You should rest and relax for the next 24 hours. We recommend you make arrangements to have an adult stay with you for at least 24 hours after your discharge.  Avoid hazardous and strenuous activity.      DO NOT DRIVE any vehicle or operate mechanical equipment for 24 hours following the end of your surgery.  Even though you may feel normal, your reactions may be affected by the medication you have received.      Do not drink alcoholic beverages for 24 hours following surgery.       Slowly progress to your regular diet as you feel able. It's not unusual to feel nauseated and/or vomit after receiving anesthesia.  If you develop these symptoms, drink clear liquids (apple juice, ginger ale, broth, 7-up, etc. ) until you feel better.  If your nausea and vomiting persists for 24 hours, please notify your surgeon.        All narcotic pain medications, along with inactivity and anesthesia, can cause constipation. Drinking plenty of liquids and increasing fiber intake will help.      For any questions of a medical nature, call your surgeon.      Do not make important decisions for 24 hours.      If you had general anesthesia, you may have a sore throat for a couple of days related to the breathing tube used during surgery.  You may use Cepacol lozenges to help with this discomfort.  If it worsens or if you develop a fever, contact your surgeon.       If you feel your pain is not well managed with the pain medications prescribed by your surgeon, please contact your surgeon's office to let them know so they can address your concerns.         Post-operative Instructions    Ophthalmic  Plastic and Reconstructive Surgery  Jose Torrez M.D.  Keren Lara M.D.    All instructions apply to the operated eye(s) or eyelid(s)      What to expect after surgery:    Thre will be some swelling, bruising, and likely a black eye (even into the lower eyelids and cheeks). Also expect crusting and discharge from the eye and/or incisions.     A small amount of surface bleeding is normal for the first 48 hours after surgery.    You may notice some bloody tears for the first few days after surgery. This is normal.    Your eye(s) and eyelid(s) may be painful and tender. This is normal after surgery. Use the pain medication as prescribed. If your pain does not improve despite the medication, contact the office.    Wound care and personal care:    If a patch or bandage has been placed, please leave this in place until seen in clinic. Prevent the bandage from getting wet.     Apply ice compresses 15 minutes on 15 minutes off while awake for the first 2 days after surgery, then switch to warm compresses 4 times a day until seen by your physician.     For warm packs you can place a cup of dry uncooked rice in a clean cotton sock. Place sock in microwave 30 seconds to one minute. Next place the warm sock into a plastic bag and wrap the bag with clean warm wet washcloth and place over operated eye.      You may shower or wash your hair the day after surgery. Do not bathe or go swimming for 1 week to prevent contamination of your wounds.    Do not apply make-up to the eyes or eyelids for 2 weeks after surgery.      Activity restrictions and driving:    Avoid heavy lifting, bending, exercise or strenuous activity for 1 week after surgery.    You may resume other activities and return to work as tolerated.    You may not resume driving until have you stopped using narcotic pain medications(such as Norco, Percocet, Tylenol #3).    Medications:    Restart all your regular home medications and eye drops today. If you  take Plavix or Aspirin on a regular basis, wait for 3 days after your surgery before restarting these in order to decrease the risk of bleeding complications.    Avoid aspirin and aspirin-like medications (Motrin, Aleve, Ibuprofen, Yulia-Hughes etc) for 5 days to reduce the risk of bleeding. You may take Tylenol (acetaminophen) for pain.    In addition to your home medications, take the following post-operative medications as prescribed by your physician:    Apply antibiotic ointment (erythromycin) to all sutures three times a day, and into the operated eye(s) at night.    Take 1 to 2 pain pills (norco or tylenol 3 as prescribed) as needed for pain up to every 4 hours.    The pain pills may make you drowsy. You must not drive a car, operate heavy machinery or drink alcohol while taking them.    The pain pills may cause constipation and nausea. Take them with some food to prevent a stomach upset. If you continue to experience nausea, call your physician.      WARNING: All the prescription pain medications listed above contain Tylenol (acetaminophen). You must not take more than 4,000 mg of acetaminophen per 24-hour period. This is equivalent to 6 tablets of Darvocet, 8 tablets of Vicodin, or 12 tablets of Norco, Percocet or Tylenol #3. If you take other over-the-counter medications containing acetaminophen, you must take the amount of acetaminophen into account and reduce the number of prescribed pain pills accordingly.    Contact information and follow-up:    Return to the Eye Clinic for a follow-up appointment with your physician as  scheduled. If no appointment has been scheduled, call [phone] for an  appointment with Dr. Torrez within 1 to 2 weeks from your date of surgery.    For severe pain, bleeding, or loss of vision, call the Eye Clinic at [phone].    An on call person can be reached after hours for concerns. The on call doctor should not call in medication refill requests after hours or on  weekends, so please plan accordingly. An effort has been made to provide adequate pain medications following every surgery, and refills will not be provided in most instances. Narcotic pain medications cannot be called in.

## 2018-05-24 NOTE — ANESTHESIA POSTPROCEDURE EVALUATION
Patient: Maryam Saavedra    Procedure(s):  RIGHT MEDIAL CANTHUS MOHS CLOSURE - Wound Class: I-Clean    Diagnosis:RIGHT MEDIAL CANTHUS LESION  Diagnosis Additional Information: No value filed.    Anesthesia Type:  MAC    Note:  Anesthesia Post Evaluation    Patient location during evaluation: PACU  Patient participation: Able to fully participate in evaluation  Level of consciousness: awake  Pain management: adequate  Airway patency: patent  Cardiovascular status: acceptable  Respiratory status: acceptable  Hydration status: acceptable  PONV: none     Anesthetic complications: None          Last vitals:  Vitals:    05/24/18 1335 05/24/18 1345 05/24/18 1400   BP: 143/77 138/88 132/79   Resp: 16 16 16   Temp:      SpO2: 96% 97% 98%         Electronically Signed By: Terrell Zhong MD  May 24, 2018  2:12 PM

## 2018-05-24 NOTE — IP AVS SNAPSHOT
MRN:[id number]                      After Visit Summary   5/24/2018    Maryam Saavedra    MRN: [id number]           Thank you!     Thank you for choosing [facility] for your care. Our goal is always to provide you with excellent care. Hearing back from our patients is one way we can continue to improve our services. Please take a few minutes to complete the written survey that you may receive in the mail after you visit with us. Thank you!        Patient Information     Date Of Birth          6/9/1932        About your hospital stay     You were admitted on:  May 24, 2018 You last received care in the:  [facility]    You were discharged on:  May 24, 2018       Who to Call     For medical emergencies, please call 911.  For non-urgent questions about your medical care, please call your primary care provider or clinic, [phone]  For questions related to your surgery, please call your surgery clinic        Attending Provider     Provider Specialty    Jose Torrez MD Ophthalmology       Primary Care Provider Office Phone # Fax #    Truman YAW Norman [phone]      After Care Instructions     Discharge Medication Instructions       Do NOT take aspirin or medications containing NSAIDS for 72 hours after procedure.            Ice to affected area       Apply cold pack for 15 minutes on, 15 minutes off, for 48 hours while awake.                  Further instructions from your care team       Same Day Surgery Discharge Instructions for  Sedation and General Anesthesia       It's not unusual to feel dizzy, light-headed or faint for up to 24 hours after surgery or while taking pain medication.  If you have these symptoms: sit for a few minutes before standing and have someone assist you when you get up to walk or use the bathroom.      You should rest and relax for the next 24 hours. We recommend you make arrangements to have an adult stay with you for at least  24 hours after your discharge.  Avoid hazardous and strenuous activity.      DO NOT DRIVE any vehicle or operate mechanical equipment for 24 hours following the end of your surgery.  Even though you may feel normal, your reactions may be affected by the medication you have received.      Do not drink alcoholic beverages for 24 hours following surgery.       Slowly progress to your regular diet as you feel able. It's not unusual to feel nauseated and/or vomit after receiving anesthesia.  If you develop these symptoms, drink clear liquids (apple juice, ginger ale, broth, 7-up, etc. ) until you feel better.  If your nausea and vomiting persists for 24 hours, please notify your surgeon.        All narcotic pain medications, along with inactivity and anesthesia, can cause constipation. Drinking plenty of liquids and increasing fiber intake will help.      For any questions of a medical nature, call your surgeon.      Do not make important decisions for 24 hours.      If you had general anesthesia, you may have a sore throat for a couple of days related to the breathing tube used during surgery.  You may use Cepacol lozenges to help with this discomfort.  If it worsens or if you develop a fever, contact your surgeon.       If you feel your pain is not well managed with the pain medications prescribed by your surgeon, please contact your surgeon's office to let them know so they can address your concerns.         Post-operative Instructions    Ophthalmic Plastic and Reconstructive Surgery  Jose Torrez M.D.  Keren Lara M.D.    All instructions apply to the operated eye(s) or eyelid(s)      What to expect after surgery:    Thre will be some swelling, bruising, and likely a black eye (even into the lower eyelids and cheeks). Also expect crusting and discharge from the eye and/or incisions.     A small amount of surface bleeding is normal for the first 48 hours after surgery.    You may notice some bloody tears  for the first few days after surgery. This is normal.    Your eye(s) and eyelid(s) may be painful and tender. This is normal after surgery. Use the pain medication as prescribed. If your pain does not improve despite the medication, contact the office.    Wound care and personal care:    If a patch or bandage has been placed, please leave this in place until seen in clinic. Prevent the bandage from getting wet.     Apply ice compresses 15 minutes on 15 minutes off while awake for the first 2 days after surgery, then switch to warm compresses 4 times a day until seen by your physician.     For warm packs you can place a cup of dry uncooked rice in a clean cotton sock. Place sock in microwave 30 seconds to one minute. Next place the warm sock into a plastic bag and wrap the bag with clean warm wet washcloth and place over operated eye.      You may shower or wash your hair the day after surgery. Do not bathe or go swimming for 1 week to prevent contamination of your wounds.    Do not apply make-up to the eyes or eyelids for 2 weeks after surgery.      Activity restrictions and driving:    Avoid heavy lifting, bending, exercise or strenuous activity for 1 week after surgery.    You may resume other activities and return to work as tolerated.    You may not resume driving until have you stopped using narcotic pain medications(such as Norco, Percocet, Tylenol #3).    Medications:    Restart all your regular home medications and eye drops today. If you take Plavix or Aspirin on a regular basis, wait for 3 days after your surgery before restarting these in order to decrease the risk of bleeding complications.    Avoid aspirin and aspirin-like medications (Motrin, Aleve, Ibuprofen, Yulia-Ariel etc) for 5 days to reduce the risk of bleeding. You may take Tylenol (acetaminophen) for pain.    In addition to your home medications, take the following post-operative medications as prescribed by your physician:    Apply  antibiotic ointment (erythromycin) to all sutures three times a day, and into the operated eye(s) at night.    Take 1 to 2 pain pills (norco or tylenol 3 as prescribed) as needed for pain up to every 4 hours.    The pain pills may make you drowsy. You must not drive a car, operate heavy machinery or drink alcohol while taking them.    The pain pills may cause constipation and nausea. Take them with some food to prevent a stomach upset. If you continue to experience nausea, call your physician.      WARNING: All the prescription pain medications listed above contain Tylenol (acetaminophen). You must not take more than 4,000 mg of acetaminophen per 24-hour period. This is equivalent to 6 tablets of Darvocet, 8 tablets of Vicodin, or 12 tablets of Norco, Percocet or Tylenol #3. If you take other over-the-counter medications containing acetaminophen, you must take the amount of acetaminophen into account and reduce the number of prescribed pain pills accordingly.    Contact information and follow-up:    Return to the Eye Clinic for a follow-up appointment with your physician as  scheduled. If no appointment has been scheduled, call [phone] for an  appointment with Dr. Torrez within 1 to 2 weeks from your date of surgery.    For severe pain, bleeding, or loss of vision, call the Eye Clinic at [phone].    An on call person can be reached after hours for concerns. The on call doctor should not call in medication refill requests after hours or on weekends, so please plan accordingly. An effort has been made to provide adequate pain medications following every surgery, and refills will not be provided in most instances. Narcotic pain medications cannot be called in.         Pending Results     No orders found from 5/22/2018 to 5/25/2018.            Admission Information     Date & Time Provider Department Dept. Phone    5/24/2018 Jose Torrez MD [facility] Same Day Surgery [phone]      Your  Vitals Were     Blood Pressure Temperature Respirations Height Weight Pulse Oximetry    143/77 97.9  F (36.6  C) (Oral) 16 1.524 m (5') 48.1 kg (106 lb 1.6 oz) 98%    BMI (Body Mass Index)                   20.72 kg/m2           [employer] Information     [employer] gives you secure access to your electronic health record. If you see a primary care provider, you can also send messages to your care team and make appointments. If you have questions, please call your primary care clinic.  If you do not have a primary care provider, please call [phone] and they will assist you.        Care EveryWhere ID     This is your Care EveryWhere ID. This could be used by other organizations to access your [facility] medical records  [id number]        Equal Access to Services     AMCKENZIE SCHULTZ : Yen Dey, angeli nelson, ella obrien, ben austin. So [facility] [phone].    ATENCIÓN: Si habla español, tiene a bond disposición servicios gratuitos de asistencia lingüística. Llame al [phone].    We comply with applicable federal civil rights laws and [place] laws. We do not discriminate on the basis of race, color, national origin, age, disability, sex, sexual orientation, or gender identity.               Review of your medicines      START taking        Dose / Directions    erythromycin ophthalmic ointment   Commonly known as:  ROMYCIN   Used for:  Postoperative eye state        Apply to skin incisions three times daily and into the eye at bedtime.   Quantity:  3.5 g   Refills:  0       HYDROcodone-acetaminophen 5-325 MG per tablet   Commonly known as:  NORCO   Used for:  Postoperative eye state        Dose:  1 tablet   Take 1 tablet by mouth every 6 hours as needed for pain Maximum of 4000 mg of acetaminophen in 24 hours.   Quantity:  10 tablet   Refills:  0         CONTINUE these medicines which have NOT CHANGED        Dose / Directions    amLODIPine 2.5 MG tablet    Commonly known as:  NORVASC   Used for:  Benign essential hypertension        TAKE 1 TABLET BY MOUTH EVERY DAY   Quantity:  90 tablet   Refills:  2       aspirin 81 MG chewable tablet        Dose:  162 mg   Take 2 tablets (162 mg) by mouth daily   Refills:  0       CEPHALEXIN PO        Dose:  1 tablet   Take 1 tablet by mouth 3 times daily   Refills:  0       metoprolol succinate 50 MG 24 hr tablet   Commonly known as:  TOPROL-XL   Used for:  Benign essential hypertension        TAKE 1/2 TABLET BY MOUTH ONCE A DAY   Quantity:  45 tablet   Refills:  2       triamterene-hydrochlorothiazide 37.5-25 MG per tablet   Commonly known as:  MAXZIDE-25   Used for:  Benign essential hypertension        TAKE 1/2 TABLET BY MOUTH ONCE A DAY   Quantity:  45 tablet   Refills:  1            Where to get your medicines      These medications were sent to [facility] - [address]  [address], [place] [address]     Phone:  [phone]     erythromycin ophthalmic ointment         Some of these will need a paper prescription and others can be bought over the counter. Ask your nurse if you have questions.     Bring a paper prescription for each of these medications     HYDROcodone-acetaminophen 5-325 MG per tablet                Protect others around you: Learn how to safely use, store and throw away your medicines at www.disposemymeds.org.        ANTIBIOTIC INSTRUCTION     You've Been Prescribed an Antibiotic - Now What?  Your healthcare team thinks that you or your loved one might have an infection. Some infections can be treated with antibiotics, which are powerful, life-saving drugs. Like all medications, antibiotics have side effects and should only be used when necessary. There are some important things you should know about your antibiotic treatment.      Your healthcare team may run tests before you start taking an antibiotic.    Your team may take samples (e.g., from your blood,  urine or other areas) to run tests to look for bacteria. These test can be important to determine if you need an antibiotic at all and, if you do, which antibiotic will work best.      Within a few days, your healthcare team might change or even stop your antibiotic.    Your team may start you on an antibiotic while they are working to find out what is making you sick.    Your team might change your antibiotic because test results show that a different antibiotic would be better to treat your infection.    In some cases, once your team has more information, they learn that you do not need an antibiotic at all. They may find out that you don't have an infection, or that the antibiotic you're taking won't work against your infection. For example, an infection caused by a virus can't be treated with antibiotics. Staying on an antibiotic when you don't need it is more likely to be harmful than helpful.      You may experience side effects from your antibiotic.    Like all medications, antibiotics have side effects. Some of these can be serious.    Let you healthcare team know if you have any known allergies when you are admitted to the hospital.    One significant side effect of nearly all antibiotics is the risk of severe and sometimes deadly diarrhea caused by Clostridium difficile (C. Difficile). This occurs when a person takes antibiotics because some good germs are destroyed. Antibiotic use allows C. diificile to take over, putting patients at high risk for this serious infection.    As a patient or caregiver, it is important to understand your or your loved one's antibiotic treatment. It is especially important for caregivers to speak up when patients can't speak for themselves. Here are some important questions to ask your healthcare team.    What infection is this antibiotic treating and how do you know I have that infection?    What side effects might occur from this antibiotic?    How long will I need to take  this antibiotic?    Is it safe to take this antibiotic with other medications or supplements (e.g., vitamins) that I am taking?     Are there any special directions I need to know about taking this antibiotic? For example, should I take it with food?    How will I be monitored to know whether my infection is responding to the antibiotic?    What tests may help to make sure the right antibiotic is prescribed for me?      Information provided by:  www.cdc.gov/getsmart  U.S. Department of Health and Human Services  Centers for disease Control and Prevention  National Center for Emerging and Zoonotic Infectious Diseases  Division of Healthcare Quality Promotion        Information about OPIOIDS     PRESCRIPTION OPIOIDS: WHAT YOU NEED TO KNOW   You have a prescription for an opioid (narcotic) pain medicine. Opioids can cause addiction. If you have a history of chemical dependency of any type, you are at a higher risk of becoming addicted to opioids. Only take this medicine after all other options have been tried. Take it for as short a time and as few doses as possible.     Do not:    Drive. If you drive while taking these medicines, you could be arrested for driving under the influence (DUI).    Operate heavy machinery    Do any other dangerous activities while taking these medicines.     Drink any alcohol while taking these medicines.      Take with any other medicines that contain acetaminophen. Read all labels carefully. Look for the word  acetaminophen  or  Tylenol.  Ask your pharmacist if you have questions or are unsure.    Store your pills in a secure place, locked if possible. We will not replace any lost or stolen medicine. If you don t finish your medicine, please throw away (dispose) as directed by your pharmacist. The [place] Pollution Control Agency has more information about safe disposal: https://www.pca.[facility].[place].us/living-green/managing-unwanted-medications    All opioids tend to cause constipation.  Drink plenty of water and eat foods that have a lot of fiber, such as fruits, vegetables, prune juice, apple juice and high-fiber cereal. Take a laxative (Miralax, milk of magnesia, Colace, Senna) if you don t move your bowels at least every other day.              Medication List: This is a list of all your medications and when to take them. Check marks below indicate your daily home schedule. Keep this list as a reference.      Medications           Morning Afternoon Evening Bedtime As Needed    amLODIPine 2.5 MG tablet   Commonly known as:  NORVASC   TAKE 1 TABLET BY MOUTH EVERY DAY                                aspirin 81 MG chewable tablet   Take 2 tablets (162 mg) by mouth daily                                CEPHALEXIN PO   Take 1 tablet by mouth 3 times daily                                erythromycin ophthalmic ointment   Commonly known as:  ROMYCIN   Apply to skin incisions three times daily and into the eye at bedtime.   Last time this was given:  1 g on 5/24/2018  1:08 PM                                HYDROcodone-acetaminophen 5-325 MG per tablet   Commonly known as:  NORCO   Take 1 tablet by mouth every 6 hours as needed for pain Maximum of 4000 mg of acetaminophen in 24 hours.                                metoprolol succinate 50 MG 24 hr tablet   Commonly known as:  TOPROL-XL   TAKE 1/2 TABLET BY MOUTH ONCE A DAY                                triamterene-hydrochlorothiazide 37.5-25 MG per tablet   Commonly known as:  MAXZIDE-25   TAKE 1/2 TABLET BY MOUTH ONCE A DAY

## 2018-05-24 NOTE — IP AVS SNAPSHOT
[facility] Same Day Surgery    [address]    [place] [address]    Phone:  [phone]    Fax:  [phone]                                       After Visit Summary   5/24/2018    Maryam Saavedra    MRN: [id number]           After Visit Summary Signature Page     I have received my discharge instructions, and my questions have been answered. I have discussed any challenges I see with this plan with the nurse or doctor.    ..........................................................................................................................................  Patient/Patient Representative Signature      ..........................................................................................................................................  Patient Representative Print Name and Relationship to Patient    ..................................................               ................................................  Date                                            Time    ..........................................................................................................................................  Reviewed by Signature/Title    ...................................................              ..............................................  Date                                                            Time

## 2018-05-24 NOTE — ANESTHESIA PREPROCEDURE EVALUATION
Anesthesia Evaluation     . Pt has had prior anesthetic. Type: General           ROS/MED HX    ENT/Pulmonary:     (+)tobacco use, Past use , . .    Neurologic:     (+)other neuro unsteady gait    Cardiovascular:     (+) hypertension----. : . . . :. valvular problems/murmurs tricuspid valve reg.:.       METS/Exercise Tolerance:     Hematologic:         Musculoskeletal:   (+) arthritis, , , -       GI/Hepatic:         Renal/Genitourinary:         Endo:         Psychiatric:         Infectious Disease:         Malignancy:   (+) Malignancy History of Skin          Other:                                    Anesthesia Plan      History & Physical Review  History and physical reviewed and following examination; no interval change.    ASA Status:  3 .        Plan for MAC with Intravenous induction. Maintenance will be TIVA.    PONV prophylaxis:  Ondansetron (or other 5HT-3) and Dexamethasone or Solumedrol       Postoperative Care  Postoperative pain management:  IV analgesics and Oral pain medications.      Consents  Anesthetic plan, risks, benefits and alternatives discussed with:  Patient..                          .

## 2018-05-24 NOTE — BRIEF OP NOTE
[facility] Brief Operative Note    Pre-operative diagnosis: RIGHT MEDIAL CANTHUS LESION   Post-operative diagnosis Same   Procedure: Procedure(s):  RIGHT MEDIAL CANTHUS MOHS CLOSURE - Wound Class: I-Clean   Surgeon: Jose Torrez MD   Assistants(s):    Estimated blood loss: Less than 10 mL   Specimens: None   Findings: As expected

## 2018-05-24 NOTE — ANESTHESIA CARE TRANSFER NOTE
Patient: Maryam Saavedra    Procedure(s):  RIGHT MEDIAL CANTHUS MOHS CLOSURE - Wound Class: I-Clean    Diagnosis: RIGHT MEDIAL CANTHUS LESION  Diagnosis Additional Information: No value filed.    Anesthesia Type:   MAC     Note:  Airway :Room Air  Patient transferred to:PACU  Handoff Report: Identifed the Patient, Identified the Reponsible Provider, Reviewed the pertinent medical history, Discussed the surgical course, Reviewed Intra-OP anesthesia mangement and issues during anesthesia, Set expectations for post-procedure period and Allowed opportunity for questions and acknowledgement of understanding    Via recliner, talking, report to RN  Vitals: (Last set prior to Anesthesia Care Transfer)    CRNA VITALS  5/24/2018 1301 - 5/24/2018 1339      5/24/2018             Resp Rate (set): 10        BP: 143/77 P: 78  SaO2 96%        Electronically Signed By: JACKIE Cuevas CRNA  May 24, 2018  1:39 PM

## 2018-05-25 NOTE — OP NOTE
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      PROCEDURES:  Right medial canthus reconstruction with local flap (upper lid to lower lid transposition flap).      SURGEON:  Gabino Torrez MD      ASSISTANT:  Keren Lara MD      ANESTHESIA:  Monitored with local infiltration of  1% lidocaine with epinephrine 1:[id number].     COMPLICATIONS:  None.      ESTIMATED BLOOD LOSS:  Less than 5 mL.      HISTORY AND INDICATIONS:  The patient presented with a right lower lid medial canthal defect following Mohs surgery earlier this week.  After risks, benefits and alternatives to the proposed procedure were explained, informed consent was obtained.      DESCRIPTION OF PROCEDURE:  The patient was brought to the operating room and placed supine on the operating table.  IV sedation was given.  The right medial canthus, lower lid and upper lid were infiltrated with local anesthetic.  A blepharoplasty transposition flap was drawn on the upper lid extending from the defect laterally.  She was prepped and draped in the typical sterile fashion for oculoplastic surgery.  Attention was directed to the right side.  Skin was incised following marked lines.  Skin flap was elevated and rotated in the lower lid defect.  The donor site was closed with interrupted 6-0 chromic and running 6-0 plain gut suture.  The flap was then sutured in place with 6-0 chromic and 6-0 plain gut sutures.  Antibiotic ointment was applied.  The patient tolerated the procedure well, left the operating room in stable condition.         GABINO TORREZ MD             D: 2018   T: 2018   MT: JARETH      Name:     DONATO CARMONA   MRN:      [id number]        Account:        [id number]   :      1932           Procedure Date: 2018       Document: [id number]

## 2018-07-02 ENCOUNTER — HOSPITAL ENCOUNTER (OUTPATIENT)
Dept: LAB | Facility: CLINIC | Age: 83
Discharge: HOME | End: 2018-07-02
Attending: ORTHOPAEDIC SURGERY | Admitting: ORTHOPAEDIC SURGERY
Payer: MEDICARE

## 2018-07-02 DIAGNOSIS — Z01.812 PRE-OPERATIVE LABORATORY EXAMINATION: ICD-10-CM

## 2018-07-02 LAB
MRSA DNA SPEC QL NAA+PROBE: NEGATIVE
SPECIMEN SOURCE: NORMAL

## 2018-07-02 PROCEDURE — 40000829 ZZHCL STATISTIC STAPH AUREUS SUSCEPT SCREEN PCR: Performed by: ORTHOPAEDIC SURGERY

## 2018-07-02 PROCEDURE — 87640 STAPH A DNA AMP PROBE: CPT | Performed by: ORTHOPAEDIC SURGERY

## 2018-07-02 PROCEDURE — 40000830 ZZHCL STATISTIC STAPH AUREUS METH RESIST SCREEN PCR: Performed by: ORTHOPAEDIC SURGERY

## 2018-07-02 PROCEDURE — 87641 MR-STAPH DNA AMP PROBE: CPT | Performed by: ORTHOPAEDIC SURGERY

## 2018-07-13 ENCOUNTER — OFFICE VISIT (OUTPATIENT)
Dept: FAMILY MEDICINE | Facility: CLINIC | Age: 83
End: 2018-07-13
Payer: MEDICARE

## 2018-07-13 VITALS
WEIGHT: 104.9 LBS | HEIGHT: 60 IN | DIASTOLIC BLOOD PRESSURE: 65 MMHG | HEART RATE: 69 BPM | SYSTOLIC BLOOD PRESSURE: 115 MMHG | OXYGEN SATURATION: 97 % | TEMPERATURE: 98.2 F | BODY MASS INDEX: 20.59 KG/M2

## 2018-07-13 DIAGNOSIS — Z01.818 PREOP GENERAL PHYSICAL EXAM: Primary | ICD-10-CM

## 2018-07-13 DIAGNOSIS — I10 BENIGN ESSENTIAL HYPERTENSION: ICD-10-CM

## 2018-07-13 DIAGNOSIS — M16.11 PRIMARY OSTEOARTHRITIS OF RIGHT HIP: ICD-10-CM

## 2018-07-13 DIAGNOSIS — R73.01 IMPAIRED FASTING GLUCOSE: ICD-10-CM

## 2018-07-13 LAB
ERYTHROCYTE [DISTWIDTH] IN BLOOD BY AUTOMATED COUNT: 13.9 % (ref 10–15)
HBA1C MFR BLD: 5.6 % (ref 0–5.6)
HCT VFR BLD AUTO: 37.7 % (ref 35–47)
HGB BLD-MCNC: 12 G/DL (ref 11.7–15.7)
MCH RBC QN AUTO: 29.3 PG (ref 26.5–33)
MCHC RBC AUTO-ENTMCNC: 31.8 G/DL (ref 31.5–36.5)
MCV RBC AUTO: 92 FL (ref 78–100)
PLATELET # BLD AUTO: 275 10E9/L (ref 150–450)
RBC # BLD AUTO: 4.09 10E12/L (ref 3.8–5.2)
WBC # BLD AUTO: 6.1 10E9/L (ref 4–11)

## 2018-07-13 PROCEDURE — 99215 OFFICE O/P EST HI 40 MIN: CPT | Performed by: INTERNAL MEDICINE

## 2018-07-13 PROCEDURE — 80048 BASIC METABOLIC PNL TOTAL CA: CPT | Performed by: INTERNAL MEDICINE

## 2018-07-13 PROCEDURE — 85027 COMPLETE CBC AUTOMATED: CPT | Performed by: INTERNAL MEDICINE

## 2018-07-13 PROCEDURE — 36415 COLL VENOUS BLD VENIPUNCTURE: CPT | Performed by: INTERNAL MEDICINE

## 2018-07-13 PROCEDURE — 93000 ELECTROCARDIOGRAM COMPLETE: CPT | Performed by: INTERNAL MEDICINE

## 2018-07-13 PROCEDURE — 83036 HEMOGLOBIN GLYCOSYLATED A1C: CPT | Performed by: INTERNAL MEDICINE

## 2018-07-13 RX ORDER — HYDROCODONE BITARTRATE AND ACETAMINOPHEN 5; 325 MG/1; MG/1
1 TABLET ORAL EVERY 6 HOURS PRN
Qty: 10 TABLET | Refills: 0 | Status: CANCELLED | OUTPATIENT
Start: 2018-07-13

## 2018-07-13 NOTE — PROGRESS NOTES
[facility]  [address] [facility] [address]  [phone]  Dept: [phone]    PRE-OP EVALUATION:  Today's date: 2018    Maryam Saavedra (: 1932) presents for pre-operative evaluation assessment as requested by Jimbo Whitt .  He requires evaluation and anesthesia risk assessment prior to undergoing surgery/procedure for treatment of right hip osteoarthritis .    Fax number for surgical facility:   Primary Physician: Truman oNrman  Type of Anesthesia Anticipated: General    Patient has a Health Care Directive or Living Will:  NO    Preop Questions 7/10/2018   Who is doing your surgery? Dr. Phillips   What are you having done? Hip replacement   Date of Surgery/Procedure: 18   Facility or Hospital where procedure/surgery will be performed: [facility] [place]   1.  Do you have a history of Heart attack, stroke, stent, coronary bypass surgery, or other heart surgery? No   2.  Do you ever have any pain or discomfort in your chest? No   3.  Do you have a history of  Heart Failure? No   4.   Are you troubled by shortness of breath when:  walking on a level surface, or up a slight hill, or at night? No   5.  Do you currently have a cold, bronchitis or other respiratory infection? No   6.  Do you have a cough, shortness of breath, or wheezing? No   7.  Do you sometimes get pains in the calves of your legs when you walk? No   8. Do you or anyone in your family have previous history of blood clots? No   9.  Do you or does anyone in your family have a serious bleeding problem such as prolonged bleeding following surgeries or cuts? No   10. Have you ever had problems with anemia or been told to take iron pills? No   11. Have you had any abnormal blood loss such as black, tarry or bloody stools, or abnormal vaginal bleeding? No   12. Have you ever had a blood transfusion? No   13. Have you or any of your relatives ever had problems with anesthesia? No   14. Do you have  sleep apnea, excessive snoring or daytime drowsiness? No   15. Do you have any prosthetic heart valves? No   16. Do you have prosthetic joints? No   17. Is there any chance that you may be pregnant? No         HPI:     HPI related to upcoming procedure: Right hip pain present for years progressive found to have advanced OA pain is now severe.  She can perform 4 METS of physical activity without chest pain or dyspnea.       MEDICAL HISTORY:     Patient Active Problem List    Diagnosis Date Noted     Unsteady gait 11/25/2017     Priority: Medium     Acute right-sided low back pain without sciatica 09/18/2016     Priority: Medium     Benign essential hypertension 09/01/2016     Priority: Medium     Impaired fasting glucose 09/01/2016     Priority: Medium      Past Medical History:   Diagnosis Date     Benign essential hypertension 9/1/2016     Chronic low back pain      Hypertension      Impaired fasting glucose     borderline     Osteoarthritis      Tricuspid regurgitation      Past Surgical History:   Procedure Laterality Date     EYE SURGERY  cataracts     MOHS MICROGRAPHIC PROCEDURE      Left lower eyelid      MOHS MICROGRAPHIC PROCEDURE Left 10/27/2016    Procedure: MOHS MICROGRAPHIC PROCEDURE;  Surgeon: Jose Torrez MD;  Location: [facility]     MOHS MICROGRAPHIC PROCEDURE Right 5/24/2018    Procedure: MOHS MICROGRAPHIC PROCEDURE;  RIGHT MEDIAL CANTHUS MOHS CLOSURE;  Surgeon: Jose Torrez MD;  Location: [facility]     ORTHOPEDIC SURGERY      bilateral wrists     Current Outpatient Prescriptions   Medication Sig Dispense Refill     amLODIPine (NORVASC) 2.5 MG tablet TAKE 1 TABLET BY MOUTH EVERY DAY 90 tablet 2     aspirin 81 MG chewable tablet Take 2 tablets (162 mg) by mouth daily       erythromycin (ROMYCIN) ophthalmic ointment Apply to skin incisions three times daily and into the eye at bedtime. 3.5 g 0     metoprolol succinate (TOPROL-XL) 50 MG 24 hr tablet TAKE 1/2 TABLET BY MOUTH ONCE A DAY 45 tablet 2      triamterene-hydrochlorothiazide (MAXZIDE-25) 37.5-25 MG per tablet TAKE 1/2 TABLET BY MOUTH ONCE A DAY 45 tablet 1     OTC products: None, except as noted above    Allergies   Allergen Reactions     Ace Inhibitors      Angioedema       Cyclobenzaprine      Angioedema        Social History   Substance Use Topics     Smoking status: Former Smoker     Smokeless tobacco: Never Used     Alcohol use 0.0 oz/week     0 Standard drinks or equivalent per week      Comment: 1-2 drinks per month     History   Drug Use No       REVIEW OF SYSTEMS:   Constitutional, neuro, ENT, endocrine, pulmonary, cardiac, gastrointestinal, genitourinary, musculoskeletal, integument and psychiatric systems are negative, except as otherwise noted.    EXAM:   /65 (BP Location: Right arm, Cuff Size: Adult Regular)  Pulse 69  Temp 98.2  F (36.8  C) (Oral)  Ht 5' (1.524 m)  Wt 104 lb 14.4 oz (47.6 kg)  SpO2 97%  BMI 20.49 kg/m2    GENERAL APPEARANCE: healthy, alert and no distress     EYES: EOMI, PERRL     HENT: ear canals and TM's normal and nose and mouth without ulcers or lesions     NECK: no adenopathy, no asymmetry, masses, or scars and thyroid normal to palpation     RESP: lungs clear to auscultation - no rales, rhonchi or wheezes     CV: regular rates and rhythm, normal S1 S2, no S3 or S4 and no murmur, click or rub     ABDOMEN:  soft, nontender, no HSM or masses and bowel sounds normal     MS: extremities normal- no gross deformities noted, no evidence of inflammation in joints, FROM in all extremities.     SKIN: no suspicious lesions or rashes     NEURO: Normal strength and tone, sensory exam grossly normal, mentation intact and speech normal     PSYCH: mentation appears normal. and affect normal/bright     LYMPHATICS: No cervical adenopathy    DIAGNOSTICS:   EKG: sinus rhythm non specific T wave changes unchanged from previous EKG in 10/2016    Recent Labs   Lab Test  05/14/18   1037  11/26/17   0702  11/25/17   1846  09/08/17    0900   HGB  13.3  11.6*  11.6*  12.6   PLT   --   217  236  251   NA  140  142  141  142   POTASSIUM  4.0  3.5  3.7  4.4   CR  0.85  0.74  0.80  0.84   A1C  5.9*   --    --   5.4        IMPRESSION:   Reason for surgery/procedure: Right hip osteoarthritis   Diagnosis/reason for consult: preoperative evaluation     The proposed surgical procedure is considered INTERMEDIATE risk.    REVISED CARDIAC RISK INDEX  The patient has the following serious cardiovascular risks for perioperative complications such as (MI, PE, VFib and 3  AV Block):  No serious cardiac risks  INTERPRETATION: 1 risks: Class II (low risk - 0.9% complication rate)    The patient has the following additional risks for perioperative complications:  Delirium or Dementia      ICD-10-CM    1. Preop general physical exam Z01.818 EKG 12-lead complete w/read - Clinics   2. Primary osteoarthritis of right hip M16.11    3. Benign essential hypertension I10 CBC with platelets     Basic metabolic panel   4. Impaired fasting glucose R73.01 Hemoglobin A1c       RECOMMENDATIONS:     --Consult hospital rounder / IM to assist post-op medical management    --Patient is to take all scheduled medications on the day of surgery EXCEPT for modifications listed below.    Anticoagulant or Antiplatelet Medication Use  ASPIRIN: Discontinue ASA 7-10 days prior to procedure to reduce bleeding risk.  It should be resumed post-operatively.        Antihypertensive medications  Skip triamterene/ hydrochlorothiazide on the AM of surgery; do take amlodipine and metoprolol on the AM of surgery       APPROVAL GIVEN to proceed with proposed procedure, without further diagnostic evaluation       Signed Electronically by: Truman Norman MD    Copy of this evaluation report is provided to requesting physician.    [place] Preop Guidelines    Revised Cardiac Risk Index

## 2018-07-13 NOTE — MR AVS SNAPSHOT
"              After Visit Summary   7/13/2018    Maryam Saavedra    MRN: [id number]           Patient Information     Date Of Birth          6/9/1932        Visit Information        Provider Department      7/13/2018 3:00 PM Truman Norman MD [facility]        Today's Diagnoses     Preop general physical exam    -  1    Primary osteoarthritis of right hip        Benign essential hypertension        Impaired fasting glucose          Care Instructions    Skip triamterene/ hydrochlorothiazide on the AM of surgery; do take amlodipine and metoprolol on the AM of surgery         You should get the new shingles vaccine \"SHINGRIX\" (not Zostavax) at your pharmacy.          Before Your Surgery      Call your surgeon if there is any change in your health. This includes signs of a cold or flu (such as a sore throat, runny nose, cough, rash or fever).    Do not smoke, drink alcohol or take over the counter medicine (unless your surgeon or primary care doctor tells you to) for the 24 hours before and after surgery.    If you take prescribed drugs: Follow your doctor s orders about which medicines to take and which to stop until after surgery.    Eating and drinking prior to surgery: follow the instructions from your surgeon    Take a shower or bath the night before surgery. Use the soap your surgeon gave you to gently clean your skin. If you do not have soap from your surgeon, use your regular soap. Do not shave or scrub the surgery site.  Wear clean pajamas and have clean sheets on your bed.     Before Your Surgery      Call your surgeon if there is any change in your health. This includes signs of a cold or flu (such as a sore throat, runny nose, cough, rash or fever).    Do not smoke, drink alcohol or take over the counter medicine (unless your surgeon or primary care doctor tells you to) for the 24 hours before and after surgery.    If you take prescribed drugs: Follow your doctor s orders about which medicines " to take and which to stop until after surgery.    Eating and drinking prior to surgery: follow the instructions from your surgeon    Take a shower or bath the night before surgery. Use the soap your surgeon gave you to gently clean your skin. If you do not have soap from your surgeon, use your regular soap. Do not shave or scrub the surgery site.  Wear clean pajamas and have clean sheets on your bed.           Follow-ups after your visit        Follow-up notes from your care team     Return in about 3 months (around 10/13/2018) for Routine Visit.      Your next 10 appointments already scheduled     Jul 24, 2018   Procedure with Jimbo Phillips MD   [facility] (--)    [address]  [facility] [address] [phone]              Who to contact     If you have questions or need follow up information about today's clinic visit or your schedule please contact [facility] directly at [phone].  Normal or non-critical lab and imaging results will be communicated to you by [email or web address], letter or phone within 4 business days after the clinic has received the results. If you do not hear from us within 7 days, please contact the clinic through [employer] or phone. If you have a critical or abnormal lab result, we will notify you by phone as soon as possible.  Submit refill requests through [employer] or call your pharmacy and they will forward the refill request to us. Please allow 3 business days for your refill to be completed.          Additional Information About Your Visit        [employer] Information     [employer] gives you secure access to your electronic health record. If you see a primary care provider, you can also send messages to your care team and make appointments. If you have questions, please call your primary care clinic.  If you do not have a primary care provider, please call [phone] and they will assist you.        Care EveryWhere ID     This is your Care  EveryWhere ID. This could be used by other organizations to access your [place] medical records  [id number]        Your Vitals Were     Pulse Temperature Height Pulse Oximetry BMI (Body Mass Index)       69 98.2  F (36.8  C) (Oral) 5' (1.524 m) 97% 20.49 kg/m2        Blood Pressure from Last 3 Encounters:   07/13/18 115/65   05/24/18 146/83   05/14/18 127/70    Weight from Last 3 Encounters:   07/13/18 104 lb 14.4 oz (47.6 kg)   05/24/18 106 lb 1.6 oz (48.1 kg)   05/14/18 107 lb (48.5 kg)              We Performed the Following     Basic metabolic panel     CBC with platelets     EKG 12-lead complete w/read - Clinics     Hemoglobin A1c        Primary Care Provider Office Phone # Fax #    Truman Norman [phone] [address] [facility] [address]  [place] [address]        Equal Access to Services     [facility]: Hadii aad ku hadasho Soomaali, waaxda luqadaha, qaybta kaalmada adeegyada, waxay idiin haymeetan juanita kearns lamagalie . So [facility] [phone].    ATENCIÓN: Si habla español, tiene a bond disposición servicios gratuitos de asistencia lingüística. Llame al [phone].    We comply with applicable federal civil rights laws and [place] laws. We do not discriminate on the basis of race, color, national origin, age, disability, sex, sexual orientation, or gender identity.            Thank you!     Thank you for choosing [facility]  for your care. Our goal is always to provide you with excellent care. Hearing back from our patients is one way we can continue to improve our services. Please take a few minutes to complete the written survey that you may receive in the mail after your visit with us. Thank you!             Your Updated Medication List - Protect others around you: Learn how to safely use, store and throw away your medicines at www.disposemymeds.org.          This list is accurate as of 7/13/18  3:36 PM.  Always use your most recent med list.                   Brand Name  Dispense Instructions for use Diagnosis    amLODIPine 2.5 MG tablet    NORVASC    90 tablet    TAKE 1 TABLET BY MOUTH EVERY DAY    Benign essential hypertension       aspirin 81 MG chewable tablet      Take 2 tablets (162 mg) by mouth daily        erythromycin ophthalmic ointment    ROMYCIN    3.5 g    Apply to skin incisions three times daily and into the eye at bedtime.    Postoperative eye state       metoprolol succinate 50 MG 24 hr tablet    TOPROL-XL    45 tablet    TAKE 1/2 TABLET BY MOUTH ONCE A DAY    Benign essential hypertension       triamterene-hydrochlorothiazide 37.5-25 MG per tablet    MAXZIDE-25    45 tablet    TAKE 1/2 TABLET BY MOUTH ONCE A DAY    Benign essential hypertension

## 2018-07-13 NOTE — PATIENT INSTRUCTIONS
"Skip triamterene/ hydrochlorothiazide on the AM of surgery; do take amlodipine and metoprolol on the AM of surgery         You should get the new shingles vaccine \"SHINGRIX\" (not Zostavax) at your pharmacy.          Before Your Surgery      Call your surgeon if there is any change in your health. This includes signs of a cold or flu (such as a sore throat, runny nose, cough, rash or fever).    Do not smoke, drink alcohol or take over the counter medicine (unless your surgeon or primary care doctor tells you to) for the 24 hours before and after surgery.    If you take prescribed drugs: Follow your doctor s orders about which medicines to take and which to stop until after surgery.    Eating and drinking prior to surgery: follow the instructions from your surgeon    Take a shower or bath the night before surgery. Use the soap your surgeon gave you to gently clean your skin. If you do not have soap from your surgeon, use your regular soap. Do not shave or scrub the surgery site.  Wear clean pajamas and have clean sheets on your bed.     Before Your Surgery      Call your surgeon if there is any change in your health. This includes signs of a cold or flu (such as a sore throat, runny nose, cough, rash or fever).    Do not smoke, drink alcohol or take over the counter medicine (unless your surgeon or primary care doctor tells you to) for the 24 hours before and after surgery.    If you take prescribed drugs: Follow your doctor s orders about which medicines to take and which to stop until after surgery.    Eating and drinking prior to surgery: follow the instructions from your surgeon    Take a shower or bath the night before surgery. Use the soap your surgeon gave you to gently clean your skin. If you do not have soap from your surgeon, use your regular soap. Do not shave or scrub the surgery site.  Wear clean pajamas and have clean sheets on your bed.   "

## 2018-07-14 LAB
ANION GAP SERPL CALCULATED.3IONS-SCNC: 8 MMOL/L (ref 3–14)
BUN SERPL-MCNC: 30 MG/DL (ref 7–30)
CALCIUM SERPL-MCNC: 9.3 MG/DL (ref 8.5–10.1)
CHLORIDE SERPL-SCNC: 105 MMOL/L (ref 94–109)
CO2 SERPL-SCNC: 27 MMOL/L (ref 20–32)
CREAT SERPL-MCNC: 0.77 MG/DL (ref 0.52–1.04)
GFR SERPL CREATININE-BSD FRML MDRD: 71 ML/MIN/1.7M2
GLUCOSE SERPL-MCNC: 83 MG/DL (ref 70–99)
POTASSIUM SERPL-SCNC: 4.2 MMOL/L (ref 3.4–5.3)
SODIUM SERPL-SCNC: 140 MMOL/L (ref 133–144)

## 2018-07-15 NOTE — PROGRESS NOTES
The following letter pertains to your most recent diagnostic tests:    Labs and EKG look OK for surgery!      Sincerely,    Dr. Norman

## 2018-07-20 NOTE — H&P (VIEW-ONLY)
[facility]  [address] [facility] [address]  [phone]  Dept: [phone]    PRE-OP EVALUATION:  Today's date: 2018    Maryam Saavedra (: 1932) presents for pre-operative evaluation assessment as requested by Jimbo Whitt .  He requires evaluation and anesthesia risk assessment prior to undergoing surgery/procedure for treatment of right hip osteoarthritis .    Fax number for surgical facility:   Primary Physician: Truman Norman  Type of Anesthesia Anticipated: General    Patient has a Health Care Directive or Living Will:  NO    Preop Questions 7/10/2018   Who is doing your surgery? Dr. Phillips   What are you having done? Hip replacement   Date of Surgery/Procedure: 18   Facility or Hospital where procedure/surgery will be performed: [facility]   1.  Do you have a history of Heart attack, stroke, stent, coronary bypass surgery, or other heart surgery? No   2.  Do you ever have any pain or discomfort in your chest? No   3.  Do you have a history of  Heart Failure? No   4.   Are you troubled by shortness of breath when:  walking on a level surface, or up a slight hill, or at night? No   5.  Do you currently have a cold, bronchitis or other respiratory infection? No   6.  Do you have a cough, shortness of breath, or wheezing? No   7.  Do you sometimes get pains in the calves of your legs when you walk? No   8. Do you or anyone in your family have previous history of blood clots? No   9.  Do you or does anyone in your family have a serious bleeding problem such as prolonged bleeding following surgeries or cuts? No   10. Have you ever had problems with anemia or been told to take iron pills? No   11. Have you had any abnormal blood loss such as black, tarry or bloody stools, or abnormal vaginal bleeding? No   12. Have you ever had a blood transfusion? No   13. Have you or any of your relatives ever had problems with anesthesia? No   14. Do you have  sleep apnea, excessive snoring or daytime drowsiness? No   15. Do you have any prosthetic heart valves? No   16. Do you have prosthetic joints? No   17. Is there any chance that you may be pregnant? No         HPI:     HPI related to upcoming procedure: Right hip pain present for years progressive found to have advanced OA pain is now severe.  She can perform 4 METS of physical activity without chest pain or dyspnea.       MEDICAL HISTORY:     Patient Active Problem List    Diagnosis Date Noted     Unsteady gait 11/25/2017     Priority: Medium     Acute right-sided low back pain without sciatica 09/18/2016     Priority: Medium     Benign essential hypertension 09/01/2016     Priority: Medium     Impaired fasting glucose 09/01/2016     Priority: Medium      Past Medical History:   Diagnosis Date     Benign essential hypertension 9/1/2016     Chronic low back pain      Hypertension      Impaired fasting glucose     borderline     Osteoarthritis      Tricuspid regurgitation      Past Surgical History:   Procedure Laterality Date     EYE SURGERY  cataracts     MOHS MICROGRAPHIC PROCEDURE      Left lower eyelid      MOHS MICROGRAPHIC PROCEDURE Left 10/27/2016    Procedure: MOHS MICROGRAPHIC PROCEDURE;  Surgeon: Jose Torrez MD;  Location: [facility]     MOHS MICROGRAPHIC PROCEDURE Right 5/24/2018    Procedure: MOHS MICROGRAPHIC PROCEDURE;  RIGHT MEDIAL CANTHUS MOHS CLOSURE;  Surgeon: Jose Torrez MD;  Location: [facility]     ORTHOPEDIC SURGERY      bilateral wrists     Current Outpatient Prescriptions   Medication Sig Dispense Refill     amLODIPine (NORVASC) 2.5 MG tablet TAKE 1 TABLET BY MOUTH EVERY DAY 90 tablet 2     aspirin 81 MG chewable tablet Take 2 tablets (162 mg) by mouth daily       erythromycin (ROMYCIN) ophthalmic ointment Apply to skin incisions three times daily and into the eye at bedtime. 3.5 g 0     metoprolol succinate (TOPROL-XL) 50 MG 24 hr tablet TAKE 1/2 TABLET BY MOUTH ONCE A DAY 45 tablet 2      triamterene-hydrochlorothiazide (MAXZIDE-25) 37.5-25 MG per tablet TAKE 1/2 TABLET BY MOUTH ONCE A DAY 45 tablet 1     OTC products: None, except as noted above    Allergies   Allergen Reactions     Ace Inhibitors      Angioedema       Cyclobenzaprine      Angioedema        Social History   Substance Use Topics     Smoking status: Former Smoker     Smokeless tobacco: Never Used     Alcohol use 0.0 oz/week     0 Standard drinks or equivalent per week      Comment: 1-2 drinks per month     History   Drug Use No       REVIEW OF SYSTEMS:   Constitutional, neuro, ENT, endocrine, pulmonary, cardiac, gastrointestinal, genitourinary, musculoskeletal, integument and psychiatric systems are negative, except as otherwise noted.    EXAM:   /65 (BP Location: Right arm, Cuff Size: Adult Regular)  Pulse 69  Temp 98.2  F (36.8  C) (Oral)  Ht 5' (1.524 m)  Wt 104 lb 14.4 oz (47.6 kg)  SpO2 97%  BMI 20.49 kg/m2    GENERAL APPEARANCE: healthy, alert and no distress     EYES: EOMI, PERRL     HENT: ear canals and TM's normal and nose and mouth without ulcers or lesions     NECK: no adenopathy, no asymmetry, masses, or scars and thyroid normal to palpation     RESP: lungs clear to auscultation - no rales, rhonchi or wheezes     CV: regular rates and rhythm, normal S1 S2, no S3 or S4 and no murmur, click or rub     ABDOMEN:  soft, nontender, no HSM or masses and bowel sounds normal     MS: extremities normal- no gross deformities noted, no evidence of inflammation in joints, FROM in all extremities.     SKIN: no suspicious lesions or rashes     NEURO: Normal strength and tone, sensory exam grossly normal, mentation intact and speech normal     PSYCH: mentation appears normal. and affect normal/bright     LYMPHATICS: No cervical adenopathy    DIAGNOSTICS:   EKG: sinus rhythm non specific T wave changes unchanged from previous EKG in 10/2016    Recent Labs   Lab Test  05/14/18   1037  11/26/17   0702  11/25/17   1846  09/08/17    0900   HGB  13.3  11.6*  11.6*  12.6   PLT   --   217  236  251   NA  140  142  141  142   POTASSIUM  4.0  3.5  3.7  4.4   CR  0.85  0.74  0.80  0.84   A1C  5.9*   --    --   5.4        IMPRESSION:   Reason for surgery/procedure: Right hip osteoarthritis   Diagnosis/reason for consult: preoperative evaluation     The proposed surgical procedure is considered INTERMEDIATE risk.    REVISED CARDIAC RISK INDEX  The patient has the following serious cardiovascular risks for perioperative complications such as (MI, PE, VFib and 3  AV Block):  No serious cardiac risks  INTERPRETATION: 1 risks: Class II (low risk - 0.9% complication rate)    The patient has the following additional risks for perioperative complications:  Delirium or Dementia      ICD-10-CM    1. Preop general physical exam Z01.818 EKG 12-lead complete w/read - Clinics   2. Primary osteoarthritis of right hip M16.11    3. Benign essential hypertension I10 CBC with platelets     Basic metabolic panel   4. Impaired fasting glucose R73.01 Hemoglobin A1c       RECOMMENDATIONS:     --Consult hospital rounder / IM to assist post-op medical management    --Patient is to take all scheduled medications on the day of surgery EXCEPT for modifications listed below.    Anticoagulant or Antiplatelet Medication Use  ASPIRIN: Discontinue ASA 7-10 days prior to procedure to reduce bleeding risk.  It should be resumed post-operatively.        Antihypertensive medications  Skip triamterene/ hydrochlorothiazide on the AM of surgery; do take amlodipine and metoprolol on the AM of surgery       APPROVAL GIVEN to proceed with proposed procedure, without further diagnostic evaluation       Signed Electronically by: Truman Norman MD    Copy of this evaluation report is provided to requesting physician.    [place] Preop Guidelines    Revised Cardiac Risk Index

## 2018-07-24 ENCOUNTER — ANESTHESIA EVENT (OUTPATIENT)
Dept: SURGERY | Facility: CLINIC | Age: 83
DRG: 470 | End: 2018-07-24
Payer: MEDICARE

## 2018-07-24 ENCOUNTER — ANESTHESIA (OUTPATIENT)
Dept: SURGERY | Facility: CLINIC | Age: 83
DRG: 470 | End: 2018-07-24
Payer: MEDICARE

## 2018-07-24 ENCOUNTER — APPOINTMENT (OUTPATIENT)
Dept: GENERAL RADIOLOGY | Facility: CLINIC | Age: 83
DRG: 470 | End: 2018-07-24
Attending: ORTHOPAEDIC SURGERY
Payer: MEDICARE

## 2018-07-24 ENCOUNTER — HOSPITAL ENCOUNTER (INPATIENT)
Facility: CLINIC | Age: 83
LOS: 3 days | Discharge: HOME | DRG: 470 | End: 2018-07-27
Attending: ORTHOPAEDIC SURGERY | Admitting: ORTHOPAEDIC SURGERY
Payer: MEDICARE

## 2018-07-24 DIAGNOSIS — Z96.641 STATUS POST TOTAL REPLACEMENT OF RIGHT HIP: Primary | ICD-10-CM

## 2018-07-24 PROBLEM — M16.9 DEGENERATIVE LOCALIZED ARTHRITIS OF HIP: Status: ACTIVE | Noted: 2018-07-24

## 2018-07-24 LAB
ABO + RH BLD: NORMAL
ABO + RH BLD: NORMAL
BLD GP AB SCN SERPL QL: NORMAL
BLOOD BANK CMNT PATIENT-IMP: NORMAL
CREAT SERPL-MCNC: 0.71 MG/DL (ref 0.52–1.04)
GFR SERPL CREATININE-BSD FRML MDRD: 78 ML/MIN/1.7M2
PLATELET # BLD AUTO: 229 10E9/L (ref 150–450)
POTASSIUM SERPL-SCNC: 3.4 MMOL/L (ref 3.4–5.3)
SPECIMEN EXP DATE BLD: NORMAL

## 2018-07-24 PROCEDURE — 86901 BLOOD TYPING SEROLOGIC RH(D): CPT | Performed by: ANESTHESIOLOGY

## 2018-07-24 PROCEDURE — 40000277 XR SURGERY CARM FLUORO LESS THAN 5 MIN W STILLS

## 2018-07-24 PROCEDURE — C1713 ANCHOR/SCREW BN/BN,TIS/BN: HCPCS | Performed by: ORTHOPAEDIC SURGERY

## 2018-07-24 PROCEDURE — 36415 COLL VENOUS BLD VENIPUNCTURE: CPT | Performed by: ANESTHESIOLOGY

## 2018-07-24 PROCEDURE — 25000128 H RX IP 250 OP 636: Performed by: NURSE ANESTHETIST, CERTIFIED REGISTERED

## 2018-07-24 PROCEDURE — 37000009 ZZH ANESTHESIA TECHNICAL FEE, EACH ADDTL 15 MIN: Performed by: ORTHOPAEDIC SURGERY

## 2018-07-24 PROCEDURE — 36000063 ZZH SURGERY LEVEL 4 EA 15 ADDTL MIN: Performed by: ORTHOPAEDIC SURGERY

## 2018-07-24 PROCEDURE — 25000128 H RX IP 250 OP 636: Performed by: ANESTHESIOLOGY

## 2018-07-24 PROCEDURE — 36415 COLL VENOUS BLD VENIPUNCTURE: CPT | Performed by: ORTHOPAEDIC SURGERY

## 2018-07-24 PROCEDURE — 86850 RBC ANTIBODY SCREEN: CPT | Performed by: ANESTHESIOLOGY

## 2018-07-24 PROCEDURE — A9270 NON-COVERED ITEM OR SERVICE: HCPCS | Mod: GY | Performed by: ORTHOPAEDIC SURGERY

## 2018-07-24 PROCEDURE — 12000007 ZZH R&B INTERMEDIATE

## 2018-07-24 PROCEDURE — 25000125 ZZHC RX 250: Performed by: ANESTHESIOLOGY

## 2018-07-24 PROCEDURE — 25000128 H RX IP 250 OP 636: Performed by: ORTHOPAEDIC SURGERY

## 2018-07-24 PROCEDURE — 40000986 XR PELVIS AD HIP PORTABLE RIGHT 1 VIEW

## 2018-07-24 PROCEDURE — 84132 ASSAY OF SERUM POTASSIUM: CPT | Performed by: ANESTHESIOLOGY

## 2018-07-24 PROCEDURE — 25800025 ZZH RX 258: Performed by: ORTHOPAEDIC SURGERY

## 2018-07-24 PROCEDURE — 85049 AUTOMATED PLATELET COUNT: CPT | Performed by: ORTHOPAEDIC SURGERY

## 2018-07-24 PROCEDURE — 86900 BLOOD TYPING SEROLOGIC ABO: CPT | Performed by: ANESTHESIOLOGY

## 2018-07-24 PROCEDURE — 27210794 ZZH OR GENERAL SUPPLY STERILE: Performed by: ORTHOPAEDIC SURGERY

## 2018-07-24 PROCEDURE — 25000132 ZZH RX MED GY IP 250 OP 250 PS 637: Mod: GY | Performed by: ORTHOPAEDIC SURGERY

## 2018-07-24 PROCEDURE — 27210995 ZZH RX 272: Performed by: ORTHOPAEDIC SURGERY

## 2018-07-24 PROCEDURE — 82565 ASSAY OF CREATININE: CPT | Performed by: ORTHOPAEDIC SURGERY

## 2018-07-24 PROCEDURE — 25000125 ZZHC RX 250: Performed by: NURSE ANESTHETIST, CERTIFIED REGISTERED

## 2018-07-24 PROCEDURE — 71000012 ZZH RECOVERY PHASE 1 LEVEL 1 FIRST HR: Performed by: ORTHOPAEDIC SURGERY

## 2018-07-24 PROCEDURE — 37000008 ZZH ANESTHESIA TECHNICAL FEE, 1ST 30 MIN: Performed by: ORTHOPAEDIC SURGERY

## 2018-07-24 PROCEDURE — C1776 JOINT DEVICE (IMPLANTABLE): HCPCS | Performed by: ORTHOPAEDIC SURGERY

## 2018-07-24 PROCEDURE — 25000125 ZZHC RX 250: Performed by: ORTHOPAEDIC SURGERY

## 2018-07-24 PROCEDURE — 25000566 ZZH SEVOFLURANE, EA 15 MIN: Performed by: ORTHOPAEDIC SURGERY

## 2018-07-24 PROCEDURE — 36000065 ZZH SURGERY LEVEL 4 W FLUORO 1ST 30 MIN: Performed by: ORTHOPAEDIC SURGERY

## 2018-07-24 PROCEDURE — 40000169 ZZH STATISTIC PRE-PROCEDURE ASSESSMENT I: Performed by: ORTHOPAEDIC SURGERY

## 2018-07-24 PROCEDURE — 0SR902A REPLACEMENT OF RIGHT HIP JOINT WITH METAL ON POLYETHYLENE SYNTHETIC SUBSTITUTE, UNCEMENTED, OPEN APPROACH: ICD-10-PCS | Performed by: ORTHOPAEDIC SURGERY

## 2018-07-24 DEVICE — IMP SCR ZIM 6.5X25MM ACET CUP SELF TAP 00-6250-065-25: Type: IMPLANTABLE DEVICE | Site: HIP | Status: FUNCTIONAL

## 2018-07-24 DEVICE — IMPLANTABLE DEVICE: Type: IMPLANTABLE DEVICE | Site: HIP | Status: FUNCTIONAL

## 2018-07-24 DEVICE — IMP FEMORAL NECK ZIM MOD TAPER KINECTIV K 00-7848-002-00: Type: IMPLANTABLE DEVICE | Site: HIP | Status: FUNCTIONAL

## 2018-07-24 DEVICE — IMP SCR ZIM 6.5X20MM ACET CUP SELF TAP 00-6250-065-20: Type: IMPLANTABLE DEVICE | Site: HIP | Status: FUNCTIONAL

## 2018-07-24 RX ORDER — SODIUM CHLORIDE, SODIUM LACTATE, POTASSIUM CHLORIDE, CALCIUM CHLORIDE 600; 310; 30; 20 MG/100ML; MG/100ML; MG/100ML; MG/100ML
INJECTION, SOLUTION INTRAVENOUS CONTINUOUS
Status: DISCONTINUED | OUTPATIENT
Start: 2018-07-24 | End: 2018-07-24 | Stop reason: HOSPADM

## 2018-07-24 RX ORDER — METOPROLOL SUCCINATE 25 MG/1
25 TABLET, EXTENDED RELEASE ORAL DAILY
Status: DISCONTINUED | OUTPATIENT
Start: 2018-07-25 | End: 2018-07-27 | Stop reason: HOSPADM

## 2018-07-24 RX ORDER — PROCHLORPERAZINE MALEATE 5 MG
5 TABLET ORAL EVERY 6 HOURS PRN
Status: DISCONTINUED | OUTPATIENT
Start: 2018-07-24 | End: 2018-07-27 | Stop reason: HOSPADM

## 2018-07-24 RX ORDER — METOPROLOL SUCCINATE 25 MG/1
25 TABLET, EXTENDED RELEASE ORAL EVERY EVENING
Status: DISCONTINUED | OUTPATIENT
Start: 2018-07-25 | End: 2018-07-24

## 2018-07-24 RX ORDER — HYDROMORPHONE HYDROCHLORIDE 1 MG/ML
.3-.5 INJECTION, SOLUTION INTRAMUSCULAR; INTRAVENOUS; SUBCUTANEOUS EVERY 5 MIN PRN
Status: DISCONTINUED | OUTPATIENT
Start: 2018-07-24 | End: 2018-07-24 | Stop reason: HOSPADM

## 2018-07-24 RX ORDER — MAGNESIUM HYDROXIDE 1200 MG/15ML
LIQUID ORAL PRN
Status: DISCONTINUED | OUTPATIENT
Start: 2018-07-24 | End: 2018-07-24 | Stop reason: HOSPADM

## 2018-07-24 RX ORDER — ONDANSETRON 4 MG/1
4 TABLET, ORALLY DISINTEGRATING ORAL EVERY 30 MIN PRN
Status: DISCONTINUED | OUTPATIENT
Start: 2018-07-24 | End: 2018-07-24 | Stop reason: HOSPADM

## 2018-07-24 RX ORDER — ONDANSETRON 2 MG/ML
INJECTION INTRAMUSCULAR; INTRAVENOUS PRN
Status: DISCONTINUED | OUTPATIENT
Start: 2018-07-24 | End: 2018-07-24

## 2018-07-24 RX ORDER — NALOXONE HYDROCHLORIDE 0.4 MG/ML
.1-.4 INJECTION, SOLUTION INTRAMUSCULAR; INTRAVENOUS; SUBCUTANEOUS
Status: DISCONTINUED | OUTPATIENT
Start: 2018-07-24 | End: 2018-07-27 | Stop reason: HOSPADM

## 2018-07-24 RX ORDER — LIDOCAINE HYDROCHLORIDE 20 MG/ML
INJECTION, SOLUTION INFILTRATION; PERINEURAL PRN
Status: DISCONTINUED | OUTPATIENT
Start: 2018-07-24 | End: 2018-07-24

## 2018-07-24 RX ORDER — NEOSTIGMINE METHYLSULFATE 1 MG/ML
VIAL (ML) INJECTION PRN
Status: DISCONTINUED | OUTPATIENT
Start: 2018-07-24 | End: 2018-07-24

## 2018-07-24 RX ORDER — AMOXICILLIN 250 MG
2 CAPSULE ORAL 2 TIMES DAILY
Status: DISCONTINUED | OUTPATIENT
Start: 2018-07-24 | End: 2018-07-27 | Stop reason: HOSPADM

## 2018-07-24 RX ORDER — FENTANYL CITRATE 50 UG/ML
INJECTION, SOLUTION INTRAMUSCULAR; INTRAVENOUS PRN
Status: DISCONTINUED | OUTPATIENT
Start: 2018-07-24 | End: 2018-07-24

## 2018-07-24 RX ORDER — EPHEDRINE SULFATE 50 MG/ML
INJECTION, SOLUTION INTRAMUSCULAR; INTRAVENOUS; SUBCUTANEOUS PRN
Status: DISCONTINUED | OUTPATIENT
Start: 2018-07-24 | End: 2018-07-24

## 2018-07-24 RX ORDER — ONDANSETRON 2 MG/ML
4 INJECTION INTRAMUSCULAR; INTRAVENOUS EVERY 30 MIN PRN
Status: DISCONTINUED | OUTPATIENT
Start: 2018-07-24 | End: 2018-07-24 | Stop reason: HOSPADM

## 2018-07-24 RX ORDER — HYDROMORPHONE HYDROCHLORIDE 1 MG/ML
0.2 INJECTION, SOLUTION INTRAMUSCULAR; INTRAVENOUS; SUBCUTANEOUS EVERY 30 MIN PRN
Status: DISCONTINUED | OUTPATIENT
Start: 2018-07-24 | End: 2018-07-27 | Stop reason: HOSPADM

## 2018-07-24 RX ORDER — GLYCOPYRROLATE 0.2 MG/ML
INJECTION, SOLUTION INTRAMUSCULAR; INTRAVENOUS PRN
Status: DISCONTINUED | OUTPATIENT
Start: 2018-07-24 | End: 2018-07-24

## 2018-07-24 RX ORDER — FENTANYL CITRATE 50 UG/ML
25-50 INJECTION, SOLUTION INTRAMUSCULAR; INTRAVENOUS
Status: DISCONTINUED | OUTPATIENT
Start: 2018-07-24 | End: 2018-07-24 | Stop reason: HOSPADM

## 2018-07-24 RX ORDER — NALOXONE HYDROCHLORIDE 0.4 MG/ML
.1-.4 INJECTION, SOLUTION INTRAMUSCULAR; INTRAVENOUS; SUBCUTANEOUS
Status: DISCONTINUED | OUTPATIENT
Start: 2018-07-24 | End: 2018-07-24

## 2018-07-24 RX ORDER — ONDANSETRON 2 MG/ML
4 INJECTION INTRAMUSCULAR; INTRAVENOUS EVERY 6 HOURS PRN
Status: DISCONTINUED | OUTPATIENT
Start: 2018-07-24 | End: 2018-07-27 | Stop reason: HOSPADM

## 2018-07-24 RX ORDER — DIPHENHYDRAMINE HCL 12.5MG/5ML
12.5 LIQUID (ML) ORAL EVERY 6 HOURS PRN
Status: DISCONTINUED | OUTPATIENT
Start: 2018-07-24 | End: 2018-07-27 | Stop reason: HOSPADM

## 2018-07-24 RX ORDER — PROPOFOL 10 MG/ML
INJECTION, EMULSION INTRAVENOUS CONTINUOUS PRN
Status: DISCONTINUED | OUTPATIENT
Start: 2018-07-24 | End: 2018-07-24

## 2018-07-24 RX ORDER — CEFAZOLIN SODIUM 1 G/3ML
1 INJECTION, POWDER, FOR SOLUTION INTRAMUSCULAR; INTRAVENOUS EVERY 8 HOURS
Status: COMPLETED | OUTPATIENT
Start: 2018-07-24 | End: 2018-07-25

## 2018-07-24 RX ORDER — HYDROCODONE BITARTRATE AND ACETAMINOPHEN 5; 325 MG/1; MG/1
1 TABLET ORAL EVERY 4 HOURS PRN
Status: DISCONTINUED | OUTPATIENT
Start: 2018-07-24 | End: 2018-07-27 | Stop reason: HOSPADM

## 2018-07-24 RX ORDER — DEXAMETHASONE SODIUM PHOSPHATE 4 MG/ML
INJECTION, SOLUTION INTRA-ARTICULAR; INTRALESIONAL; INTRAMUSCULAR; INTRAVENOUS; SOFT TISSUE PRN
Status: DISCONTINUED | OUTPATIENT
Start: 2018-07-24 | End: 2018-07-24

## 2018-07-24 RX ORDER — CEFAZOLIN SODIUM 1 G/3ML
1 INJECTION, POWDER, FOR SOLUTION INTRAMUSCULAR; INTRAVENOUS SEE ADMIN INSTRUCTIONS
Status: DISCONTINUED | OUTPATIENT
Start: 2018-07-24 | End: 2018-07-24 | Stop reason: HOSPADM

## 2018-07-24 RX ORDER — LIDOCAINE 40 MG/G
CREAM TOPICAL
Status: DISCONTINUED | OUTPATIENT
Start: 2018-07-24 | End: 2018-07-27 | Stop reason: HOSPADM

## 2018-07-24 RX ORDER — AMOXICILLIN 250 MG
1 CAPSULE ORAL 2 TIMES DAILY
Status: DISCONTINUED | OUTPATIENT
Start: 2018-07-24 | End: 2018-07-27 | Stop reason: HOSPADM

## 2018-07-24 RX ORDER — AMLODIPINE BESYLATE 2.5 MG/1
2.5 TABLET ORAL DAILY
Status: DISCONTINUED | OUTPATIENT
Start: 2018-07-24 | End: 2018-07-27 | Stop reason: HOSPADM

## 2018-07-24 RX ORDER — DEXTROSE MONOHYDRATE, SODIUM CHLORIDE, AND POTASSIUM CHLORIDE 50; 1.49; 4.5 G/1000ML; G/1000ML; G/1000ML
INJECTION, SOLUTION INTRAVENOUS CONTINUOUS
Status: DISCONTINUED | OUTPATIENT
Start: 2018-07-24 | End: 2018-07-26

## 2018-07-24 RX ORDER — METOPROLOL SUCCINATE 25 MG/1
25 TABLET, EXTENDED RELEASE ORAL DAILY
Status: DISCONTINUED | OUTPATIENT
Start: 2018-07-24 | End: 2018-07-24

## 2018-07-24 RX ORDER — PROPOFOL 10 MG/ML
INJECTION, EMULSION INTRAVENOUS PRN
Status: DISCONTINUED | OUTPATIENT
Start: 2018-07-24 | End: 2018-07-24

## 2018-07-24 RX ORDER — CEFAZOLIN SODIUM 2 G/100ML
2 INJECTION, SOLUTION INTRAVENOUS
Status: COMPLETED | OUTPATIENT
Start: 2018-07-24 | End: 2018-07-24

## 2018-07-24 RX ORDER — DIPHENHYDRAMINE HYDROCHLORIDE 50 MG/ML
12.5 INJECTION INTRAMUSCULAR; INTRAVENOUS EVERY 6 HOURS PRN
Status: DISCONTINUED | OUTPATIENT
Start: 2018-07-24 | End: 2018-07-27 | Stop reason: HOSPADM

## 2018-07-24 RX ORDER — ONDANSETRON 4 MG/1
4 TABLET, ORALLY DISINTEGRATING ORAL EVERY 6 HOURS PRN
Status: DISCONTINUED | OUTPATIENT
Start: 2018-07-24 | End: 2018-07-27 | Stop reason: HOSPADM

## 2018-07-24 RX ADMIN — FENTANYL CITRATE 50 MCG: 50 INJECTION, SOLUTION INTRAMUSCULAR; INTRAVENOUS at 14:17

## 2018-07-24 RX ADMIN — AMLODIPINE BESYLATE 2.5 MG: 2.5 TABLET ORAL at 20:48

## 2018-07-24 RX ADMIN — PROPOFOL 150 MG: 10 INJECTION, EMULSION INTRAVENOUS at 13:32

## 2018-07-24 RX ADMIN — NEOSTIGMINE METHYLSULFATE 3 MG: 1 INJECTION, SOLUTION INTRAVENOUS at 15:37

## 2018-07-24 RX ADMIN — ONDANSETRON 4 MG: 2 INJECTION INTRAMUSCULAR; INTRAVENOUS at 15:09

## 2018-07-24 RX ADMIN — GLYCOPYRROLATE 0.4 MG: 0.2 INJECTION, SOLUTION INTRAMUSCULAR; INTRAVENOUS at 15:37

## 2018-07-24 RX ADMIN — DEXAMETHASONE SODIUM PHOSPHATE 4 MG: 4 INJECTION, SOLUTION INTRA-ARTICULAR; INTRALESIONAL; INTRAMUSCULAR; INTRAVENOUS; SOFT TISSUE at 13:43

## 2018-07-24 RX ADMIN — POTASSIUM CHLORIDE, DEXTROSE MONOHYDRATE AND SODIUM CHLORIDE: 150; 5; 450 INJECTION, SOLUTION INTRAVENOUS at 19:08

## 2018-07-24 RX ADMIN — SODIUM CHLORIDE, POTASSIUM CHLORIDE, SODIUM LACTATE AND CALCIUM CHLORIDE: 600; 310; 30; 20 INJECTION, SOLUTION INTRAVENOUS at 14:42

## 2018-07-24 RX ADMIN — ROCURONIUM BROMIDE 50 MG: 10 INJECTION INTRAVENOUS at 13:32

## 2018-07-24 RX ADMIN — FENTANYL CITRATE 25 MCG: 50 INJECTION, SOLUTION INTRAMUSCULAR; INTRAVENOUS at 14:20

## 2018-07-24 RX ADMIN — SODIUM CHLORIDE, POTASSIUM CHLORIDE, SODIUM LACTATE AND CALCIUM CHLORIDE: 600; 310; 30; 20 INJECTION, SOLUTION INTRAVENOUS at 11:33

## 2018-07-24 RX ADMIN — PROPOFOL 50 MCG/KG/MIN: 10 INJECTION, EMULSION INTRAVENOUS at 13:34

## 2018-07-24 RX ADMIN — CEFAZOLIN SODIUM 2 G: 2 INJECTION, SOLUTION INTRAVENOUS at 13:37

## 2018-07-24 RX ADMIN — Medication 5 MG: at 15:15

## 2018-07-24 RX ADMIN — Medication 5 MG: at 13:56

## 2018-07-24 RX ADMIN — TRANEXAMIC ACID 1 G: 100 INJECTION, SOLUTION INTRAVENOUS at 20:07

## 2018-07-24 RX ADMIN — TRANEXAMIC ACID 1 G: 100 INJECTION, SOLUTION INTRAVENOUS at 13:50

## 2018-07-24 RX ADMIN — SENNOSIDES AND DOCUSATE SODIUM 1 TABLET: 8.6; 5 TABLET ORAL at 20:48

## 2018-07-24 RX ADMIN — FENTANYL CITRATE 100 MCG: 50 INJECTION, SOLUTION INTRAMUSCULAR; INTRAVENOUS at 13:32

## 2018-07-24 RX ADMIN — LIDOCAINE HYDROCHLORIDE 1 ML: 10 INJECTION, SOLUTION EPIDURAL; INFILTRATION; INTRACAUDAL; PERINEURAL at 11:32

## 2018-07-24 RX ADMIN — FENTANYL CITRATE 25 MCG: 50 INJECTION, SOLUTION INTRAMUSCULAR; INTRAVENOUS at 14:02

## 2018-07-24 RX ADMIN — CEFAZOLIN 1 G: 1 INJECTION, POWDER, FOR SOLUTION INTRAMUSCULAR; INTRAVENOUS at 15:34

## 2018-07-24 RX ADMIN — Medication 5 MG: at 14:06

## 2018-07-24 RX ADMIN — LIDOCAINE HYDROCHLORIDE 100 MG: 20 INJECTION, SOLUTION INFILTRATION; PERINEURAL at 13:32

## 2018-07-24 RX ADMIN — TRANEXAMIC ACID 1 G: 100 INJECTION, SOLUTION INTRAVENOUS at 15:09

## 2018-07-24 ASSESSMENT — ACTIVITIES OF DAILY LIVING (ADL): ADLS_ACUITY_SCORE: 9

## 2018-07-24 ASSESSMENT — LIFESTYLE VARIABLES: TOBACCO_USE: 1

## 2018-07-24 NOTE — PROGRESS NOTES
Admission medication history interview status for the 7/24/2018  admission is complete. See EPIC admission navigator for prior to admission medications     Medication history source reliability:Good    Medication history interview source(s):Patient    Medication history resources (including written lists, pill bottles, clinic record):Mailed in list    Primary pharmacy. [facility]    Additional medication history information not noted on PTA med list :None    Time spent in this activity: 35 minutes    Prior to Admission medications    Medication Sig Last Dose Taking? Auth Provider   Acetaminophen (TYLENOL PO) Take 1,000 mg by mouth 2 times daily as needed for mild pain or fever 7/22/2018 at Unknown Yes Reported, Patient   amLODIPine (NORVASC) 2.5 MG tablet TAKE 1 TABLET BY MOUTH EVERY DAY 7/23/2018 at AM Yes Truman Norman MD   ASPIRIN PO Take 162 mg by mouth every evening (2 x 81mg = 162mg) 7/19/2018 at PM Yes Reported, Patient   erythromycin (ROMYCIN) ophthalmic ointment Apply to skin incisions three times daily and into the eye at bedtime. 7/17/2018 at HS Yes Keren Lara MD   Ibuprofen (ADVIL PO) Take 400-600 mg by mouth 2 times daily as needed for moderate pain 7/19/2018 at Unknown Yes Reported, Patient   metoprolol succinate (TOPROL-XL) 50 MG 24 hr tablet TAKE 1/2 TABLET BY MOUTH ONCE A DAY 7/23/2018 at PM Yes Truman Norman MD   triamterene-hydrochlorothiazide (MAXZIDE-25) 37.5-25 MG per tablet TAKE 1/2 TABLET BY MOUTH ONCE A DAY 7/23/2018 at AM Yes Truman Norman MD

## 2018-07-24 NOTE — OP NOTE
Procedure Date: 07/24/2018      PREOPERATIVE DIAGNOSIS:  Advanced degenerative arthritis of the right hip.      POSTOPERATIVE DIAGNOSIS:  Advanced degenerative arthritis of the right hip.      PROCEDURE:  Right direct anterior total arthroplasty.      SURGEON:  Jimbo Phillips MD.      FIRST ASSISTANT:  Edwin Carcamo PA-C.      PROCEDURE IN DETAIL:  The patient was brought to the operating room, given a general anesthetic.  She was placed supine on the radiolucent operating table and her right hip and right lower extremity were then prepped and draped in the usual sterile fashion.  An incision was made over the anterior aspect of the hip.  This was carried down through subcutaneous tissues down to the fascia.  The fascia was incised and the interval between the tensor fascia brooke and sartorius was developed deep.  The rectus muscle was reflected medially exposing the circumflex vessels which were cauterized.  The hip capsule was then exposed.  We excised the anterior capsule exposing the femoral head and neck.  An osteotomy was then made in the femoral neck at the level of our preoperatively templated osteotomy level.  Another osteotomy was made just below the femoral head and the neck and head fragments were then removed.  The head was deformed, it was quite flattened consistent with her preoperative x-rays and the acetabulum was quite worn especially along its lateral rim and anteriorly.  The attention was then turned to the acetabulum.  We gently reamed the acetabulum up to 45 mm.  A 46 mm Trilogy cup was then impacted into the acetabulum.        This had very good fixation which was supplemented with 2 screws which also had very good fixation.  It should be noted though her bone quality was quite poor but despite this we did get good fixation both with the cup and the screws.  A standard highly crosslinked liner to accept a size 28 head was then snapped into the acetabular component.  Attention was then  turned to the femur.  The piriformis was released, allowing exposure of the opening of the femoral canal.  The canal was opened with a starter reamer and then lateralized with a lateralizing reamer and then we sequentially broached the hip up to a size 7.5 M/L taper broach.  This appeared to fit nicely.   The broach was removed.  A real size 7.5 Kinectiv stem was then impacted into the femur.  We then trialed the hip with various neck lengths and offsets and it appeared that a standard offset -8 neck gave the best fit.  A real standard offset -8 neck with a size 28.0 head were then impacted onto the stem.  The hip was relocated.  The soft tissue tension appeared satisfactory and the hip was stable through a full range of motion.  The leg length and offset appeared to be restored as well.  The wound was then irrigated with a dilute solution of Betadine.        This allowed to sit within the wound for 3 minutes and was thoroughly irrigated from the wound with 1 liter of normal saline.  The fascia was then closed with a running 0 Vicryl suture.  The skin was closed over a drain in the subcutaneous tissues with 2-0 Vicryl subcutaneous sutures and a 3-0 Monocryl running subcuticular suture and Steri-Strips.  A sterile dressing was then applied to the wound.  The patient was then awakened from anesthesia and transferred to postanesthesia recovery in satisfactory condition.  It should be noted that my assistant was necessary throughout the entire procedure to assist with retraction and positioning.         PO SNOW MD             D: 2018   T: 2018   MT: OLGA      Name:     DONATO CARMONA   MRN:      [id number]        Account:        [id number]   :      1932           Procedure Date: 2018      Document: [id number]

## 2018-07-24 NOTE — ANESTHESIA CARE TRANSFER NOTE
Patient: Maryam Saavedra    Procedure(s):  RIGHT DIRECT ANTERIOR TOTAL HIP ARTHROPLASTY - Wound Class: I-Clean    Diagnosis: ADVANCED DJD RIGHT HIP  Diagnosis Additional Information: No value filed.    Anesthesia Type:   General, ETT     Note:  Airway :Face Mask  Patient transferred to:PACU  Comments: Extubated awake in OR, exchanging well, to recovery, VSSHandoff Report: Identifed the Patient, Identified the Reponsible Provider, Reviewed the pertinent medical history, Discussed the surgical course, Reviewed Intra-OP anesthesia mangement and issues during anesthesia, Set expectations for post-procedure period and Allowed opportunity for questions and acknowledgement of understanding      Vitals: (Last set prior to Anesthesia Care Transfer)    CRNA VITALS  7/24/2018 1530 - 7/24/2018 1606      7/24/2018             NIBP: 171/83    Pulse: 63    NIBP Mean: 120    SpO2: 99 %    Resp Rate (observed): (!)  3    Resp Rate (set): 10                Electronically Signed By: JACKIE Mckeon CRNA  July 24, 2018  4:06 PM

## 2018-07-24 NOTE — ANESTHESIA PREPROCEDURE EVALUATION
Anesthesia Evaluation     .             ROS/MED HX    ENT/Pulmonary:     (+)tobacco use, Past use , . .    Neurologic:  - neg neurologic ROS     Cardiovascular:     (+) hypertension----. : . . . :. valvular problems/murmurs tricuspid regurg:.       METS/Exercise Tolerance:     Hematologic:  - neg hematologic  ROS       Musculoskeletal:   (+) arthritis, , , -       GI/Hepatic:        (-) GERD   Renal/Genitourinary:  - ROS Renal section negative       Endo:  - neg endo ROS       Psychiatric:  - neg psychiatric ROS       Infectious Disease:  - neg infectious disease ROS       Malignancy:         Other:                     Physical Exam  Normal systems: cardiovascular, pulmonary and dental    Airway   Mallampati: II  TM distance: >3 FB  Neck ROM: full    Dental     Cardiovascular       Pulmonary                     Anesthesia Plan      History & Physical Review  History and physical reviewed and following examination; no interval change.    ASA Status:  3 .    NPO Status:  > 8 hours    Plan for General and ETT with Intravenous induction. Maintenance will be Balanced.    PONV prophylaxis:  Ondansetron (or other 5HT-3) and Dexamethasone or Solumedrol  Background propofol gtt      Postoperative Care  Postoperative pain management:  IV analgesics.      Consents  Anesthetic plan, risks, benefits and alternatives discussed with:  Patient.  Use of blood products discussed: Yes.   Use of blood products discussed with Patient.  Consented to blood products.  .          Procedure: Procedure(s):  ARTHROPLASTY HIP ANTERIOR  Preop diagnosis: ADVANCED DJD RIGHT HIP    Allergies   Allergen Reactions     Ace Inhibitors      Angioedema       Cyclobenzaprine      Angioedema     Past Medical History:   Diagnosis Date     Benign essential hypertension 9/1/2016     Chronic low back pain      Hypertension      Impaired fasting glucose     borderline     Osteoarthritis      Past Surgical History:   Procedure Laterality Date     EYE SURGERY   cataracts     MOHS MICROGRAPHIC PROCEDURE      Left lower eyelid      MOHS MICROGRAPHIC PROCEDURE Left 10/27/2016    Procedure: MOHS MICROGRAPHIC PROCEDURE;  Surgeon: Jose Torrez MD;  Location: [facility]     MOHS MICROGRAPHIC PROCEDURE Right 5/24/2018    Procedure: MOHS MICROGRAPHIC PROCEDURE;  RIGHT MEDIAL CANTHUS MOHS CLOSURE;  Surgeon: Jose Torrez MD;  Location: [facility]     ORTHOPEDIC SURGERY      bilateral wrists     Social History   Substance Use Topics     Smoking status: Former Smoker     Start date: 7/24/1998     Smokeless tobacco: Never Used     Alcohol use 0.0 oz/week     0 Standard drinks or equivalent per week      Comment: 1-2 drinks per month     Prior to Admission medications    Medication Sig Start Date End Date Taking? Authorizing Provider   Acetaminophen (TYLENOL PO) Take 1,000 mg by mouth 2 times daily as needed for mild pain or fever   Yes Reported, Patient   amLODIPine (NORVASC) 2.5 MG tablet TAKE 1 TABLET BY MOUTH EVERY DAY 1/3/18  Yes Truman Norman MD   ASPIRIN PO Take 162 mg by mouth every evening (2 x 81mg = 162mg)   Yes Reported, Patient   erythromycin (ROMYCIN) ophthalmic ointment Apply to skin incisions three times daily and into the eye at bedtime. 5/24/18  Yes Keren Lara MD   Ibuprofen (ADVIL PO) Take 400-600 mg by mouth 2 times daily as needed for moderate pain   Yes Reported, Patient   metoprolol succinate (TOPROL-XL) 50 MG 24 hr tablet TAKE 1/2 TABLET BY MOUTH ONCE A DAY 3/26/18  Yes Truman Norman MD   triamterene-hydrochlorothiazide (MAXZIDE-25) 37.5-25 MG per tablet TAKE 1/2 TABLET BY MOUTH ONCE A DAY 5/8/18  Yes Truman Norman MD     Current Facility-Administered Medications Ordered in Epic   Medication Dose Route Frequency Last Rate Last Dose     ceFAZolin (ANCEF) 1 g vial to attach to  ml bag for ADULT or 50 ml bag for PEDS  1 g Intravenous See Admin Instructions         ceFAZolin (ANCEF) intermittent infusion 2 g in 100 mL dextrose PRE-MIX  2 g  Intravenous Pre-Op/Pre-procedure x 1 dose         lactated ringers infusion   Intravenous Continuous 25 mL/hr at 07/24/18 1133       lidocaine 1 % 1 mL  1 mL Other Q1H PRN   1 mL at 07/24/18 1132     tranexamic acid (CYKLOKAPRON) 1 g in sodium chloride 0.9 % 60 mL bolus  1 g Intravenous Once         tranexamic acid (CYKLOKAPRON) 1 g in sodium chloride 0.9 % 60 mL bolus  1 g Intravenous Once         No current [facility]-ordered outpatient prescriptions on file.       lactated ringers 25 mL/hr at 07/24/18 1133     Wt Readings from Last 1 Encounters:   07/24/18 46.7 kg (103 lb)     Temp Readings from Last 1 Encounters:   07/24/18 36.5  C (97.7  F) (Temporal)     BP Readings from Last 6 Encounters:   07/24/18 147/75   07/13/18 115/65   05/24/18 146/83   05/14/18 127/70   11/30/17 124/68   11/26/17 138/72     Pulse Readings from Last 4 Encounters:   07/24/18 61   07/13/18 69   05/14/18 61   11/30/17 59     Resp Readings from Last 1 Encounters:   07/24/18 16     SpO2 Readings from Last 1 Encounters:   07/24/18 100%     Recent Labs   Lab Test  07/13/18   1620  05/14/18   1037   NA  140  140   POTASSIUM  4.2  4.0   CHLORIDE  105  105   CO2  27  29   ANIONGAP  8  6   GLC  83  93   BUN  30  29   CR  0.77  0.85   NOHEMY  9.3  9.9     Recent Labs   Lab Test  11/25/17   1846  09/08/17   0900   AST  28  25   ALT  26  23   ALKPHOS  101  90   BILITOTAL  0.3  0.4     Recent Labs   Lab Test  07/13/18   1620  05/14/18   1037  11/26/17   0702   WBC  6.1   --   6.3   HGB  12.0  13.3  11.6*   PLT  275   --   217     No results for input(s): ABO, RH in the last 46161 hours.  No results for input(s): INR, PTT in the last 74716 hours.   No results for input(s): TROPI in the last 49975 hours.  No results for input(s): PH, PCO2, PO2, HCO3 in the last 81656 hours.  No results for input(s): HCG in the last 31437 hours.  No results found for this or any previous visit (from the past 744 hour(s)).    RECENT LABS:   ECG:   ECHO:                     .

## 2018-07-24 NOTE — IP AVS SNAPSHOT
[address] [facility]    [address] [place] [address]    Phone:  [phone]                                       After Visit Summary   7/24/2018    Maryam Saavedra    MRN: [id number]           After Visit Summary Signature Page     I have received my discharge instructions, and my questions have been answered. I have discussed any challenges I see with this plan with the nurse or doctor.    ..........................................................................................................................................  Patient/Patient Representative Signature      ..........................................................................................................................................  Patient Representative Print Name and Relationship to Patient    ..................................................               ................................................  Date                                            Time    ..........................................................................................................................................  Reviewed by Signature/Title    ...................................................              ..............................................  Date                                                            Time

## 2018-07-24 NOTE — ANESTHESIA POSTPROCEDURE EVALUATION
Patient: Maryam Saavedra    Procedure(s):  RIGHT DIRECT ANTERIOR TOTAL HIP ARTHROPLASTY - Wound Class: I-Clean    Diagnosis:ADVANCED DJD RIGHT HIP  Diagnosis Additional Information: No value filed.    Anesthesia Type:  General, ETT    Note:  Anesthesia Post Evaluation    Patient location during evaluation: PACU  Patient participation: Able to fully participate in evaluation  Level of consciousness: awake  Pain management: adequate  Airway patency: patent  Cardiovascular status: acceptable  Respiratory status: acceptable  Hydration status: acceptable  PONV: none     Anesthetic complications: None          Last vitals:  Vitals:    07/24/18 1630 07/24/18 1640 07/24/18 1650   BP: 128/77 153/81 167/79   Pulse:      Resp: 13 12 18   Temp: 33.8  C (92.8  F) 35.7  C (96.2  F)    SpO2: 100% 100% 100%         Electronically Signed By: EDEL JOSEPH MD  July 24, 2018  4:54 PM

## 2018-07-24 NOTE — IP AVS SNAPSHOT
MRN:[id number]                      After Visit Summary   7/24/2018    Maryam Saavedra    MRN: [id number]           Thank you!     Thank you for choosing [facility] for your care. Our goal is always to provide you with excellent care. Hearing back from our patients is one way we can continue to improve our services. Please take a few minutes to complete the written survey that you may receive in the mail after you visit with us. Thank you!        Patient Information     Date Of Birth          6/9/1932        Designated Caregiver       Most Recent Value    Caregiver    Will someone help with your care after discharge? yes    Name of designated caregiver Saray    Phone number of caregiver [phone]    Caregiver address [address] [place] [address]      About your hospital stay     You were admitted on:  July 24, 2018 You last received care in the:  [facility] [address] [facility]    You were discharged on:  July 27, 2018        Reason for your hospital stay       Right total hip replacement                  Who to Call     For medical emergencies, please call 911.  For non-urgent questions about your medical care, please call your primary care provider or clinic, [phone]  For questions related to your surgery, please call your surgery clinic        Attending Provider     Provider Specialty    Jimbo Phillips MD Orthopedics       Primary Care Provider Office Phone # Fax #    Truman Norman [phone]      After Care Instructions     Activity       Your activity upon discharge: activity as tolerated            Diet       Follow this diet upon discharge: Orders Placed This Encounter      Advance Diet as Tolerated: Regular Diet Adult            Shower       Okay to shower between dressing changes            Wound care and dressings       Instructions to care for your wound at home: daily dressing changes.                  Follow-up Appointments      "Follow-up and recommended labs and tests        Follow up with Dr. Jimbo Phillips, at [facility], within 10-14 days from surgery, call [phone] for appointment                  Pending Results     No orders found from 7/22/2018 to 7/25/2018.            Admission Information     Date & Time Provider Department Dept. Phone    7/24/2018 Jimbo Phillips MD [place] [address] [facility] [phone]      Your Vitals Were     Blood Pressure Pulse Temperature Respirations Height Weight    117/63 (BP Location: Right arm) 69 98.3  F (36.8  C) (Oral) 16 1.549 m (5' 1\") 46.7 kg (103 lb)    Pulse Oximetry BMI (Body Mass Index)                97% 19.46 kg/m2          MyChart Information     [facility] gives you secure access to your electronic health record. If you see a primary care provider, you can also send messages to your care team and make appointments. If you have questions, please call your primary care clinic.  If you do not have a primary care provider, please call [phone] and they will assist you.        Care EveryWhere ID     This is your Care EveryWhere ID. This could be used by other organizations to access your [facility] medical records  [id number]        Equal Access to Services     MACKENZIE SCHULTZ AH: Yen Dey, waaxda luurbano, qaybta kaalmada adegilson, ben austin. So [facility] [place] [phone].    ATENCIÓN: Si habla español, tiene a bond disposición servicios gratuitos de asistencia lingüística. Lljd al [phone].    We comply with applicable federal civil rights laws and [place] laws. We do not discriminate on the basis of race, color, national origin, age, disability, sex, sexual orientation, or gender identity.               Review of your medicines      START taking        Dose / Directions    aspirin 325 MG tablet   Used for:  Status post total replacement of right hip   Replaces:  ASPIRIN PO        Dose:  325 mg   Take 1 " tablet (325 mg) by mouth 2 times daily (2 x 81mg = 162mg)   Quantity:  38 tablet   Refills:  1       HYDROcodone-acetaminophen 5-325 MG per tablet   Commonly known as:  NORCO   Used for:  Status post total replacement of right hip        Dose:  1 tablet   Take 1 tablet by mouth every 4 hours as needed for other (pain control or improvement in physical function. Hold dose for analgesic side effects.)   Quantity:  50 tablet   Refills:  0       senna-docusate 8.6-50 MG per tablet   Commonly known as:  SENOKOT-S;PERICOLACE   Used for:  Status post total replacement of right hip        Dose:  1 tablet   Take 1 tablet by mouth 2 times daily   Quantity:  100 tablet   Refills:  0         CONTINUE these medicines which have NOT CHANGED        Dose / Directions    ADVIL PO        Dose:  400-600 mg   Take 400-600 mg by mouth 2 times daily as needed for moderate pain   Refills:  0       amLODIPine 2.5 MG tablet   Commonly known as:  NORVASC   Used for:  Benign essential hypertension        TAKE 1 TABLET BY MOUTH EVERY DAY   Quantity:  90 tablet   Refills:  2       erythromycin ophthalmic ointment   Commonly known as:  ROMYCIN   Used for:  Postoperative eye state        Apply to skin incisions three times daily and into the eye at bedtime.   Quantity:  3.5 g   Refills:  0       metoprolol succinate 50 MG 24 hr tablet   Commonly known as:  TOPROL-XL   Used for:  Benign essential hypertension        TAKE 1/2 TABLET BY MOUTH ONCE A DAY   Quantity:  45 tablet   Refills:  2       triamterene-hydrochlorothiazide 37.5-25 MG per tablet   Commonly known as:  MAXZIDE-25   Used for:  Benign essential hypertension        TAKE 1/2 TABLET BY MOUTH ONCE A DAY   Quantity:  45 tablet   Refills:  1       TYLENOL PO        Dose:  1000 mg   Take 1,000 mg by mouth 2 times daily as needed for mild pain or fever   Refills:  0         STOP taking     ASPIRIN PO   Replaced by:  aspirin 325 MG tablet                Where to get your medicines      These  medications were sent to [facility] [place], [place] - [address]  [address], [place] [address]     Phone:  [phone]     aspirin 325 MG tablet    senna-docusate 8.6-50 MG per tablet         Some of these will need a paper prescription and others can be bought over the counter. Ask your nurse if you have questions.     Bring a paper prescription for each of these medications     HYDROcodone-acetaminophen 5-325 MG per tablet                Protect others around you: Learn how to safely use, store and throw away your medicines at www.disposemymeds.org.        Information about OPIOIDS     PRESCRIPTION OPIOIDS: WHAT YOU NEED TO KNOW   We gave you an opioid (narcotic) pain medicine. It is important to manage your pain, but opioids are not always the best choice. You should first try all the other options your care team gave you. Take this medicine for as short a time (and as few doses) as possible.     These medicines have risks:    DO NOT drive when on new or higher doses of pain medicine. These medicines can affect your alertness and reaction times, and you could be arrested for driving under the influence (DUI). If you need to use opioids long-term, talk to your care team about driving.    DO NOT operate heave machinery    DO NOT do any other dangerous activities while taking these medicines.     DO NOT drink any alcohol while taking these medicines.      If the opioid prescribed includes acetaminophen, DO NOT take with any other medicines that contain acetaminophen. Read all labels carefully. Look for the word  acetaminophen  or  Tylenol.  Ask your pharmacist if you have questions or are unsure.    You can get addicted to pain medicines, especially if you have a history of addiction (chemical, alcohol or substance dependence). Talk to your care team about ways to reduce this risk.    Store your pills in a secure place, locked if possible. We will not replace any lost or stolen  medicine. If you don t finish your medicine, please throw away (dispose) as directed by your pharmacist. The [place] Pollution [employer] Agency has more information about safe disposal: https://www.pca.[facility].[place].us/living-green/managing-unwanted-medications.     All opioids tend to cause constipation. Drink plenty of water and eat foods that have a lot of fiber, such as fruits, vegetables, prune juice, apple juice and high-fiber cereal. Take a laxative (Miralax, milk of magnesia, Colace, Senna) if you don t move your bowels at least every other day.              Medication List: This is a list of all your medications and when to take them. Check marks below indicate your daily home schedule. Keep this list as a reference.      Medications           Morning Afternoon Evening Bedtime As Needed    ADVIL PO   Take 400-600 mg by mouth 2 times daily as needed for moderate pain   Next Dose Due:  Resume as directed                                amLODIPine 2.5 MG tablet   Commonly known as:  NORVASC   TAKE 1 TABLET BY MOUTH EVERY DAY   Last time this was given:  2.5 mg on 7/27/2018  8:33 AM   Next Dose Due:  7/28/18 am                                   aspirin 325 MG tablet   Take 1 tablet (325 mg) by mouth 2 times daily (2 x 81mg = 162mg)   Next Dose Due:  7/28/18 am and pm                                      erythromycin ophthalmic ointment   Commonly known as:  ROMYCIN   Apply to skin incisions three times daily and into the eye at bedtime.   Next Dose Due:  Resume as directed                                HYDROcodone-acetaminophen 5-325 MG per tablet   Commonly known as:  NORCO   Take 1 tablet by mouth every 4 hours as needed for other (pain control or improvement in physical function. Hold dose for analgesic side effects.)   Next Dose Due:  Start when your pain is really increasing!  Start with one tab first and if that does not decrease your pain then add another.                                metoprolol succinate  50 MG 24 hr tablet   Commonly known as:  TOPROL-XL   TAKE 1/2 TABLET BY MOUTH ONCE A DAY   Last time this was given:  25 mg on 7/26/2018 11:11 AM   Next Dose Due:  7/27/18 pm                                   senna-docusate 8.6-50 MG per tablet   Commonly known as:  SENOKOT-S;PERICOLACE   Take 1 tablet by mouth 2 times daily   Last time this was given:  1 tablet on 7/27/2018  8:32 AM   Next Dose Due:  7/27/18 pm                                      triamterene-hydrochlorothiazide 37.5-25 MG per tablet   Commonly known as:  MAXZIDE-25   TAKE 1/2 TABLET BY MOUTH ONCE A DAY   Next Dose Due:  7/27/18 pm                                   TYLENOL PO   Take 1,000 mg by mouth 2 times daily as needed for mild pain or fever   Last time this was given:  650 mg on 7/27/2018  8:36 AM   Next Dose Due:  Either this afternoon or evening.

## 2018-07-24 NOTE — BRIEF OP NOTE
[facility] Brief Operative Note    Pre-operative diagnosis: ADVANCED DJD RIGHT HIP   Post-operative diagnosis ADVANCED DJD RIGHT HIP   Procedure: Procedure(s):  RIGHT DIRECT ANTERIOR TOTAL HIP ARTHROPLASTY - Wound Class: I-Clean   Surgeon(s): Surgeon(s) and Role:     * Jimbo Phillips MD - Primary     * Edwin Carcamo PA-C - Assisting   Estimated blood loss: * No values recorded between 7/24/2018  2:08 PM and 7/24/2018  3:21 PM *    Specimens: * No specimens in log *   Findings: ADVANCED DJD RIGHT HIP

## 2018-07-24 NOTE — PLAN OF CARE
Problem: Hip Arthroplasty (Total, Partial) (Adult)  Goal: Signs and Symptoms of Listed Potential Problems Will be Absent, Minimized or Managed (Hip Arthroplasty)  Signs and symptoms of listed potential problems will be absent, minimized or managed by discharge/transition of care (reference Hip Arthroplasty (Total, Partial) (Adult) CPG).   Outcome: No Change  Pt. A&o, vss, denied any pain, dressing CDI, hvc and osborne patent, family at the bed side, Will continue to monitor.

## 2018-07-25 ENCOUNTER — APPOINTMENT (OUTPATIENT)
Dept: PHYSICAL THERAPY | Facility: CLINIC | Age: 83
DRG: 470 | End: 2018-07-25
Attending: ORTHOPAEDIC SURGERY
Payer: MEDICARE

## 2018-07-25 LAB
CREAT SERPL-MCNC: 0.69 MG/DL (ref 0.52–1.04)
GFR SERPL CREATININE-BSD FRML MDRD: 81 ML/MIN/1.7M2
HGB BLD-MCNC: 11.5 G/DL (ref 11.7–15.7)
PLATELET # BLD AUTO: 249 10E9/L (ref 150–450)

## 2018-07-25 PROCEDURE — 25800025 ZZH RX 258: Performed by: ORTHOPAEDIC SURGERY

## 2018-07-25 PROCEDURE — A9270 NON-COVERED ITEM OR SERVICE: HCPCS | Mod: GY | Performed by: ORTHOPAEDIC SURGERY

## 2018-07-25 PROCEDURE — 25000132 ZZH RX MED GY IP 250 OP 250 PS 637: Mod: GY | Performed by: ORTHOPAEDIC SURGERY

## 2018-07-25 PROCEDURE — 97530 THERAPEUTIC ACTIVITIES: CPT | Mod: GP | Performed by: PHYSICAL THERAPIST

## 2018-07-25 PROCEDURE — 25000132 ZZH RX MED GY IP 250 OP 250 PS 637: Mod: GY | Performed by: PHYSICIAN ASSISTANT

## 2018-07-25 PROCEDURE — 40000193 ZZH STATISTIC PT WARD VISIT: Performed by: PHYSICAL THERAPIST

## 2018-07-25 PROCEDURE — 85049 AUTOMATED PLATELET COUNT: CPT | Performed by: ORTHOPAEDIC SURGERY

## 2018-07-25 PROCEDURE — 25000128 H RX IP 250 OP 636: Performed by: ORTHOPAEDIC SURGERY

## 2018-07-25 PROCEDURE — 85018 HEMOGLOBIN: CPT | Performed by: ORTHOPAEDIC SURGERY

## 2018-07-25 PROCEDURE — 97116 GAIT TRAINING THERAPY: CPT | Mod: GP | Performed by: PHYSICAL THERAPIST

## 2018-07-25 PROCEDURE — A9270 NON-COVERED ITEM OR SERVICE: HCPCS | Mod: GY | Performed by: PHYSICIAN ASSISTANT

## 2018-07-25 PROCEDURE — 12000007 ZZH R&B INTERMEDIATE

## 2018-07-25 PROCEDURE — 82565 ASSAY OF CREATININE: CPT | Performed by: ORTHOPAEDIC SURGERY

## 2018-07-25 PROCEDURE — 97110 THERAPEUTIC EXERCISES: CPT | Mod: GP | Performed by: PHYSICAL THERAPIST

## 2018-07-25 PROCEDURE — 36415 COLL VENOUS BLD VENIPUNCTURE: CPT | Performed by: ORTHOPAEDIC SURGERY

## 2018-07-25 PROCEDURE — 97161 PT EVAL LOW COMPLEX 20 MIN: CPT | Mod: GP | Performed by: PHYSICAL THERAPIST

## 2018-07-25 RX ORDER — ACETAMINOPHEN 325 MG/1
650 TABLET ORAL EVERY 4 HOURS PRN
Status: DISCONTINUED | OUTPATIENT
Start: 2018-07-25 | End: 2018-07-27 | Stop reason: HOSPADM

## 2018-07-25 RX ORDER — TRIAMTERENE AND HYDROCHLOROTHIAZIDE 37.5; 25 MG/1; MG/1
0.5 TABLET ORAL DAILY
Status: DISCONTINUED | OUTPATIENT
Start: 2018-07-25 | End: 2018-07-27 | Stop reason: HOSPADM

## 2018-07-25 RX ADMIN — AMLODIPINE BESYLATE 2.5 MG: 2.5 TABLET ORAL at 08:02

## 2018-07-25 RX ADMIN — SENNOSIDES AND DOCUSATE SODIUM 2 TABLET: 8.6; 5 TABLET ORAL at 08:01

## 2018-07-25 RX ADMIN — SENNOSIDES AND DOCUSATE SODIUM 2 TABLET: 8.6; 5 TABLET ORAL at 21:57

## 2018-07-25 RX ADMIN — POTASSIUM CHLORIDE, DEXTROSE MONOHYDRATE AND SODIUM CHLORIDE: 150; 5; 450 INJECTION, SOLUTION INTRAVENOUS at 03:01

## 2018-07-25 RX ADMIN — ACETAMINOPHEN 650 MG: 325 TABLET, FILM COATED ORAL at 21:57

## 2018-07-25 RX ADMIN — ACETAMINOPHEN 650 MG: 325 TABLET, FILM COATED ORAL at 14:23

## 2018-07-25 RX ADMIN — Medication 1 HALF-TAB: at 10:51

## 2018-07-25 RX ADMIN — ENOXAPARIN SODIUM 40 MG: 40 INJECTION SUBCUTANEOUS at 08:02

## 2018-07-25 RX ADMIN — CEFAZOLIN SODIUM 1 G: 1 INJECTION, POWDER, FOR SOLUTION INTRAMUSCULAR; INTRAVENOUS at 07:53

## 2018-07-25 RX ADMIN — CEFAZOLIN SODIUM 1 G: 1 INJECTION, POWDER, FOR SOLUTION INTRAMUSCULAR; INTRAVENOUS at 01:30

## 2018-07-25 RX ADMIN — METOPROLOL SUCCINATE 25 MG: 25 TABLET, EXTENDED RELEASE ORAL at 08:02

## 2018-07-25 RX ADMIN — ACETAMINOPHEN 650 MG: 325 TABLET, FILM COATED ORAL at 07:57

## 2018-07-25 ASSESSMENT — PAIN DESCRIPTION - DESCRIPTORS
DESCRIPTORS: ACHING

## 2018-07-25 ASSESSMENT — ACTIVITIES OF DAILY LIVING (ADL)
ADLS_ACUITY_SCORE: 10
ADLS_ACUITY_SCORE: 9
ADLS_ACUITY_SCORE: 10
ADLS_ACUITY_SCORE: 10

## 2018-07-25 NOTE — PROGRESS NOTES
hospitalist consult received; pt is 85yo female with PMH HTN/HLD who is s/p right IZZY. Patient's chronic medical conditions are stable and do not require daily monitoring, PTA medications resumed. Hospitalist service will sign-off. Please call with acute concerns.    Leobardo Sahni PA-C  7/24/2018, 7:16 PM  Pager: [phone]

## 2018-07-25 NOTE — PROGRESS NOTES
"[facility] Orthopedic Post-Op Progress Note    Maryam Saavedra MRN# [id number]   YOB: 1932 Age: 86 year old            Interval History:   1 Day Post-Op from Total hip arthoplasty (Right)  Doing well.  Continues to improve.  Pain is well-controlled.  No fevers.  X-rays reviewed.  Starting physical therapy this AM.            Physical Exam:   /57 (BP Location: Right arm)  Pulse 69  Temp 98.7  F (37.1  C) (Oral)  Resp 16  Ht 1.549 m (5' 1\")  Wt 46.7 kg (103 lb)  SpO2 98%  BMI 19.46 kg/m2    Dressing currently in place.  Mild swelling.  Movement and sensation intact distal to surgical site.  Calves non-tender.           Data:     Hemoglobin   Date Value Ref Range Status   07/25/2018 11.5 (L) 11.7 - 15.7 g/dL Final   07/13/2018 12.0 11.7 - 15.7 g/dL Final            Assessment and Plan:    Assessment:   Post-operative day #1  Total hip arthoplasty (Right)     Doing well.  No excessive bleeding  Pain well-controlled.           Plan:   Continue current medical management  Start working with physical therapy  Will add Tylenol to pain medicine  Plan on discharge to home vs. TCU on Friday, 7/27/2018, pending progress  Continue current DVT prophylaxis           Suresh Carcamo PA-C  "

## 2018-07-25 NOTE — PLAN OF CARE
Problem: Patient Care Overview  Goal: Plan of Care/Patient Progress Review  Outcome: Improving  Alert and oriented X 4, up with A1 with WW. PT X 2 today, ambulated in hallway. Pain controlled with APAP/ice. Tolerating diet. R hip dressing CDI. Voiding. Trace edema in BLE. Hemavac patent and draining.

## 2018-07-25 NOTE — PLAN OF CARE
Problem: Patient Care Overview  Goal: Plan of Care/Patient Progress Review  Outcome: Improving  A/O x4. Up ax1 to dangle at bedside. VSS on 3L O2. CMS intact. Drsg C/D/I. Hemovac in place and patent. Miller in place and patent. Capno stable; occasionally alarming d/t bradycardia. IVF infusing. Minimal pain. Denies need for intervention at this time. BS active. Tolerating regular diet. Progressing per POC. Will cont to monitor.

## 2018-07-25 NOTE — PLAN OF CARE
Problem: Patient Care Overview  Goal: Plan of Care/Patient Progress Review  PT: Orders received, Chart reviewed, Eval completed and treatment started, s/p R IZZY (D-A), 7/24/18.  Discharge Planner PT   Patient plan for discharge:Disch to home with help of her family.  Current status: PT: Needs Moustapha and vc for exercise, bed mobility, transfers, and gait with FWW, WBAT R, 50'.  Barriers to return to prior living situation: Postop pain, edema, weakness, and stairs to enter and within her house.  Recommendations for discharge: Disch to home with help of family.  Rationale for recommendations: Anticipate that patient will recover functional independence, mobility, and safety for negotiation of chosen residence with help of her family.       Entered by: Fredrick Dominique 07/25/2018 5:14 PM

## 2018-07-25 NOTE — PROGRESS NOTES
A/O, VSS on 3L O2 through capno. Capno 45/10 consistently, with alarms for bradycardia. IV infusing. R hip dressing CDI. HMV in place. CMS intact. Denies pain at this time. Trace edema and +1 pulses in BLE. Miller removed, due to void.

## 2018-07-26 ENCOUNTER — APPOINTMENT (OUTPATIENT)
Dept: OCCUPATIONAL THERAPY | Facility: CLINIC | Age: 83
DRG: 470 | End: 2018-07-26
Attending: ORTHOPAEDIC SURGERY
Payer: MEDICARE

## 2018-07-26 ENCOUNTER — APPOINTMENT (OUTPATIENT)
Dept: PHYSICAL THERAPY | Facility: CLINIC | Age: 83
DRG: 470 | End: 2018-07-26
Attending: ORTHOPAEDIC SURGERY
Payer: MEDICARE

## 2018-07-26 LAB
GLUCOSE SERPL-MCNC: 106 MG/DL (ref 70–99)
HGB BLD-MCNC: 11.6 G/DL (ref 11.7–15.7)

## 2018-07-26 PROCEDURE — 36415 COLL VENOUS BLD VENIPUNCTURE: CPT | Performed by: ORTHOPAEDIC SURGERY

## 2018-07-26 PROCEDURE — 25000132 ZZH RX MED GY IP 250 OP 250 PS 637: Mod: GY | Performed by: PHYSICIAN ASSISTANT

## 2018-07-26 PROCEDURE — 97165 OT EVAL LOW COMPLEX 30 MIN: CPT | Mod: GO | Performed by: OCCUPATIONAL THERAPIST

## 2018-07-26 PROCEDURE — 85018 HEMOGLOBIN: CPT | Performed by: ORTHOPAEDIC SURGERY

## 2018-07-26 PROCEDURE — 82947 ASSAY GLUCOSE BLOOD QUANT: CPT | Performed by: ORTHOPAEDIC SURGERY

## 2018-07-26 PROCEDURE — 25000128 H RX IP 250 OP 636: Performed by: ORTHOPAEDIC SURGERY

## 2018-07-26 PROCEDURE — 97116 GAIT TRAINING THERAPY: CPT | Mod: GP

## 2018-07-26 PROCEDURE — A9270 NON-COVERED ITEM OR SERVICE: HCPCS | Mod: GY | Performed by: ORTHOPAEDIC SURGERY

## 2018-07-26 PROCEDURE — 12000007 ZZH R&B INTERMEDIATE

## 2018-07-26 PROCEDURE — 97110 THERAPEUTIC EXERCISES: CPT | Mod: GP

## 2018-07-26 PROCEDURE — A9270 NON-COVERED ITEM OR SERVICE: HCPCS | Mod: GY | Performed by: PHYSICIAN ASSISTANT

## 2018-07-26 PROCEDURE — 40000133 ZZH STATISTIC OT WARD VISIT: Performed by: OCCUPATIONAL THERAPIST

## 2018-07-26 PROCEDURE — 97535 SELF CARE MNGMENT TRAINING: CPT | Mod: GO | Performed by: OCCUPATIONAL THERAPIST

## 2018-07-26 PROCEDURE — 40000193 ZZH STATISTIC PT WARD VISIT

## 2018-07-26 PROCEDURE — 25000132 ZZH RX MED GY IP 250 OP 250 PS 637: Mod: GY | Performed by: ORTHOPAEDIC SURGERY

## 2018-07-26 RX ORDER — AMOXICILLIN 250 MG
1 CAPSULE ORAL 2 TIMES DAILY
Qty: 100 TABLET | Refills: 0 | Status: SHIPPED | OUTPATIENT
Start: 2018-07-26 | End: 2018-11-09

## 2018-07-26 RX ORDER — ASPIRIN 325 MG
325 TABLET ORAL 2 TIMES DAILY
Qty: 38 TABLET | Refills: 1 | Status: SHIPPED | OUTPATIENT
Start: 2018-07-26 | End: 2018-09-02

## 2018-07-26 RX ORDER — HYDROCODONE BITARTRATE AND ACETAMINOPHEN 5; 325 MG/1; MG/1
1 TABLET ORAL EVERY 4 HOURS PRN
Qty: 50 TABLET | Refills: 0 | Status: SHIPPED | OUTPATIENT
Start: 2018-07-26 | End: 2018-11-09

## 2018-07-26 RX ADMIN — SENNOSIDES AND DOCUSATE SODIUM 1 TABLET: 8.6; 5 TABLET ORAL at 20:42

## 2018-07-26 RX ADMIN — AMLODIPINE BESYLATE 2.5 MG: 2.5 TABLET ORAL at 11:11

## 2018-07-26 RX ADMIN — ENOXAPARIN SODIUM 40 MG: 40 INJECTION SUBCUTANEOUS at 11:15

## 2018-07-26 RX ADMIN — ACETAMINOPHEN 650 MG: 325 TABLET, FILM COATED ORAL at 11:11

## 2018-07-26 RX ADMIN — METOPROLOL SUCCINATE 25 MG: 25 TABLET, EXTENDED RELEASE ORAL at 11:11

## 2018-07-26 RX ADMIN — Medication 1 HALF-TAB: at 11:11

## 2018-07-26 RX ADMIN — SENNOSIDES AND DOCUSATE SODIUM 2 TABLET: 8.6; 5 TABLET ORAL at 11:10

## 2018-07-26 RX ADMIN — ACETAMINOPHEN 650 MG: 325 TABLET, FILM COATED ORAL at 20:42

## 2018-07-26 ASSESSMENT — ACTIVITIES OF DAILY LIVING (ADL)
ADLS_ACUITY_SCORE: 10
ADLS_ACUITY_SCORE: 10
PREVIOUS_RESPONSIBILITIES: MEAL PREP;HOUSEKEEPING;LAUNDRY;MEDICATION MANAGEMENT;DRIVING
ADLS_ACUITY_SCORE: 10
ADLS_ACUITY_SCORE: 9

## 2018-07-26 NOTE — PLAN OF CARE
Problem: Patient Care Overview  Goal: Plan of Care/Patient Progress Review  Discharge Planner PT   Patient plan for discharge: Disch to home with help of her family.  Current status: Sit to/from stand with FWW and SBA, cues for safety. Sit to/from supine with min assist for R LE. Amb 100 ft x 1 with FWW and SBA. Tolerates IZZY exs well with minimal reports of pain. Pt returned supine at end of session with all needs in reach and bed alarm on.  Barriers to return to prior living situation: Stairs to enter and within her house not yet assessed  Recommendations for discharge: Home with help of family for household tasks per the plan established by the Physical Therapist   Rationale for recommendations: Anticipate that patient will recover functional independence, mobility, and safety for negotiation of chosen residence with help of her family       Entered by: Britney Esparza 07/26/2018 10:54 AM

## 2018-07-26 NOTE — PLAN OF CARE
Problem: Hip Arthroplasty (Total, Partial) (Adult)  Goal: Signs and Symptoms of Listed Potential Problems Will be Absent, Minimized or Managed (Hip Arthroplasty)  Signs and symptoms of listed potential problems will be absent, minimized or managed by discharge/transition of care (reference Hip Arthroplasty (Total, Partial) (Adult) CPG).   Outcome: No Change  Oriented to self and time, tmax: 99.1 given IS and did teaching. Other VSS on RA. Pluses intact, trace edema in ankles. Dressing CDI. Hemovac in place w/ bright bloody output. Mild pain, declined interventions. Up w/ A1 to BSC.

## 2018-07-26 NOTE — PLAN OF CARE
Problem: Hip Arthroplasty (Total, Partial) (Adult)  Goal: Signs and Symptoms of Listed Potential Problems Will be Absent, Minimized or Managed (Hip Arthroplasty)  Signs and symptoms of listed potential problems will be absent, minimized or managed by discharge/transition of care (reference Hip Arthroplasty (Total, Partial) (Adult) CPG).   Outcome: Improving  A/O x4. Forgetful. Up Ax2 gaitbelt and walker to BSC. Voiding adequately. VSS on RA. CMS intact. Drsg C/D/I. hemovac in place and patent. Pain controlled with tylenol. Progressing per POC. Will cont to monitor.

## 2018-07-26 NOTE — PROGRESS NOTES
07/25/18 1052   Quick Adds   Type of Visit Initial PT Evaluation   Living Environment   Lives With child(staci), adult  (son)   Living Arrangements house  (1 story with LL)   Home Accessibility stairs to enter home;stairs within home   Number of Stairs to Enter Home 3   Number of Stairs Within Home (full flight to LL)   Stair Railings at Home outside, present on right side;inside, present on right side   Transportation Available car;family or friend will provide   Self-Care   Dominant Hand right   Usual Activity Tolerance good   Current Activity Tolerance poor   Regular Exercise no   Equipment Currently Used at Home cane, straight  (Has FWW)   Functional Level Prior   Ambulation 1-->assistive equipment   Transferring 0-->independent   Toileting 0-->independent   Bathing 0-->independent   Dressing 0-->independent   Eating 0-->independent   Communication 0-->understands/communicates without difficulty   Swallowing 0-->swallows foods/liquids without difficulty   Cognition 0 - no cognition issues reported   Fall history within last six months no   Which of the above functional risks had a recent onset or change? ambulation;dressing   General Information   Onset of Illness/Injury or Date of Surgery - Date 07/24/18   Referring Physician Jimbo Phillips   Patient/Family Goals Statement Disch to home with help of family.   Pertinent History of Current Problem (include personal factors and/or comorbidities that impact the POC) Progressive pain and immobility in R hip leading to R D-A IZZY on 7/24/18.   Precautions/Limitations fall precautions;other (see comments)  (No precautions associated with surgical approach.)   Weight-Bearing Status - RLE weight-bearing as tolerated   Cognitive Status Examination   Orientation orientation to person, place and time   Level of Consciousness alert   Follows Commands and Answers Questions 100% of the time   Personal Safety and Judgment impulsive   Memory intact   Pain Assessment   Patient  Currently in Pain Yes, see Vital Sign flowsheet  (7/10R hip pain.)   Integumentary/Edema   Integumentary/Edema Comments Surgical site covered by postop dressing.   Posture    Posture Forward head position   Posture Comments Tends to slouch with sitting and standing.   Range of Motion (ROM)   ROM Comment R hip ROM limited by postop pain and edema.   Strength   Strength Comments R hip strength inhibited by postop pain and edema.   Bed Mobility   Bed Mobility Bed mobility analysis   Bed Mobility Comments Needs Moustapha and vc for bed mobility, supine to sit and back to supine.   Bed Mobility Analysis   Bed Mobility Limitations decreased ability to use legs for bridging/pushing   Impairments Contributing to Impaired Bed Mobility decreased flexibility;pain;decreased ROM;decreased strength   Transfer Skills   Transfer Comments Needs Moustapha and vc for transfers, sit to stand and back to sit, WBAT R with FWW.   Gait   Gait Gait Analysis   Gait Comments Needs Moustapha and vc for gait with FWW, WBAT R, 50'.   Gait Analysis   Gait Pattern Used 3-point gait   Gait Deviations Noted decreased rosa;decreased velocity of limb motion;decreased stride length   Impairments Contributing to Gait Deviations decreased flexibility;pain;decreased ROM;decreased strength   Balance   Balance no deficits were identified   Sensory Examination   Sensory Perception no deficits were identified   Coordination   Coordination no deficits were identified   Muscle Tone   Muscle Tone no deficits were identified   Modality Interventions   Planned Modality Interventions Cryotherapy   General Therapy Interventions   Planned Therapy Interventions bed mobility training;gait training;ROM;strengthening;stretching;transfer training;risk factor education;home program guidelines;progressive activity/exercise   Clinical Impression   Criteria for Skilled Therapeutic Intervention yes, treatment indicated   PT Diagnosis Impaired gait and mobility.   Influenced by the  "following impairments Pain, edema, weakness.   Functional limitations due to impairments limited mobility and gait.   Clinical Presentation Stable/Uncomplicated   Clinical Presentation Rationale Functional assessment and clinical judgement.   Clinical Decision Making (Complexity) Low complexity   Therapy Frequency` 2 times/day   Predicted Duration of Therapy Intervention (days/wks) 3 days.   Anticipated Equipment Needs at Discharge front wheeled walker   Anticipated Discharge Disposition Home with Assist   Risk & Benefits of therapy have been explained Yes   Patient, Family & other staff in agreement with plan of care Yes   [facility] TM \"6 Clicks\"   2016, Trustees of [facility], under license to [employer].  All rights reserved.   6 Clicks Short Forms Basic Mobility Inpatient Short Form   [facility] AM-PAC  \"6 Clicks\" V.2 Basic Mobility Inpatient Short Form   1. Turning from your back to your side while in a flat bed without using bedrails? 3 - A Little   2. Moving from lying on your back to sitting on the side of a flat bed without using bedrails? 3 - A Little   3. Moving to and from a bed to a chair (including a wheelchair)? 3 - A Little   4. Standing up from a chair using your arms (e.g., wheelchair, or bedside chair)? 3 - A Little   5. To walk in hospital room? 3 - A Little   6. Climbing 3-5 steps with a railing? 2 - A Lot   Basic Mobility Raw Score (Score out of 24.Lower scores equate to lower levels of function) 17   Total Evaluation Time   Total Evaluation Time (Minutes) 15     "

## 2018-07-26 NOTE — PROGRESS NOTES
"POD# 2    SUBJECTIVE  Some confusion last night  Awake, alert, and oriented this morning  Denies significant pain    OBJECTIVE:   /65 (BP Location: Right arm)  Pulse 69  Temp 99.1  F (37.3  C) (Oral)  Resp 14  Ht 1.549 m (5' 1\")  Wt 46.7 kg (103 lb)  SpO2 97%  BMI 19.46 kg/m2   Hemoglobin   Date Value Ref Range Status   07/26/2018 11.6 (L) 11.7 - 15.7 g/dL Final   ]   Wound: dressing dry  Drain minimal output since surgery    ASSESSMENT   Doing well  Confusion last night may have been related to narcotics    PLAN   IZZY pathway  Minimize narcotics  Anticipate discharge home tomorrow    Jimbo Phillips   "

## 2018-07-26 NOTE — PROGRESS NOTES
07/26/18 0800   Quick Adds   Type of Visit Initial Occupational Therapy Evaluation   Living Environment   Lives With child(staci), adult  (son)   Living Arrangements house   Home Accessibility stairs to enter home;stairs within home   Number of Stairs to Enter Home 3   Number of Stairs Within Home (flight to --laundry)   Transportation Available car;family or friend will provide  (driving PTA(no freeway driving))   Living Environment Comment Pt. comfort height toilets w/sink support, tub/shower combo.   Self-Care   Dominant Hand right   Usual Activity Tolerance good   Regular Exercise no   Equipment Currently Used at Home cane, straight  (pt. using cane PTA;has FWW)   Activity/Exercise/Self-Care Comment Pt. typically indep. w/ I/ADL's.   Functional Level Prior   Ambulation 1-->assistive equipment  (cane)   Transferring 0-->independent   Toileting 0-->independent   Bathing 0-->independent   Dressing 0-->independent   Eating 0-->independent   Communication 0-->understands/communicates without difficulty   Swallowing 0-->swallows foods/liquids without difficulty   Cognition 0 - no cognition issues reported   Fall history within last six months no   Which of the above functional risks had a recent onset or change? ambulation;transferring;toileting;bathing;dressing   General Information   Onset of Illness/Injury or Date of Surgery - Date 07/24/18   Referring Physician Dr. Phillips   Patient/Family Goals Statement Plans to DC home 7/27   Additional Occupational Profile Info/Pertinent History of Current Problem Pt. underwent a IZZY, direct-anterior approach   Precautions/Limitations fall precautions  (no hip precautions)   Weight-Bearing Status - RLE weight-bearing as tolerated   General Observations Pt. pleasant and coperative, agreeable to OT, pain=4/10(R hip)   General Info Comments Pt. reports her son works during the day(lives w/son), however, her dtr., Saray, will be able to assist.   Cognitive Status Examination    Orientation orientation to person, place and time   Level of Consciousness alert;confused  (mild confusion, some difficulty w/instructions)   Able to Follow Commands mild impairment   Personal Safety (Cognitive) mild impairment   Memory (forgetful)   Cognitive Comment Per chart, confusion last night, mild confusion noted during session, will continue to monitor.   Visual Perception   Visual Perception Comments Wears glasses, no vision problems noed/reported   Sensory Examination   Sensory Comments No numbness/tingling reported   Pain Assessment   Patient Currently in Pain Yes, see Vital Sign flowsheet  (R hip--4/10)   Range of Motion (ROM)   ROM Comment WNL/WFL   Strength   Strength Comments WFL   Coordination   Upper Extremity Coordination No deficits were identified   Mobility   Bed Mobility Comments SBA supine-sit, use of handrail, HOB  elevated   Transfer Skill: Sit to Stand   Level of [place]: Sit/Stand minimum assist (75% patients effort)   Physical Assist/Nonphysical Assist: Sit/Stand verbal cues;1 person assist   Transfer Skill: Sit to Stand weight-bearing as tolerated   Assistive Device for Transfer: Sit/Stand rolling walker   Toilet Transfer   Toilet Transfer Comments comfort height toilets at home   Tub/Shower Transfer   Tub/Shower Transfer Comments tub/shower combo. at home   Balance   Balance Comments good balance overall, CGA for room mobility   Lower Body Dressing   Level of [place]: Dress Lower Body minimum assist (75% patients effort)   Grooming   Level of [place]: Grooming contact guard   Instrumental Activities of Daily Living (IADL)   Previous Responsibilities meal prep;housekeeping;laundry;medication management;driving  (family will do laundry, assist w/IADL's)   Activities of Daily Living Analysis   Impairments Contributing to Impaired Activities of Daily Living balance impaired;cognition impaired;flexibility decreased;pain;ROM decreased;strength decreased  (decreased LE  "strength/ROM)   General Therapy Interventions   Planned Therapy Interventions ADL retraining;cognition  (monitor cognition)   Clinical Impression   Criteria for Skilled Therapeutic Interventions Met yes, treatment indicated   OT Diagnosis Decline in ADL performance   Influenced by the following impairments pain,impaired balance, decreased flexibilty, decreased LE strength/ROM, confusion(mild)   Assessment of Occupational Performance 3-5 Performance Deficits   Identified Performance Deficits Currently below baseline w/ dressing,toileting, bathing, grooming, fx. transfers   Clinical Decision Making (Complexity) Low complexity   Therapy Frequency daily   Predicted Duration of Therapy Intervention (days/wks) 2-3 days   Anticipated Equipment Needs at Discharge long shoe horn;reacher;shower chair;sock aide;tub bench;other (see comments)  (grab bar)   Anticipated Discharge Disposition Home;Home with Assist   Risks and Benefits of Treatment have been explained. Yes   Patient, Family & other staff in agreement with plan of care Yes   [facility] TM \"6 Clicks\"   2016, Trustees of [facility], under license to [employer].  All rights reserved.   6 Clicks Short Forms Daily Activity Inpatient Short Form   [facility]  \"6 Clicks\" Daily Activity Inpatient Short Form   1. Putting on and taking off regular lower body clothing? 3 - A Little   2. Bathing (including washing, rinsing, drying)? 2 - A Lot   3. Toileting, which includes using toilet, bedpan or urinal? 3 - A Little   4. Putting on and taking off regular upper body clothing? 4 - None   5. Taking care of personal grooming such as brushing teeth? 3 - A Little   6. Eating meals? 4 - None   Daily Activity Raw Score (Score out of 24.Lower scores equate to lower levels of function) 19   Total Evaluation Time   Total Evaluation Time (Minutes) 12     "

## 2018-07-26 NOTE — PLAN OF CARE
Problem: Patient Care Overview  Goal: Plan of Care/Patient Progress Review  Discharge Planner OT      OT:Evaluation completed and treatment initiated. Pt. is s/p R IZZY, D-A approach, no precautions. Pt. resides w/ her son, typically indep. w/I/ADL's, was using a cane PTA(typically does not use AD);Pt.'s son works during the day, however, her daughter will be able to assist as needed. Pt. reports tub/shower combo., comfort height toilets at home.  Patient plan for discharge: Home w/family assist  Current status: Pt.able to come supine-sit w/ SBA;sit-stand w/ CGA-min. A, needing vc's for safety/hand placement, can be forgetful;Pt. ambulated to bathroom w/ CGA;Pt. tolerated standing at sink to complete grooming/hyg, X 5 min. W SBA-CGA, pain=4/10 R hip(same as at rest);Ed. In safe toilet transfer tech. W/simualted home set-up--completed X 2 w/ CGA, needing vc's for hand palacement both times, confusion noted;Pt. ambulated approx. 10 feet w/ CGA to WC, vc's to reach back for armrest for increased safety;Pt. sat in WC for instruction in AE/comp. Techs. For LE dressing--pt. doffed socks w/ reacher;donned L sock without AE, used sock-aide to reno R;donned pants using reacher/SBA;pt. reports she has elastic shoelaces for shoes. Ed. In various options/DME for tub transfer, issued DME/AE resource and tub transfer ed. handout(will trial 7/27).   Barriers to return to prior living situation: tub/shower combo., may be alone at times, impaired cognition(pt. w/ some confusion last night, mild confusion his a.m. during session).  Recommendations for discharge: Home w/ family assist(may need increased supervision for safety pending cognitive status);AE/DME for bathing, reacher  Rationale for recommendations: Anticipate pt.will prgress, meet goals, and be able to DC home w/ family assist/supervision, use of AE/DME as indicated.       Entered by: Erin Herman 07/26/2018 10:56 AM

## 2018-07-26 NOTE — PLAN OF CARE
Problem: Patient Care Overview  Goal: Plan of Care/Patient Progress Review  Outcome: Improving  Hemovac discontinued today, dressing changed.  Up with SBA to bathroom.  Forgetful at times.  Otherwise, progressing per pathway.

## 2018-07-27 ENCOUNTER — APPOINTMENT (OUTPATIENT)
Dept: PHYSICAL THERAPY | Facility: CLINIC | Age: 83
DRG: 470 | End: 2018-07-27
Attending: ORTHOPAEDIC SURGERY
Payer: MEDICARE

## 2018-07-27 ENCOUNTER — APPOINTMENT (OUTPATIENT)
Dept: OCCUPATIONAL THERAPY | Facility: CLINIC | Age: 83
DRG: 470 | End: 2018-07-27
Attending: ORTHOPAEDIC SURGERY
Payer: MEDICARE

## 2018-07-27 VITALS
TEMPERATURE: 98.3 F | HEART RATE: 69 BPM | HEIGHT: 61 IN | DIASTOLIC BLOOD PRESSURE: 63 MMHG | OXYGEN SATURATION: 97 % | SYSTOLIC BLOOD PRESSURE: 117 MMHG | RESPIRATION RATE: 16 BRPM | WEIGHT: 103 LBS | BODY MASS INDEX: 19.45 KG/M2

## 2018-07-27 PROCEDURE — 25000132 ZZH RX MED GY IP 250 OP 250 PS 637: Mod: GY | Performed by: PHYSICIAN ASSISTANT

## 2018-07-27 PROCEDURE — 40000133 ZZH STATISTIC OT WARD VISIT: Performed by: OCCUPATIONAL THERAPY ASSISTANT

## 2018-07-27 PROCEDURE — 97530 THERAPEUTIC ACTIVITIES: CPT | Mod: GO | Performed by: OCCUPATIONAL THERAPY ASSISTANT

## 2018-07-27 PROCEDURE — 25000128 H RX IP 250 OP 636: Performed by: ORTHOPAEDIC SURGERY

## 2018-07-27 PROCEDURE — A9270 NON-COVERED ITEM OR SERVICE: HCPCS | Mod: GY | Performed by: PHYSICIAN ASSISTANT

## 2018-07-27 PROCEDURE — 97110 THERAPEUTIC EXERCISES: CPT | Mod: GP

## 2018-07-27 PROCEDURE — 40000193 ZZH STATISTIC PT WARD VISIT

## 2018-07-27 PROCEDURE — 97535 SELF CARE MNGMENT TRAINING: CPT | Mod: GO | Performed by: OCCUPATIONAL THERAPY ASSISTANT

## 2018-07-27 PROCEDURE — 97116 GAIT TRAINING THERAPY: CPT | Mod: GP

## 2018-07-27 PROCEDURE — A9270 NON-COVERED ITEM OR SERVICE: HCPCS | Mod: GY | Performed by: ORTHOPAEDIC SURGERY

## 2018-07-27 PROCEDURE — 25000132 ZZH RX MED GY IP 250 OP 250 PS 637: Mod: GY | Performed by: ORTHOPAEDIC SURGERY

## 2018-07-27 RX ADMIN — AMLODIPINE BESYLATE 2.5 MG: 2.5 TABLET ORAL at 08:33

## 2018-07-27 RX ADMIN — ENOXAPARIN SODIUM 40 MG: 40 INJECTION SUBCUTANEOUS at 08:29

## 2018-07-27 RX ADMIN — ACETAMINOPHEN 650 MG: 325 TABLET, FILM COATED ORAL at 08:36

## 2018-07-27 RX ADMIN — SENNOSIDES AND DOCUSATE SODIUM 1 TABLET: 8.6; 5 TABLET ORAL at 08:32

## 2018-07-27 ASSESSMENT — ACTIVITIES OF DAILY LIVING (ADL)
ADLS_ACUITY_SCORE: 9
ADLS_ACUITY_SCORE: 10

## 2018-07-27 NOTE — PLAN OF CARE
Problem: Patient Care Overview  Goal: Plan of Care/Patient Progress Review  Outcome: Improving  Educated pt and pt's daughter for discharge.  Pt is excited to go home.  Instructions given and medications given.  Pt is ready to go home and daughter Saray took pt home.  Pt has met her goals.

## 2018-07-27 NOTE — PROGRESS NOTES
"POD# 3    SUBJECTIVE  Pain well controlled  Doing well in PT    OBJECTIVE:   /63 (BP Location: Right arm)  Pulse 69  Temp 98.3  F (36.8  C) (Oral)  Resp 16  Ht 1.549 m (5' 1\")  Wt 46.7 kg (103 lb)  SpO2 97%  BMI 19.46 kg/m2   Hemoglobin   Date Value Ref Range Status   07/26/2018 11.6 (L) 11.7 - 15.7 g/dL Final   ]   Wound: dressing dry  Drain pulled    ASSESSMENT   Doing well    PLAN   Home today  Follow up 2 weeks postop    Jimbo Phillips   "

## 2018-07-27 NOTE — PLAN OF CARE
Problem: Patient Care Overview  Goal: Plan of Care/Patient Progress Review  Discharge Planner OT   Patient plan for discharge: Home w/family assist  Current status: Educated on AE for tub (shower chair and bench), pt trial shower bench and completed transfer to sit with CGA unable to scoot back on chair due to height, pt tub is lower at home, anticipate pt will be able to complete transfer with SBA from family initially. Reviewed hip precautions with pt in regards to following during transfers and ADLS, pt required several cues to follow precautions when completing sit/stands. Educated on community/online resources for recommended AE. Pt/daugther verbalized good understanding of all education.   Barriers to return to prior living situation: tub/shower combo., may be alone at times, impaired cognition(pt. w/ some confusion last night, mild confusion his a.m. during session).  Recommendations for discharge: Home w/ family assist(may need increased supervision for safety pending cognitive status);AE/DME for bathing, reacher per plan established by the Occupational THerapist  Rationale for recommendations: family will be available to assist as needed at home for ADLS         Entered by: Mell Torres 07/27/2018 12:06 PM     Pt to discharge to home today with family assist, GOALS NOT MET, see discharge summary

## 2018-07-27 NOTE — PLAN OF CARE
Problem: Patient Care Overview  Goal: Plan of Care/Patient Progress Review  Outcome: Improving  Patient A&Ox4. VSS on RA. CMS intact. Dressing clean, dry, and intact. Denies pain. Up with the assist of one to the bathroom, voiding adequately. Patient slept between cares. Plan to discharge home today. Will continue to monitor.

## 2018-07-27 NOTE — PLAN OF CARE
Problem: Patient Care Overview  Goal: Plan of Care/Patient Progress Review  Discharge Planner PT   Patient plan for discharge: Disch to home with help of her family.  Current status: Sit to/from stand with FWW and SBA, cues for safety. Sit to/from supine with SBA. Amb 200 ft x 1 with FWW and SBA. Up/down 3 steps x 1 with 1 rail and HHA. Tolerates IZZY exs well with minimal reports of pain. Pt sitting EOB with nursing staff upon PT exit.  Barriers to return to prior living situation: none noted at this time  Recommendations for discharge: Home with help of family for household tasks per the plan established by the Physical Therapist   Rationale for recommendations: Anticipate that patient will recover functional independence, mobility, and safety for negotiation of chosen residence with help of her family       Entered by: Britney Esparza 07/27/2018 10:00 AM     Pt discharging home today.    PT goals met.

## 2018-07-30 ENCOUNTER — TELEPHONE (OUTPATIENT)
Dept: FAMILY MEDICINE | Facility: CLINIC | Age: 83
End: 2018-07-30

## 2018-07-30 NOTE — TELEPHONE ENCOUNTER
Degenerative Localized Arthritis Of Hip, Advanced Djd Right Hip 07/27/2018 6 mo ED/IP 0/1  [phone] (home)

## 2018-07-30 NOTE — DISCHARGE SUMMARY
[facility]    Discharge Summary  Orthopedics    Date of Admission:  7/24/2018  Date of Discharge:  7/27/2018 12:30 PM  Discharging Provider: Edwin Carcamo PA-C  Date of Service: 7/27/2018    Discharge Diagnoses   Status post total replacement of right hip    Procedure/Surgery Information   Procedure: Procedure(s):  RIGHT DIRECT ANTERIOR TOTAL HIP ARTHROPLASTY - Wound Class: I-Clean   Surgeon(s): Surgeon(s) and Role:     * Jimbo Phillips MD - Primary     * Edwin Carcamo PA-C - Assisting           History of Present Illness   Maryam Saavedra is a 86 year old female who presented with significant pain and degenerative changes in her right hip.    Hospital Course   Maryam Saavedra was admitted on 7/24/2018.  The following problems were addressed during her hospitalization:  Active Problems:    Degenerative localized arthritis of hip      Post-operative pain control: included Norco and will be Norco on discharge.     Medications discontinued or adjusted during this hospitalization:  Start aspirin 325 mg twice daily for 6 weeks for DVT prophylaxis.    Antibiotics prescribed at discharge: None     Edwin Carcamo PA-C    Discharge Disposition   Discharged to home   Condition at discharge: Good    Unresulted Labs Ordered in the Past 30 Days of this Admission     No orders found from 5/25/2018 to 7/25/2018.          Primary Care Physician   Truman Norman    Consultations This Hospital Stay   HOSPITALIST IP CONSULT  OCCUPATIONAL THERAPY ADULT IP CONSULT  PHYSICAL THERAPY ADULT IP CONSULT    Time Spent on this Encounter   I have spent less than 30 minutes on this discharge.    Discharge Orders     Reason for your hospital stay   Right total hip replacement     Follow-up and recommended labs and tests    Follow up with Dr. Jimbo Phillips, at [facility], within 10-14 days from surgery, call [phone] for appointment     Activity   Your activity upon  discharge: activity as tolerated     Wound care and dressings   Instructions to care for your wound at home: daily dressing changes.     Shower   Okay to shower between dressing changes     Diet   Follow this diet upon discharge: Orders Placed This Encounter     Advance Diet as Tolerated: Regular Diet Adult       Discharge Medications   Start aspirin 325 mg twice daily for 6 weeks for DVT prophylaxis.  Discharge Medication List as of 7/27/2018 11:47 AM      START taking these medications    Details   HYDROcodone-acetaminophen (NORCO) 5-325 MG per tablet Take 1 tablet by mouth every 4 hours as needed for other (pain control or improvement in physical function. Hold dose for analgesic side effects.), Disp-50 tablet, R-0, Local Print      senna-docusate (SENOKOT-S;PERICOLACE) 8.6-50 MG per tablet Take 1 tablet by mouth 2 times daily, Disp-100 tablet, R-0, E-Prescribe         CONTINUE these medications which have CHANGED    Details   aspirin 325 MG tablet Take 1 tablet (325 mg) by mouth 2 times daily (2 x 81mg = 162mg), Disp-38 tablet, R-1, E-PrescribeTake one adult strength aspirin two times daily until 6 weeks from surgery, and then resume preoperative dose of 162 mg daily         CONTINUE these medications which have NOT CHANGED    Details   Acetaminophen (TYLENOL PO) Take 1,000 mg by mouth 2 times daily as needed for mild pain or fever, Historical      amLODIPine (NORVASC) 2.5 MG tablet TAKE 1 TABLET BY MOUTH EVERY DAY, Disp-90 tablet, R-2, E-Prescribe      erythromycin (ROMYCIN) ophthalmic ointment Apply to skin incisions three times daily and into the eye at bedtime.Disp-3.5 g, [id number]      Ibuprofen (ADVIL PO) Take 400-600 mg by mouth 2 times daily as needed for moderate pain, Historical      metoprolol succinate (TOPROL-XL) 50 MG 24 hr tablet TAKE 1/2 TABLET BY MOUTH ONCE A DAY, Disp-45 tablet, R-2, E-Prescribe      triamterene-hydrochlorothiazide (MAXZIDE-25) 37.5-25 MG per tablet TAKE 1/2 TABLET BY  MOUTH ONCE A DAY, Disp-45 tablet, R-1, E-Prescribe           Allergies   Allergies   Allergen Reactions     Ace Inhibitors      Angioedema       Cyclobenzaprine      Angioedema     Data   Results for orders placed or performed during the hospital encounter of 07/24/18   XR Surgery JOSÉ MIGUEL L/T 5 Min Fluoro w Stills    Narrative    XR SURGERY JOSÉ MIGUEL FLUORO LESS THAN 5 MIN W STILLS 7/24/2018 3:19 PM     COMPARISON: None    HISTORY: right anterior hip arthroplasty.    NUMBER OF IMAGES ACQUIRED: 1    VIEWS: 1    FLUOROSCOPY TIME: .8      Impression    IMPRESSION: Intraoperative image during RIGHT total hip arthroplasty.    GAMALIEL WREN MD   XR Pelvis w Hip Port Right 1 View    Narrative    XR PELVIS AD HIP PORTABLE RIGHT 1 VIEW 7/24/2018 4:59 PM    COMPARISON: None.    HISTORY: Postop hip arthroplasty.      Impression    IMPRESSION: RIGHT total hip arthroplasty. Hardware appears intact. No  fractures are seen. Mild degenerative changes in the LEFT hip.    GAMALIEL WREN MD     Hemoglobin   Date Value Ref Range Status   07/26/2018 11.6 (L) 11.7 - 15.7 g/dL Final   07/25/2018 11.5 (L) 11.7 - 15.7 g/dL Final   ]  Last Basic Metabolic Panel:  Lab Results   Component Value Date     07/13/2018      Lab Results   Component Value Date    POTASSIUM 3.4 07/24/2018     Lab Results   Component Value Date    CHLORIDE 105 07/13/2018     Lab Results   Component Value Date    NOHEMY 9.3 07/13/2018     Lab Results   Component Value Date    CO2 27 07/13/2018     Lab Results   Component Value Date    BUN 30 07/13/2018     Lab Results   Component Value Date    CR 0.69 07/25/2018     Lab Results   Component Value Date     07/26/2018

## 2018-07-30 NOTE — TELEPHONE ENCOUNTER
"ED / Discharge Outreach Protocol    Patient Contact    Attempt # 1    Was call answered?  Yes.  \"May I please speak with Maryam\"  Is patient available?   Yes      ED for acute condition Discharge Protocol    \"Hi, my name is Nathaniel Leon, a registered nurse, and I am calling from [facility].  I am calling to follow up and see how things are going for you after your recent emergency visit.\"    Tell me how you are doing now that you are home?\" \"Well I feel I'm doing ok\" \"been working hard\"       Discharge Instructions    \"Let's review your discharge instructions.  What is/are the follow-up recommendations?  Pt. Response: \"nothing that I can remember\" \"I'm doing my exercises\" \"use a walker\"  Instructions to care for your wound at home: daily dressing changes.- OK to shower \"my daughter is changing it daily\"    \"Has an appointment with your primary care provider been scheduled?\"  No (needed - schedule appointment and remind to bring meds) Pt will discuss with daughter to arrange a time that works for both.    Medications    \"Tell me what changed about your medicines when you discharged?\"    \"I take two regular aspirin a day\" \"Havent been taking the pain medicine\" \"Bowel movements are ok, not constipated\"  Norco  Senna  Increased Aspirin    \"What questions do you have about your medications?\"   None  Call Summary    \"What questions or concerns do you have about your recent visit and your follow-up care?\"     none    \"If you have questions or things don't continue to improve, we encourage you contact us through the main clinic number (give number).  Even if the clinic is not open, triage nurses are available 24/7 to help you.     We would like you to know that our clinic has extended hours (provide information).  We also have urgent care (provide details on closest location and hours/contact info)\"    \"Thank you for your time and take care!\"      Adi DOLL RN    "

## 2018-08-01 ENCOUNTER — DOCUMENTATION ONLY (OUTPATIENT)
Dept: OTHER | Facility: CLINIC | Age: 83
End: 2018-08-01

## 2018-08-07 ENCOUNTER — TRANSFERRED RECORDS (OUTPATIENT)
Dept: HEALTH INFORMATION MANAGEMENT | Facility: CLINIC | Age: 83
End: 2018-08-07

## 2018-09-04 ENCOUNTER — TRANSFERRED RECORDS (OUTPATIENT)
Dept: HEALTH INFORMATION MANAGEMENT | Facility: CLINIC | Age: 83
End: 2018-09-04

## 2018-09-30 DIAGNOSIS — I10 BENIGN ESSENTIAL HYPERTENSION: ICD-10-CM

## 2018-10-01 RX ORDER — AMLODIPINE BESYLATE 2.5 MG/1
TABLET ORAL
Qty: 90 TABLET | Refills: 2 | Status: SHIPPED | OUTPATIENT
Start: 2018-10-01 | End: 2018-11-09

## 2018-10-01 NOTE — TELEPHONE ENCOUNTER
"Amlodipine 2.5 mg    Last Written Prescription Date:  01/03/18  Last Fill Quantity: 90 tablets,  # refills: 2   Last office visit: 7/13/2018 with prescribing provider:  Emerson   Future Office Visit:      Requested Prescriptions   Pending Prescriptions Disp Refills     amLODIPine (NORVASC) 2.5 MG tablet [Pharmacy Med Name: AMLODIPINE BESYLATE 2.5 MG TAB] 90 tablet 2     Sig: TAKE 1 TABLET BY MOUTH EVERY DAY    Calcium Channel Blockers Protocol  Passed    9/30/2018  1:48 AM       Passed - Blood pressure under 140/90 in past 12 months    BP Readings from Last 3 Encounters:   07/27/18 117/63   07/13/18 115/65   05/24/18 146/83                Passed - Recent (12 mo) or future (30 days) visit within the authorizing provider's specialty    Patient had office visit in the last 12 months or has a visit in the next 30 days with authorizing provider or within the authorizing provider's specialty.  See \"Patient Info\" tab in inbasket, or \"Choose Columns\" in Meds & Orders section of the refill encounter.           Passed - Patient is age 18 or older       Passed - No active pregnancy on record       Passed - Normal serum creatinine on file in past 12 months    Recent Labs   Lab Test  07/25/18   0659   CR  0.69            Passed - No positive pregnancy test in past 12 months          "

## 2018-11-06 DIAGNOSIS — I10 BENIGN ESSENTIAL HYPERTENSION: ICD-10-CM

## 2018-11-07 RX ORDER — TRIAMTERENE/HYDROCHLOROTHIAZID 37.5-25 MG
TABLET ORAL
Qty: 45 TABLET | Refills: 0 | Status: SHIPPED | OUTPATIENT
Start: 2018-11-07 | End: 2018-11-09

## 2018-11-07 NOTE — TELEPHONE ENCOUNTER
"triamterene-hydrochlorothiazide (MAXZIDE-25) 37.5-25 MG per tablet 45 tablet 1 5/8/2018  No   Sig: TAKE 1/2 TABLET BY MOUTH ONCE A DAY       Last Written Prescription Date:  05/08/2018  Last Fill Quantity: 45,  # refills: 1   Last office visit: 7/13/2018 with prescribing provider:     Future Office Visit:   Next 5 appointments (look out 90 days)     Nov 09, 2018 12:30 PM CST   [facility] with Truman Norman MD   [facility]    [address] [id number] [phone]                 Requested Prescriptions   Pending Prescriptions Disp Refills     triamterene-hydrochlorothiazide (MAXZIDE-25) 37.5-25 MG per tablet [Pharmacy Med Name: TRIAMTERENE-HCTZ 37.5-25 MG TB] 45 tablet 1     Sig: TAKE 1/2 TABLET BY MOUTH ONCE A DAY    Diuretics (Including Combos) Protocol Passed    11/6/2018  6:37 PM       Passed - Blood pressure under 140/90 in past 12 months    BP Readings from Last 3 Encounters:   07/27/18 117/63   07/13/18 115/65   05/24/18 146/83                Passed - Recent (12 mo) or future (30 days) visit within the authorizing provider's specialty    Patient had office visit in the last 12 months or has a visit in the next 30 days with authorizing provider or within the authorizing provider's specialty.  See \"Patient Info\" tab in inbasket, or \"Choose Columns\" in Meds & Orders section of the refill encounter.             Passed - Patient is age 18 or older       Passed - No active pregancy on record       Passed - Normal serum creatinine on file in past 12 months    Recent Labs   Lab Test  07/25/18   0659   CR  0.69             Passed - Normal serum potassium on file in past 12 months    Recent Labs   Lab Test  07/24/18   1318   POTASSIUM  3.4                   Passed - Normal serum sodium on file in past 12 months    Recent Labs   Lab Test  07/13/18   1620   NA  140             Passed - No positive pregnancy test in past 12 months          "

## 2018-11-09 ENCOUNTER — HOSPITAL ENCOUNTER (OUTPATIENT)
Dept: MAMMOGRAPHY | Facility: CLINIC | Age: 83
Discharge: HOME | End: 2018-11-09
Attending: INTERNAL MEDICINE | Admitting: INTERNAL MEDICINE
Payer: MEDICARE

## 2018-11-09 ENCOUNTER — OFFICE VISIT (OUTPATIENT)
Dept: FAMILY MEDICINE | Facility: CLINIC | Age: 83
End: 2018-11-09
Payer: MEDICARE

## 2018-11-09 VITALS
DIASTOLIC BLOOD PRESSURE: 71 MMHG | SYSTOLIC BLOOD PRESSURE: 133 MMHG | TEMPERATURE: 96.8 F | BODY MASS INDEX: 20.01 KG/M2 | WEIGHT: 106 LBS | OXYGEN SATURATION: 99 % | HEART RATE: 52 BPM | HEIGHT: 61 IN

## 2018-11-09 DIAGNOSIS — Z12.31 VISIT FOR SCREENING MAMMOGRAM: ICD-10-CM

## 2018-11-09 DIAGNOSIS — I10 BENIGN ESSENTIAL HYPERTENSION: ICD-10-CM

## 2018-11-09 DIAGNOSIS — Z00.00 ROUTINE GENERAL MEDICAL EXAMINATION AT A HEALTH CARE FACILITY: Primary | ICD-10-CM

## 2018-11-09 DIAGNOSIS — R41.3 MEMORY LOSS: ICD-10-CM

## 2018-11-09 PROBLEM — M16.9 DEGENERATIVE LOCALIZED ARTHRITIS OF HIP: Status: RESOLVED | Noted: 2018-07-24 | Resolved: 2018-11-09

## 2018-11-09 PROBLEM — R26.81 UNSTEADY GAIT: Status: RESOLVED | Noted: 2017-11-25 | Resolved: 2018-11-09

## 2018-11-09 PROCEDURE — G0439 PPPS, SUBSEQ VISIT: HCPCS | Performed by: INTERNAL MEDICINE

## 2018-11-09 PROCEDURE — 77063 BREAST TOMOSYNTHESIS BI: CPT

## 2018-11-09 RX ORDER — AMLODIPINE BESYLATE 2.5 MG/1
2.5 TABLET ORAL DAILY
Qty: 90 TABLET | Refills: 3 | Status: SHIPPED | OUTPATIENT
Start: 2018-11-09 | End: 2019-11-21

## 2018-11-09 RX ORDER — TRIAMTERENE/HYDROCHLOROTHIAZID 37.5-25 MG
TABLET ORAL
Qty: 45 TABLET | Refills: 3 | Status: SHIPPED | OUTPATIENT
Start: 2018-11-09 | End: 2019-11-21

## 2018-11-09 RX ORDER — METOPROLOL SUCCINATE 50 MG/1
TABLET, EXTENDED RELEASE ORAL
Qty: 45 TABLET | Refills: 3 | Status: SHIPPED | OUTPATIENT
Start: 2018-11-09 | End: 2019-11-21

## 2018-11-09 NOTE — PROGRESS NOTES
"    SUBJECTIVE:   Maryam Saavedra is a 86 year old female who presents for Preventive Visit.    Are you in the first 12 months of your Medicare Part B coverage?  No    Physical Health:    In general, how would you rate your overall physical health? good    Outside of work, how many days during the week do you exercise?Moves around house Walking, stairs    Outside of work, approximately how many minutes a day do you exercise?less than 15 minutes    If you drink alcohol do you typically have >3 drinks per day or >7 drinks per week? No    Do you usually eat at least 4 servings of fruit and vegetables a day, include whole grains & fiber and avoid regularly eating high fat or \"junk\" foods? Yes    Do you have any problems taking medications regularly?  No    Do you have any side effects from medications? none    Needs assistance for the following daily activities: no assistance needed    Which of the following safety concerns are present in your home?:  none identified     Hearing impairment: No    In the past 6 months, have you been bothered by leaking of urine? yes    Mental Health:    In general, how would you rate your overall mental or emotional health? excellent  PHQ-2 Score:      Fall risk:      Fallen 2 or more times in the past year?: No  Any fall with injury in the past year?: No      COGNITIVE SCREEN  1) Repeat 3 items (Leader, Season, Table)    2) Clock draw: abnormal  3) 3 item recall: Recalls 1 object   Results: ABNORMAL clock, 1-2 items recalled: PROBABLE COGNITIVE IMPAIRMENT, **INFORM PROVIDER**    Mini-CogTM Copyright ANDREW Todd. Licensed by the author for use in [facility]; reprinted with permission ([email or web address]). All rights reserved.          Reviewed and updated as needed this visit by clinical staff  Tobacco  Allergies  Meds  Soc Hx        Reviewed and updated as needed this visit by Provider        Social History   Substance Use Topics     Smoking status: Former Smoker     Start date: " 7/24/1998     Smokeless tobacco: Never Used     Alcohol use 0.0 oz/week     0 Standard drinks or equivalent per week      Comment: 1-2 drinks per month                             Do you feel safe in your environment - Yes    Do you have a Health Care Directive?: Yes: Advance Directive has been received and scanned.    Current providers sharing in care for this patient include:   Patient Care Team:  Truman Norman MD as PCP - General (Internal Medicine)    The following health maintenance items are reviewed in Epic and correct as of today:  Health Maintenance   Topic Date Due     FALL RISK ASSESSMENT  09/28/2018     PHQ-2 Q1 YR  11/09/2019     TETANUS IMMUNIZATION (SYSTEM ASSIGNED)  06/26/2023     ADVANCE DIRECTIVE PLANNING Q5 YRS  08/01/2023     PNEUMOCOCCAL  Completed     INFLUENZA VACCINE  Completed     Patient Active Problem List   Diagnosis     Benign essential hypertension     Impaired fasting glucose     Acute right-sided low back pain without sciatica     Unsteady gait     Degenerative localized arthritis of hip     Past Surgical History:   Procedure Laterality Date     ARTHROPLASTY HIP ANTERIOR Right 7/24/2018    Procedure: ARTHROPLASTY HIP ANTERIOR;  RIGHT DIRECT ANTERIOR TOTAL HIP ARTHROPLASTY;  Surgeon: Jimbo Phillips MD;  Location:  OR     EYE SURGERY  cataracts     MOHS MICROGRAPHIC PROCEDURE      Left lower eyelid      MOHS MICROGRAPHIC PROCEDURE Left 10/27/2016    Procedure: MOHS MICROGRAPHIC PROCEDURE;  Surgeon: Jose Torrez MD;  Location:  [facility]     MOHS MICROGRAPHIC PROCEDURE Right 5/24/2018    Procedure: MOHS MICROGRAPHIC PROCEDURE;  RIGHT MEDIAL CANTHUS MOHS CLOSURE;  Surgeon: Jose Torrez MD;  Location: [facility]     ORTHOPEDIC SURGERY      bilateral wrists       Social History   Substance Use Topics     Smoking status: Former Smoker     Start date: 7/24/1998     Smokeless tobacco: Never Used     Alcohol use 0.0 oz/week     0 Standard drinks or equivalent per week      Comment:  "1-2 drinks per month     Family History   Problem Relation Age of Onset     Diabetes Mother      Diabetes Father          Current Outpatient Prescriptions   Medication Sig Dispense Refill     Acetaminophen (TYLENOL PO) Take 1,000 mg by mouth 2 times daily as needed for mild pain or fever       amLODIPine (NORVASC) 2.5 MG tablet TAKE 1 TABLET BY MOUTH EVERY DAY 90 tablet 2     metoprolol succinate (TOPROL-XL) 50 MG 24 hr tablet TAKE 1/2 TABLET BY MOUTH ONCE A DAY 45 tablet 2     triamterene-hydrochlorothiazide (MAXZIDE-25) 37.5-25 MG per tablet TAKE 1/2 TABLET BY MOUTH ONCE A DAY 45 tablet 0     Allergies   Allergen Reactions     Ace Inhibitors      Angioedema       Cyclobenzaprine      Angioedema           ROS:  Constitutional, HEENT, cardiovascular, pulmonary, gi and gu systems are negative, except as otherwise noted.    OBJECTIVE:   /71 (BP Location: Right arm, Cuff Size: Adult Regular)  Pulse 52  Temp 96.8  F (36  C) (Oral)  Ht 5' 1\" (1.549 m)  Wt 106 lb (48.1 kg)  SpO2 99%  BMI 20.03 kg/m2 Estimated body mass index is 20.03 kg/(m^2) as calculated from the following:    Height as of this encounter: 5' 1\" (1.549 m).    Weight as of this encounter: 106 lb (48.1 kg).  EXAM:   GENERAL APPEARANCE: healthy, alert and no distress  EYES: Eyes grossly normal to inspection, PERRL and conjunctivae and sclerae normal  HENT: ear canals and TM's normal, nose and mouth without ulcers or lesions, oropharynx clear and oral mucous membranes moist  NECK: no adenopathy, no asymmetry, masses, or scars and thyroid normal to palpation  RESP: lungs clear to auscultation - no rales, rhonchi or wheezes  CV: regular rate and rhythm, normal S1 S2, no S3 or S4, no murmur, click or rub, no peripheral edema and peripheral pulses strong  ABDOMEN: soft, nontender, no hepatosplenomegaly, no masses and bowel sounds normal  MS: no musculoskeletal defects are noted and gait is age appropriate without ataxia  SKIN: no suspicious lesions " "or rashes  NEURO: There are obvious memory problems, Cranial nerves 2-12 appear grossly intact, symmetric strength, she fails the \"get up and go\" test.  PSYCH: mentation appears normal and affect normal/bright    Diagnostic Test Results:  none     ASSESSMENT / PLAN:   1. Routine general medical examination at a health care facility      2. Memory loss  I think she has memory loss  I told her that she should schedule a consult with Dr. Valentin to assess the severity and get information about treatment and prognosis to help her and her family plan for the appropriate support she may need in the future.  She adamantly declines that recommendation today.    I recommended that she use a cane at all times to prevent a fall       3. Benign essential hypertension  Her blood pressure is under good control.  Labs looked OK this summer.        End of Life Planning:  Patient currently has an advanced directive: Yes.  Practitioner is supportive of decision.    COUNSELING:  Reviewed preventive health counseling, as reflected in patient instructions  Special attention given to:       Regular exercise       Healthy diet/nutrition       Immunizations    Shingrix recommended           BP Readings from Last 1 Encounters:   11/09/18 133/71     Estimated body mass index is 20.03 kg/(m^2) as calculated from the following:    Height as of this encounter: 5' 1\" (1.549 m).    Weight as of this encounter: 106 lb (48.1 kg).           reports that she has quit smoking. She started smoking about 20 years ago. She has never used smokeless tobacco.      Appropriate preventive services were discussed with this patient, including applicable screening as appropriate for cardiovascular disease, diabetes, osteopenia/osteoporosis, and glaucoma.  As appropriate for age/gender, discussed screening for colorectal cancer, prostate cancer, breast cancer, and cervical cancer. Checklist reviewing preventive services available has been given to the " patient.    Reviewed patients plan of care and provided an AVS. The Basic Care Plan (routine screening as documented in Health Maintenance) for Maryam meets the Care Plan requirement. This Care Plan has been established and reviewed with the Patient and daughter.    Counseling Resources:  ATP IV Guidelines  Pooled Cohorts Equation Calculator  Breast Cancer Risk Calculator  FRAX Risk Assessment  ICSI Preventive Guidelines  Dietary Guidelines for Americans, 2010  Twiigg's MyPlate  ASA Prophylaxis  Lung CA Screening    Truman Norman MD  [facility]

## 2018-11-09 NOTE — PATIENT INSTRUCTIONS
"You should get the new shingles vaccine \"SHINGRIX\" (not Zostavax) at your pharmacy.            Preventive Health Recommendations    See your health care provider every year to    Review health changes.     Discuss preventive care.      Review your medicines if your doctor has prescribed any.      You no longer need a yearly Pap test unless you've had an abnormal Pap test in the past 10 years. If you have vaginal symptoms, such as bleeding or discharge, be sure to talk with your provider about a Pap test.      Every 1 to 2 years, have a mammogram.  If you are over 69, talk with your health care provider about whether or not you want to continue having screening mammograms.      Every 10 years, have a colonoscopy. Or, have a yearly FIT test (stool test). These exams will check for colon cancer.       Have a cholesterol test every 5 years, or more often if your doctor advises it.       Have a diabetes test (fasting glucose) every three years. If you are at risk for diabetes, you should have this test more often.       At age 65, have a bone density scan (DEXA) to check for osteoporosis (brittle bone disease).    Shots:    Get a flu shot each year.    Get a tetanus shot every 10 years.    Talk to your doctor about your pneumonia vaccines. There are now two you should receive - Pneumovax (PPSV 23) and Prevnar (PCV 13).    Talk to your pharmacist about the shingles vaccine.    Talk to your doctor about the hepatitis B vaccine.    Nutrition:     Eat at least 5 servings of fruits and vegetables each day.      Eat whole-grain bread, whole-wheat pasta and brown rice instead of white grains and rice.      Get adequate Calcium and Vitamin D.     Lifestyle    Exercise at least 150 minutes a week (30 minutes a day, 5 days a week). This will help you control your weight and prevent disease.      Limit alcohol to one drink per day.      No smoking.       Wear sunscreen to prevent skin cancer.       See your dentist twice a year for " an exam and cleaning.      See your eye doctor every 1 to 2 years to screen for conditions such as glaucoma, macular degeneration and cataracts.    Personalized Prevention Plan  You are due for the preventive services outlined below.  Your care team is available to assist you in scheduling these services.  If you have already completed any of these items, please share that information with your care team to update in your medical record.  Health Maintenance Due   Topic Date Due     Flu Vaccine (1) 09/01/2018     FALL RISK ASSESSMENT  09/28/2018

## 2018-11-09 NOTE — MR AVS SNAPSHOT
"              After Visit Summary   11/9/2018    Maryam Saavedra    MRN: [id number]           Patient Information     Date Of Birth          6/9/1932        Visit Information        Provider Department      11/9/2018 12:30 PM Truman Norman MD [facility]        Today's Diagnoses     Routine general medical examination at a health care facility    -  1    Memory loss        Benign essential hypertension          Care Instructions    You should get the new shingles vaccine \"SHINGRIX\" (not Zostavax) at your pharmacy.            Preventive Health Recommendations    See your health care provider every year to    Review health changes.     Discuss preventive care.      Review your medicines if your doctor has prescribed any.      You no longer need a yearly Pap test unless you've had an abnormal Pap test in the past 10 years. If you have vaginal symptoms, such as bleeding or discharge, be sure to talk with your provider about a Pap test.      Every 1 to 2 years, have a mammogram.  If you are over 69, talk with your health care provider about whether or not you want to continue having screening mammograms.      Every 10 years, have a colonoscopy. Or, have a yearly FIT test (stool test). These exams will check for colon cancer.       Have a cholesterol test every 5 years, or more often if your doctor advises it.       Have a diabetes test (fasting glucose) every three years. If you are at risk for diabetes, you should have this test more often.       At age 65, have a bone density scan (DEXA) to check for osteoporosis (brittle bone disease).    Shots:    Get a flu shot each year.    Get a tetanus shot every 10 years.    Talk to your doctor about your pneumonia vaccines. There are now two you should receive - Pneumovax (PPSV 23) and Prevnar (PCV 13).    Talk to your pharmacist about the shingles vaccine.    Talk to your doctor about the hepatitis B vaccine.    Nutrition:     Eat at least 5 servings of fruits " and vegetables each day.      Eat whole-grain bread, whole-wheat pasta and brown rice instead of white grains and rice.      Get adequate Calcium and Vitamin D.     Lifestyle    Exercise at least 150 minutes a week (30 minutes a day, 5 days a week). This will help you control your weight and prevent disease.      Limit alcohol to one drink per day.      No smoking.       Wear sunscreen to prevent skin cancer.       See your dentist twice a year for an exam and cleaning.      See your eye doctor every 1 to 2 years to screen for conditions such as glaucoma, macular degeneration and cataracts.    Personalized Prevention Plan  You are due for the preventive services outlined below.  Your care team is available to assist you in scheduling these services.  If you have already completed any of these items, please share that information with your care team to update in your medical record.  Health Maintenance Due   Topic Date Due     Flu Vaccine (1) 09/01/2018     FALL RISK ASSESSMENT  09/28/2018             Follow-ups after your visit        Additional Services     MEMORY CLINIC REFERRAL       [place] [facility] [phone]    Please answer the following questions to ensure a successful referral:    Has the patient been diagnosed with any positive neuro-cognitive impairment? Yes, if so what failed mincog    Primary objective or goal of consultation (please provide some detail):  New symptoms     What is the name of the person that should be contacted to schedule the appt?  Daughter     What is the relationship to the patient?     What is the best number to reach them at?  Saray Freedman     The patient/family will be contacted within 1-2 business days to schedule your appointment. If you haven't heard from us within that timeframe, please call the number above.     Please be aware that coverage of these services is subject to the terms and limitations of your health  insurance plan.  Call member services at your health plan with any benefit or coverage questions.      Please bring the following to your appointment:  >>   Any x-rays, CTs or MRIs which have been performed.  Contact the facility where they were done to arrange for  prior to your scheduled appointment.  Any new CT, MRI or other procedures ordered by your specialist must be performed at a [facility] facility or coordinated by your clinic's referral office.    >>   List of current medications   >>   This referral request   >>   Any documents/labs given to you for this referral                  Follow-up notes from your care team     Return in about 1 year (around 11/9/2019) for Preventive Visit.      Who to contact     If you have questions or need follow up information about today's clinic visit or your schedule please contact [facility] directly at [phone].  Normal or non-critical lab and imaging results will be communicated to you by Hyperpothart, letter or phone within 4 business days after the clinic has received the results. If you do not hear from us within 7 days, please contact the clinic through Hyperpothart or phone. If you have a critical or abnormal lab result, we will notify you by phone as soon as possible.  Submit refill requests through Streamezzo or call your pharmacy and they will forward the refill request to us. Please allow 3 business days for your refill to be completed.          Additional Information About Your Visit        Streamezzo Information     Streamezzo gives you secure access to your electronic health record. If you see a primary care provider, you can also send messages to your care team and make appointments. If you have questions, please call your primary care clinic.  If you do not have a primary care provider, please call [phone] and they will assist you.        Care EveryWhere ID     This is your Care EveryWhere ID. This could be used by other organizations to access  "your [facility] medical records  [id number]        Your Vitals Were     Pulse Temperature Height Pulse Oximetry BMI (Body Mass Index)       52 96.8  F (36  C) (Oral) 5' 1\" (1.549 m) 99% 20.03 kg/m2        Blood Pressure from Last 3 Encounters:   11/09/18 133/71   07/27/18 117/63   07/13/18 115/65    Weight from Last 3 Encounters:   11/09/18 106 lb (48.1 kg)   07/24/18 103 lb (46.7 kg)   07/13/18 104 lb 14.4 oz (47.6 kg)              We Performed the Following     MEMORY CLINIC REFERRAL          Today's Medication Changes          These changes are accurate as of 11/9/18  1:16 PM.  If you have any questions, ask your nurse or doctor.               Stop taking these medicines if you haven't already. Please contact your care team if you have questions.     ADVIL PO   Stopped by:  Truman Norman MD                    Primary Care Provider Office Phone # Fax #    Truman Norman [phone] [address] [address]        Equal Access to Services     [facility]: Hadii aad ku hadasho Soomaali, waaxda luqadaha, qaybta kaalmada aderichardyada, ben paz . So [facility] [phone].    ATENCIÓN: Si habla español, tiene a bond disposición servicios gratuitos de asistencia lingüística. [facility] [phone].    We comply with applicable federal civil rights laws and [place] laws. We do not discriminate on the basis of race, color, national origin, age, disability, sex, sexual orientation, or gender identity.            Thank you!     Thank you for choosing [facility]  for your care. Our goal is always to provide you with excellent care. Hearing back from our patients is one way we can continue to improve our services. Please take a few minutes to complete the written survey that you may receive in the mail after your visit with us. Thank you!             Your Updated Medication List - Protect others around you: Learn how to safely use, store and " throw away your medicines at www.disposemymeds.org.          This list is accurate as of 11/9/18  1:16 PM.  Always use your most recent med list.                   Brand Name Dispense Instructions for use Diagnosis    amLODIPine 2.5 MG tablet    NORVASC    90 tablet    TAKE 1 TABLET BY MOUTH EVERY DAY    Benign essential hypertension       metoprolol succinate 50 MG 24 hr tablet    TOPROL-XL    45 tablet    TAKE 1/2 TABLET BY MOUTH ONCE A DAY    Benign essential hypertension       triamterene-hydrochlorothiazide 37.5-25 MG per tablet    MAXZIDE-25    45 tablet    TAKE 1/2 TABLET BY MOUTH ONCE A DAY    Benign essential hypertension       TYLENOL PO      Take 1,000 mg by mouth 2 times daily as needed for mild pain or fever

## 2019-05-21 ENCOUNTER — ANCILLARY PROCEDURE (OUTPATIENT)
Dept: GENERAL RADIOLOGY | Facility: CLINIC | Age: 84
End: 2019-05-21
Attending: NURSE PRACTITIONER
Payer: MEDICARE

## 2019-05-21 ENCOUNTER — OFFICE VISIT (OUTPATIENT)
Dept: FAMILY MEDICINE | Facility: CLINIC | Age: 84
End: 2019-05-21
Payer: MEDICARE

## 2019-05-21 VITALS
BODY MASS INDEX: 21.77 KG/M2 | DIASTOLIC BLOOD PRESSURE: 91 MMHG | HEART RATE: 60 BPM | OXYGEN SATURATION: 100 % | SYSTOLIC BLOOD PRESSURE: 135 MMHG | WEIGHT: 115.3 LBS | HEIGHT: 61 IN | TEMPERATURE: 97.6 F

## 2019-05-21 DIAGNOSIS — I89.0 LYMPHEDEMA OF BOTH LOWER EXTREMITIES: Primary | ICD-10-CM

## 2019-05-21 DIAGNOSIS — I89.0 LYMPHEDEMA OF BOTH LOWER EXTREMITIES: ICD-10-CM

## 2019-05-21 LAB
ALBUMIN UR-MCNC: NEGATIVE MG/DL
APPEARANCE UR: CLEAR
BASOPHILS # BLD AUTO: 0 10E9/L (ref 0–0.2)
BASOPHILS NFR BLD AUTO: 0.4 %
BILIRUB UR QL STRIP: NEGATIVE
COLOR UR AUTO: YELLOW
DIFFERENTIAL METHOD BLD: NORMAL
EOSINOPHIL # BLD AUTO: 0.1 10E9/L (ref 0–0.7)
EOSINOPHIL NFR BLD AUTO: 1.9 %
ERYTHROCYTE [DISTWIDTH] IN BLOOD BY AUTOMATED COUNT: 14.1 % (ref 10–15)
GLUCOSE UR STRIP-MCNC: NEGATIVE MG/DL
HCT VFR BLD AUTO: 39.8 % (ref 35–47)
HGB BLD-MCNC: 13.2 G/DL (ref 11.7–15.7)
HGB UR QL STRIP: NEGATIVE
KETONES UR STRIP-MCNC: NEGATIVE MG/DL
LEUKOCYTE ESTERASE UR QL STRIP: ABNORMAL
LYMPHOCYTES # BLD AUTO: 1.3 10E9/L (ref 0.8–5.3)
LYMPHOCYTES NFR BLD AUTO: 25.3 %
MCH RBC QN AUTO: 30.9 PG (ref 26.5–33)
MCHC RBC AUTO-ENTMCNC: 33.2 G/DL (ref 31.5–36.5)
MCV RBC AUTO: 93 FL (ref 78–100)
MONOCYTES # BLD AUTO: 0.5 10E9/L (ref 0–1.3)
MONOCYTES NFR BLD AUTO: 9.9 %
NEUTROPHILS # BLD AUTO: 3.2 10E9/L (ref 1.6–8.3)
NEUTROPHILS NFR BLD AUTO: 62.5 %
NITRATE UR QL: NEGATIVE
PH UR STRIP: 6 PH (ref 5–7)
PLATELET # BLD AUTO: 227 10E9/L (ref 150–450)
RBC # BLD AUTO: 4.27 10E12/L (ref 3.8–5.2)
RBC #/AREA URNS AUTO: NORMAL /HPF
SOURCE: ABNORMAL
SP GR UR STRIP: 1.01 (ref 1–1.03)
UROBILINOGEN UR STRIP-ACNC: 0.2 EU/DL (ref 0.2–1)
WBC # BLD AUTO: 5.1 10E9/L (ref 4–11)
WBC #/AREA URNS AUTO: NORMAL /HPF

## 2019-05-21 PROCEDURE — 85025 COMPLETE CBC W/AUTO DIFF WBC: CPT | Performed by: NURSE PRACTITIONER

## 2019-05-21 PROCEDURE — 71046 X-RAY EXAM CHEST 2 VIEWS: CPT

## 2019-05-21 PROCEDURE — 80053 COMPREHEN METABOLIC PANEL: CPT | Performed by: NURSE PRACTITIONER

## 2019-05-21 PROCEDURE — 36415 COLL VENOUS BLD VENIPUNCTURE: CPT | Performed by: NURSE PRACTITIONER

## 2019-05-21 PROCEDURE — 99214 OFFICE O/P EST MOD 30 MIN: CPT | Performed by: NURSE PRACTITIONER

## 2019-05-21 PROCEDURE — 81001 URINALYSIS AUTO W/SCOPE: CPT | Performed by: NURSE PRACTITIONER

## 2019-05-21 ASSESSMENT — MIFFLIN-ST. JEOR: SCORE: 900.38

## 2019-05-21 NOTE — LETTER
[facility]  [address] [facility]  [address]  [place]  [address]  Tel: [phone]    May 28, 2019    Maryam Belen Quentin  [address] [facility] [address]        Dear Ms. Saavedra,    Your kidney function and liver function are really good, Maryam.  And you do not appear to have an infection.    As you know the ultrasound looking for deep vein clots is also negative.  So I do think that having your legs wrapped will give us really good results with the edema  If you have any further questions or problems, please contact our office.      Sincerely,    Shaylee Levine, NANCY/SML          Enclosure: Lab Results  Results for orders placed or performed in visit on 05/21/19   Comprehensive metabolic panel (BMP + Alb, Alk Phos, ALT, AST, Total. Bili, TP)   Result Value Ref Range    Sodium 140 133 - 144 mmol/L    Potassium 4.1 3.4 - 5.3 mmol/L    Chloride 103 94 - 109 mmol/L    Carbon Dioxide 28 20 - 32 mmol/L    Anion Gap 9 3 - 14 mmol/L    Glucose 73 70 - 99 mg/dL    Urea Nitrogen 37 (H) 7 - 30 mg/dL    Creatinine 0.77 0.52 - 1.04 mg/dL    GFR Estimate 69 >60 mL/min/[1.73_m2]    GFR Estimate If Black 80 >60 mL/min/[1.73_m2]    Calcium 10.1 8.5 - 10.1 mg/dL    Bilirubin Total 0.3 0.2 - 1.3 mg/dL    Albumin 3.7 3.4 - 5.0 g/dL    Protein Total 7.6 6.8 - 8.8 g/dL    Alkaline Phosphatase 136 40 - 150 U/L    ALT 39 0 - 50 U/L    AST 35 0 - 45 U/L   CBC with platelets differential   Result Value Ref Range    WBC 5.1 4.0 - 11.0 10e9/L    RBC Count 4.27 3.8 - 5.2 10e12/L    Hemoglobin 13.2 11.7 - 15.7 g/dL    Hematocrit 39.8 35.0 - 47.0 %    MCV 93 78 - 100 fl    MCH 30.9 26.5 - 33.0 pg    MCHC 33.2 31.5 - 36.5 g/dL    RDW 14.1 10.0 - 15.0 %    Platelet Count 227 150 - 450 10e9/L    % Neutrophils 62.5 %    % Lymphocytes 25.3 %    % Monocytes 9.9 %    % Eosinophils 1.9 %    % Basophils 0.4 %    Absolute Neutrophil 3.2 1.6 - 8.3 10e9/L    Absolute Lymphocytes 1.3 0.8 - 5.3 10e9/L    Absolute Monocytes 0.5 0.0 - 1.3  10e9/L    Absolute Eosinophils 0.1 0.0 - 0.7 10e9/L    Absolute Basophils 0.0 0.0 - 0.2 10e9/L    Diff Method Automated Method    *UA reflex to Microscopic and Culture ([place] and [facility] (except [place] and [place])   Result Value Ref Range    Color Urine Yellow     Appearance Urine Clear     Glucose Urine Negative NEG^Negative mg/dL    Bilirubin Urine Negative NEG^Negative    Ketones Urine Negative NEG^Negative mg/dL    Specific Gravity Urine 1.015 1.003 - 1.035    Blood Urine Negative NEG^Negative    pH Urine 6.0 5.0 - 7.0 pH    Protein Albumin Urine Negative NEG^Negative mg/dL    Urobilinogen Urine 0.2 0.2 - 1.0 EU/dL    Nitrite Urine Negative NEG^Negative    Leukocyte Esterase Urine Small (A) NEG^Negative    Source Midstream Urine    Urine Microscopic   Result Value Ref Range    WBC Urine 0 - 5 OTO5^0 - 5 /HPF    RBC Urine O - 2 OTO2^O - 2 /HPF

## 2019-05-21 NOTE — PROGRESS NOTES
Subjective     Maryam Saavedra is a 86 year old female who presents to clinic today for the following health issues:    HPI   Leg swelling ongoing for months      Duration: undetermined length of time due to cognitive difficulties.  Daughter present and says mother has been complaining of leg swelling for a number of months.  Denies SOB. No chest pain, no abd pain and normal bowel movements,      Only abd surgery tubal ligation      Description  Location: both legs more on the left leg     Intensity:  Moderate swelling     Accompanying signs and symptoms: swelling    History 40 pack year smoker  Previous similar problem: NO  Previous evaluation:  none    Precipitating or alleviating factors:  Trauma or overuse: no   Aggravating factors include: none    Therapies tried and outcome: rest/inactivity      Patient Active Problem List   Diagnosis     Benign essential hypertension     Past Surgical History:   Procedure Laterality Date     ARTHROPLASTY HIP ANTERIOR Right 7/24/2018    Procedure: ARTHROPLASTY HIP ANTERIOR;  RIGHT DIRECT ANTERIOR TOTAL HIP ARTHROPLASTY;  Surgeon: Jimbo Phillips MD;  Location:  OR     EYE SURGERY  cataracts     MOHS MICROGRAPHIC PROCEDURE      Left lower eyelid      MOHS MICROGRAPHIC PROCEDURE Left 10/27/2016    Procedure: MOHS MICROGRAPHIC PROCEDURE;  Surgeon: Jose Torrez MD;  Location: [facility]     MOHS MICROGRAPHIC PROCEDURE Right 5/24/2018    Procedure: MOHS MICROGRAPHIC PROCEDURE;  RIGHT MEDIAL CANTHUS MOHS CLOSURE;  Surgeon: Jose Torrez MD;  Location: [facility]     ORTHOPEDIC SURGERY      bilateral wrists       Social History     Tobacco Use     Smoking status: Former Smoker     Start date: 7/24/1998     Smokeless tobacco: Never Used   Substance Use Topics     Alcohol use: Yes     Alcohol/week: 0.0 oz     Comment: 1-2 drinks per month     Family History   Problem Relation Age of Onset     Diabetes Mother      Diabetes Father          Current Outpatient Medications  "  Medication Sig Dispense Refill     Acetaminophen (TYLENOL PO) Take 1,000 mg by mouth 2 times daily as needed for mild pain or fever       amLODIPine (NORVASC) 2.5 MG tablet Take 1 tablet (2.5 mg) by mouth daily 90 tablet 3     metoprolol succinate (TOPROL-XL) 50 MG 24 hr tablet TAKE 1/2 TABLET BY MOUTH ONCE A DAY 45 tablet 3     triamterene-hydrochlorothiazide (MAXZIDE-25) 37.5-25 MG per tablet TAKE 1/2 TABLET BY MOUTH ONCE A DAY 45 tablet 3     Allergies   Allergen Reactions     Ace Inhibitors      Angioedema       Cyclobenzaprine      Angioedema       Reviewed and updated as needed this visit by Provider         Review of Systems - poor historian  ROS COMP: Constitutional, HEENT, cardiovascular, pulmonary, GI, , musculoskeletal, neuro, skin, endocrine and psych systems are negative, except as otherwise noted.      Objective    BP (!) 135/91 (BP Location: Right arm, Patient Position: Sitting, Cuff Size: Adult Regular)   Pulse 60   Temp 97.6  F (36.4  C) (Oral)   Ht 1.549 m (5' 1\")   Wt 52.3 kg (115 lb 4.8 oz)   SpO2 100%   BMI 21.79 kg/m      Physical Exam   GENERAL: frail, alert and no distress  EYES: Eyes grossly normal to inspection, PERRL and conjunctivae and sclerae normal  HENT: ear canals and TM's normal, nose and mouth without ulcers or lesions  NECK: no adenopathy, no asymmetry, masses, or scars and thyroid normal to palpation  RESP: lungs clear to auscultation - no rales, rhonchi or wheezes  CV: regular rate and rhythm, normal S1 S2, no S3 or S4, no murmur, click or rub, bilateral woody peripheral edema Lt >Rt with venous stasis rash and several healing skin abrasions,   ABDOMEN: soft, nontender, no hepatosplenomegaly, no masses and bowel sounds normal, poss  MS: no gross musculoskeletal defects noted, no edema  NEURO: Normal strength and tone, mentation dementia and speech normal  PSYCH: affect normal/  Diagnostic Test Results:  Results for orders placed or performed in visit on 05/21/19 "   Comprehensive metabolic panel (BMP + Alb, Alk Phos, ALT, AST, Total. Bili, TP)   Result Value Ref Range    Sodium 140 133 - 144 mmol/L    Potassium 4.1 3.4 - 5.3 mmol/L    Chloride 103 94 - 109 mmol/L    Carbon Dioxide 28 20 - 32 mmol/L    Anion Gap 9 3 - 14 mmol/L    Glucose 73 70 - 99 mg/dL    Urea Nitrogen 37 (H) 7 - 30 mg/dL    Creatinine 0.77 0.52 - 1.04 mg/dL    GFR Estimate 69 >60 mL/min/[1.73_m2]    GFR Estimate If Black 80 >60 mL/min/[1.73_m2]    Calcium 10.1 8.5 - 10.1 mg/dL    Bilirubin Total 0.3 0.2 - 1.3 mg/dL    Albumin 3.7 3.4 - 5.0 g/dL    Protein Total 7.6 6.8 - 8.8 g/dL    Alkaline Phosphatase 136 40 - 150 U/L    ALT 39 0 - 50 U/L    AST 35 0 - 45 U/L   CBC with platelets differential   Result Value Ref Range    WBC 5.1 4.0 - 11.0 10e9/L    RBC Count 4.27 3.8 - 5.2 10e12/L    Hemoglobin 13.2 11.7 - 15.7 g/dL    Hematocrit 39.8 35.0 - 47.0 %    MCV 93 78 - 100 fl    MCH 30.9 26.5 - 33.0 pg    MCHC 33.2 31.5 - 36.5 g/dL    RDW 14.1 10.0 - 15.0 %    Platelet Count 227 150 - 450 10e9/L    % Neutrophils 62.5 %    % Lymphocytes 25.3 %    % Monocytes 9.9 %    % Eosinophils 1.9 %    % Basophils 0.4 %    Absolute Neutrophil 3.2 1.6 - 8.3 10e9/L    Absolute Lymphocytes 1.3 0.8 - 5.3 10e9/L    Absolute Monocytes 0.5 0.0 - 1.3 10e9/L    Absolute Eosinophils 0.1 0.0 - 0.7 10e9/L    Absolute Basophils 0.0 0.0 - 0.2 10e9/L    Diff Method Automated Method    *UA reflex to Microscopic and Culture ([facility] (except [place] and [place])   Result Value Ref Range    Color Urine Yellow     Appearance Urine Clear     Glucose Urine Negative NEG^Negative mg/dL    Bilirubin Urine Negative NEG^Negative    Ketones Urine Negative NEG^Negative mg/dL    Specific Gravity Urine 1.015 1.003 - 1.035    Blood Urine Negative NEG^Negative    pH Urine 6.0 5.0 - 7.0 pH    Protein Albumin Urine Negative NEG^Negative mg/dL    Urobilinogen Urine 0.2 0.2 - 1.0 EU/dL    Nitrite Urine Negative NEG^Negative    Leukocyte  Esterase Urine Small (A) NEG^Negative    Source Midstream Urine    Urine Microscopic   Result Value Ref Range    WBC Urine 0 - 5 OTO5^0 - 5 /HPF    RBC Urine O - 2 OTO2^O - 2 /HPF   Ultrasound negative for DVT  Chest xray:        IMPRESSION: Prominent thoracolumbar scoliosis. 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 Chest otherwise negative.    Assessment & Plan       ICD-10-CM    1. Lymphedema of both lower extremities I89.0 PHYSICAL THERAPY REFERRAL     XR Chest 2 Views     Comprehensive metabolic panel (BMP + Alb, Alk Phos, ALT, AST, Total. Bili, TP)     CBC with platelets differential     US Lower Extremity Venous Duplex Bilateral     *UA reflex to Microscopic and Culture ([facility] (except [place] and [place])     DVT ultrasound bilateral  to be scheduled  Will r/o heart failure,  anemia, renal and liver failure  She will schedule with lymphedema clinic  When labs available will also consider increasing diuretic an dhave her follow up with PCP     JACKIE Rust [facility] [place]

## 2019-05-21 NOTE — RESULT ENCOUNTER NOTE
The chest xray is negative for cardiac enlargement of lung disease.  It does show us that you have some arthritis of your spine.

## 2019-05-21 NOTE — LETTER
[facility]  [address] [facility]  [address]  [place]  [address]  Tel: [phone]    May 21, 2019    Maryam Saavedra  [address]  [facility] [address]        Dear MsYusuf Saavedra,    The chest xray is negative for cardiac enlargement of lung disease.  It does show us that you have some arthritis of your spine    If you have any further questions or problems, please contact our office.      Sincerely,    Truman Norman MD/ Val Vivas CMA  Results for orders placed or performed in visit on 05/21/19   XR Chest 2 Views    Narrative    XR CHEST 2 VW 5/21/2019 1:41 PM    HISTORY: Lymphedema of both lower extremities      Impression    IMPRESSION: Prominent thoracolumbar scoliosis. 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 Chest otherwise negative.    DANIELA CHAUDHARY MD               Enclosure: Lab Results     no

## 2019-05-22 ENCOUNTER — HOSPITAL ENCOUNTER (OUTPATIENT)
Dept: ULTRASOUND IMAGING | Facility: CLINIC | Age: 84
Discharge: HOME | End: 2019-05-22
Attending: NURSE PRACTITIONER | Admitting: NURSE PRACTITIONER
Payer: MEDICARE

## 2019-05-22 DIAGNOSIS — I89.0 LYMPHEDEMA OF BOTH LOWER EXTREMITIES: ICD-10-CM

## 2019-05-22 LAB
ALBUMIN SERPL-MCNC: 3.7 G/DL (ref 3.4–5)
ALP SERPL-CCNC: 136 U/L (ref 40–150)
ALT SERPL W P-5'-P-CCNC: 39 U/L (ref 0–50)
ANION GAP SERPL CALCULATED.3IONS-SCNC: 9 MMOL/L (ref 3–14)
AST SERPL W P-5'-P-CCNC: 35 U/L (ref 0–45)
BILIRUB SERPL-MCNC: 0.3 MG/DL (ref 0.2–1.3)
BUN SERPL-MCNC: 37 MG/DL (ref 7–30)
CALCIUM SERPL-MCNC: 10.1 MG/DL (ref 8.5–10.1)
CHLORIDE SERPL-SCNC: 103 MMOL/L (ref 94–109)
CO2 SERPL-SCNC: 28 MMOL/L (ref 20–32)
CREAT SERPL-MCNC: 0.77 MG/DL (ref 0.52–1.04)
GFR SERPL CREATININE-BSD FRML MDRD: 69 ML/MIN/{1.73_M2}
GLUCOSE SERPL-MCNC: 73 MG/DL (ref 70–99)
POTASSIUM SERPL-SCNC: 4.1 MMOL/L (ref 3.4–5.3)
PROT SERPL-MCNC: 7.6 G/DL (ref 6.8–8.8)
SODIUM SERPL-SCNC: 140 MMOL/L (ref 133–144)

## 2019-05-22 PROCEDURE — 93970 EXTREMITY STUDY: CPT

## 2019-05-28 ENCOUNTER — HOSPITAL ENCOUNTER (OUTPATIENT)
Dept: OCCUPATIONAL THERAPY | Facility: CLINIC | Age: 84
Setting detail: THERAPIES SERIES
End: 2019-05-28
Attending: NURSE PRACTITIONER
Payer: MEDICARE

## 2019-05-28 DIAGNOSIS — I89.0 LYMPHEDEMA OF BOTH LOWER EXTREMITIES: Primary | ICD-10-CM

## 2019-05-28 PROCEDURE — 97165 OT EVAL LOW COMPLEX 30 MIN: CPT | Mod: GO

## 2019-05-28 PROCEDURE — 97535 SELF CARE MNGMENT TRAINING: CPT | Mod: GO

## 2019-05-28 NOTE — PROGRESS NOTES
[facility]        OUTPATIENT OCCUPATIONAL THERAPY EDEMA EVALUATION  PLAN OF TREATMENT FOR OUTPATIENT REHABILITATION  (COMPLETE FOR INITIAL CLAIMS ONLY)  Patient's Last Name, First Name, Maryam Sims                           Provider s Name:   [facility] Medical Record No.  [id number]     Start of Care Date:  05/28/19   Onset Date:  05/21/19(pt reports approx past 2-3 months)   Type:  OT   Medical Diagnosis:  lymphedema BLE's   Therapy Diagnosis:  lymphedema Visits from SOC:  1                                     __________________________________________________________________________________   Plan of Treatment/Functional Goals:    Gradient compression bandaging, Fit for compression garment, Exercises, Precautions to prevent infection / exacerbation, Education, Skin care / precautions, Home management program development        GOALS  1. Goal description: Pt will tolerate gradient compression bandaging/wearing compression garments 23 hrs/day to prevent re-acccumulation of extracellular fluid for reductions in BLE lymphedema needed to aid eimporvements in LB dresisng and challenges with functional ambulation       Target date: 07/26/19  2. Goal description: Pt and/or caregiver will demonstrate independence in applying gradient compression bandages to build Ind with home management of BLE lymphedema needed to aide improvements in functional ambulation/standing balance and LB dressing       Target date: 07/26/19  3. Goal description: Pt will demo Ind in performing prescribed exercises to facilate the muscle puming system for max LE reductions needed to aide improvements in LB dressing/clothing fit       Target date: 07/26/19  4. Goal description: Pt will demo independent in donning/doffing, wearing schedule, and care of compression garments  to build Ind  with home management of BLE lymphedema needed to aide improvements in functional ambulation/standing balance and LB dressing       Target date: 07/26/19  5. Goal description: Pt will display ttal BLE volume reduction of .5L+ for reductions needed to aide imporvements in LB dressing/clothing fit and promote better balance with mobility tasks       Target date: 07/26/19  6.               7.             8.              Treatment frequency: 3 times / week   Treatment duration: 3x/wk x 2 weeks, then 0x/wk x 2 weeks, then 1x/wk x 1 week    Francisco Javier Taylor                                    I CERTIFY THE NEED FOR THESE SERVICES FURNISHED UNDER        THIS PLAN OF TREATMENT AND WHILE UNDER MY CARE     (Physician co-signature of this document indicates review and certification of the therapy plan).                   Certification date from: 05/28/19       Certification date to: 07/26/19           Referring physician: Shaylee Levine   Initial Assessment  See Epic Evaluation- Start of care: 05/28/19

## 2019-05-28 NOTE — PROGRESS NOTES
05/28/19 1500   Quick Adds   Quick Adds Certification   Rehab Discipline   Discipline OT   Type of Visit   Type of visit Initial Edema Evaluation   General Information   Start of care 05/28/19   Referring physician Shaylee Levine   Orders Evaluate and treat as indicated   Order date 05/21/19   Medical diagnosis lymphedema BLE's   Onset of illness / date of surgery 05/21/19   Edema onset 05/21/19  (pt reports approx past 2-3 months)   Affected body parts LLE;RLE   Edema etiology Unknown   Edema etiology comments Pt has no history cancer care or damage to her lymphatic system. Primary lymphedema ruled out as this swelling just started past 2-3 months. Pt does display some spider veins and could be living with onset or start CVI or venous stasis. Pt does display some balance concerns seen with wall walking and wabbling LE's and she reports falls history. She has only had 1 fall past 10-12 months after having hip replacement surgery July 2018. Neuropathy discussed and potential support to decreased walking and activity and therefore decreased muscle pumping support to LE's venous and lymphatic systems.   Pertinent history of current problem (PT: include personal factors and/or comorbidities that impact the POC; OT: include additional occupational profile info) PMH significant for HTN, L IZZY July 2018, and a wrist surgery reported. Pt verbally reports bone on bone L knee/L knee pain.   Surgical / medical history reviewed Yes   Edema special tests Ultrasound   Prior level of functional mobility I-Mod I   Prior treatment Elevation;Diuretics  (BP med acts like diuretic)   Community support Family / friend caregiver   Patient role / employment history Retired   Living environment Apartment / condo   Current assistive devices   (SEC available for use)   Fall Risk Screen   Fall screen completed by OT   Have you fallen 2 or more times in the past year? No   Have you fallen and had an injury in the past year? No   Is  patient a fall risk? Yes   Fall screen comments Pt has history of falls, but greatly imporved after hip surgery July 2018. Pt displays balance challeneges when walking in clinic. Pt defers PT referral at this time.   Abuse Screen (yes response referral indicated)   Feels Unsafe at Home or Work/School no   Feels Threatened by Someone no   Does Anyone Try to Keep You From Having Contact with Others or Doing Things Outside Your Home? no   Physical Signs of Abuse Present no   System Outcome Measures   Lymphedema Life Impact Scale (score range 0-72). A higher score indicates greater impairment. 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   Cognitive Status   Orientation Orientation to person, place and time   Level of consciousness Alert   Follows commands and answers questions 100% of the time   Personal safety and judgement Intact   Edema Exam / Assessment   Skin condition Pitting;Non-pitting;Dryness   Skin condition comments BLE 1+ pitting ankles-knee crease; non pitting B feet-unclear if pitting lacking in feet 2/2 shoe pressure   Pitting 1+   Pitting location BLE's ankle-knee   Capillary refill Symmetrical   Dorsal pedal pulse comments unabel to palpate; no signs ischemia   Stemmer sign Negative   Girth Measurements   Girth Measurements   (will obatin upon return for services)   Range of Motion   ROM comments WFL   Strength   Strength comments NT'd   Activities of Daily Living   Activities of Daily Living I   Bed Mobility   Bed mobility I   Transfers   Transfers I   Gait / Locomotion   Gait / Locomotion I   Sensory   Sensory perception comments possible neuropathy BLE's   Coordination   Coordination Gross motor coordination appropriate   Muscle Tone   Muscle tone No deficits were  identified   Planned Edema Interventions   Planned edema interventions Gradient compression bandaging;Fit for compression garment;Exercises;Precautions to prevent infection / exacerbation;Education;Skin care / precautions;Home management program development   Clinical Impression   Criteria for skilled therapeutic intervention met Yes   Therapy diagnosis lymphedema   Influenced by the following impairments / conditions Stage 1   Assessment of Occupational Performance 1-3 Performance Deficits   Identified Performance Deficits decreased functional ambulation; decreased Ind with LB dressing; balnace deficits when standing/walking   Clinical Decision Making (Complexity) Low complexity   Treatment frequency 3 times / week   Treatment duration 3x/wk x 2 weeks, then 0x/wk x 2 weeks, then 1x/wk x 1 week   Patient / family and/or staff in agreement with plan of care Yes   Risks and benefits of therapy have been explained Yes   Clinical impression comments Pt will benefit from skilled lymphedema services to reduce BLE lymphedema to aide imporveemnts in functional mobility, balance when ambulating, and promote better fit LB clothing.   Goals   Edema Eval Goals 1;2;3;4;5   Goal 1   Goal identifier GCB Wearing   Goal description Pt will tolerate gradient compression bandaging/wearing compression garments 23 hrs/day to prevent re-acccumulation of extracellular fluid for reductions in BLE lymphedema needed to aid eimporvements in LB dresisng and challenges with functional ambulation   Target date 07/26/19   Goal 2   Goal identifier GCB donning   Goal description Pt and/or caregiver will demonstrate independence in applying gradient compression bandages to build Ind with home management of BLE lymphedema needed to aide improvements in functional ambulation/standing balance and LB dressing   Target date 07/26/19   Goal 3   Goal identifier HEP Muscle Pump   Goal description Pt will demo Ind in performing prescribed exercises to  facilate the muscle puming system for max LE reductions needed to aide improvements in LB dressing/clothing fit   Target date 07/26/19   Goal 4   Goal identifier Garments   Goal description Pt will demo independent in donning/doffing, wearing schedule, and care of compression garments  to build Ind with home management of BLE lymphedema needed to aide improvements in functional ambulation/standing balance and LB dressing   Target date 07/26/19   Goal 5   Goal identifier Volume Reduction   Goal description Pt will display ttal BLE volume reduction of .5L+ for reductions needed to aide imporvements in LB dressing/clothing fit and promote better balance with mobility tasks   Target date 07/26/19   Total Evaluation Time   OT Eval, Low Complexity Minutes (42322) 23   Certification   Certification date from 05/28/19   Certification date to 07/26/19   Medical Diagnosis lymphedema BLE's   Certification I certify the need for these services furnished under this plan of treatment and while under my care.  (Physician co-signature of this document indicates review and certification of the therapy plan).

## 2019-06-18 ENCOUNTER — HOSPITAL ENCOUNTER (OUTPATIENT)
Dept: OCCUPATIONAL THERAPY | Facility: CLINIC | Age: 84
Setting detail: THERAPIES SERIES
End: 2019-06-18
Attending: NURSE PRACTITIONER
Payer: MEDICARE

## 2019-06-18 PROCEDURE — 97535 SELF CARE MNGMENT TRAINING: CPT | Mod: GO

## 2019-06-19 ENCOUNTER — HOSPITAL ENCOUNTER (OUTPATIENT)
Dept: OCCUPATIONAL THERAPY | Facility: CLINIC | Age: 84
Setting detail: THERAPIES SERIES
End: 2019-06-19
Attending: NURSE PRACTITIONER
Payer: MEDICARE

## 2019-06-19 PROCEDURE — 97535 SELF CARE MNGMENT TRAINING: CPT | Mod: GO

## 2019-06-20 ENCOUNTER — HOSPITAL ENCOUNTER (OUTPATIENT)
Dept: OCCUPATIONAL THERAPY | Facility: CLINIC | Age: 84
Setting detail: THERAPIES SERIES
End: 2019-06-20
Attending: NURSE PRACTITIONER
Payer: MEDICARE

## 2019-06-20 PROCEDURE — 97140 MANUAL THERAPY 1/> REGIONS: CPT | Mod: GO

## 2019-06-25 ENCOUNTER — HOSPITAL ENCOUNTER (OUTPATIENT)
Dept: OCCUPATIONAL THERAPY | Facility: CLINIC | Age: 84
Setting detail: THERAPIES SERIES
End: 2019-06-25
Attending: NURSE PRACTITIONER
Payer: MEDICARE

## 2019-06-25 PROCEDURE — 97140 MANUAL THERAPY 1/> REGIONS: CPT | Mod: GO

## 2019-06-26 ENCOUNTER — HOSPITAL ENCOUNTER (OUTPATIENT)
Dept: OCCUPATIONAL THERAPY | Facility: CLINIC | Age: 84
Setting detail: THERAPIES SERIES
End: 2019-06-26
Attending: NURSE PRACTITIONER
Payer: MEDICARE

## 2019-06-26 PROCEDURE — 97140 MANUAL THERAPY 1/> REGIONS: CPT | Mod: GO

## 2019-06-27 ENCOUNTER — HOSPITAL ENCOUNTER (OUTPATIENT)
Dept: OCCUPATIONAL THERAPY | Facility: CLINIC | Age: 84
Setting detail: THERAPIES SERIES
End: 2019-06-27
Attending: NURSE PRACTITIONER
Payer: MEDICARE

## 2019-06-27 PROCEDURE — 97140 MANUAL THERAPY 1/> REGIONS: CPT | Mod: GO

## 2019-06-27 PROCEDURE — 97535 SELF CARE MNGMENT TRAINING: CPT | Mod: GO

## 2019-07-09 ENCOUNTER — TRANSFERRED RECORDS (OUTPATIENT)
Dept: HEALTH INFORMATION MANAGEMENT | Facility: CLINIC | Age: 84
End: 2019-07-09

## 2019-07-23 ENCOUNTER — HOSPITAL ENCOUNTER (OUTPATIENT)
Dept: OCCUPATIONAL THERAPY | Facility: CLINIC | Age: 84
Setting detail: THERAPIES SERIES
End: 2019-07-23
Attending: NURSE PRACTITIONER
Payer: MEDICARE

## 2019-07-23 PROCEDURE — 97140 MANUAL THERAPY 1/> REGIONS: CPT | Mod: GO

## 2019-07-24 NOTE — PROGRESS NOTES
Outpatient Occupational Therapy Discharge Note     Patient: Maryam Saavedra  : 1932    Beginning/End Dates of Reporting Period:  19 to 2019    Referring Provider: Shaylee Levine Diagnosis: lymphedema BLE's    Client Self Report:       Objective Measurements: see flowsheet                                                        Outcome Measures (most recent score):  Lymphedema Life Impact Scale (score range 0-72). A higher score indicates greater impairment.: 4 post score from 13 pre score indicates benefit and satisfaction with services    Goals:   Goal Identifier GCB Wearing   Goal Description Pt will tolerate gradient compression bandaging/wearing compression garments 23 hrs/day to prevent re-acccumulation of extracellular fluid for reductions in BLE lymphedema needed to aid eimporvements in LB dresisng and challenges with functional ambulation   Target Date 19   Date Met  19   Progress:     Goal Identifier GCB donning   Goal Description Pt and/or caregiver will demonstrate independence in applying gradient compression bandages to build Ind with home management of BLE lymphedema needed to aide improvements in functional ambulation/standing balance and LB dressing   Target Date 19   Date Met  19   Progress:     Goal Identifier HEP Muscle Pump   Goal Description Pt will demo Ind in performing prescribed exercises to facilate the muscle puming system for max LE reductions needed to aide improvements in LB dressing/clothing fit   Target Date 19   Date Met  19   Progress:     Goal Identifier Garments   Goal Description Pt will demo independent in donning/doffing, wearing schedule, and care of compression garments  to build Ind with home management of BLE lymphedema needed to aide improvements in functional ambulation/standing balance and LB dressing   Target Date 19   Date Met  19   Progress:     Goal Identifier Volume Reduction   Goal  Description Pt will display ttal BLE volume reduction of .5L+ for reductions needed to aide imporvements in LB dressing/clothing fit and promote better balance with mobility tasks   Target Date 07/26/19   Date Met  06/26/19   Progress:     Goal Identifier     Goal Description     Target Date     Date Met      Progress:     Goal Identifier     Goal Description     Target Date     Date Met      Progress:     Goal Identifier     Goal Description     Target Date     Date Met      Progress:     Progress Toward Goals:   Progress this reporting period: Pt has made reductions in BLE lymphedema-see flowsheet with use of GCB's, transitioned to a daytime knee high compression stocking, and has replaced GCB use with velcro compression for nighttime use. Pt has HEP to maximize her LE reductions in lymphedema, and at follow up displayed reduced legs after 2 weeks independent management from home. Pt appropriate to manage from home; pt to be discharged.       Plan:  Discharge from therapy.    Discharge:    Reason for Discharge: Patient has met all goals.  No further expectation of progress.    Equipment Issued: BLE knee high open toed 20-30 mm Hg compressionstockings; BLE knee high velcro compression garments    Discharge Plan: Patient to continue home program.

## 2019-08-20 ENCOUNTER — TRANSFERRED RECORDS (OUTPATIENT)
Dept: HEALTH INFORMATION MANAGEMENT | Facility: CLINIC | Age: 84
End: 2019-08-20

## 2019-10-03 ENCOUNTER — HEALTH MAINTENANCE LETTER (OUTPATIENT)
Age: 84
End: 2019-10-03

## 2019-11-20 ASSESSMENT — ACTIVITIES OF DAILY LIVING (ADL): CURRENT_FUNCTION: NO ASSISTANCE NEEDED

## 2019-11-21 ENCOUNTER — HOSPITAL ENCOUNTER (OUTPATIENT)
Dept: MAMMOGRAPHY | Facility: CLINIC | Age: 84
Discharge: HOME | End: 2019-11-21
Attending: INTERNAL MEDICINE | Admitting: INTERNAL MEDICINE
Payer: MEDICARE

## 2019-11-21 ENCOUNTER — OFFICE VISIT (OUTPATIENT)
Dept: FAMILY MEDICINE | Facility: CLINIC | Age: 84
End: 2019-11-21
Payer: MEDICARE

## 2019-11-21 VITALS
HEART RATE: 57 BPM | HEIGHT: 60 IN | TEMPERATURE: 97.1 F | DIASTOLIC BLOOD PRESSURE: 64 MMHG | SYSTOLIC BLOOD PRESSURE: 114 MMHG | BODY MASS INDEX: 22.46 KG/M2 | WEIGHT: 114.4 LBS | OXYGEN SATURATION: 96 %

## 2019-11-21 DIAGNOSIS — Z12.31 OTHER SCREENING MAMMOGRAM: ICD-10-CM

## 2019-11-21 DIAGNOSIS — Z00.00 ROUTINE GENERAL MEDICAL EXAMINATION AT A HEALTH CARE FACILITY: Primary | ICD-10-CM

## 2019-11-21 DIAGNOSIS — I10 BENIGN ESSENTIAL HYPERTENSION: ICD-10-CM

## 2019-11-21 PROCEDURE — G0439 PPPS, SUBSEQ VISIT: HCPCS | Performed by: INTERNAL MEDICINE

## 2019-11-21 PROCEDURE — 77063 BREAST TOMOSYNTHESIS BI: CPT

## 2019-11-21 RX ORDER — OMEGA-3 FATTY ACIDS/FISH OIL 300-1000MG
CAPSULE ORAL
COMMUNITY
End: 2020-05-11

## 2019-11-21 RX ORDER — METOPROLOL SUCCINATE 50 MG/1
TABLET, EXTENDED RELEASE ORAL
Qty: 45 TABLET | Refills: 3 | Status: SHIPPED | OUTPATIENT
Start: 2019-11-21 | End: 2020-05-08

## 2019-11-21 RX ORDER — TRIAMTERENE/HYDROCHLOROTHIAZID 37.5-25 MG
TABLET ORAL
Qty: 45 TABLET | Refills: 3 | Status: SHIPPED | OUTPATIENT
Start: 2019-11-21 | End: 2020-04-03

## 2019-11-21 RX ORDER — AMLODIPINE BESYLATE 2.5 MG/1
2.5 TABLET ORAL DAILY
Qty: 90 TABLET | Refills: 3 | Status: ON HOLD | OUTPATIENT
Start: 2019-11-21 | End: 2020-05-15

## 2019-11-21 ASSESSMENT — ACTIVITIES OF DAILY LIVING (ADL): CURRENT_FUNCTION: NO ASSISTANCE NEEDED

## 2019-11-21 ASSESSMENT — MIFFLIN-ST. JEOR: SCORE: 882.28

## 2019-11-21 NOTE — PROGRESS NOTES
"SUBJECTIVE:   Maryam Saavedra is a 87 year old female who presents for Preventive Visit.    Are you in the first 12 months of your Medicare coverage?  No    Healthy Habits:     In general, how would you rate your overall health?  Good    Frequency of exercise:  None    Duration of exercise:  Other    Do you usually eat at least 4 servings of fruit and vegetables a day, include whole grains    & fiber and avoid regularly eating high fat or \"junk\" foods?  Yes    Taking medications regularly:  Yes    Barriers to taking medications:  None    Medication side effects:  None    Ability to successfully perform activities of daily living:  No assistance needed    Home Safety:  No safety concerns identified    Hearing Impairment:  No hearing concerns    In the past 6 months, have you been bothered by leaking of urine? Yes    In general, how would you rate your overall mental or emotional health?  Good      PHQ-2 Total Score: 0    Additional concerns today:  No    Do you feel safe in your environment? Yes    Have you ever done Advance Care Planning? (For example, a Health Directive, POLST, or a discussion with a medical provider or your loved ones about your wishes): Yes, advance care planning is on file.      Fall risk  Fallen 2 or more times in the past year?: No  Any fall with injury in the past year?: No    Cognitive Screening   1) Repeat 3 items (Leader, Season, Table)   2) Clock draw:   NORMAL  3) 3 item recall: Recalls 3 objects  Results: 3 items recalled: COGNITIVE IMPAIRMENT LESS LIKELY    Mini-CogTM Copyright ANDREW Todd. Licensed by the author for use in [facility]; reprinted with permission (stacey@.[facility]). All rights reserved.      Do you have sleep apnea, excessive snoring or daytime drowsiness?: yes    Reviewed and updated as needed this visit by clinical staff  Tobacco  Allergies  Meds  Med Hx  Surg Hx  Soc Hx        Reviewed and updated as needed this visit by Provider  Tobacco  Med Hx  Surg " Hx  Soc Hx       Social History     Tobacco Use     Smoking status: Former Smoker     Packs/day: 0.00     Years: 0.00     Pack years: 0.00     Start date: 7/24/1998     Smokeless tobacco: Former User   Substance Use Topics     Alcohol use: Yes     Alcohol/week: 0.0 standard drinks     Comment: 1-2 drinks per month     If you drink alcohol do you typically have >3 drinks per day or >7 drinks per week? No    Current providers sharing in care for this patient include:   Patient Care Team:  Truman Norman MD as PCP - General (Internal Medicine)  Truman Norman MD as Assigned PCP    The following health maintenance items are reviewed in Epic and correct as of today:  Health Maintenance   Topic Date Due     ZOSTER IMMUNIZATION (1 of 2) 06/09/1982     MEDICARE ANNUAL WELLNESS VISIT  11/09/2019     FALL RISK ASSESSMENT  05/21/2020     DTAP/TDAP/TD IMMUNIZATION (2 - Td) 06/26/2023     ADVANCE CARE PLANNING  08/01/2023     PHQ-2  Completed     INFLUENZA VACCINE  Completed     PNEUMOCOCCAL IMMUNIZATION 65+ LOW/MEDIUM RISK  Completed     IPV IMMUNIZATION  Aged Out     MENINGITIS IMMUNIZATION  Aged Out     Patient Active Problem List   Diagnosis     Benign essential hypertension     Past Surgical History:   Procedure Laterality Date     ARTHROPLASTY HIP ANTERIOR Right 7/24/2018    Procedure: ARTHROPLASTY HIP ANTERIOR;  RIGHT DIRECT ANTERIOR TOTAL HIP ARTHROPLASTY;  Surgeon: Jimbo Phillips MD;  Location:  OR     EYE SURGERY  cataracts     MOHS MICROGRAPHIC PROCEDURE      Left lower eyelid      MOHS MICROGRAPHIC PROCEDURE Left 10/27/2016    Procedure: MOHS MICROGRAPHIC PROCEDURE;  Surgeon: Jose Torrez MD;  Location: [facility]     MOHS MICROGRAPHIC PROCEDURE Right 5/24/2018    Procedure: MOHS MICROGRAPHIC PROCEDURE;  RIGHT MEDIAL CANTHUS MOHS CLOSURE;  Surgeon: Jose Torrez MD;  Location: [facility]     ORTHOPEDIC SURGERY      bilateral wrists       Social History     Tobacco Use     Smoking status: Former Smoker      "Packs/day: 0.00     Years: 0.00     Pack years: 0.00     Start date: 7/24/1998     Smokeless tobacco: Former User   Substance Use Topics     Alcohol use: Yes     Alcohol/week: 0.0 standard drinks     Comment: 1-2 drinks per month     Family History   Problem Relation Age of Onset     Diabetes Mother      Diabetes Father          Current Outpatient Medications   Medication Sig Dispense Refill     amLODIPine (NORVASC) 2.5 MG tablet Take 1 tablet (2.5 mg) by mouth daily 90 tablet 3     aspirin (ASA) 81 MG tablet        ibuprofen (ADVIL/MOTRIN) 200 MG capsule        metoprolol succinate (TOPROL-XL) 50 MG 24 hr tablet TAKE 1/2 TABLET BY MOUTH ONCE A DAY 45 tablet 3     order for DME 1: Gradient Compression Wraps; 2: BLE knee high 20-30 mm Hg compression stockings 1 each 0     triamterene-hydrochlorothiazide (MAXZIDE-25) 37.5-25 MG per tablet TAKE 1/2 TABLET BY MOUTH ONCE A DAY 45 tablet 3     Acetaminophen (TYLENOL PO) Take 1,000 mg by mouth 2 times daily as needed for mild pain or fever       Allergies   Allergen Reactions     Ace Inhibitors      Angioedema       Cyclobenzaprine      Angioedema         Review of Systems  Constitutional, HEENT, cardiovascular, pulmonary, gi and gu systems are negative, except as otherwise noted.    OBJECTIVE:   /64 (BP Location: Right arm, Patient Position: Sitting, Cuff Size: Adult Regular)   Pulse 57   Temp 97.1  F (36.2  C) (Oral)   Ht 1.535 m (5' 0.43\")   Wt 51.9 kg (114 lb 6.4 oz)   SpO2 96%   Breastfeeding No   BMI 22.02 kg/m   Estimated body mass index is 22.02 kg/m  as calculated from the following:    Height as of this encounter: 1.535 m (5' 0.43\").    Weight as of this encounter: 51.9 kg (114 lb 6.4 oz).  Physical Exam  GENERAL: healthy, alert and no distress  EYES: Eyes grossly normal to inspection, PERRL and conjunctivae and sclerae normal  HENT: ear canals and TM's normal, nose and mouth without ulcers or lesions  NECK: no adenopathy, no asymmetry, masses, or " "scars and thyroid normal to palpation  RESP: lungs clear to auscultation - no rales, rhonchi or wheezes  CV: Heart with regular rate and rhythm.  No carotid bruits  ABDOMEN: soft, nontender, no hepatosplenomegaly, no masses and bowel sounds normal  MS: no gross musculoskeletal defects noted,1+ edema in both distal lower extremities with chronic venous stasis skin changes   SKIN: no suspicious lesions or rashes  NEURO: Normal strength and tone, mentation intact and speech normal  PSYCH: mentation appears normal, affect normal/bright    Labs were OK in May    ASSESSMENT / PLAN:   1. Routine general medical examination at a health care facility      2. Benign essential hypertension  Blood pressure well controlled; continue current medications   - metoprolol succinate ER (TOPROL-XL) 50 MG 24 hr tablet; TAKE 1/2 TABLET BY MOUTH ONCE A DAY  Dispense: 45 tablet; Refill: 3  - triamterene-HCTZ (MAXZIDE-25) 37.5-25 MG tablet; TAKE 1/2 TABLET BY MOUTH ONCE A DAY  Dispense: 45 tablet; Refill: 3  - amLODIPine (NORVASC) 2.5 MG tablet; Take 1 tablet (2.5 mg) by mouth daily  Dispense: 90 tablet; Refill: 3    COUNSELING:  Reviewed preventive health counseling, as reflected in patient instructions  Special attention given to:       Regular exercise       Healthy diet/nutrition       Immunizations    shingrix recommended           Estimated body mass index is 22.02 kg/m  as calculated from the following:    Height as of this encounter: 1.535 m (5' 0.43\").    Weight as of this encounter: 51.9 kg (114 lb 6.4 oz).         reports that she has quit smoking. She started smoking about 21 years ago. She smoked 0.00 packs per day for 0.00 years. She has quit using smokeless tobacco.      Appropriate preventive services were discussed with this patient, including applicable screening as appropriate for cardiovascular disease, diabetes, osteopenia/osteoporosis, and glaucoma.  As appropriate for age/gender, discussed screening for colorectal " cancer, prostate cancer, breast cancer, and cervical cancer. Checklist reviewing preventive services available has been given to the patient.    Reviewed patients plan of care and provided an AVS. The Basic Care Plan (routine screening as documented in Health Maintenance) for Maryam meets the Care Plan requirement. This Care Plan has been established and reviewed with the Patient and daughter.    Counseling Resources:  ATP IV Guidelines  Pooled Cohorts Equation Calculator  Breast Cancer Risk Calculator  FRAX Risk Assessment  ICSI Preventive Guidelines  Dietary Guidelines for Americans, 2010  USDA's MyPlate  ASA Prophylaxis  Lung CA Screening    Truman Norman MD  [facility]    Identified Health Risks:

## 2020-01-10 ENCOUNTER — TELEPHONE (OUTPATIENT)
Dept: FAMILY MEDICINE | Facility: CLINIC | Age: 85
End: 2020-01-10

## 2020-01-10 NOTE — TELEPHONE ENCOUNTER
Reason for Call:  Other     Detailed comments: Pt woud like to discuss the Shingles vaccine with Dr. Norman.  She reports that the insruence company told her that HE will be the one to tell her what it will cost her with her insurance.  Pt was informed that her insurance company would be the one to tell her that, but she insisted her insurance company doesn't know and told her to ask her doctor    Phone Number Patient can be reached at: Home number on file [phone] (home)    Best Time: any    Can we leave a detailed message on this number? YES    Call taken on 1/10/2020 at 2:20 PM by Emmie Ontiveros

## 2020-01-14 NOTE — TELEPHONE ENCOUNTER
Spoke with patient and clarified that she should call medicare and they should provide her a more direct and clear answer. She agreed to that and was very pleasant.   Jyoti Archibald CMA on 1/14/2020 at 11:06 AM

## 2020-01-29 ENCOUNTER — TELEPHONE (OUTPATIENT)
Dept: FAMILY MEDICINE | Facility: CLINIC | Age: 85
End: 2020-01-29

## 2020-01-29 NOTE — TELEPHONE ENCOUNTER
Saray the daughter is calling to get an order for a colonoscopy. Maryam says she has not had one in about 10yrs. This would   Be for a screening. Pending order.    Joann Hsieh  Referral Coordinator

## 2020-01-29 NOTE — TELEPHONE ENCOUNTER
"Call out to patient:  Had last Colonoscopy in 2004. She read me these results from it:  Has Hemorrhoids with blood in one area site of intermittent bleeding..  Unremarkable colon mucousa  Biopsies obtained in ascending colon  to exclude microscopic colitis.  Small pieces of tissue biopsies or polyps have been removed and sent to pathology.    She decided that maybe she should check it out again since she forgot about it 15 years ago and she has noticed that her stool has become less formed and more sfot/mushy like it is still brown, no blood noticed on T Paper unless she strained hard.  Denies any abdominal pain, denies bloating or abnormal flatus. No fever nausea or vomiting.  States she has a great appetite and eats everything.    Decided she should have it checked so\"if it would be cancer and I didn't get around to having it checked I would be upset with myself\".    No one in her immediate family has had Colon cancer that she knows of.    After speaking to her I suggested why not try the Cologuard testing instead and she agreed.    If appropriate please order Cologuard testing.    Gracie Cisneros RN              "

## 2020-01-29 NOTE — TELEPHONE ENCOUNTER
Typically one can stop screening for colon cancer after their 80th birthday.  Is there a new symptom for which their is concern, or is their some particular concern about janae's risk for colon cancer?

## 2020-01-29 NOTE — TELEPHONE ENCOUNTER
I would not recommend Cologuard at this time, I would recommend an office visit to investigate her new stool symptoms, this could be done on a nonurgent basis

## 2020-01-30 NOTE — TELEPHONE ENCOUNTER
Called Pt:     Relayed message. She is agreeable and will call back to schedule.     Discussed to please call back if she notes any blood in her stool, abdominal pain or fevers and Pt agreed     Nathalie SHANKS RN

## 2020-03-23 ENCOUNTER — VIRTUAL VISIT (OUTPATIENT)
Dept: FAMILY MEDICINE | Facility: CLINIC | Age: 85
End: 2020-03-23
Payer: MEDICARE

## 2020-03-23 DIAGNOSIS — M79.662 PAIN OF LEFT LOWER LEG: Primary | ICD-10-CM

## 2020-03-23 PROCEDURE — G2012 BRIEF CHECK IN BY MD/QHP: HCPCS | Performed by: INTERNAL MEDICINE

## 2020-03-23 NOTE — PROGRESS NOTES
"Maryam Saavedra is a 87 year old female who is being evaluated via a telephone visit.      The patient has been notified of following (by CHARLES Ross CMA    \"We have found that certain health care needs can be provided without the need for a physical exam.  This service lets us provide the care you need with a short phone conversation.  If a prescription is necessary we can send it directly to your pharmacy.  If lab work is needed we can place an order for that and you can then stop by our lab to have the test done at a later time.    This telephone visit will be conducted via 3 way call with the you (the patient) , the physician/provider, and a me all on the line at the same time.  This allows your physician/provider to have the phone conversation with you while I will be taking notes for your medical record.  We will have full access to your [facility] medical record during this entire phone call.    Since this is like an office visit,  will bill your insurance company for this service.  Please check with your medical insurance if this type of telephone/virtual is covered . You may be responsible for the cost of this service if insurance coverage is denied.  The typical cost is $30 (10min), $59(11-20min) and $85 (21-30min)     If during the course of the call the physician/provider feels a telephone visit is not appropriate, you will not be charged for this service\"    Consent has been obtained for this service by care team member: yes.  See the scanned image in the medical record.    S: The history as provided by the patient to the provider during this 3 way call include Fall more that one week ago without LOC or head injury; injury to left side; increasing difficulty walking, now using walker; pain localized to buttocks and radiating down left leg; no new leg numbness or weakness or new bowel or bladder symptoms     Pertinent parts of the the patient's medical history reviewed and confirmed by the " provider included : history of hypertension ; lymphedema      Total time of call between patient and provider was 10 minutes     Sincerely,    Truman Norman MD  (MD signature)  ===================================================    I have reviewed the note as documented above.  This accurately captures the substance of my conversation with the patient,        Assessment/Plan:    (M99.339) Pain of left lower leg  (primary encounter diagnosis)  Comment: Persistent left-sided pain following a traumatic injury about 2 weeks ago.  Technically, the patient should have an x-ray to exclude spinal fracture, hip fracture, pelvic fracture.  Discussed with patient and her daughter.  They do have some concerns about coming into the office and being exposed to the pandemic virus.  They wish to see how the course of the patient's symptoms go over the course of this week.  If symptoms persist until later this week, they will come in for the x-rays which were ordered.  We did spend some time discussing pain management strategies including over-the-counter acetaminophen.  Plan: XR Lumbar Spine 2/3 Views, XR Hip Left 2-3         Views, XR Thoracic Spine 2 Views

## 2020-04-02 ENCOUNTER — HOSPITAL ENCOUNTER (EMERGENCY)
Facility: CLINIC | Age: 85
Discharge: HOME | End: 2020-04-02
Attending: EMERGENCY MEDICINE | Admitting: EMERGENCY MEDICINE
Payer: MEDICARE

## 2020-04-02 ENCOUNTER — APPOINTMENT (OUTPATIENT)
Dept: CT IMAGING | Facility: CLINIC | Age: 85
End: 2020-04-02
Attending: EMERGENCY MEDICINE
Payer: MEDICARE

## 2020-04-02 ENCOUNTER — NURSE TRIAGE (OUTPATIENT)
Dept: NURSING | Facility: CLINIC | Age: 85
End: 2020-04-02

## 2020-04-02 ENCOUNTER — APPOINTMENT (OUTPATIENT)
Dept: ULTRASOUND IMAGING | Facility: CLINIC | Age: 85
End: 2020-04-02
Attending: EMERGENCY MEDICINE
Payer: MEDICARE

## 2020-04-02 VITALS
HEART RATE: 61 BPM | BODY MASS INDEX: 21.62 KG/M2 | OXYGEN SATURATION: 98 % | TEMPERATURE: 97.7 F | SYSTOLIC BLOOD PRESSURE: 124 MMHG | HEIGHT: 61 IN | DIASTOLIC BLOOD PRESSURE: 54 MMHG | RESPIRATION RATE: 8 BRPM

## 2020-04-02 DIAGNOSIS — I89.0 LYMPHEDEMA OF LEFT LEG: ICD-10-CM

## 2020-04-02 LAB
ALBUMIN SERPL-MCNC: 2.7 G/DL (ref 3.4–5)
ALP SERPL-CCNC: 207 U/L (ref 40–150)
ALT SERPL W P-5'-P-CCNC: 31 U/L (ref 0–50)
ANION GAP SERPL CALCULATED.3IONS-SCNC: 6 MMOL/L (ref 3–14)
AST SERPL W P-5'-P-CCNC: 31 U/L (ref 0–45)
BASOPHILS # BLD AUTO: 0 10E9/L (ref 0–0.2)
BASOPHILS NFR BLD AUTO: 0.4 %
BILIRUB SERPL-MCNC: 0.3 MG/DL (ref 0.2–1.3)
BUN SERPL-MCNC: 28 MG/DL (ref 7–30)
CALCIUM SERPL-MCNC: 9.1 MG/DL (ref 8.5–10.1)
CHLORIDE SERPL-SCNC: 101 MMOL/L (ref 94–109)
CO2 SERPL-SCNC: 30 MMOL/L (ref 20–32)
CREAT SERPL-MCNC: 0.9 MG/DL (ref 0.52–1.04)
DIFFERENTIAL METHOD BLD: ABNORMAL
EOSINOPHIL # BLD AUTO: 0 10E9/L (ref 0–0.7)
EOSINOPHIL NFR BLD AUTO: 0.4 %
ERYTHROCYTE [DISTWIDTH] IN BLOOD BY AUTOMATED COUNT: 14.7 % (ref 10–15)
GFR SERPL CREATININE-BSD FRML MDRD: 57 ML/MIN/{1.73_M2}
GLUCOSE SERPL-MCNC: 93 MG/DL (ref 70–99)
HCT VFR BLD AUTO: 34.1 % (ref 35–47)
HGB BLD-MCNC: 11.2 G/DL (ref 11.7–15.7)
IMM GRANULOCYTES # BLD: 0 10E9/L (ref 0–0.4)
IMM GRANULOCYTES NFR BLD: 0.2 %
INTERPRETATION ECG - MUSE: NORMAL
LYMPHOCYTES # BLD AUTO: 0.6 10E9/L (ref 0.8–5.3)
LYMPHOCYTES NFR BLD AUTO: 10.2 %
MCH RBC QN AUTO: 29.7 PG (ref 26.5–33)
MCHC RBC AUTO-ENTMCNC: 32.8 G/DL (ref 31.5–36.5)
MCV RBC AUTO: 91 FL (ref 78–100)
MONOCYTES # BLD AUTO: 0.5 10E9/L (ref 0–1.3)
MONOCYTES NFR BLD AUTO: 9.3 %
NEUTROPHILS # BLD AUTO: 4.4 10E9/L (ref 1.6–8.3)
NEUTROPHILS NFR BLD AUTO: 79.5 %
NRBC # BLD AUTO: 0 10*3/UL
NRBC BLD AUTO-RTO: 0 /100
PLATELET # BLD AUTO: 292 10E9/L (ref 150–450)
POTASSIUM SERPL-SCNC: 3.6 MMOL/L (ref 3.4–5.3)
PROT SERPL-MCNC: 6.9 G/DL (ref 6.8–8.8)
RBC # BLD AUTO: 3.77 10E12/L (ref 3.8–5.2)
SODIUM SERPL-SCNC: 137 MMOL/L (ref 133–144)
WBC # BLD AUTO: 5.5 10E9/L (ref 4–11)

## 2020-04-02 PROCEDURE — 93005 ELECTROCARDIOGRAM TRACING: CPT

## 2020-04-02 PROCEDURE — 74177 CT ABD & PELVIS W/CONTRAST: CPT

## 2020-04-02 PROCEDURE — 80053 COMPREHEN METABOLIC PANEL: CPT | Performed by: EMERGENCY MEDICINE

## 2020-04-02 PROCEDURE — 25000125 ZZHC RX 250: Performed by: EMERGENCY MEDICINE

## 2020-04-02 PROCEDURE — 85025 COMPLETE CBC W/AUTO DIFF WBC: CPT | Performed by: EMERGENCY MEDICINE

## 2020-04-02 PROCEDURE — 99285 EMERGENCY DEPT VISIT HI MDM: CPT | Mod: 25

## 2020-04-02 PROCEDURE — 87040 BLOOD CULTURE FOR BACTERIA: CPT | Performed by: EMERGENCY MEDICINE

## 2020-04-02 PROCEDURE — 25000128 H RX IP 250 OP 636: Performed by: EMERGENCY MEDICINE

## 2020-04-02 PROCEDURE — 93971 EXTREMITY STUDY: CPT | Mod: LT

## 2020-04-02 RX ORDER — IOPAMIDOL 755 MG/ML
58 INJECTION, SOLUTION INTRAVASCULAR ONCE
Status: COMPLETED | OUTPATIENT
Start: 2020-04-02 | End: 2020-04-02

## 2020-04-02 RX ADMIN — IOPAMIDOL 58 ML: 755 INJECTION, SOLUTION INTRAVENOUS at 21:22

## 2020-04-02 RX ADMIN — SODIUM CHLORIDE 60 ML: 9 INJECTION, SOLUTION INTRAVENOUS at 21:22

## 2020-04-02 ASSESSMENT — ENCOUNTER SYMPTOMS
DIARRHEA: 0
DIFFICULTY URINATING: 0
DYSURIA: 0
CHILLS: 0
CONSTIPATION: 0
FEVER: 0
HEADACHES: 0
SHORTNESS OF BREATH: 0

## 2020-04-02 NOTE — ED AVS SNAPSHOT
Emergency Department  [address] [place] [address]  Phone:  [phone]  Fax:  [phone]                                    Maryam Saavedra   MRN: [id number]    Department:   Emergency Department   Date of Visit:  4/2/2020           After Visit Summary Signature Page    I have received my discharge instructions, and my questions have been answered. I have discussed any challenges I see with this plan with the nurse or doctor.    ..........................................................................................................................................  Patient/Patient Representative Signature      ..........................................................................................................................................  Patient Representative Print Name and Relationship to Patient    ..................................................               ................................................  Date                                   Time    ..........................................................................................................................................  Reviewed by Signature/Title    ...................................................              ..............................................  Date                                               Time          22EPIC Rev 08/18

## 2020-04-02 NOTE — TELEPHONE ENCOUNTER
Left leg is swollen from foot up to thigh, and cannot move her toes on that foot.  She is having trouble walking, so they will call 911.    Virgie Davalos RN  [facility]      Reason for Disposition    [1] Can't walk or can barely walk AND [2] new onset    Additional Information    Negative: Severe difficulty breathing (e.g., struggling for each breath, speaks in single words)    Negative: Looks like a broken bone or dislocated joint (e.g., crooked or deformed)    Negative: Sounds like a life-threatening emergency to the triager    Negative: Difficulty breathing at rest    Negative: Entire foot is cool or blue in comparison to other side    Protocols used: LEG SWELLING AND EDEMA-A-AH

## 2020-04-03 DIAGNOSIS — I10 BENIGN ESSENTIAL HYPERTENSION: ICD-10-CM

## 2020-04-03 RX ORDER — TRIAMTERENE/HYDROCHLOROTHIAZID 37.5-25 MG
TABLET ORAL
Qty: 90 TABLET | Refills: 1 | Status: SHIPPED | OUTPATIENT
Start: 2020-04-03 | End: 2020-05-08

## 2020-04-03 NOTE — TELEPHONE ENCOUNTER
Reason for Call:  Medication or medication refill:    Do you use a [facility]?  Name of the pharmacy and phone number for the current request:  [id number] - [place], [place] - [address]    Name of the medication requested:   triamterene-HCTZ (MAXZIDE-25) 37.5-25 MG tablet  45 tablet  3          Other request:  Per dr oliveira from [facility] ed is rquesting pt to increase dosage 1 full tab daily . Please advise     Can we leave a detailed message on this number? YES    Phone number patient can be reached at: Home number on file  [phone] (home)    Best Time: any    Call taken on 4/3/2020 at 11:47 AM by Shilo Velázquez

## 2020-04-03 NOTE — ED PROVIDER NOTES
History     Chief Complaint:  Leg Swelling      HPI   Maryam Saavedra is a 87 year old female with a history of HTN, scoliosis, chronic low back pain who presents to the emergency department for evaluation of leg swelling. Patient notes that she has been suffering from swelling of her left leg for the last 10 days. She has had multiple other episodes of swelling in the past which she states started a number of years ago after a fall. Patient has also fallen within the last couple of weeks as well.  She decided to come to the emergency department because this swelling has been persistent and in the past it would resolve faster. She is having normal bowel movements. Patient denies fever, chills, shortness of breath, chest pain, headache, diarrhea or urinary symptoms. No history of DVT. No recent travels. Not on hormone replacements. Former smoker; she quit 20+ years ago. Denies memory issues.     Allergies:  Ace Inhibitors  Cyclobenzaprine.     Medications:    Acetaminophen  amLODIPine (NORVASC)  aspirin (ASA)  ibuprofen (ADVIL/MOTRIN)  metoprolol succinate ER (TOPROL-XL)  triamterene-HCTZ (MAXZIDE-25)  Triamterene-hydrochlorothiazide (DYAZIDE)  Metoprolol tartrate (LOPRESSOR)     Past Medical History:    HTN  Chronic low back pain  Osteoarthritis  Scoliosis    Past Surgical History:    Right hip arthroplasty  Eye surgery  Mohs  Orthopedic surgery bilateral wrists    Family History:    DM - mother, father    Social History:  Lives with her son.  Former smoker.  Positive for alcohol use.   Marital Status:   [5]     Review of Systems   Constitutional: Negative for chills and fever.   Respiratory: Negative for shortness of breath.    Cardiovascular: Positive for leg swelling. Negative for chest pain.   Gastrointestinal: Negative for constipation and diarrhea.   Genitourinary: Negative for decreased urine volume, difficulty urinating, dysuria and enuresis.   Neurological: Negative for headaches.   All other  "systems reviewed and are negative.    Physical Exam     Patient Vitals for the past 24 hrs:   BP Temp Temp src Pulse Heart Rate Resp SpO2 Height   04/02/20 2030 124/54 -- -- 61 61 12 100 % --   04/02/20 1914 (!) 141/64 97.7  F (36.5  C) Oral -- 69 16 98 % 1.549 m (5' 1\")       Physical Exam  Constitutional: Elderly white female, supine.   HENT: Cheeks are flushed.   Eyes: EOM are normal. Pupils are equal, round, and reactive to light.   Neck: Normal range of motion. No JVD present. No cervical adenopathy.  Cardiovascular: Regular rhythm.  Exam reveals no gallop and no friction rub.    No murmur heard.  Pulmonary/Chest: Bilateral breath sounds normal. No wheezes, rhonchi or rales. Chest wall bruising. Left chest pectoralis muscle no tenderness.  Abdominal: Soft. No tenderness. No rebound or guarding. 2+ femoral pulses.   Musculoskeletal:  Left leg warm. Strong popliteal pulse. Full range of motion of the hip, knee and ankle. 2+ edema from foot to mild thigh. Mild erythema.   Lymphadenopathy: No lymphadenopathy.   Neurological: Alert and oriented to person, place, and time. Normal strength. Coordination normal. able to walk with walker  Skin: See above.     Emergency Department Course     ECG:  Time: 1935  Vent. Rate 63 bpm. MI interval 160. QRS duration 80. QT/QTc 450/460. P-R-T axis 104 -44 38.  Normal sinus rhythm.  Read time: 1945     Imaging:  Radiology findings were communicated with the patient who voiced understanding of the findings.    US Lower Extremity Venous Duplex Left   1.  No deep venous thrombosis in the left lower extremity.  As per radiology.    CT Abdomen Pelvis w Contrast  No acute findings  As per radiology.    US Lower Extremity Venous Duplex Left   Final Result   IMPRESSION:   1.  No deep venous thrombosis in the left lower extremity.      FRANCOIS BAILEY MD      CT Abdomen Pelvis w Contrast    (Results Pending)       Laboratory:  Laboratory findings were communicated with the patient who " voiced understanding of the findings.    CBC: WBC: 5.5, HGB: 11.2, PLT: 292    CMP: GFR Estimate 57 (L), Albumin 2.7 (L), ALKPHOS 207 (H) o/w WNL (Creatinine: 0.90)    Blood culture: pending.    UA with micro: pending    Emergency Department Course:  Past medical records, nursing notes, and vitals reviewed.    1916 I performed an exam of the patient as documented above.     EKG obtained in the ED, see results above.   IV was inserted and blood was drawn for laboratory testing, results above.  The patient provided a urine sample here in the emergency department. This was sent for laboratory testing, findings above.  The patient was sent for a US,CT while in the emergency department, results above.     2058 I rechecked the patient and discussed the results of her workup.         I personally reviewed the laboratory and imaging results with the Patient and answered all related questions prior to .     Impression & Plan     Medical Decision Making:  Maryam Saavedra is an 87 year old woman who presents to the ED because of left leg swelling. She does have history of lymphedema but this swelling seems to be entirely of the left leg. She has been able to get around with a walker but it has gotten to the point where this is difficult and the daughter called the paramedics. On the 23rd of March, approximately 10 days ago the family had called to primary care doctor stating that she had fallen a week or two earlier and injured her left side and that she had pain in her left chest and lower back. The doctor recommended she get some xray's but because of the COIVD-10 concern she did not want to come to the office. It is the leg swelling and difficulty walking which seems to be the problem now. The patient herself has fair poor memory in terms of timing of events. She denises however any chills, fevers, shaking chills, shortness of breath or chest pain. When I examine her, there is bruising on the left side of the chest that  appears older, there is no crepitance or flail. There is no back tenderness but the left leg is quite swollen from the foot all the way to at least the mid and proximal thigh. Good femoral and popliteal pulses are felt. It is hard to feel distal pulses but there is some mild redness and warmth compared to the right leg which is non-edematous and even on the cool side. Patient has undergone blood and urine and ultrasound which shows a baker's cyst but no deep vein thrombosis. She is currently obtaining an abdomen, pelvis CT that's positioned to be determined. Impression is left leg swelling number one. Number two is right chest wall contusion.     Diagnosis:    ICD-10-CM    1. Lymphedema of left leg  I89.0        Disposition:  discharge          Scribe Disclosure:  INDIRA, Silvino Willett, am serving as a scribe at 7:08 PM on 4/2/2020 to document services personally performed by Uvaldo Womack MD  based on my observations and the provider's statements to me.          Uvaldo Womack MD  04/02/20 1343

## 2020-04-03 NOTE — ED NOTES
"Pt able to able ambulate for extended distance using walker and standby assist. Pt had a slight limp to left leg, but otherwise steady, very slow gait. Pt expressed multiple that she was \"nervous\" and requested staff walk close behind her.       Pt unable to provide UA due to missing urinary hat.   "

## 2020-04-03 NOTE — TELEPHONE ENCOUNTER
Pt requesting refill- NOTE DOSE CHANGE from [facility] ED. Would you like telephone visit prior to changing dose?    Thank you,  Adi DOLL RN

## 2020-04-08 LAB
BACTERIA SPEC CULT: NO GROWTH
SPECIMEN SOURCE: NORMAL

## 2020-04-21 ENCOUNTER — TELEPHONE (OUTPATIENT)
Dept: FAMILY MEDICINE | Facility: CLINIC | Age: 85
End: 2020-04-21

## 2020-04-21 DIAGNOSIS — R27.0 ATAXIA: Primary | ICD-10-CM

## 2020-04-21 NOTE — TELEPHONE ENCOUNTER
"Daughter, Saray, reports the following:    Significant decrease in mobility and \"confidence\" with standing or walking since her fall last month.  Some knee and shin pain, but not worse or debilitating.   Really struggles to get up from a sitting position. Uses an OLD walker, but doesn't feel secure.  Currently house bound; not sure if able to get her to a car with the aid of 2 people.    Lymphedema wraps being done by daughter. Son stays at night. They manage meds for patient.     1)  Requesting referral for PT home care eval (balance/strengthening) (recent visit PCP)    2) Walker with seat and brakes. (pended for print and sign; they will  in clinic)    Thank you,  Naomie Mercer RN on 4/21/2020 at 5:01 PM            "

## 2020-04-21 NOTE — TELEPHONE ENCOUNTER
Reason for Call:  Other call back    Detailed comments: daughter calling req a call back to ask what to do regarding mothers Mobility Issues are not getting better    Phone Number Saray can be reached at: Other phone number:  [phone]    Best Time: today    Can we leave a detailed message on this number? Not Applicable    Call taken on 4/21/2020 at 2:49 PM by Rissa Greenberg

## 2020-04-22 NOTE — TELEPHONE ENCOUNTER
Spoke with Saray    Relayed information- will fax DME to [facility] in [place], as requested.     Notified of home care orders, which they will likely receive a call from  [facility] Intake    Dtr appreciative of plan     Nathalie SHANKS RN

## 2020-04-22 NOTE — TELEPHONE ENCOUNTER
I ordered home care and walker  If pain is severe, she could come in for the x-rays ordered on 3/23 (virtual visit)

## 2020-04-23 ENCOUNTER — TELEPHONE (OUTPATIENT)
Dept: FAMILY MEDICINE | Facility: CLINIC | Age: 85
End: 2020-04-23

## 2020-04-23 ENCOUNTER — VIRTUAL VISIT (OUTPATIENT)
Dept: FAMILY MEDICINE | Facility: CLINIC | Age: 85
End: 2020-04-23
Payer: MEDICARE

## 2020-04-23 DIAGNOSIS — M79.662 PAIN OF LEFT LOWER LEG: Primary | ICD-10-CM

## 2020-04-23 DIAGNOSIS — M79.89 LEFT LEG SWELLING: ICD-10-CM

## 2020-04-23 DIAGNOSIS — R27.0 ATAXIA: ICD-10-CM

## 2020-04-23 PROCEDURE — 99213 OFFICE O/P EST LOW 20 MIN: CPT | Mod: 95 | Performed by: INTERNAL MEDICINE

## 2020-04-23 NOTE — PROGRESS NOTES
"Maryam Saavedra is a 87 year old female who is being evaluated via a billable video visit.      The patient has been notified of following:     \"This video visit will be conducted via a call between you and your physician/provider. We have found that certain health care needs can be provided without the need for an in-person physical exam.  This service lets us provide the care you need with a video conversation.  If a prescription is necessary we can send it directly to your pharmacy.  If lab work is needed we can place an order for that and you can then stop by our lab to have the test done at a later time.    Video visits are billed at different rates depending on your insurance coverage.  Please reach out to your insurance provider with any questions.    If during the course of the call the physician/provider feels a video visit is not appropriate, you will not be charged for this service.\"    Patient has given verbal consent for Video visit? Yes    How would you like to obtain your AVS? Cindyhart    Patient would like the video invitation sent by: Text to cell phone: [phone]    Will anyone else be joining your video visit? Yes: Saray, patient's daughter. How would they like to receive their invitation? Text to cell phone: [phone]      Subjective     Maryam Saavedra is a 87 year old female who presents to clinic today for the following health issues:    HPI    Face to face for homecare    Video Start Time: 14:04    87 year old fell more than one month ago with injury to left leg   Since then seen in ER  Since then persistent swelling in left leg but slowly improving  Daughter wants PT in home to help with mobility and fall risk reduction   Need face to face visit to facilitate  History from patient difficult, but she denies uncontrolled pain  Daughter states that she is having trouble getting up from chair independently and getting to bathroom etc.     Patient Active Problem List   Diagnosis     Benign " essential hypertension     Past Surgical History:   Procedure Laterality Date     ARTHROPLASTY HIP ANTERIOR Right 7/24/2018    Procedure: ARTHROPLASTY HIP ANTERIOR;  RIGHT DIRECT ANTERIOR TOTAL HIP ARTHROPLASTY;  Surgeon: Jimbo Phillips MD;  Location:  OR     EYE SURGERY  cataracts     MOHS MICROGRAPHIC PROCEDURE      Left lower eyelid      MOHS MICROGRAPHIC PROCEDURE Left 10/27/2016    Procedure: MOHS MICROGRAPHIC PROCEDURE;  Surgeon: Jose Torrez MD;  Location:  [facility]     MOHS MICROGRAPHIC PROCEDURE Right 5/24/2018    Procedure: MOHS MICROGRAPHIC PROCEDURE;  RIGHT MEDIAL CANTHUS MOHS CLOSURE;  Surgeon: Jose Torrez MD;  Location: [facility]     ORTHOPEDIC SURGERY      bilateral wrists       Social History     Tobacco Use     Smoking status: Former Smoker     Packs/day: 0.00     Years: 0.00     Pack years: 0.00     Start date: 7/24/1998     Smokeless tobacco: Former User   Substance Use Topics     Alcohol use: Yes     Alcohol/week: 0.0 standard drinks     Comment: 1-2 drinks per month     Family History   Problem Relation Age of Onset     Diabetes Mother      Diabetes Father          Current Outpatient Medications   Medication Sig Dispense Refill     Acetaminophen (TYLENOL PO) Take 1,000 mg by mouth 2 times daily as needed for mild pain or fever       amLODIPine (NORVASC) 2.5 MG tablet Take 1 tablet (2.5 mg) by mouth daily 90 tablet 3     aspirin (ASA) 81 MG tablet Take 162 mg by mouth daily        ibuprofen (ADVIL/MOTRIN) 200 MG capsule        metoprolol succinate ER (TOPROL-XL) 50 MG 24 hr tablet TAKE 1/2 TABLET BY MOUTH ONCE A DAY 45 tablet 3     order for DME 1: Gradient Compression Wraps; 2: BLE knee high 20-30 mm Hg compression stockings 1 each 0     triamterene-HCTZ (MAXZIDE-25) 37.5-25 MG tablet TAKE 1 TABLET BY MOUTH ONCE A DAY 90 tablet 1     Allergies   Allergen Reactions     Ace Inhibitors      Angioedema       Cyclobenzaprine      Angioedema       Reviewed and updated as needed this visit  "by Provider         Review of Systems   ROS COMP: Constitutional, HEENT, cardiovascular, pulmonary, gi and gu systems are negative, except as otherwise noted.      Objective    There were no vitals taken for this visit.  Estimated body mass index is 21.62 kg/m  as calculated from the following:    Height as of 4/2/20: 1.549 m (5' 1\").    Weight as of 11/21/19: 51.9 kg (114 lb 6.4 oz).  Physical Exam     GENERAL: healthy, alert and no distress  EYES: Eyes grossly normal to inspection, conjunctivae and sclerae normal  RESP: no audible wheeze, cough, or visible cyanosis.  No visible retractions or increased work of breathing.  Able to speak fully in complete sentences.  MS: more swelling in left leg than right, no joint redness, no skin erythema or breakdown   NEURO: Alert and oriented to person, place and time. Mild memory deficits.  Cranial nerves 2-12 appear grossly intact.   Moves all extremities   PSYCH: affect normal/bright and appearance well groomed      Negative doppler ultrasound in ER; CT abdominal/pelvis without obvious bone abnormalities         Assessment & Plan       ICD-10-CM    1. Pain of left lower leg  M79.662    2. Left leg swelling  M79.89    3. Ataxia  R27.0      Advised to elevate left leg when sitting  PT is a good idea for mobility and fall risk reduction        No follow-ups on file.    Truman Norman MD  [facility]      Video-Visit Details    Type of service:  Video Visit    Video End Time:14:15    Originating Location (pt. Location): Home    Distant Location (provider location):  [facility]     Mode of Communication:  Video Conference via [employer]   No follow-ups on file.       Truman Norman MD          "

## 2020-04-23 NOTE — TELEPHONE ENCOUNTER
[facility] with [facility] to satisfy medicare requirements?

## 2020-04-23 NOTE — TELEPHONE ENCOUNTER
Called and spoke with Saray, patient's daughter.     States she was informed of this possibility by [facility].     Video Visit scheduled today with PCP at 2pm.     Saray will go to patient's house and visit will be via Saray's smartphone.     Text to:  [phone]    Virgie RUBY RN,BSN

## 2020-04-23 NOTE — TELEPHONE ENCOUNTER
Good morning Dr Norman-  We received a referral for in home PT services yesterday. The FTF that was sent with the order is invalid. In order for the FTF to be valid the patient needs to have a VIDEO or IN PERSON visit with you. Telephone only visits do not qualify per Medicare guidelines. The patient must have this valid FTF within 14 days of home care services beginning or we will have to discharge. Please call daughter to set up a VIDEO visit with patient and please send the valid FTF to [facility]. Thank you!  Elli Ro RN  [phone]

## 2020-04-24 ENCOUNTER — TELEPHONE (OUTPATIENT)
Dept: FAMILY MEDICINE | Facility: CLINIC | Age: 85
End: 2020-04-24

## 2020-04-24 NOTE — TELEPHONE ENCOUNTER
Called Mariaa with [facility] to provide verbal orders for PT, OT, SN, SW, lymphedema therapy as requested  Also informed her to have pt switch to tylenol 1g q8h    Adi DOLL RN

## 2020-04-24 NOTE — TELEPHONE ENCOUNTER
APAP would be safer for her.  Recommended dose would be up to 1g every 8 hours as needed for pain or fever

## 2020-04-24 NOTE — TELEPHONE ENCOUNTER
Reason for Call:  Home Health Care    Mariaa with [facility] called regarding (reason for call):     Orders are needed for this patient.     PT: continue 2x4 weeks    OT: eval & treat    Skilled Nursing: eval & treat & orders for UA to rule out UTI. Pt has had resting HR of 48 & BP 92/58    Pt's family also questioning use of ibuprofen     Social Work: assessment    Lymphedema Therapy: eval & treat    Phone Number Homecare Nurse can be reached at: [phone]    Can we leave a detailed message on this number? YES    Best Time: any    Call taken on 4/24/2020 at 12:05 PM by Kelly Mccollum

## 2020-04-24 NOTE — TELEPHONE ENCOUNTER
PCP,    Will provide verbal orders.    Family is concerned about pt using Ibuprofen. Please advise if ok to continue.    Thank you,  Adi DOLL RN

## 2020-04-25 ENCOUNTER — NURSE TRIAGE (OUTPATIENT)
Dept: NURSING | Facility: CLINIC | Age: 85
End: 2020-04-25

## 2020-04-25 NOTE — TELEPHONE ENCOUNTER
PT is  with patient since recent fall in middle of March 2020    RN, Tiffanie, [phone] was called in to see patient because of new confusion worse in the mornings.    Questioned if UA/UC needed  No significant history of UTI's  No frequency, burning, odor change.  Drinking very little  Does not use bathroom frequently.    Lungs are clear  Afebrile    Was in ER on 4/2/20 for left leg edema.  Diuretic was increased by half     Bp low - 85/55 today  Heart rate is in the 50's    Yesterday was 100/60    Dr SHARMILA Lopez paged at 1:01 pm    Suad FRANCO RN -[phone]    Maryam Saavedra, 6/9/1932 - Dr Norman patient  increase in confusion in a.m's and some but less later in the day  bp and heart rate low, lungs clear, no urinary symptoms  Increased bp med in ER on 4/2/20.    Per Dr Lopez - I gave this information to TEE Moreno    Since patient has already taken today's medications, starting tomorrow:  Hold diuretic  Reduce metoprolol by 50%  Continue giving amlodipine  Increase fluids     Increase vitals to twice daily  Monitor mentation especially starting tomorrow with medication changes    If confusion worsens, fever develops, or dysuria/frequency be seen in ER.    Call back with any further questions/concerns.    COVID 19 Nurse Triage Plan/Patient Instructions    Please be aware that novel coronavirus (COVID-19) may be circulating in the community. If you develop symptoms such as fever, cough, or SOB or if you have concerns about the presence of another infection including coronavirus (COVID-19), please contact your health care provider or visit [email or web address].     Disposition/Instructions    Patient to stay at home and follow home care protocol based instructions.     Thank you for limiting contact with others, wearing a simple mask to cover your cough, practice good hand hygiene habits and accessing our virtual services where possible to limit the spread of this virus.    For more information about COVID19  and options for caring for yourself at home, please visit the CDC website at https://www.cdc.gov/coronavirus/2019-ncov/about/steps-when-sick.html  For more options for care at [facility], please visit our website at [email or web address]    For more information, please use the [place] Department of Health COVID-19 Website: [email or web address]./diseases/coronavirus/index.html  [place] Department of Health ([facility]) COVID-19 Hotlines (Interpreters available):      Health questions: Phone Number: [phone] or [phone] and Hours: 7 a.m. to 7 p.m.    Schools and  questions: Phone Number: [phone] or [phone] and Hours 7 a.m. to 7 p.m.      Suad FRANCO RN [facility]

## 2020-04-29 ENCOUNTER — TELEPHONE (OUTPATIENT)
Dept: FAMILY MEDICINE | Facility: CLINIC | Age: 85
End: 2020-04-29

## 2020-04-29 NOTE — TELEPHONE ENCOUNTER
[facility] now requests orders and shares plan of care/discharge summaries for some patients through [employer]bris.  Please REPLY TO THIS MESSAGE OR ROUTE BACK TO THE AUTHOR in order to give authorization for orders when needed.  This is considered a verbal order, you will still receive a faxed copy of orders for signature.  Thank you for your assistance in improving collaboration for our patients.    ORDER    Requesting lymphedema orders OT for 2x/wk for 2 weeks to educate patient and family on use of compression garments to BLE, education for long term management, wear schedule of compression and education for proper skin care to reduce risk of infection.    Lexa García OTRL, CLT  [phone]  [email or web address]

## 2020-05-01 ENCOUNTER — TELEPHONE (OUTPATIENT)
Dept: FAMILY MEDICINE | Facility: CLINIC | Age: 85
End: 2020-05-01

## 2020-05-01 NOTE — TELEPHONE ENCOUNTER
[facility] now requests orders and shares plan of care/discharge summaries for some patients through [employer].  Please REPLY TO THIS MESSAGE OR ROUTE BACK TO THE AUTHOR in order to give authorization for orders when needed.  This is considered a verbal order, you will still receive a faxed copy of orders for signature.  Thank you for your assistance in improving collaboration for our patients.    ORDER  Delay in start for OT due to scheduling conflicts/ Requesting new orders since first visit will be after order has .   Starting 20,   OT to eval and treat to assess safety with ADLS/IADLS, UE strengthening program, assess need for DME, bathroom safety and meal prep.   Patricia Greenfield, OTR/L  [phone]

## 2020-05-05 ENCOUNTER — TELEPHONE (OUTPATIENT)
Dept: FAMILY MEDICINE | Facility: CLINIC | Age: 85
End: 2020-05-05

## 2020-05-06 NOTE — TELEPHONE ENCOUNTER
[facility] now requests orders and shares plan of care/discharge summaries for some patients through [employer].  Please REPLY TO THIS MESSAGE OR ROUTE BACK TO THE AUTHOR in order to give authorization for orders when needed.  This is considered a verbal order, you will still receive a faxed copy of orders for signature.  Thank you for your assistance in improving collaboration for our patients.    ORDER    Occupational Therapy 1x per week for 4 weeks for equipment recommendations, cognitive compensatory technique education, UE strength and increase indep with ADLs.     Patricia Greenfield OTR/L

## 2020-05-07 ENCOUNTER — TRANSFERRED RECORDS (OUTPATIENT)
Dept: HEALTH INFORMATION MANAGEMENT | Facility: CLINIC | Age: 85
End: 2020-05-07

## 2020-05-07 DIAGNOSIS — Z11.59 ENCOUNTER FOR SCREENING FOR OTHER VIRAL DISEASES: Primary | ICD-10-CM

## 2020-05-08 ENCOUNTER — ANCILLARY PROCEDURE (OUTPATIENT)
Dept: GENERAL RADIOLOGY | Facility: CLINIC | Age: 85
End: 2020-05-08
Attending: NURSE PRACTITIONER
Payer: MEDICARE

## 2020-05-08 ENCOUNTER — OFFICE VISIT (OUTPATIENT)
Dept: FAMILY MEDICINE | Facility: CLINIC | Age: 85
End: 2020-05-08
Payer: MEDICARE

## 2020-05-08 VITALS
HEART RATE: 75 BPM | BODY MASS INDEX: 21.62 KG/M2 | DIASTOLIC BLOOD PRESSURE: 53 MMHG | HEIGHT: 61 IN | SYSTOLIC BLOOD PRESSURE: 99 MMHG | TEMPERATURE: 94.5 F | OXYGEN SATURATION: 91 %

## 2020-05-08 DIAGNOSIS — Z01.818 PREOP GENERAL PHYSICAL EXAM: ICD-10-CM

## 2020-05-08 DIAGNOSIS — I10 BENIGN ESSENTIAL HYPERTENSION: ICD-10-CM

## 2020-05-08 DIAGNOSIS — Z01.818 PREOP GENERAL PHYSICAL EXAM: Primary | ICD-10-CM

## 2020-05-08 DIAGNOSIS — S82.92XA CLOSED FRACTURE OF LEFT LOWER EXTREMITY, INITIAL ENCOUNTER: ICD-10-CM

## 2020-05-08 DIAGNOSIS — R06.89 DECREASED BREATH SOUNDS AT RIGHT LUNG BASE: ICD-10-CM

## 2020-05-08 LAB
CREAT SERPL-MCNC: 0.83 MG/DL (ref 0.52–1.04)
GFR SERPL CREATININE-BSD FRML MDRD: 63 ML/MIN/{1.73_M2}
HGB BLD-MCNC: 10.3 G/DL (ref 11.7–15.7)
MRSA DNA SPEC QL NAA+PROBE: NEGATIVE
POTASSIUM SERPL-SCNC: 4.1 MMOL/L (ref 3.4–5.3)
SPECIMEN SOURCE: NORMAL

## 2020-05-08 PROCEDURE — 87640 STAPH A DNA AMP PROBE: CPT | Performed by: NURSE PRACTITIONER

## 2020-05-08 PROCEDURE — 36415 COLL VENOUS BLD VENIPUNCTURE: CPT | Performed by: NURSE PRACTITIONER

## 2020-05-08 PROCEDURE — 99215 OFFICE O/P EST HI 40 MIN: CPT | Performed by: NURSE PRACTITIONER

## 2020-05-08 PROCEDURE — 85018 HEMOGLOBIN: CPT | Performed by: NURSE PRACTITIONER

## 2020-05-08 PROCEDURE — 82565 ASSAY OF CREATININE: CPT | Performed by: NURSE PRACTITIONER

## 2020-05-08 PROCEDURE — 84132 ASSAY OF SERUM POTASSIUM: CPT | Performed by: NURSE PRACTITIONER

## 2020-05-08 PROCEDURE — 71045 X-RAY EXAM CHEST 1 VIEW: CPT

## 2020-05-08 PROCEDURE — 87641 MR-STAPH DNA AMP PROBE: CPT | Performed by: NURSE PRACTITIONER

## 2020-05-08 RX ORDER — METOPROLOL SUCCINATE 50 MG/1
12.5 TABLET, EXTENDED RELEASE ORAL EVERY EVENING
Status: ON HOLD | COMMUNITY
Start: 2020-05-08 | End: 2020-05-15

## 2020-05-08 NOTE — PATIENT INSTRUCTIONS
Before Your Surgery      Call your surgeon if there is any change in your health. This includes signs of a cold or flu (such as a sore throat, runny nose, cough, rash or fever).    Do not smoke, drink alcohol or take over the counter medicine (unless your surgeon or primary care doctor tells you to) for the 24 hours before and after surgery.    If you take prescribed drugs: Follow your doctor s orders about which medicines to take and which to stop until after surgery.    Eating and drinking prior to surgery: follow the instructions from your surgeon    Take a shower or bath the night before surgery. Use the soap your surgeon gave you to gently clean your skin. If you do not have soap from your surgeon, use your regular soap. Do not shave or scrub the surgery site.  Wear clean pajamas and have clean sheets on your bed.     Hold your amlodipine until surgery.   Continue checking BP everyday. If your blood pressure goes up then let us know. We might have to restart this.     Continue the metoprolol for now     Continue to HOLD the aspirin

## 2020-05-08 NOTE — PROGRESS NOTES
[address] [address]  [phone]  Dept: [phone]    PRE-OP EVALUATION:  Today's date: 2020    Maryam Saavedra (: 1932) presents for pre-operative evaluation assessment as requested by Jimbo Whitt MD.  She requires evaluation and anesthesia risk assessment prior to undergoing surgery/procedure for treatment of LEFT TOTAL KNEE ARTHROPLASTY .    Proposed Surgery/ Procedure: LEFT TOTAL KNEE ARTHROPLASTY  Date of Surgery/ Procedure: 2020  Time of Surgery/ Procedure: 12:40 PM  Hospital/Surgical Facility:  OR  Fax number for surgical facility: [facility]  Primary Physician: Truman Norman  Type of Anesthesia Anticipated: General    Patient has a Health Care Directive or Living Will:  YES     1. NO - Do you have a history of heart attack, stroke, stent, bypass or surgery on an artery in the head, neck, heart or legs?  2. NO - Do you ever have any pain or discomfort in your chest?  3. NO - Do you have a history of  Heart Failure?  4. NO - Are you troubled by shortness of breath when: walking on the level, up a slight hill or at night?  5. NO - Do you currently have a cold, bronchitis or other respiratory infection?  6. NO - Do you have a cough, shortness of breath or wheezing?  7. NO - Do you sometimes get pains in the calves of your legs when you walk?  8. NO - Do you or anyone in your family have previous history of blood clots?  9. NO - Do you or does anyone in your family have a serious bleeding problem such as prolonged bleeding following surgeries or cuts?  10. NO - Have you ever had problems with anemia or been told to take iron pills?  11. NO - Have you had any abnormal blood loss such as black, tarry or bloody stools, or abnormal vaginal bleeding?  12. NO - Have you ever had a blood transfusion?  13. NO - Have you or any of your relatives ever had problems with anesthesia?  14. YES - Do you have sleep apnea, excessive snoring or  daytime drowsiness? Snoring    15. NO - Do you have any prosthetic heart valves?  16. YES - Do you have prosthetic joints? R hip 7/2018  17. NO - Is there any chance that you may be pregnant?      HPI:     HPI related to upcoming procedure:   Had a fall over a month ago where she then developed lymphedema. Has been getting wrapped so swelling is down   Just went to ortho yesterday and was told she has a fracture in the knee. We do not have those records.  Having severe pain of upper leg and knee   Has slight balance issues. Previously would walk without the walker at home but now since the fall needs to always use it   No paresthesias of the leg   Has been more confused lately.   She has not been taking her aspirin recently        See problem list for active medical problems.  Problems all longstanding and stable, except as noted/documented.  See ROS for pertinent symptoms related to these conditions.      MEDICAL HISTORY:     Patient Active Problem List    Diagnosis Date Noted     Benign essential hypertension 09/01/2016     Priority: Medium      Past Medical History:   Diagnosis Date     Benign essential hypertension 9/1/2016     Chronic low back pain      Hypertension      Impaired fasting glucose     borderline     Osteoarthritis      Past Surgical History:   Procedure Laterality Date     ARTHROPLASTY HIP ANTERIOR Right 7/24/2018    Procedure: ARTHROPLASTY HIP ANTERIOR;  RIGHT DIRECT ANTERIOR TOTAL HIP ARTHROPLASTY;  Surgeon: Jimbo Phillips MD;  Location:  OR     EYE SURGERY  cataracts     MOHS MICROGRAPHIC PROCEDURE      Left lower eyelid      MOHS MICROGRAPHIC PROCEDURE Left 10/27/2016    Procedure: MOHS MICROGRAPHIC PROCEDURE;  Surgeon: Jose Torrez MD;  Location: [facility]     MOHS MICROGRAPHIC PROCEDURE Right 5/24/2018    Procedure: MOHS MICROGRAPHIC PROCEDURE;  RIGHT MEDIAL CANTHUS MOHS CLOSURE;  Surgeon: Jose Torrez MD;  Location: [facility]     ORTHOPEDIC SURGERY      bilateral wrists  "    Current Outpatient Medications   Medication Sig Dispense Refill     Acetaminophen (TYLENOL PO) Take 1,000 mg by mouth 2 times daily as needed for mild pain or fever       amLODIPine (NORVASC) 2.5 MG tablet Take 1 tablet (2.5 mg) by mouth daily 90 tablet 3     aspirin (ASA) 81 MG tablet Take 162 mg by mouth daily        ibuprofen (ADVIL/MOTRIN) 200 MG capsule        metoprolol succinate ER (TOPROL-XL) 50 MG 24 hr tablet TAKE 1/2 TABLET BY MOUTH ONCE A DAY 45 tablet 3     order for DME 1: Gradient Compression Wraps; 2: BLE knee high 20-30 mm Hg compression stockings 1 each 0     triamterene-HCTZ (MAXZIDE-25) 37.5-25 MG tablet TAKE 1 TABLET BY MOUTH ONCE A DAY 90 tablet 1     OTC products: None, except as noted above    Allergies   Allergen Reactions     Ace Inhibitors      Angioedema       Cyclobenzaprine      Angioedema      Latex Allergy: NO    Social History     Tobacco Use     Smoking status: Former Smoker     Packs/day: 0.00     Years: 0.00     Pack years: 0.00     Start date: 7/24/1998     Smokeless tobacco: Former User   Substance Use Topics     Alcohol use: Yes     Alcohol/week: 0.0 standard drinks     Comment: 1-2 drinks per month     History   Drug Use No       REVIEW OF SYSTEMS:   Constitutional, neuro, ENT, endocrine, pulmonary, cardiac, gastrointestinal, genitourinary, musculoskeletal, integument and psychiatric systems are negative, except as otherwise noted.    EXAM:   BP 99/53 (BP Location: Left arm, Patient Position: Sitting, Cuff Size: Adult Small)   Pulse 75   Temp 94.5  F (34.7  C) (Tympanic)   Ht 1.549 m (5' 1\")   SpO2 91%   BMI 21.62 kg/m      GENERAL APPEARANCE: healthy, alert and no distress     EYES: EOMI, PERRL     HENT: ear canals and TM's normal and nose and mouth without ulcers or lesions     RESP: lungs clear to auscultation - no rales, rhonchi or wheezes     CV: regular rates and rhythm, normal S1 S2, no S3 or S4 and no murmur, click or rub     MS: extremities normal- no gross " deformities noted, no evidence of inflammation in joints, FROM in all extremities.     SKIN: no suspicious lesions or rashes     NEURO: Normal strength and tone, sensory exam grossly normal, mentation intact and speech normal     PSYCH: mentation appears normal. and affect normal/bright    DIAGNOSTICS:     EKG: completed 4/2/2020  MRSA/MSSA screening for elective joint replacement surgery  Labs Resulted Today:   Results for orders placed or performed in visit on 05/08/20   XR Chest 1 View     Status: None    Narrative    XR CHEST 1 VW 5/8/2020 1:08 PM    HISTORY: Preop general physical exam; Decreased breath sounds at right  lung base    COMPARISON: 5/21/2019 radiograph    FINDINGS AND     Impression    IMPRESSION: No pleural fluid or pneumothorax. No airspace disease or  edema. Normal size of the heart. There is mild aortic tortuosity and  calcification. There is lateral curvature of the spine.    LESTER J FAHRNER, MD   Results for orders placed or performed in visit on 05/08/20   Hemoglobin     Status: Abnormal   Result Value Ref Range    Hemoglobin 10.3 (L) 11.7 - 15.7 g/dL   Potassium     Status: None   Result Value Ref Range    Potassium 4.1 3.4 - 5.3 mmol/L   Creatinine     Status: None   Result Value Ref Range    Creatinine 0.83 0.52 - 1.04 mg/dL    GFR Estimate 63 >60 mL/min/[1.73_m2]    GFR Estimate If Black 73 >60 mL/min/[1.73_m2]   Methicillin Resist/Sens S. aureus PCR     Status: None    Specimen: Nares   Result Value Ref Range    Specimen Description Nares     Methicillin Resist/Sens S. aureus PCR Negative NEG^Negative       Recent Labs   Lab Test 04/02/20  1934 05/21/19  1124  07/13/18  1620 05/14/18  1037   HGB 11.2* 13.2   < > 12.0 13.3    227   < > 275  --     140  --  140 140   POTASSIUM 3.6 4.1   < > 4.2 4.0   CR 0.90 0.77   < > 0.77 0.85   A1C  --   --   --  5.6 5.9*    < > = values in this interval not displayed.        IMPRESSION:   Reason for surgery/procedure: L  TKA  Diagnosis/reason for consult: medical preop    The proposed surgical procedure is considered INTERMEDIATE risk.    REVISED CARDIAC RISK INDEX  The patient has the following serious cardiovascular risks for perioperative complications such as (MI, PE, VFib and 3  AV Block):  No serious cardiac risks  INTERPRETATION: 0 risks: Class I (very low risk - 0.4% complication rate)    The patient has the following additional risks for perioperative complications:  Current increased confusion      ICD-10-CM    1. Preop general physical exam  Z01.818 Hemoglobin     Potassium     Creatinine     JUST IN CASE     Methicillin Resist/Sens S. aureus PCR     CANCELED: XR Chest 2 Views   2. Benign essential hypertension  I10 metoprolol succinate ER (TOPROL-XL) 25 MG 24 hr tablet   3. Closed fracture of left lower extremity, initial encounter  S82.92XA     knee. dx at [facility]   4. Decreased breath sounds at right lung base  R09.89 CANCELED: XR Chest 2 Views       RECOMMENDATIONS:       Anemia  Anemia and does not require treatment prior to surgery.  Monitor Hemoglobin postoperatively.      --Patient is to take all scheduled medications on the day of surgery EXCEPT for modifications listed below.      Hold your amlodipine until surgery.   Continue checking BP everyday. If your blood pressure goes up then let us know. We might have to restart this.     Continue the metoprolol for now     Continue to HOLD the aspirin    Pt is high risk for this procedure given the fact that she has been more confused lately which may be related to her current fracture and also her anemia which is also likely related to her fracture. I do not have records from [facility] but it appears this surgery is highly necessary d/t her fracture.       APPROVAL GIVEN to proceed with proposed procedure, without further diagnostic evaluation       Signed Electronically by: JACKIE Rocha CNP    Copy of this evaluation report is provided to requesting  physician.    [facility] Preop Guidelines    Revised Cardiac Risk Index

## 2020-05-09 ENCOUNTER — NURSE TRIAGE (OUTPATIENT)
Dept: NURSING | Facility: CLINIC | Age: 85
End: 2020-05-09

## 2020-05-09 NOTE — TELEPHONE ENCOUNTER
Saray (daughter) calls and says that her mother is having knee surgery on Tuesday and has had 3 blood pressure medication changes recently. Saray says that a NP, yesterday, told her mother to not take her Amlodipine, because pt's BP had been 99/53 for weeks. Saray says that her mother did not take the Amlodipine today and now her BP = 154/63. Saray says that she wants her mother back on the Amlodipine. Dr. Miller-[facility]-was paged to call this nurse, at: [phone], at: [phone], via page . Dr. Miller called this nurse back and was told about pt's BP and about Saray's concern. Dr. Miller then says that pt. Can take the Amlodipine today, tomorrow, and on Monday, but to not take it on Tuesday, and discuss this with pt's , after pt's surgery. RN then called Saray back and told Saray what Dr. Miller said. Saray voiced understanding. COVID 19 Nurse Triage Plan/Patient Instructions    Please be aware that novel coronavirus (COVID-19) may be circulating in the community. If you develop symptoms such as fever, cough, or SOB or if you have concerns about the presence of another infection including coronavirus (COVID-19), please contact your health care provider or visit [email or web address].     Disposition/Instructions    Patient to stay at home and follow home care protocol based instructions.     Thank you for limiting contact with others, wearing a simple mask to cover your cough, practice good hand hygiene habits and accessing our virtual services where possible to limit the spread of this virus.    For more information about COVID19 and options for caring for yourself at home, please visit the CDC website at https://www.cdc.gov/coronavirus/2019-ncov/about/steps-when-sick.html  For more options for care at [facility], please visit our website at [email or web address]    For more information, please use the [place] Department of Health COVID-19 Website:  https://www.[email or web address]/diseases/coronavirus/index.html  [place] Department of Health ([facility]) COVID-19 Hotlines (Interpreters available):      Health questions: Phone Number: [phone] or [phone] and Hours: 7 a.m. to 7 p.m.    Schools and  questions: Phone Number: [phone] or [phone] and Hours 7 a.m. to 7 p.m.                  Reason for Disposition    Health Information question, no triage required and triager able to answer question    Additional Information    Negative: [1] Caller is not with the adult (patient) AND [2] reporting urgent symptoms    Negative: Lab result questions    Negative: Medication questions    Negative: Caller can't be reached by phone    Negative: Caller has already spoken to PCP or another triager    Negative: RN needs further essential information from caller in order to complete triage    Negative: Requesting regular office appointment    Negative: [1] Caller requesting NON-URGENT health information AND [2] PCP's office is the best resource    Protocols used: INFORMATION ONLY CALL-A-

## 2020-05-10 ENCOUNTER — OFFICE VISIT (OUTPATIENT)
Dept: URGENT CARE | Facility: URGENT CARE | Age: 85
End: 2020-05-10
Payer: MEDICARE

## 2020-05-10 DIAGNOSIS — Z11.59 ENCOUNTER FOR SCREENING FOR OTHER VIRAL DISEASES: ICD-10-CM

## 2020-05-10 PROCEDURE — 99207 ZZC NO BILLABLE SERVICE THIS VISIT: CPT

## 2020-05-10 PROCEDURE — 99000 SPECIMEN HANDLING OFFICE-LAB: CPT | Performed by: ORTHOPAEDIC SURGERY

## 2020-05-10 PROCEDURE — 87635 SARS-COV-2 COVID-19 AMP PRB: CPT | Performed by: ORTHOPAEDIC SURGERY

## 2020-05-11 ENCOUNTER — DOCUMENTATION ONLY (OUTPATIENT)
Dept: EDUCATION SERVICES | Facility: CLINIC | Age: 85
End: 2020-05-11

## 2020-05-11 LAB
SARS-COV-2 RNA SPEC QL NAA+PROBE: NOT DETECTED
SPECIMEN SOURCE: NORMAL

## 2020-05-11 RX ORDER — TRIAMTERENE/HYDROCHLOROTHIAZID 37.5-25 MG
1 TABLET ORAL DAILY
COMMUNITY
End: 2020-05-11

## 2020-05-11 NOTE — PROGRESS NOTES
PTA medications updated by Medication Scribe day before surgery via phone call with patient      -LAST DOSES ENTERED BY NURSE-    Medication history sources: Patient's family/friend (Daughter- Saray), Surescripts and H&P  Medication history source reliability: Good  Adherence assessment: N/A Not Observed    Significant changes made to the medication list:  None      Additional medication history information:   None        Prior to Admission medications    Medication Sig Last Dose Taking? Auth Provider   Acetaminophen (TYLENOL PO) Take 1,000 mg by mouth 2 times daily as needed for mild pain or fever  at PRN Yes Reported, Patient   amLODIPine (NORVASC) 2.5 MG tablet Take 1 tablet (2.5 mg) by mouth daily  at am Yes Truman Norman MD   aspirin (ASA) 81 MG tablet Take 162 mg by mouth daily (takes 2 x 81mg)  Yes Reported, Patient   metoprolol succinate ER (TOPROL-XL) 50 MG 24 hr tablet Take 12.5 mg by mouth every evening (takes 0.25 x 50mg)  at pm Yes Kal Kay APRN CNP   order for DME 1: Gradient Compression Wraps; 2: BLE knee high 20-30 mm Hg compression stockings   Shaylee Levine APRN CNP

## 2020-05-11 NOTE — RESULT ENCOUNTER NOTE
Maryam, I hope you were able to see your results. As you can see the hemoglobin is a little lower. I suspect that is related to your fracture. Fractures cause bleeding which would make the hemoglobin go down. Otherwise everything else looks great. I wish you the best of luck on your surgery  Kal

## 2020-05-12 ENCOUNTER — ANESTHESIA (OUTPATIENT)
Dept: SURGERY | Facility: CLINIC | Age: 85
DRG: 470 | End: 2020-05-12
Payer: MEDICARE

## 2020-05-12 ENCOUNTER — HOSPITAL ENCOUNTER (INPATIENT)
Facility: CLINIC | Age: 85
LOS: 3 days | Discharge: NURSING, REHAB OR LONG-TERM CARE | DRG: 470 | End: 2020-05-15
Attending: ORTHOPAEDIC SURGERY | Admitting: ORTHOPAEDIC SURGERY
Payer: MEDICARE

## 2020-05-12 ENCOUNTER — APPOINTMENT (OUTPATIENT)
Dept: GENERAL RADIOLOGY | Facility: CLINIC | Age: 85
DRG: 470 | End: 2020-05-12
Attending: ORTHOPAEDIC SURGERY
Payer: MEDICARE

## 2020-05-12 ENCOUNTER — ANESTHESIA EVENT (OUTPATIENT)
Dept: SURGERY | Facility: CLINIC | Age: 85
DRG: 470 | End: 2020-05-12
Payer: MEDICARE

## 2020-05-12 DIAGNOSIS — Z96.652 S/P TOTAL KNEE ARTHROPLASTY, LEFT: Primary | ICD-10-CM

## 2020-05-12 DIAGNOSIS — I10 BENIGN ESSENTIAL HYPERTENSION: ICD-10-CM

## 2020-05-12 LAB — POTASSIUM SERPL-SCNC: 3.9 MMOL/L (ref 3.4–5.3)

## 2020-05-12 PROCEDURE — 25000128 H RX IP 250 OP 636: Performed by: NURSE ANESTHETIST, CERTIFIED REGISTERED

## 2020-05-12 PROCEDURE — 25000128 H RX IP 250 OP 636: Performed by: ORTHOPAEDIC SURGERY

## 2020-05-12 PROCEDURE — 0SRD0J9 REPLACEMENT OF LEFT KNEE JOINT WITH SYNTHETIC SUBSTITUTE, CEMENTED, OPEN APPROACH: ICD-10-PCS | Performed by: ORTHOPAEDIC SURGERY

## 2020-05-12 PROCEDURE — C1713 ANCHOR/SCREW BN/BN,TIS/BN: HCPCS | Performed by: ORTHOPAEDIC SURGERY

## 2020-05-12 PROCEDURE — 25000566 ZZH SEVOFLURANE, EA 15 MIN: Performed by: ORTHOPAEDIC SURGERY

## 2020-05-12 PROCEDURE — 25000125 ZZHC RX 250: Performed by: NURSE ANESTHETIST, CERTIFIED REGISTERED

## 2020-05-12 PROCEDURE — 99221 1ST HOSP IP/OBS SF/LOW 40: CPT | Performed by: PHYSICIAN ASSISTANT

## 2020-05-12 PROCEDURE — 27810169 ZZH OR IMPLANT GENERAL: Performed by: ORTHOPAEDIC SURGERY

## 2020-05-12 PROCEDURE — C1776 JOINT DEVICE (IMPLANTABLE): HCPCS | Performed by: ORTHOPAEDIC SURGERY

## 2020-05-12 PROCEDURE — 25800030 ZZH RX IP 258 OP 636: Performed by: ANESTHESIOLOGY

## 2020-05-12 PROCEDURE — 25000125 ZZHC RX 250: Performed by: ORTHOPAEDIC SURGERY

## 2020-05-12 PROCEDURE — 25000128 H RX IP 250 OP 636: Performed by: ANESTHESIOLOGY

## 2020-05-12 PROCEDURE — 27210794 ZZH OR GENERAL SUPPLY STERILE: Performed by: ORTHOPAEDIC SURGERY

## 2020-05-12 PROCEDURE — 40000985 XR KNEE PORT LT 1/2 VW: Mod: LT

## 2020-05-12 PROCEDURE — 71000012 ZZH RECOVERY PHASE 1 LEVEL 1 FIRST HR: Performed by: ORTHOPAEDIC SURGERY

## 2020-05-12 PROCEDURE — 37000009 ZZH ANESTHESIA TECHNICAL FEE, EACH ADDTL 15 MIN: Performed by: ORTHOPAEDIC SURGERY

## 2020-05-12 PROCEDURE — 36000093 ZZH SURGERY LEVEL 4 1ST 30 MIN: Performed by: ORTHOPAEDIC SURGERY

## 2020-05-12 PROCEDURE — 25800030 ZZH RX IP 258 OP 636: Performed by: NURSE ANESTHETIST, CERTIFIED REGISTERED

## 2020-05-12 PROCEDURE — 99207 ZZC CONSULT E&M CHANGED TO INITIAL LEVEL: CPT | Performed by: PHYSICIAN ASSISTANT

## 2020-05-12 PROCEDURE — 25000132 ZZH RX MED GY IP 250 OP 250 PS 637: Mod: GY | Performed by: ORTHOPAEDIC SURGERY

## 2020-05-12 PROCEDURE — 25800025 ZZH RX 258: Performed by: ORTHOPAEDIC SURGERY

## 2020-05-12 PROCEDURE — 40000171 ZZH STATISTIC PRE-PROCEDURE ASSESSMENT III: Performed by: ORTHOPAEDIC SURGERY

## 2020-05-12 PROCEDURE — 36000063 ZZH SURGERY LEVEL 4 EA 15 ADDTL MIN: Performed by: ORTHOPAEDIC SURGERY

## 2020-05-12 PROCEDURE — 25000125 ZZHC RX 250: Performed by: ANESTHESIOLOGY

## 2020-05-12 PROCEDURE — 27110028 ZZH OR GENERAL SUPPLY NON-STERILE: Performed by: ORTHOPAEDIC SURGERY

## 2020-05-12 PROCEDURE — 84132 ASSAY OF SERUM POTASSIUM: CPT | Performed by: ANESTHESIOLOGY

## 2020-05-12 PROCEDURE — 12000000 ZZH R&B MED SURG/OB

## 2020-05-12 PROCEDURE — 25800030 ZZH RX IP 258 OP 636: Performed by: ORTHOPAEDIC SURGERY

## 2020-05-12 PROCEDURE — 36415 COLL VENOUS BLD VENIPUNCTURE: CPT | Performed by: ANESTHESIOLOGY

## 2020-05-12 PROCEDURE — 37000008 ZZH ANESTHESIA TECHNICAL FEE, 1ST 30 MIN: Performed by: ORTHOPAEDIC SURGERY

## 2020-05-12 DEVICE — IMP ART SURFACE ZIM NEXGEN LPS EF 3-4 12MM 00-5964-032-12: Type: IMPLANTABLE DEVICE | Site: KNEE | Status: FUNCTIONAL

## 2020-05-12 DEVICE — SIMPLEX® HV IS A FAST-SETTING ACRYLIC RESIN FOR USE IN BONE SURGERY. MIXING THE TWO SEPARATE STERILE COMPONENTS PRODUCES A DUCTILE BONE CEMENT WHICH, AFTER HARDENING, FIXES THE IMPLANT AND TRANSFERS STRESSES PRODUCED DURING MOVEMENT EVENLY TO THE BONE. SIMPLEX® HV CEMENT POWDER ALSO CONTAINS INSOLUBLE ZIRCONIUM DIOXIDE AS AN X-RAY CONTRAST MEDIUM. SIMPLEX® HV DOES NOT EMIT A SIGNAL AND DOES NOT POSE A SAFETY RISK IN A MAGNETIC RESONANCE ENVIRONMENT.
Type: IMPLANTABLE DEVICE | Site: KNEE | Status: FUNCTIONAL
Brand: SIMPLEX HV

## 2020-05-12 DEVICE — IMP PLATE TIBIAL ZIM NEXGEN SIZE 4: Type: IMPLANTABLE DEVICE | Site: KNEE | Status: FUNCTIONAL

## 2020-05-12 DEVICE — IMP COMP PATELLA ZIM NEXGEN 8.5X32MM 00-5972-065-32: Type: IMPLANTABLE DEVICE | Site: KNEE | Status: FUNCTIONAL

## 2020-05-12 DEVICE — IMPLANTABLE DEVICE
Type: IMPLANTABLE DEVICE | Site: KNEE | Status: FUNCTIONAL
Brand: NEXGEN® LEGACY®

## 2020-05-12 RX ORDER — FENTANYL CITRATE 50 UG/ML
25-50 INJECTION, SOLUTION INTRAMUSCULAR; INTRAVENOUS
Status: DISCONTINUED | OUTPATIENT
Start: 2020-05-12 | End: 2020-05-12 | Stop reason: HOSPADM

## 2020-05-12 RX ORDER — LABETALOL HYDROCHLORIDE 5 MG/ML
10 INJECTION, SOLUTION INTRAVENOUS
Status: DISCONTINUED | OUTPATIENT
Start: 2020-05-12 | End: 2020-05-12 | Stop reason: HOSPADM

## 2020-05-12 RX ORDER — PROPOFOL 10 MG/ML
INJECTION, EMULSION INTRAVENOUS CONTINUOUS PRN
Status: DISCONTINUED | OUTPATIENT
Start: 2020-05-12 | End: 2020-05-12

## 2020-05-12 RX ORDER — ACETAMINOPHEN 325 MG/1
650 TABLET ORAL EVERY 4 HOURS PRN
Status: DISCONTINUED | OUTPATIENT
Start: 2020-05-12 | End: 2020-05-15 | Stop reason: HOSPADM

## 2020-05-12 RX ORDER — BISACODYL 10 MG
10 SUPPOSITORY, RECTAL RECTAL DAILY PRN
Status: DISCONTINUED | OUTPATIENT
Start: 2020-05-12 | End: 2020-05-15 | Stop reason: HOSPADM

## 2020-05-12 RX ORDER — AMLODIPINE BESYLATE 2.5 MG/1
2.5 TABLET ORAL DAILY
Status: DISCONTINUED | OUTPATIENT
Start: 2020-05-13 | End: 2020-05-15 | Stop reason: HOSPADM

## 2020-05-12 RX ORDER — HYDROMORPHONE HYDROCHLORIDE 1 MG/ML
.3-.5 INJECTION, SOLUTION INTRAMUSCULAR; INTRAVENOUS; SUBCUTANEOUS EVERY 5 MIN PRN
Status: DISCONTINUED | OUTPATIENT
Start: 2020-05-12 | End: 2020-05-12 | Stop reason: HOSPADM

## 2020-05-12 RX ORDER — FENTANYL CITRATE 50 UG/ML
INJECTION, SOLUTION INTRAMUSCULAR; INTRAVENOUS PRN
Status: DISCONTINUED | OUTPATIENT
Start: 2020-05-12 | End: 2020-05-12

## 2020-05-12 RX ORDER — PROCHLORPERAZINE MALEATE 5 MG
5 TABLET ORAL EVERY 6 HOURS PRN
Status: DISCONTINUED | OUTPATIENT
Start: 2020-05-12 | End: 2020-05-15 | Stop reason: HOSPADM

## 2020-05-12 RX ORDER — HYDROCODONE BITARTRATE AND ACETAMINOPHEN 5; 325 MG/1; MG/1
1-2 TABLET ORAL EVERY 4 HOURS PRN
Status: DISCONTINUED | OUTPATIENT
Start: 2020-05-12 | End: 2020-05-14

## 2020-05-12 RX ORDER — DEXTROSE MONOHYDRATE, SODIUM CHLORIDE, AND POTASSIUM CHLORIDE 50; 1.49; 4.5 G/1000ML; G/1000ML; G/1000ML
INJECTION, SOLUTION INTRAVENOUS CONTINUOUS
Status: DISCONTINUED | OUTPATIENT
Start: 2020-05-12 | End: 2020-05-13

## 2020-05-12 RX ORDER — ONDANSETRON 2 MG/ML
4 INJECTION INTRAMUSCULAR; INTRAVENOUS EVERY 30 MIN PRN
Status: DISCONTINUED | OUTPATIENT
Start: 2020-05-12 | End: 2020-05-12 | Stop reason: HOSPADM

## 2020-05-12 RX ORDER — MAGNESIUM HYDROXIDE 1200 MG/15ML
LIQUID ORAL PRN
Status: DISCONTINUED | OUTPATIENT
Start: 2020-05-12 | End: 2020-05-12 | Stop reason: HOSPADM

## 2020-05-12 RX ORDER — GLYCOPYRROLATE 0.2 MG/ML
INJECTION, SOLUTION INTRAMUSCULAR; INTRAVENOUS PRN
Status: DISCONTINUED | OUTPATIENT
Start: 2020-05-12 | End: 2020-05-12

## 2020-05-12 RX ORDER — POLYETHYLENE GLYCOL 3350 17 G/17G
17 POWDER, FOR SOLUTION ORAL DAILY
Status: DISCONTINUED | OUTPATIENT
Start: 2020-05-12 | End: 2020-05-15 | Stop reason: HOSPADM

## 2020-05-12 RX ORDER — CEFAZOLIN SODIUM 1 G/3ML
1 INJECTION, POWDER, FOR SOLUTION INTRAMUSCULAR; INTRAVENOUS EVERY 8 HOURS
Status: COMPLETED | OUTPATIENT
Start: 2020-05-12 | End: 2020-05-13

## 2020-05-12 RX ORDER — NEOSTIGMINE METHYLSULFATE 1 MG/ML
VIAL (ML) INJECTION PRN
Status: DISCONTINUED | OUTPATIENT
Start: 2020-05-12 | End: 2020-05-12

## 2020-05-12 RX ORDER — LIDOCAINE HYDROCHLORIDE 20 MG/ML
INJECTION, SOLUTION INFILTRATION; PERINEURAL PRN
Status: DISCONTINUED | OUTPATIENT
Start: 2020-05-12 | End: 2020-05-12

## 2020-05-12 RX ORDER — PROPOFOL 10 MG/ML
INJECTION, EMULSION INTRAVENOUS PRN
Status: DISCONTINUED | OUTPATIENT
Start: 2020-05-12 | End: 2020-05-12

## 2020-05-12 RX ORDER — ONDANSETRON 2 MG/ML
INJECTION INTRAMUSCULAR; INTRAVENOUS PRN
Status: DISCONTINUED | OUTPATIENT
Start: 2020-05-12 | End: 2020-05-12

## 2020-05-12 RX ORDER — EPHEDRINE SULFATE 50 MG/ML
INJECTION, SOLUTION INTRAMUSCULAR; INTRAVENOUS; SUBCUTANEOUS PRN
Status: DISCONTINUED | OUTPATIENT
Start: 2020-05-12 | End: 2020-05-12

## 2020-05-12 RX ORDER — CEFAZOLIN SODIUM 2 G/100ML
2 INJECTION, SOLUTION INTRAVENOUS
Status: COMPLETED | OUTPATIENT
Start: 2020-05-12 | End: 2020-05-12

## 2020-05-12 RX ORDER — SODIUM CHLORIDE, SODIUM LACTATE, POTASSIUM CHLORIDE, CALCIUM CHLORIDE 600; 310; 30; 20 MG/100ML; MG/100ML; MG/100ML; MG/100ML
INJECTION, SOLUTION INTRAVENOUS CONTINUOUS
Status: DISCONTINUED | OUTPATIENT
Start: 2020-05-12 | End: 2020-05-12 | Stop reason: HOSPADM

## 2020-05-12 RX ORDER — ONDANSETRON 4 MG/1
4 TABLET, ORALLY DISINTEGRATING ORAL EVERY 30 MIN PRN
Status: DISCONTINUED | OUTPATIENT
Start: 2020-05-12 | End: 2020-05-12 | Stop reason: HOSPADM

## 2020-05-12 RX ORDER — HYDROMORPHONE HYDROCHLORIDE 1 MG/ML
0.2 INJECTION, SOLUTION INTRAMUSCULAR; INTRAVENOUS; SUBCUTANEOUS EVERY 30 MIN PRN
Status: DISCONTINUED | OUTPATIENT
Start: 2020-05-12 | End: 2020-05-15 | Stop reason: HOSPADM

## 2020-05-12 RX ORDER — CEFAZOLIN SODIUM 1 G/3ML
1 INJECTION, POWDER, FOR SOLUTION INTRAMUSCULAR; INTRAVENOUS SEE ADMIN INSTRUCTIONS
Status: DISCONTINUED | OUTPATIENT
Start: 2020-05-12 | End: 2020-05-12 | Stop reason: HOSPADM

## 2020-05-12 RX ORDER — ONDANSETRON 2 MG/ML
4 INJECTION INTRAMUSCULAR; INTRAVENOUS EVERY 6 HOURS PRN
Status: DISCONTINUED | OUTPATIENT
Start: 2020-05-12 | End: 2020-05-15 | Stop reason: HOSPADM

## 2020-05-12 RX ORDER — ACETAMINOPHEN 650 MG/1
650 SUPPOSITORY RECTAL EVERY 4 HOURS PRN
Status: DISCONTINUED | OUTPATIENT
Start: 2020-05-12 | End: 2020-05-15 | Stop reason: HOSPADM

## 2020-05-12 RX ORDER — LIDOCAINE 40 MG/G
CREAM TOPICAL
Status: DISCONTINUED | OUTPATIENT
Start: 2020-05-12 | End: 2020-05-15 | Stop reason: HOSPADM

## 2020-05-12 RX ORDER — ONDANSETRON 4 MG/1
4 TABLET, ORALLY DISINTEGRATING ORAL EVERY 6 HOURS PRN
Status: DISCONTINUED | OUTPATIENT
Start: 2020-05-12 | End: 2020-05-15 | Stop reason: HOSPADM

## 2020-05-12 RX ORDER — NALOXONE HYDROCHLORIDE 0.4 MG/ML
.1-.4 INJECTION, SOLUTION INTRAMUSCULAR; INTRAVENOUS; SUBCUTANEOUS
Status: DISCONTINUED | OUTPATIENT
Start: 2020-05-12 | End: 2020-05-15 | Stop reason: HOSPADM

## 2020-05-12 RX ORDER — DEXAMETHASONE SODIUM PHOSPHATE 4 MG/ML
INJECTION, SOLUTION INTRA-ARTICULAR; INTRALESIONAL; INTRAMUSCULAR; INTRAVENOUS; SOFT TISSUE PRN
Status: DISCONTINUED | OUTPATIENT
Start: 2020-05-12 | End: 2020-05-12

## 2020-05-12 RX ORDER — AMOXICILLIN 250 MG
2 CAPSULE ORAL 2 TIMES DAILY
Status: DISCONTINUED | OUTPATIENT
Start: 2020-05-12 | End: 2020-05-15 | Stop reason: HOSPADM

## 2020-05-12 RX ORDER — FENTANYL CITRATE 50 UG/ML
25-100 INJECTION, SOLUTION INTRAMUSCULAR; INTRAVENOUS
Status: DISCONTINUED | OUTPATIENT
Start: 2020-05-12 | End: 2020-05-12 | Stop reason: HOSPADM

## 2020-05-12 RX ORDER — NALOXONE HYDROCHLORIDE 0.4 MG/ML
.1-.4 INJECTION, SOLUTION INTRAMUSCULAR; INTRAVENOUS; SUBCUTANEOUS
Status: DISCONTINUED | OUTPATIENT
Start: 2020-05-12 | End: 2020-05-12

## 2020-05-12 RX ADMIN — SENNOSIDES AND DOCUSATE SODIUM 2 TABLET: 8.6; 5 TABLET ORAL at 20:27

## 2020-05-12 RX ADMIN — POTASSIUM CHLORIDE, DEXTROSE MONOHYDRATE AND SODIUM CHLORIDE: 150; 5; 450 INJECTION, SOLUTION INTRAVENOUS at 17:36

## 2020-05-12 RX ADMIN — FENTANYL CITRATE 25 MCG: 50 INJECTION, SOLUTION INTRAMUSCULAR; INTRAVENOUS at 12:44

## 2020-05-12 RX ADMIN — FENTANYL CITRATE 25 MCG: 50 INJECTION, SOLUTION INTRAMUSCULAR; INTRAVENOUS at 13:21

## 2020-05-12 RX ADMIN — BUPIVACAINE HYDROCHLORIDE 17 ML GIVEN: 5 INJECTION, SOLUTION EPIDURAL; INTRACAUDAL; PERINEURAL at 12:11

## 2020-05-12 RX ADMIN — DEXMEDETOMIDINE HYDROCHLORIDE 4 MCG: 100 INJECTION, SOLUTION INTRAVENOUS at 13:19

## 2020-05-12 RX ADMIN — FENTANYL CITRATE 25 MCG: 50 INJECTION, SOLUTION INTRAMUSCULAR; INTRAVENOUS at 13:19

## 2020-05-12 RX ADMIN — POLYETHYLENE GLYCOL 3350 17 G: 17 POWDER, FOR SOLUTION ORAL at 18:09

## 2020-05-12 RX ADMIN — FENTANYL CITRATE 25 MCG: 50 INJECTION, SOLUTION INTRAMUSCULAR; INTRAVENOUS at 13:13

## 2020-05-12 RX ADMIN — PROPOFOL 150 MCG/KG/MIN: 10 INJECTION, EMULSION INTRAVENOUS at 12:45

## 2020-05-12 RX ADMIN — ROCURONIUM BROMIDE 10 MG: 10 INJECTION INTRAVENOUS at 13:04

## 2020-05-12 RX ADMIN — ASPIRIN 325 MG: 325 TABLET, COATED ORAL at 18:09

## 2020-05-12 RX ADMIN — CEFAZOLIN 1 G: 330 INJECTION, POWDER, FOR SOLUTION INTRAMUSCULAR; INTRAVENOUS at 20:27

## 2020-05-12 RX ADMIN — DEXMEDETOMIDINE HYDROCHLORIDE 4 MCG: 100 INJECTION, SOLUTION INTRAVENOUS at 13:14

## 2020-05-12 RX ADMIN — NEOSTIGMINE METHYLSULFATE 3 MG: 1 INJECTION, SOLUTION INTRAVENOUS at 14:20

## 2020-05-12 RX ADMIN — GLYCOPYRROLATE 0.2 MG: 0.2 INJECTION, SOLUTION INTRAMUSCULAR; INTRAVENOUS at 13:16

## 2020-05-12 RX ADMIN — Medication 5 MG: at 15:04

## 2020-05-12 RX ADMIN — ROCURONIUM BROMIDE 30 MG: 10 INJECTION INTRAVENOUS at 12:45

## 2020-05-12 RX ADMIN — DEXMEDETOMIDINE HYDROCHLORIDE 8 MCG: 100 INJECTION, SOLUTION INTRAVENOUS at 14:29

## 2020-05-12 RX ADMIN — GLYCOPYRROLATE 0.4 MG: 0.2 INJECTION, SOLUTION INTRAMUSCULAR; INTRAVENOUS at 14:20

## 2020-05-12 RX ADMIN — CEFAZOLIN SODIUM 2 G: 2 INJECTION, SOLUTION INTRAVENOUS at 12:53

## 2020-05-12 RX ADMIN — SODIUM CHLORIDE, POTASSIUM CHLORIDE, SODIUM LACTATE AND CALCIUM CHLORIDE: 600; 310; 30; 20 INJECTION, SOLUTION INTRAVENOUS at 12:20

## 2020-05-12 RX ADMIN — DEXAMETHASONE SODIUM PHOSPHATE 4 MG: 4 INJECTION, SOLUTION INTRA-ARTICULAR; INTRALESIONAL; INTRAMUSCULAR; INTRAVENOUS; SOFT TISSUE at 12:58

## 2020-05-12 RX ADMIN — LIDOCAINE HYDROCHLORIDE 60 MG: 20 INJECTION, SOLUTION INFILTRATION; PERINEURAL at 12:44

## 2020-05-12 RX ADMIN — GLYCOPYRROLATE 0.2 MG: 0.2 INJECTION, SOLUTION INTRAMUSCULAR; INTRAVENOUS at 13:23

## 2020-05-12 RX ADMIN — PROPOFOL 90 MG: 10 INJECTION, EMULSION INTRAVENOUS at 12:44

## 2020-05-12 RX ADMIN — Medication 5 MG: at 15:02

## 2020-05-12 RX ADMIN — ONDANSETRON 4 MG: 2 INJECTION INTRAMUSCULAR; INTRAVENOUS at 14:20

## 2020-05-12 RX ADMIN — SODIUM CHLORIDE, POTASSIUM CHLORIDE, SODIUM LACTATE AND CALCIUM CHLORIDE: 600; 310; 30; 20 INJECTION, SOLUTION INTRAVENOUS at 15:32

## 2020-05-12 RX ADMIN — DEXMEDETOMIDINE HYDROCHLORIDE 4 MCG: 100 INJECTION, SOLUTION INTRAVENOUS at 13:21

## 2020-05-12 ASSESSMENT — MIFFLIN-ST. JEOR: SCORE: 848.66

## 2020-05-12 ASSESSMENT — ACTIVITIES OF DAILY LIVING (ADL): ADLS_ACUITY_SCORE: 37

## 2020-05-12 NOTE — ANESTHESIA PREPROCEDURE EVALUATION
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]  Closed fracture of left distal femur (H) [S72.402A]    Procedure(s):  LEFT TOTAL KNEE ARTHROPLASTY    Past Medical History:   Diagnosis Date     Benign essential hypertension 2016     Chronic low back pain      Hypertension      Impaired fasting glucose     borderline     Osteoarthritis      Past Surgical History:   Procedure Laterality Date     ARTHROPLASTY HIP ANTERIOR Right 2018    Procedure: ARTHROPLASTY HIP ANTERIOR;  RIGHT DIRECT ANTERIOR TOTAL HIP ARTHROPLASTY;  Surgeon: Jimbo Phillips MD;  Location:  OR     EYE SURGERY  cataracts     MOHS MICROGRAPHIC PROCEDURE      Left lower eyelid      MOHS MICROGRAPHIC PROCEDURE Left 10/27/2016    Procedure: MOHS MICROGRAPHIC PROCEDURE;  Surgeon: Jose Torrez MD;  Location:  [facility]     MOHS MICROGRAPHIC PROCEDURE Right 2018    Procedure: MOHS MICROGRAPHIC PROCEDURE;  RIGHT MEDIAL CANTHUS MOHS CLOSURE;  Surgeon: Jose Torrez MD;  Location: [facility]     ORTHOPEDIC SURGERY      bilateral wrists       Anesthesia Evaluation     . Pt has had prior anesthetic.     No history of anesthetic complications          ROS/MED HX    ENT/Pulmonary:      (-) sleep apnea   Neurologic:       Cardiovascular:     (+) hypertension-range: controlled, ---. : . . . :. .       METS/Exercise Tolerance:     Hematologic:     (+) Anemia, -      Musculoskeletal:   (+) arthritis,  other musculoskeletal- severe scoliosis      GI/Hepatic:        (-) GERD   Renal/Genitourinary:         Endo:         Psychiatric:         Infectious Disease:         Malignancy:         Other:                          Physical Exam  Normal systems: cardiovascular and pulmonary    Airway   Mallampati: II  TM distance: >3 FB  Neck ROM: full    Dental   (+) partials    Cardiovascular       Pulmonary             Lab  "Results   Component Value Date    WBC 5.5 04/02/2020    HGB 10.3 (L) 05/08/2020    HCT 34.1 (L) 04/02/2020     04/02/2020    CRP 5.0 11/25/2017     04/02/2020    POTASSIUM 4.1 05/08/2020    CHLORIDE 101 04/02/2020    CO2 30 04/02/2020    BUN 28 04/02/2020    CR 0.83 05/08/2020    GLC 93 04/02/2020    NOHEMY 9.1 04/02/2020    ALBUMIN 2.7 (L) 04/02/2020    PROTTOTAL 6.9 04/02/2020    ALT 31 04/02/2020    AST 31 04/02/2020    ALKPHOS 207 (H) 04/02/2020    BILITOTAL 0.3 04/02/2020    TSH 4.20 (H) 11/25/2017    T4 1.05 11/25/2017       Preop Vitals  BP Readings from Last 3 Encounters:   05/12/20 139/69   05/08/20 99/53   04/02/20 124/54    Pulse Readings from Last 3 Encounters:   05/12/20 54   05/08/20 75   04/02/20 61      Resp Readings from Last 3 Encounters:   05/12/20 10   04/02/20 8   07/27/18 16    SpO2 Readings from Last 3 Encounters:   05/12/20 100%   05/08/20 91%   04/02/20 98%      Temp Readings from Last 1 Encounters:   05/12/20 36.6  C (97.9  F) (Temporal)    Ht Readings from Last 1 Encounters:   05/12/20 1.549 m (5' 1\")      Wt Readings from Last 1 Encounters:   05/12/20 47.6 kg (105 lb)    Estimated body mass index is 19.84 kg/m  as calculated from the following:    Height as of this encounter: 1.549 m (5' 1\").    Weight as of this encounter: 47.6 kg (105 lb).       Anesthesia Plan      History & Physical Review  History and physical reviewed and following examination; no interval change.    ASA Status:  2 .    NPO Status:  > 8 hours    Plan for General with Intravenous induction. Maintenance will be Balanced.    PONV prophylaxis:  Ondansetron (or other 5HT-3) and Dexamethasone or Solumedrol  Given severe scoliosis, discussed options and we chose a general anesthetic  ETT with propofol TIVA primary anesthetic (avoid sevo if able)        Postoperative Care  Postoperative pain management:  Peripheral nerve block (Single Shot), IV analgesics and Multi-modal analgesia.      Consents  Anesthetic plan, " risks, benefits and alternatives discussed with:  Patient..                 Chuck Pruitt MD

## 2020-05-12 NOTE — ANESTHESIA PROCEDURE NOTES
Procedure note : Femoral and Adductor canal  Staff -   Anesthesiologist:  Chuck Pruitt MD      Performed By: Anesthesiologist        Pre-Procedure  Performed by Chuck Pruitt MD  Location: pre-op      Pre-Anesthestic Checklist: patient identified, IV checked, risks and benefits discussed, informed consent, pre-op evaluation, at physician/surgeon's request and post-op pain management    Timeout  Correct Patient: Yes   Correct Procedure: Yes   Correct Site: Yes   Correct Laterality: Yes   Correct Position: Yes   Site Marked: Yes   .   Procedure Documentation    .    Procedure: Femoral and Adductor canal, left.   Patient Position:supine Local skin infiltrated with mL of 1% lidocaine.    Ultrasound used to identify targeted nerve, plexus, or vascular marker and placed a needle adjacent to it., Ultrasound was used to visualize the spread of the anesthetic in close proximity to the above stated nerve. A permanent image is entered into the patient's record.  Patient Prep/Sterile Barriers; chlorhexidine gluconate and isopropyl alcohol.  .  Needle: short bevel   Needle Gauge: 21.    Needle Length (Inches) 3.13   Insertion Method: Single Shot.        Assessment/Narrative  Paresthesias: No.  Injection made incrementally with aspirations every 5 mL..  The placement was negative for: blood aspirated, painful injection and site bleeding.  Bolus given via needle..   Secured via.   Complications: none. Comments:  Ultrasound Interpretation, peripheral nerve block    1.  Under ultrasound guidance, needle was inserted and placed in close proximity to the nerve.  2. Ultrasound was also used to visualize the spread of the anesthetic in close proximity to the nerve being blocked.  3. The nerve appeared anatomically normal.  4. There were no apparent abnormal pathological findings.  5. A permanent ultrasound image was saved n the patient's record.    Chuck Pruitt MD   12:11 PM

## 2020-05-12 NOTE — BRIEF OP NOTE
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 Pre-trauma advanced degenerative changes within the medial compartment.  Complications: None.  Implants:   Implant Name Type Inv. Item Serial No.  Lot No. LRB No. Used Action   BONE CEMENT RADIOPAQUE SIMPLEX HV FULL DOSE 6194-1-001 Cement, Bone BONE CEMENT RADIOPAQUE SIMPLEX HV FULL DOSE 6194-1-001  [employer] [id number] Left 2 Implanted   IMP PLATE TIBIAL ZIM NEXGEN SIZE 4 Metallic Hardware/[place] IMP PLATE TIBIAL ZIM NEXGEN SIZE 4  [employer] [id number] Left 1 Implanted   IMP COMP PATELLA ZIM NEXGEN 8.5X32MM -337-32 Total Joint Component/Insert IMP COMP PATELLA ZIM NEXGEN 8.5X32MM -961-32  [employer] [id number] Left 1 Implanted   IMP COMP FEMORAL ZIM NEXGEN E PS RT -193-51 Total Joint Component/Insert IMP COMP FEMORAL ZIM NEXGEN E PS RT -391-51  [employer] [id number] Left 1 Implanted   IMP ART SURFACE ZIM NEXGEN LPS EF 3-4 12MM -608-12 Total Joint Component/Insert IMP ART SURFACE ZIM NEXGEN LPS EF 3-4 12MM -167-12  [employer] [id number] Left 1 Implanted

## 2020-05-12 NOTE — CONSULTS
[facility]  Consult Note - Hospitalist Service     Date of Admission:  5/12/2020  Consult Requested by: Dr. Phillips  Reason for Consult: Post-operative medical co-management    Assessment & Plan   Maryam Saavedra is a 87 year old female with PMHx of hypertension and osteoarthritis admitted on 5/12/2020 and underwent a right TKA.    Osteoarthritis with recent fall and noted healed, displaced lateral tibial plateau fracture S/P left TKA: Surgery performed by Dr. Phillips. EBL <50 ccs. General anesthesia used.   -- Defer routine post-operative cares, IVF, DVT prophylaxis and pain control to primary service   -- Pt treated with perioperative Ancef   -- DVT prophylaxis with  mg po every day   -- Pain control with PRN tylenol, PRN Norco 5-325 mg one to two tablets q4 hrs, and IV dilaudid 0.2 mg q30 min PRN; judicious use of narcotics given pt's age and increased risk of delirium  -- Encourage pulmonary toilet; incentive spirometer at bedside   -- Bowel regimen in place while on narcotics   -- PT and OT in the AM   -- Hgb and BMP in AM     Hypertension: Continue PTA Norvasc 2.5 mg po every day and Toprol XL 12.5 mg po at bedtime with parameters in place.     The patient's care was discussed with the Attending Physician, Dr. Galicia, Bedside Nurse and Patient.    Jennifer Zamora PA-C  [facility]  ______________________________________________________________________    Chief Complaint   S/P left TKA    History is obtained from the patient    History of Present Illness   Maryam Saavedra is a 87 year old female with PMHx of hypertension and osteoarthritis admitted on 5/12/2020 and underwent a right TKA.    Per review of H&P, pt had fall over one month PTA, developed subsequent lymphedema. Ultimately due to persistent pain and swelling, she presented to Ortho clinic and was diagnosed with a fracture. Due to severe pain and balance issues, surgery was recommended.      Surgery performed by Dr. Phillips. EBL <50 ccs. General anesthesia used. Intraop findings of healed, displaced lateral tibial plateau fracture    In the immediate post-op period, pt is resting comfortably in bed, complains of minimal pain. No recent medication changes or acute concerns. Denies any SOB, CP, dizziness, lightheadedness. No recent fevers. Uses a walker for ambulatory assistance. No acute concerns at this time.     Review of Systems   The 10 point Review of Systems is negative other than noted in the HPI.    Past Medical History    I have reviewed this patient's medical history and updated it with pertinent information if needed.   Past Medical History:   Diagnosis Date     Benign essential hypertension 9/1/2016     Chronic low back pain      Hypertension      Impaired fasting glucose     borderline     Osteoarthritis      Past Surgical History   I have reviewed this patient's surgical history and updated it with pertinent information if needed.  Past Surgical History:   Procedure Laterality Date     ARTHROPLASTY HIP ANTERIOR Right 7/24/2018    Procedure: ARTHROPLASTY HIP ANTERIOR;  RIGHT DIRECT ANTERIOR TOTAL HIP ARTHROPLASTY;  Surgeon: Jimbo Phillips MD;  Location:  OR     EYE SURGERY  cataracts     MOHS MICROGRAPHIC PROCEDURE      Left lower eyelid      MOHS MICROGRAPHIC PROCEDURE Left 10/27/2016    Procedure: MOHS MICROGRAPHIC PROCEDURE;  Surgeon: Jose Torrez MD;  Location: [facility]     MOHS MICROGRAPHIC PROCEDURE Right 5/24/2018    Procedure: MOHS MICROGRAPHIC PROCEDURE;  RIGHT MEDIAL CANTHUS MOHS CLOSURE;  Surgeon: Jose Torrez MD;  Location: [facility]     ORTHOPEDIC SURGERY      bilateral wrists     Social History   I have reviewed this patient's social history and updated it with pertinent information if needed.  Social History     Tobacco Use     Smoking status: Former Smoker     Packs/day: 0.00     Years: 0.00     Pack years: 0.00     Start date: 7/24/1998     Smokeless tobacco:  Former User   Substance Use Topics     Alcohol use: Yes     Alcohol/week: 0.0 standard drinks     Comment: 1-2 drinks per month     Drug use: No     Family History   I have reviewed this patient's family history and updated it with pertinent information if needed.   Family History   Problem Relation Age of Onset     Diabetes Mother      Diabetes Father      Medications   Medications Prior to Admission   Medication Sig Dispense Refill Last Dose     Acetaminophen (TYLENOL PO) Take 1,000 mg by mouth 2 times daily as needed for mild pain or fever   Past Week at PRN     amLODIPine (NORVASC) 2.5 MG tablet Take 1 tablet (2.5 mg) by mouth daily 90 tablet 3 5/11/2020 at am     aspirin (ASA) 81 MG tablet Take 162 mg by mouth daily (takes 2 x 81mg)   Past Week at Unknown time     metoprolol succinate ER (TOPROL-XL) 50 MG 24 hr tablet Take 12.5 mg by mouth every evening (takes 0.25 x 50mg)   5/11/2020 at pm     order for DME 1: Gradient Compression Wraps; 2: BLE knee high 20-30 mm Hg compression stockings 1 each 0 Past Week at Unknown time       Allergies   Allergies   Allergen Reactions     Ace Inhibitors      Angioedema       Cyclobenzaprine      Angioedema       Physical Exam   Vital Signs: Temp: 94.6  F (34.8  C) Temp src: Oral BP: 107/54 Pulse: 58 Heart Rate: 52 Resp: 8 SpO2: 100 % O2 Device: None (Room air) Oxygen Delivery: 1 LPM  Weight: 105 lbs 0 oz    CONSTITUTIONAL: Pt laying in bed, dressed in hospital garb. Appears comfortable. Cooperative with interview.   HEENT: Normocephalic, atraumatic. Negative for conjunctival redness or scleral icterus.  Oral mucosa pink and moist; negative for ulcerations, erythema, or exudates.  Dentition in good repair.   CARDIOVASCULAR: RRR, no murmurs, rubs, or extra heart sounds appreciated. Pulses +2/4 and regular in upper and lower extremities, bilaterally.   RESPIRATORY: No increased work of breathing.  CTA, bilat; no wheezes, rales, or rhonchi appreciated.  GASTROINTESTINAL:   Abdomen soft, non-distended. BS auscultated in all four quadrants. Negative for tenderness to palpation.  No masses or organomegaly noted.  MUSCULOSKELETAL: Left knee wrapped. No gross deformities noted. Normal muscle tone.   HEMATOLOGIC/LYMPHATIC/IMMUNOLOGIC: Mild swelling of left foot noted.   NEUROLOGIC: Alert and oriented to person and place, has difficulty with the year.  strength intact. No focal neuro deficits.   SKIN: Warm, dry, intact. No jaundice noted. Negative for suspicious lesions, rashes, bruising, open sores or abrasions.     Data   Results for orders placed or performed during the hospital encounter of 05/12/20 (from the past 24 hour(s))   Potassium   Result Value Ref Range    Potassium 3.9 3.4 - 5.3 mmol/L   XR Knee Port Left 1/2 Views    Narrative    KNEE PORTABLE LEFT ONE - TWO VIEW  5/12/2020 3:37 PM     HISTORY: Status post left TKA.       Impression    IMPRESSION: Total knee arthroplasty with overlying skin blanco.    JAMES SAEZ MD

## 2020-05-12 NOTE — OR NURSING
Initially in PACU very lethargic- awoken only w vigorous tactile and auditory stimuli- eventually more awake- however confused- OR to self initially- frequent orientation pt is now oriented to person place, time- date- remembers vaguely she had surgery but cannot remember where surgery was done on her. -

## 2020-05-12 NOTE — ANESTHESIA CARE TRANSFER NOTE
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    Anesthesia Type:   General     Note:  Airway :Face Mask  Patient transferred to:PACU  Comments: To PACU with face mask, 8l/min O2, spontaneous ventilations. VSS. Report to RNHandoff Report: Identifed the Patient, Identified the Reponsible Provider, Reviewed the pertinent medical history, Discussed the surgical course, Reviewed Intra-OP anesthesia mangement and issues during anesthesia, Set expectations for post-procedure period and Allowed opportunity for questions and acknowledgement of understanding      Vitals: (Last set prior to Anesthesia Care Transfer)    CRNA VITALS  5/12/2020 1438 - 5/12/2020 1518      5/12/2020             Pulse:  65    SpO2:  98 %    Resp Rate (observed): 12    Resp Rate (set):  10                Electronically Signed By: JACKIE Guzman CRNA  May 12, 2020  3:18 PM

## 2020-05-12 NOTE — ANESTHESIA POSTPROCEDURE EVALUATION
Patient: Maryam Saavedra    Procedure(s):  LEFT TOTAL KNEE ARTHROPLASTY    Diagnosis:Post-traumatic osteoarthritis of both knees [M17.2]  Closed fracture of left distal femur (H) [S72.402A]  Diagnosis Additional Information: No value filed.    Anesthesia Type:  General    Note:  Anesthesia Post Evaluation    Patient location during evaluation: PACU  Patient participation: Able to fully participate in evaluation  Level of consciousness: awake  Pain management: adequate  Airway patency: patent  Cardiovascular status: acceptable  Respiratory status: acceptable  Hydration status: acceptable  PONV: controlled     Anesthetic complications: None          Last vitals:  Vitals:    05/12/20 1600 05/12/20 1610 05/12/20 1620   BP: 109/63 113/57 109/61   Pulse: 58 55 58   Resp: 14 10 11   Temp: 34  C (93.2  F) 36.1  C (97  F)    SpO2: 99% 98% 97%         Electronically Signed By: Chuck Pruitt MD  May 12, 2020  4:27 PM

## 2020-05-13 ENCOUNTER — APPOINTMENT (OUTPATIENT)
Dept: PHYSICAL THERAPY | Facility: CLINIC | Age: 85
DRG: 470 | End: 2020-05-13
Attending: ORTHOPAEDIC SURGERY
Payer: MEDICARE

## 2020-05-13 ENCOUNTER — TELEPHONE (OUTPATIENT)
Dept: FAMILY MEDICINE | Facility: CLINIC | Age: 85
End: 2020-05-13

## 2020-05-13 ENCOUNTER — APPOINTMENT (OUTPATIENT)
Dept: OCCUPATIONAL THERAPY | Facility: CLINIC | Age: 85
DRG: 470 | End: 2020-05-13
Attending: ORTHOPAEDIC SURGERY
Payer: MEDICARE

## 2020-05-13 LAB
ANION GAP SERPL CALCULATED.3IONS-SCNC: 4 MMOL/L (ref 3–14)
BUN SERPL-MCNC: 26 MG/DL (ref 7–30)
CALCIUM SERPL-MCNC: 8.7 MG/DL (ref 8.5–10.1)
CHLORIDE SERPL-SCNC: 108 MMOL/L (ref 94–109)
CO2 SERPL-SCNC: 26 MMOL/L (ref 20–32)
CREAT SERPL-MCNC: 0.92 MG/DL (ref 0.52–1.04)
GFR SERPL CREATININE-BSD FRML MDRD: 55 ML/MIN/{1.73_M2}
GLUCOSE SERPL-MCNC: 122 MG/DL (ref 70–99)
HGB BLD-MCNC: 8.7 G/DL (ref 11.7–15.7)
POTASSIUM SERPL-SCNC: 4.8 MMOL/L (ref 3.4–5.3)
SODIUM SERPL-SCNC: 138 MMOL/L (ref 133–144)

## 2020-05-13 PROCEDURE — 85018 HEMOGLOBIN: CPT | Performed by: ORTHOPAEDIC SURGERY

## 2020-05-13 PROCEDURE — 25000128 H RX IP 250 OP 636: Performed by: ORTHOPAEDIC SURGERY

## 2020-05-13 PROCEDURE — 97116 GAIT TRAINING THERAPY: CPT | Mod: GP | Performed by: PHYSICAL THERAPIST

## 2020-05-13 PROCEDURE — 36415 COLL VENOUS BLD VENIPUNCTURE: CPT | Performed by: ORTHOPAEDIC SURGERY

## 2020-05-13 PROCEDURE — 97530 THERAPEUTIC ACTIVITIES: CPT | Mod: GP | Performed by: PHYSICAL THERAPIST

## 2020-05-13 PROCEDURE — 99232 SBSQ HOSP IP/OBS MODERATE 35: CPT | Performed by: HOSPITALIST

## 2020-05-13 PROCEDURE — 80048 BASIC METABOLIC PNL TOTAL CA: CPT | Performed by: ORTHOPAEDIC SURGERY

## 2020-05-13 PROCEDURE — 25800030 ZZH RX IP 258 OP 636: Performed by: ORTHOPAEDIC SURGERY

## 2020-05-13 PROCEDURE — 97161 PT EVAL LOW COMPLEX 20 MIN: CPT | Mod: GP | Performed by: PHYSICAL THERAPIST

## 2020-05-13 PROCEDURE — 25000132 ZZH RX MED GY IP 250 OP 250 PS 637: Mod: GY | Performed by: ORTHOPAEDIC SURGERY

## 2020-05-13 PROCEDURE — 97530 THERAPEUTIC ACTIVITIES: CPT | Mod: GO

## 2020-05-13 PROCEDURE — 25000132 ZZH RX MED GY IP 250 OP 250 PS 637: Mod: GY | Performed by: PHYSICIAN ASSISTANT

## 2020-05-13 PROCEDURE — 97110 THERAPEUTIC EXERCISES: CPT | Mod: GP | Performed by: PHYSICAL THERAPIST

## 2020-05-13 PROCEDURE — 12000000 ZZH R&B MED SURG/OB

## 2020-05-13 PROCEDURE — 97165 OT EVAL LOW COMPLEX 30 MIN: CPT | Mod: GO

## 2020-05-13 RX ORDER — ASPIRIN 325 MG
325 TABLET, DELAYED RELEASE (ENTERIC COATED) ORAL DAILY
DISCHARGE
Start: 2020-05-14 | End: 2020-06-16

## 2020-05-13 RX ORDER — HYDROCODONE BITARTRATE AND ACETAMINOPHEN 5; 325 MG/1; MG/1
1-2 TABLET ORAL EVERY 4 HOURS PRN
Qty: 50 TABLET | Refills: 0 | Status: SHIPPED | OUTPATIENT
Start: 2020-05-13 | End: 2020-05-15

## 2020-05-13 RX ORDER — AMOXICILLIN 250 MG
2 CAPSULE ORAL 2 TIMES DAILY
DISCHARGE
Start: 2020-05-13 | End: 2020-12-30

## 2020-05-13 RX ADMIN — SENNOSIDES AND DOCUSATE SODIUM 2 TABLET: 8.6; 5 TABLET ORAL at 08:33

## 2020-05-13 RX ADMIN — SENNOSIDES AND DOCUSATE SODIUM 2 TABLET: 8.6; 5 TABLET ORAL at 21:07

## 2020-05-13 RX ADMIN — HYDROCODONE BITARTRATE AND ACETAMINOPHEN 1 TABLET: 5; 325 TABLET ORAL at 08:33

## 2020-05-13 RX ADMIN — ASPIRIN 325 MG: 325 TABLET, COATED ORAL at 08:33

## 2020-05-13 RX ADMIN — CEFAZOLIN 1 G: 330 INJECTION, POWDER, FOR SOLUTION INTRAMUSCULAR; INTRAVENOUS at 04:01

## 2020-05-13 RX ADMIN — POLYETHYLENE GLYCOL 3350 17 G: 17 POWDER, FOR SOLUTION ORAL at 08:33

## 2020-05-13 RX ADMIN — AMLODIPINE BESYLATE 2.5 MG: 2.5 TABLET ORAL at 08:33

## 2020-05-13 RX ADMIN — POTASSIUM CHLORIDE, DEXTROSE MONOHYDRATE AND SODIUM CHLORIDE: 150; 5; 450 INJECTION, SOLUTION INTRAVENOUS at 04:19

## 2020-05-13 ASSESSMENT — ACTIVITIES OF DAILY LIVING (ADL)
ADLS_ACUITY_SCORE: 27
ADLS_ACUITY_SCORE: 27
PREVIOUS_RESPONSIBILITIES: MEAL PREP
ADLS_ACUITY_SCORE: 33
ADLS_ACUITY_SCORE: 23
ADLS_ACUITY_SCORE: 33
ADLS_ACUITY_SCORE: 37

## 2020-05-13 NOTE — PLAN OF CARE
At baseline pt lives with adult son and daughter and is independent in grooming, toileting, upper body dressing. Received assist with showering and lower body dressing. Has history of falls. Was receiving home OT/PT.    Discharge Planner OT   Patient plan for discharge: Desires home with family and home therapy  Current status: Pt seated in wheelchair and agreeable to therapy. Pt verbalizing fear of falling and reports she also experienced this at home; pt given support and encouragement and fall prevention strategies. Pt completed sit to stand with modA of one and a second on SBA using FWW with cues. Pt unable to pivot transfer. Stood at walker for 20 seconds. Completed stand to sit with modA. Educated regarding discharge recommendation  Barriers to return to prior living situation: decreased activity tolerance, level of assist for self-cares, fall risk, fear of falling, impaired carryover of new learning, stairs  Recommendations for discharge: TCU  Rationale for recommendations: Pt functioning below baseline and will benefit from continued skilled OT to maximize safety and independence.          Entered by: Duyen Wu 05/13/2020 10:55 AM

## 2020-05-13 NOTE — PLAN OF CARE
Discharge Planner PT   Patient plan for discharge: Pt did not say in pm  Current status: While in supine, KI was donned. Pt was assisted to EOB with modA but pt was able to assist more actively with UE to sit up and scoot forward. . Pt stood from bed with modA of 2 with KI on and used walker for support with cues. Pt was able to stand upright and weight shift over feet with increased knee stability. Pt required cues for hand and walker placement to stand upright. Pt took 8-10 steps with walker and modA of one and min to modA of second person while wearing KI. Pt sat in chair with modA of 2 and cues and remained in chair with alarm on and needs in reach. Pt demonstrates decreased hearing and decreased cognition due to memory impairments and needs for multiple cues.  Barriers to return to prior living situation: level of assist x 2  Recommendations for discharge: TCU  Rationale for recommendations: Pt's mobility improved with KI on for stability. Pt was able to stand and take a few steps but pt still does not have functional mobility and needs assist of 2 so recommendation is for TCU. If pt progresses to assist of one and can be managed by family then pt may be able to go home.       Entered by: Elena Myers 05/13/2020 3:59 PM

## 2020-05-13 NOTE — PLAN OF CARE
Discharge Planner PT   Patient plan for discharge: pt wants to go home and have assist of family and homecare  Current status: Orders received. Chart reviewed. Evaluation completed and treatment initiated. Pt is a 87 year old female POD#1 TKA after displaced tibial plateau fx. Pt fell one month ago and has had several falls over 6 months. Pt was using walker with assist of family and receiving homecare. Pt is WBAT with no KI  Currently pt needs modA for bed mobility. Pt leans back in sitting and has significant fear of falling. Pt requires modA of 2 to stand with walker. Pt is unable to straighten left LE. Pt has decreased knee stability in standing with buckling and knee flexion requiring mod to maxA of 2 to stand up. Pt had weak QS in supine. Pt required Lorena Steady to transfer pt to wheelchair.  Barriers to return to prior living situation: level of assist x 2 and inability to take steps for gait and steps into home  Recommendations for discharge: TCU  Rationale for recommendations: Pt is significantly below baseline and is not at mobility level to be managed by family at home and pt will need continued skilled care to increase mobility. Pt pt can ambulate and transfer with one assist then pt may be able to return home with 24/7 assist.       Entered by: Elena Myers 05/13/2020 10:08 AM

## 2020-05-13 NOTE — OP NOTE
Procedure Date: 05/12/2020      PREOPERATIVE DIAGNOSIS:  Posttraumatic degenerative arthritis combined with chronic degenerative arthritis due to a subacute fracture.      POSTOPERATIVE DIAGNOSIS:  Posttraumatic degenerative arthritis related to a lateral tibial plateau fracture combined with chronic advanced degenerative arthritis.      PROCEDURE:  Left total knee arthroplasty.      SURGEON:  Jimbo Phillips MD      FIRST ASSISTANT:  Edwin Carcamo PA-C      INDICATIONS:  The patient is an 87-year-old female, whom we have been following along for advanced degenerative arthritis of the left knee with intermittent cortisone injections.  She was in stable health until several months ago when she fell.  She was seen in the emergency room, but no x-rays were done of her lower extremity and since then, she has been having a lot of trouble with ambulation, to the point where she was unable to ambulate.  She was seen in clinic recently and x-rays revealed posttraumatic changes within the lateral compartment with deficiency of both the lateral tibial plateau and the lateral femoral condyle.  It was felt that she would be best served with a left total knee arthroplasty.  This was discussed with the patient and her daughter at length, its risks, benefits, expected postoperative course.        PROCEDURE IN DETAIL:  The patient was brought to the operating room and given a left adductor canal block.  She was then given a general anesthetic.  Her left lower extremity was prepped and draped in the usual sterile fashion.  Her left lower extremity was exsanguinated.  Tourniquet was inflated.  An incision was made over the anterior aspect of the knee.  This was carried down through subcutaneous tissues down to the fascia, and a standard median parapatellar approach was taken to the knee joint.  This revealed some fibrosis and synovitis within the knee consistent with a subacute injury.  The fat pad was excised, the menisci  were excised, the knee was examined, and there appeared to be healed displaced fracture involving the posterior aspect of the tibial plateau, which had caused avulsive changes on the lateral femoral condylar weightbearing surface.  There was significant bony deficiency, but we decided to make the cuts for the standard components.  A drill hole was made in the distal femur and using the intramedullary guide set to 6 degrees from the femoral anatomic axis, the femur was cut to accept a size E cruciate substituting femoral component and the tibia was cut using the external alignment guide to accept a size 4 tibial component.  We really had no bony deficiency left on the tibial side, a good surface for the component, and on the femoral side, there was a slight bony deficiency over the lateral femoral condyle, but good contact surfaces otherwise.  We did not feel that it was necessary to use a stemmed femoral component.  The patella was then cut to accept a size 32 patella.  The knee was trialed.  The alignment appeared satisfactory.  Ligaments were stable and balanced.  The trial components were removed.  The tibia was punched.  We then injected the posterior capsule with the periarticular analgesia cocktail.  The knee was then thoroughly irrigated and dried, and the real components were cemented into place.  While the cement was hardening, we irrigated the knee with a dilute solution of Betadine, which was allowed to sit within the knee for 3 minutes and then was thoroughly irrigated from the knee with a liter of normal saline.  Once the cement hardened, all extraneous cement was removed.  The knee was trialed, and it appeared that a 12 mm insert gave the best fit.  A real 12 mm tibial polyethylene insert was then snapped into the tibial component.  The knee had full range of motion and the patella tracked well.  The wound was then irrigated once again.  The fascia was closed with interrupted 0 Vicryl sutures.  The  skin was closed with 2-0 Vicryl subcutaneous sutures and staples.  The knee was then injected with a mixture of 3 grams of tranexamic acid in 100 mL of normal saline.  A sterile compressive dressing was applied to the knee.  The tourniquet was deflated, and the patient was then awakened from anesthesia and transferred to postanesthesia recovery in satisfactory condition.        It should be noted that my assistant was necessary throughout the entire procedure to assist with retraction and positioning.         PO SNOW MD             D: 2020   T: 2020   MT: EARL      Name:     DONATO CARMONA   MRN:      [id number]        Account:        [id number]   :      1932           Procedure Date: 2020      Document: [id number]

## 2020-05-13 NOTE — PLAN OF CARE
Pt had total knee arthroplasty today. A&Ox3, disoriented to time at times. Soft Bps, low temp 94-94.8 (given warm blanket and temp in room turned up), respirations 8-11, bradycardic. Sats good on room air. Capno stable. Denies pain. No nausea, given complimentary meal. Will continue to monitor.

## 2020-05-13 NOTE — PLAN OF CARE
Patient is A&O x4. Up with assist of 2/gb/ moraima steady. Denies chest pain or SOB. Denies pain @ rest, given norco x1 this shift prior to therapy this AM. Incontinent of urine, purewick in place. Will continue to monitor.

## 2020-05-13 NOTE — PROGRESS NOTES
05/13/20 1033   Quick Adds   Type of Visit Initial Occupational Therapy Evaluation   Living Environment   Lives With child(staci), adult  (oldest daughter and son)   Living Arrangements house   Home Accessibility stairs to enter home   Number of Stairs, Main Entrance 3   Transportation Anticipated family or friend will provide   Self-Care   Usual Activity Tolerance moderate   Current Activity Tolerance poor   Equipment Currently Used at Home shower chair;walker, rolling;raised toilet;grab bar, tub/shower  (toilet safety frame)   Activity/Exercise/Self-Care Comment tub/shower   Functional Level   Ambulation 1-->assistive equipment   Transferring 1-->assistive equipment   Toileting 1-->assistive equipment   Bathing 3-->assistive equipment and person   Dressing 2-->assistive person   Eating 0-->independent   Fall history within last six months yes   Number of times patient has fallen within last six months 3   General Information   Onset of Illness/Injury or Date of Surgery - Date 05/12/20   Referring Physician Jimbo Phillips MD    Patient/Family Goals Statement I wish I was less fearful of falling   Additional Occupational Profile Info/Pertinent History of Current Problem TKA due to displaced tibial plateau fx   Precautions/Limitations fall precautions   Cognitive Status Examination   Orientation person;place;time   Memory impaired   Organization/Problem Solving Problem solving impaired   Executive Function Self awareness/monitoring impaired;Cognitive flexibility impaired   Visual Perception   Visual Perception Wears glasses   Sensory Examination   Sensory Comments Denies BUE numbness/tingling   Pain Assessment   Patient Currently in Pain Yes, see Vital Sign flowsheet   Strength   Strength Comments Generalized weakness in BUEs   Coordination   Coordination Comments Functional in BUEs   Transfer Skill: Sit to Stand   Level of [place]: Sit/Stand moderate assist (50% patients effort)   Physical  "Assist/Nonphysical Assist: Sit/Stand 1 person + 1 person to manage equipment;set-up required;supervision;verbal cues;nonverbal cues (demo/gestures)   Assistive Device for Transfer: Sit/Stand rolling walker   Transfer Skill: Toilet Transfer   Level of [place]: Toilet dependent (less than 25% patients effort)   Lower Body Dressing   Physical Assist/Nonphysical Assist: Dress Lower Body 2 person assist   Toileting   Physical Assist/Nonphysical Assist: Toilet 2 person assist   Instrumental Activities of Daily Living (IADL)   Previous Responsibilities meal prep   IADL Comments has assist with cleaning, laundry and med setup  (does not drive)   Activities of Daily Living Analysis   Impairments Contributing to Impaired Activities of Daily Living balance impaired;cognition impaired;fear and anxiety;pain;post surgical precautions;strength decreased;ROM decreased   General Therapy Interventions   Planned Therapy Interventions ADL retraining   Clinical Impression   Criteria for Skilled Therapeutic Interventions Met yes, treatment indicated   OT Diagnosis decline function   Influenced by the following impairments balance impaired;cognition impaired;fear and anxiety;pain;post surgical precautions;strength decreased;ROM decreased   Assessment of Occupational Performance 5 or more Performance Deficits   Identified Performance Deficits dressing, grooming, toileting, bathing, functional mobility   Clinical Decision Making (Complexity) Low complexity   Therapy Frequency 4x/week   Predicted Duration of Therapy Intervention (days/wks) 5 days   Anticipated Discharge Disposition Transitional Care Facility   Risks and Benefits of Treatment have been explained. Yes   Patient, Family & other staff in agreement with plan of care Yes   Clinical Impression Comments Pt functioning below baseline and will benefit from continued skilled OT to maximize safety and independence   [facility] AM-PAC TM \"6 Clicks\"   2016, Trustees of [employer] " "[place], under license to [employer].  All rights reserved.   6 Clicks Short Forms Daily Activity Inpatient Short Form   [facility] AM-PAC  \"6 Clicks\" Daily Activity Inpatient Short Form   1. Putting on and taking off regular lower body clothing? 1 - Total   2. Bathing (including washing, rinsing, drying)? 2 - A Lot   3. Toileting, which includes using toilet, bedpan or urinal? 1 - Total   4. Putting on and taking off regular upper body clothing? 3 - A Little   5. Taking care of personal grooming such as brushing teeth? 3 - A Little   6. Eating meals? 4 - None   Daily Activity Raw Score (Score out of 24.Lower scores equate to lower levels of function) 14   Total Evaluation Time   Total Evaluation Time (Minutes) 12     "

## 2020-05-13 NOTE — PROGRESS NOTES
"POD# 1    SUBJECTIVE  Pain well controlled  Having a difficult time with mobility    OBJECTIVE:   BP 91/40 (BP Location: Right arm)   Pulse 58   Temp 97.5  F (36.4  C) (Oral)   Resp 16   Ht 1.549 m (5' 1\")   Wt 47.6 kg (105 lb)   SpO2 93%   BMI 19.84 kg/m     Hemoglobin   Date Value Ref Range Status   05/13/2020 8.7 (L) 11.7 - 15.7 g/dL Final   ]   Wound: dressing dry      ASSESSMENT   Mobility issues not unexpected as she is likely very deconditioned    PLAN   Continue PT  Knee immobilizer as necessary for knee buckling  Hopefully she can discharge home on Friday with help from her family  Otherwise will need to look for a TCU bed    Jimbo Phillips MD   "

## 2020-05-13 NOTE — PLAN OF CARE
Pt disoriented to time. VSS ex soft BP. Dressing to leg CDI. No immobilizer per orders. +1 DP pulses, +2 edema to lower extremities. Mepilex to coccyx and upper back, blanchable redness. Stood at edge of bed A2, pt began to buckle and was assisted back to bed. Straight cathed x1. Small episode of incontinence, bladder scanned for 191 ml this morning. Denies pain. Will continue to monitor.

## 2020-05-13 NOTE — TELEPHONE ENCOUNTER
Reason for Call:  Home Health Care    Mariaa with [facility] called regarding (reason for call):     Patient update .     PT: patient cancelled home care visits after 5/4, secondary to scheduled left knee replacement for 5/12. Planning to resume home care once she returns from the hospital.     Pt Provider: [facility]    Phone Number Homecare Nurse can be reached at: [phone]  Mariaa, PT     Can we leave a detailed message on this number? YES      Call taken on 5/13/2020 at 8:16 AM by Olga Gaona

## 2020-05-13 NOTE — PROGRESS NOTES
05/13/20 0911   Quick Adds   Type of Visit Initial PT Evaluation   Living Environment   Lives With child(staci), adult   Living Arrangements house   Home Accessibility stairs to enter home   Number of Stairs, Main Entrance 3   Stair Railings, Main Entrance railing on left side (ascending)   Transportation Anticipated car, drives self;family or friend will provide   Self-Care   Usual Activity Tolerance moderate   Current Activity Tolerance fair   Regular Exercise   (getting home PT and OT)   Equipment Currently Used at Home shower chair;walker, rolling   Functional Level Prior   Ambulation 1-->assistive equipment   Transferring 1-->assistive equipment   Toileting 0-->independent   Bathing 3-->assistive equipment and person   Communication 0-->understands/communicates without difficulty   Swallowing 0-->swallows foods/liquids without difficulty   Cognition 1 - attention or memory deficits   Fall history within last six months yes   Number of times patient has fallen within last six months 3   Which of the above functional risks had a recent onset or change? ambulation;transferring   Prior Functional Level Comment Pt is poor historian but indicates needing assist with gait with walker from family   General Information   Onset of Illness/Injury or Date of Surgery - Date 05/12/20   Referring Physician Dr. Phillips   Patient/Family Goals Statement To go home with continued home care and assist from family   Pertinent History of Current Problem (include personal factors and/or comorbidities that impact the POC) Pt is an 87 year old female admitted for TKA due to displaced tibial plateau fx   Precautions/Limitations fall precautions   Weight-Bearing Status - LLE weight-bearing as tolerated   Weight-Bearing Status - RLE full weight-bearing   Cognitive Status Examination   Orientation person;place   Level of Consciousness alert   Follows Commands and Answers Questions 100% of the time   Personal Safety and Judgment intact  "  Memory impaired   Pain Assessment   Patient Currently in Pain Yes, see Vital Sign flowsheet   Integumentary/Edema   Integumentary/Edema Comments bandage left LE   Posture    Posture Comments forward head   Range of Motion (ROM)   ROM Comment Decreased left UE shoulder flexion, decreased left LE consistent with surgery   Strength   Strength Comments Generalized weakness, decreased left LE consistent withsurgery   Bed Mobility   Bed Mobility Comments supine to sit: modA and cues.   Transfer Skills   Transfer Comments sit to stand: modA of 2. See flowsheet   Gait   Gait Comments unablet to stand upright and take steps due to instability left LE, decreased WB and weight shifting abiliities   Balance   Balance Comments requires modA of 2 standing and Moustapha of one sitting due to pt leans back.   Sensory Examination   Sensory Perception no deficits were identified   Coordination   Coordination no deficits were identified   General Therapy Interventions   Planned Therapy Interventions bed mobility training;gait training;ROM;strengthening;transfer training;progressive activity/exercise   Clinical Impression   Criteria for Skilled Therapeutic Intervention yes, treatment indicated   PT Diagnosis impaired gait   Influenced by the following impairments Pain, decreased ROM and strength, decreased balance, generalized weakness and anxiety   Functional limitations due to impairments increased assist for all mobility compared to baseline   Clinical Presentation Stable/Uncomplicated   Clinical Presentation Rationale clinical judgement   Clinical Decision Making (Complexity) Low complexity   Therapy Frequency 2x/day   Predicted Duration of Therapy Intervention (days/wks) 3 days   Anticipated Discharge Disposition Transitional Care Facility   Risk & Benefits of therapy have been explained Yes   Patient, Family & other staff in agreement with plan of care Yes   [facility] AM-PAC TM \"6 Clicks\"   2016, Trustees of [place] " "[place], under license to [employer].  All rights reserved.   6 Clicks Short Forms Basic Mobility Inpatient Short Form   [facility] AM-PAC  \"6 Clicks\" V.2 Basic Mobility Inpatient Short Form   1. Turning from your back to your side while in a flat bed without using bedrails? 2 - A Lot   2. Moving from lying on your back to sitting on the side of a flat bed without using bedrails? 2 - A Lot   3. Moving to and from a bed to a chair (including a wheelchair)? 2 - A Lot   4. Standing up from a chair using your arms (e.g., wheelchair, or bedside chair)? 2 - A Lot   5. To walk in hospital room? 1 - Total   6. Climbing 3-5 steps with a railing? 1 - Total   Basic Mobility Raw Score (Score out of 24.Lower scores equate to lower levels of function) 10   Total Evaluation Time   Total Evaluation Time (Minutes) 15     "

## 2020-05-13 NOTE — PROGRESS NOTES
05/13/20 0911   Quick Adds   Type of Visit Initial PT Evaluation   Living Environment   Lives With child(staci), adult   Living Arrangements house   Home Accessibility stairs to enter home   Number of Stairs, Main Entrance 3   Stair Railings, Main Entrance railing on left side (ascending)   Transportation Anticipated car, drives self;family or friend will provide   Self-Care   Usual Activity Tolerance moderate   Current Activity Tolerance fair   Regular Exercise   (getting home PT and OT)   Equipment Currently Used at Home shower chair;walker, rolling   Functional Level Prior   Ambulation 1-->assistive equipment   Transferring 1-->assistive equipment   Toileting 0-->independent   Bathing 3-->assistive equipment and person   Communication 0-->understands/communicates without difficulty   Swallowing 0-->swallows foods/liquids without difficulty   Cognition 1 - attention or memory deficits   Fall history within last six months yes   Number of times patient has fallen within last six months 3   Which of the above functional risks had a recent onset or change? ambulation;transferring   Prior Functional Level Comment Pt is poor historian but indicates needing assist with gait with walker from family   General Information   Onset of Illness/Injury or Date of Surgery - Date 05/12/20   Referring Physician Dr. Phillips   Patient/Family Goals Statement To go home with continued home care and assist from family   Pertinent History of Current Problem (include personal factors and/or comorbidities that impact the POC) Pt is an 87 year old female admitted for TKA due to displaced tibial plateau fx   Precautions/Limitations fall precautions   Weight-Bearing Status - LLE weight-bearing as tolerated   Weight-Bearing Status - RLE full weight-bearing   Cognitive Status Examination   Orientation person;place   Level of Consciousness alert   Follows Commands and Answers Questions 100% of the time   Personal Safety and Judgment intact  "  Memory impaired   Pain Assessment   Patient Currently in Pain Yes, see Vital Sign flowsheet   Integumentary/Edema   Integumentary/Edema Comments bandage left LE   Posture    Posture Comments forward head   Range of Motion (ROM)   ROM Comment Decreased left UE shoulder flexion, decreased left LE consistent with surgery   Strength   Strength Comments Generalized weakness, decreased left LE consistent withsurgery   Bed Mobility   Bed Mobility Comments supine to sit: modA and cues.   Transfer Skills   Transfer Comments sit to stand: modA of 2. See flowsheet   Gait   Gait Comments unablet to stand upright and take steps due to instability left LE, decreased WB and weight shifting abiliities   Balance   Balance Comments requires modA of 2 standing and Moustapha of one sitting due to pt leans back.   Sensory Examination   Sensory Perception no deficits were identified   Coordination   Coordination no deficits were identified   General Therapy Interventions   Planned Therapy Interventions bed mobility training;gait training;ROM;strengthening;transfer training;progressive activity/exercise   Clinical Impression   Criteria for Skilled Therapeutic Intervention yes, treatment indicated   PT Diagnosis impaired gait   Influenced by the following impairments Pain, decreased ROM and strength, decreased balance, generalized weakness and anxiety   Functional limitations due to impairments increased assist for all mobility compared to baseline   Clinical Presentation Stable/Uncomplicated   Clinical Presentation Rationale clinical judgement   Clinical Decision Making (Complexity) Low complexity   Therapy Frequency 2x/day   Predicted Duration of Therapy Intervention (days/wks) 3 days   Anticipated Discharge Disposition Transitional Care Facility   Risk & Benefits of therapy have been explained Yes   Patient, Family & other staff in agreement with plan of care Yes   [facility] AM-PAC TM \"6 Clicks\"   2016, Trustees of [place] " "[place], under license to [employer].  All rights reserved.   6 Clicks Short Forms Basic Mobility Inpatient Short Form   [facility] AM-PAC  \"6 Clicks\" V.2 Basic Mobility Inpatient Short Form   1. Turning from your back to your side while in a flat bed without using bedrails? 2 - A Lot   2. Moving from lying on your back to sitting on the side of a flat bed without using bedrails? 2 - A Lot   3. Moving to and from a bed to a chair (including a wheelchair)? 2 - A Lot   4. Standing up from a chair using your arms (e.g., wheelchair, or bedside chair)? 2 - A Lot   5. To walk in hospital room? 1 - Total   6. Climbing 3-5 steps with a railing? 1 - Total   Basic Mobility Raw Score (Score out of 24.Lower scores equate to lower levels of function) 10   Total Evaluation Time   Total Evaluation Time (Minutes) 15     "

## 2020-05-13 NOTE — PROGRESS NOTES
[facility]    Medicine Progress Note - Hospitalist Service       Date of Admission:  5/12/2020  Assessment & Plan   Maryam Saavedra is a 87 year old female with PMHx of hypertension and osteoarthritis admitted on 5/12/2020 and underwent a right TKA.     Osteoarthritis with recent fall and noted healed, displaced lateral tibial plateau fracture S/P left TKA: Surgery performed by Dr. Phillips. EBL <50 ccs. General anesthesia used.   -- Defer routine post-operative cares, IVF, DVT prophylaxis and pain control to primary service   -- Pt treated with perioperative Ancef   -- DVT prophylaxis with  mg po every day   -- Pain control with PRN tylenol, PRN Norco 5-325 mg one to two tablets q4 hrs, and IV dilaudid 0.2 mg q30 min PRN; judicious use of narcotics given pt's age and increased risk of delirium  -- Encourage pulmonary toilet; incentive spirometer at bedside   -- Bowel regimen in place while on narcotics   -- PT and OT. Per PT pt may need knee immobilizer to start - they will contact and discuss with ortho. There is a knee immobilizer in the room, but this may have come from pt's son?  -- Hgb slightly decreased. BMP looks okay.  -- I discontinued IV fluids as pt had eaten most of her lunch tray.     Hypertension: Continue PTA Norvasc 2.5 mg po every day and Toprol XL 12.5 mg po at bedtime with parameters in place.        Diet: Advance Diet as Tolerated: Regular Diet Adult  Room Service    DVT Prophylaxis: Pneumatic Compression Devices and  daily  Miller Catheter: not present  Code Status: Full Code           Disposition Plan   Expected discharge: dispo per primary team  Entered: Suzy Rosenberg MD 05/13/2020, 9:13 AM       The patient's care was discussed with the Patient and PT Consultant.    Suzy Rosenberg MD  Hospitalist Service  [facility]    ______________________________________________________________________    Interval History   Very pleasant elderly  female sitting up eating lunch. She had just finished working with PT and pain was rated 8/10 though she appeared comfortable. No acute complaints. Was trying to eat with mask on and I encouraged her to remove mask to eat. Slightly confused but able to answer questions.    Data reviewed today: I reviewed all medications, new labs and imaging results over the last 24 hours. I personally reviewed no images or EKG's today.    Physical Exam   Vital Signs: Temp: 97.7  F (36.5  C) Temp src: Oral BP: 107/53 Pulse: 58 Heart Rate: 60 Resp: 16 SpO2: 93 % O2 Device: None (Room air) Oxygen Delivery: 1 LPM  Weight: 105 lbs 0 oz  General Appearance: Alert, oriented, eating lunch, no apparent distress  Respiratory: some crackles in right lung base, otherwise CTAB  Cardiovascular: RRR  GI: soft NTND  Skin: left leg in ace bandage. Skin otherwise without rashes, lesions.  NEuro: Non focal. Moving all extremities     Data   Recent Labs   Lab 05/13/20  0636 05/12/20  1124 05/08/20  1319   HGB 8.7*  --  10.3*     --   --    POTASSIUM 4.8 3.9 4.1   CHLORIDE 108  --   --    CO2 26  --   --    BUN 26  --   --    CR 0.92  --  0.83   ANIONGAP 4  --   --    NOHEMY 8.7  --   --    *  --   --      Recent Results (from the past 24 hour(s))   XR Knee Port Left 1/2 Views    Narrative    KNEE PORTABLE LEFT ONE - TWO VIEW  5/12/2020 3:37 PM     HISTORY: Status post left TKA.       Impression    IMPRESSION: Total knee arthroplasty with overlying skin blanco.    JAMES SAEZ MD     Medications       amLODIPine  2.5 mg Oral Daily     aspirin  325 mg Oral Daily     metoprolol succinate ER  12.5 mg Oral QPM     polyethylene glycol  17 g Oral Daily     senna-docusate  2 tablet Oral BID     sodium chloride (PF)  3 mL Intracatheter Q8H

## 2020-05-13 NOTE — PROGRESS NOTES
[facility]  Patient is currently open to home care services with [facility]. The patient is currently receiving PT and OT services. [facility]  and team have been notified of patient admission. [facility] liaison will continue to follow patient during stay. If appropriate provide orders to resume home care at time of discharge.

## 2020-05-13 NOTE — TELEPHONE ENCOUNTER
Returned call. OK'd requested orders.  Orders will be faxed to PCP for review and signature.   Virgie Li RN

## 2020-05-14 ENCOUNTER — APPOINTMENT (OUTPATIENT)
Dept: PHYSICAL THERAPY | Facility: CLINIC | Age: 85
DRG: 470 | End: 2020-05-14
Attending: ORTHOPAEDIC SURGERY
Payer: MEDICARE

## 2020-05-14 LAB
GLUCOSE BLDC GLUCOMTR-MCNC: 152 MG/DL (ref 70–99)
HGB BLD-MCNC: 8.9 G/DL (ref 11.7–15.7)

## 2020-05-14 PROCEDURE — 85018 HEMOGLOBIN: CPT | Performed by: ORTHOPAEDIC SURGERY

## 2020-05-14 PROCEDURE — 97110 THERAPEUTIC EXERCISES: CPT | Mod: GP

## 2020-05-14 PROCEDURE — 36415 COLL VENOUS BLD VENIPUNCTURE: CPT | Performed by: ORTHOPAEDIC SURGERY

## 2020-05-14 PROCEDURE — 12000000 ZZH R&B MED SURG/OB

## 2020-05-14 PROCEDURE — 00000146 ZZHCL STATISTIC GLUCOSE BY METER IP

## 2020-05-14 PROCEDURE — 25000132 ZZH RX MED GY IP 250 OP 250 PS 637: Mod: GY | Performed by: ORTHOPAEDIC SURGERY

## 2020-05-14 PROCEDURE — 25000132 ZZH RX MED GY IP 250 OP 250 PS 637: Mod: GY | Performed by: PHYSICIAN ASSISTANT

## 2020-05-14 PROCEDURE — 99231 SBSQ HOSP IP/OBS SF/LOW 25: CPT | Performed by: PHYSICIAN ASSISTANT

## 2020-05-14 PROCEDURE — 97530 THERAPEUTIC ACTIVITIES: CPT | Mod: GP

## 2020-05-14 RX ORDER — HYDROCODONE BITARTRATE AND ACETAMINOPHEN 5; 325 MG/1; MG/1
1 TABLET ORAL EVERY 4 HOURS PRN
Status: DISCONTINUED | OUTPATIENT
Start: 2020-05-14 | End: 2020-05-15 | Stop reason: HOSPADM

## 2020-05-14 RX ADMIN — SENNOSIDES AND DOCUSATE SODIUM 2 TABLET: 8.6; 5 TABLET ORAL at 08:50

## 2020-05-14 RX ADMIN — ASPIRIN 325 MG: 325 TABLET, COATED ORAL at 08:50

## 2020-05-14 RX ADMIN — POLYETHYLENE GLYCOL 3350 17 G: 17 POWDER, FOR SOLUTION ORAL at 10:06

## 2020-05-14 RX ADMIN — AMLODIPINE BESYLATE 2.5 MG: 2.5 TABLET ORAL at 08:50

## 2020-05-14 RX ADMIN — SENNOSIDES AND DOCUSATE SODIUM 2 TABLET: 8.6; 5 TABLET ORAL at 20:27

## 2020-05-14 ASSESSMENT — ACTIVITIES OF DAILY LIVING (ADL)
ADLS_ACUITY_SCORE: 28
ADLS_ACUITY_SCORE: 26
ADLS_ACUITY_SCORE: 27
ADLS_ACUITY_SCORE: 27

## 2020-05-14 ASSESSMENT — MIFFLIN-ST. JEOR: SCORE: 884.94

## 2020-05-14 NOTE — PLAN OF CARE
Discharge Planner PT   Patient plan for discharge: Pt did not say in pm  Current status: Pt took extended time on BSC and needed A with don/dof briefs. Pt transferred STS with min/modA x2. Once standing pt had LOB backwards and needed maxAx2 to keep up, strong cues provided for L LE placement and WBOS. Pt unable to fully stand without having LOB backwards and needed A to sit back on commode. Pt attempted to stand again and needed modAx2 to pivot to bed. Pt able to transfer sit to supine with Moustapha of L LE. Pt able to scoot middle of bed IND. Pt left supine in bed with alarm on and needs in reach.   Barriers to return to prior living situation: level of assist x 2  Recommendations for discharge: TCU per plan established by the PT.  Rationale for recommendations: Pt's mobility improved with KI on for stability. Pt was able to stand and take a few steps but pt still does not have functional mobility and needs assist of 2 so recommendation is for TCU. If pt progresses to assist of one and can be managed by family then pt may be able to go home.       Entered by: Nancy Heredia 05/14/2020 9:34 AM

## 2020-05-14 NOTE — PLAN OF CARE
Pt Alert and disoriented to time, forgetful/confused at times. VSS on RA, soft Bp's. CMS intact. Changed all dressings, CDI. Tolerating Reg diet. Up to commode, voiding adequate, incontinence intermittently. Heavy assist of 2, would recommend use of linnea steady this evening.  Denied pain/pain medication. Discharge tomorrow, most likely TCU.

## 2020-05-14 NOTE — CONSULTS
Care Transition Initial Assessment - SW     Met with: Patient    Active Problems:    S/P total knee arthroplasty, left       DATA  Lives With: alone, child(staci), adult   Living Arrangements: house  Quality of Family Relationships: involved, supportive  Description of Support System: Supportive, Involved  Who is your support system?: Children  Support Assessment: Adequate family and caregiver support, Adequate social supports.   Identified issues/concerns regarding health management:    Quality of Family Relationships: involved, supportive  Transportation Anticipated: family or friend will provide    ASSESSMENT  Cognitive Status:  awake  Concerns to be addressed: Discharge planning    Per Care Transition consult for discharge planning. Patient was admitted on 5/12/20 for S/P total knee arthroplasty, left and a tentative discharge date of 5/15/20. Reviewed patient's chart and spoke to her daughter regarding discharge plans. Per daughter and chart review, patient lives with her son who stays with her at night and her daughter helps set up her medication. There are 3 stairs to enter the home with a railing. At baseline patient uses a shower chair and a rolling walker. Reviewed therapy recommendations of TCU. Discussed this with daughter who agreed with TCU at discharge. She provided choices of [facility] and [facility] after reviewing medicare.gov ratings. Referrals submitted for the above choices via DOD.     Pt/family was provided with the Medicare Compare list for SNF.  Discussed associated Medicare star ratings to assist with choice for referrals/discharge planning Yes    Education was given to pt/family that star ratings are updated/maintained by Medicare and can be reviewed by visiting www.medicare.gov Yes       PLAN  Financial costs for the patient includes yes, explained amenity fee  .  Patient given options and choices for discharge yes .  Patient/family is agreeable to the plan?  Yes:    Transportation/person available to transport on day of discharge  is TBD and have they been notified/set up TBD  Patient Goals and Preferences: TCU .  Patient anticipates discharging to:  TCU .    Will continue to follow    ANTONIO Gonzales    [facility]

## 2020-05-14 NOTE — PLAN OF CARE
[id number]:  A&OX3, disoriented to time and confused at times.  Up with assist of 2 and moraima steady.  Patient incontinent of urine.  Turn and repositioned every 2 hours.  Denies pain.

## 2020-05-14 NOTE — PROGRESS NOTES
"[facility] Orthopedic Post-Op Progress Note    Maryam Saavedra MRN# [id number]   YOB: 1932 Age: 87 year old            Interval History:   2 Days Post-Op from Total knee arthoplasty (Left)  Doing well.  Continues to improve.  Slight confusion currently.  Pain is well-controlled with Norco.  No fevers.  Slow progress with PT.  Knee immobilizer in place for instability.            Physical Exam:   /54   Pulse 78   Temp 98.7  F (37.1  C) (Oral)   Resp 16   Ht 1.549 m (5' 1\")   Wt 51.3 kg (113 lb)   SpO2 94%   BMI 21.35 kg/m      Dressing removed overnight by patient.  Incision healing well.  Mild swelling.  Movement and sensation intact distal to surgical site.  Calves non-tender.           Data:     Hemoglobin   Date Value Ref Range Status   05/14/2020 8.9 (L) 11.7 - 15.7 g/dL Final   05/13/2020 8.7 (L) 11.7 - 15.7 g/dL Final            Assessment and Plan:    Assessment:   Post-operative day #2  Total knee arthoplasty (Left)     Doing well.  No excessive bleeding  Pain well-controlled.  Slow progress with PT           Plan:   Continue current medical management  May use knee immobilizer for stability if needed  Wound re-dressed  Continue working with physical therapy  Plan on discharge to home with home care and PT if continued progress with PT today  Continue current DVT prophylaxis           Suresh Carcamo PA-C    "

## 2020-05-14 NOTE — PROGRESS NOTES
[facility]    Medicine Progress Note - Hospitalist Service       Date of Admission:  5/12/2020  Assessment & Plan   Maryam Saavedra is a 87 year old female with PMHx of hypertension and osteoarthritis admitted on 5/12/2020 and underwent a right TKA.     Osteoarthritis with recent fall and noted healed, displaced lateral tibial plateau fracture S/P left TKA  Surgery performed by Dr. Phillips. EBL <50 ccs. GETA. Pt treated with perioperative Ancef.  - Defer routine post-operative cares, IVF, DVT prophylaxis and pain control to primary service    - DVT prophylaxis with  mg po every day   - Pain control with PRN tylenol, PRN Norco 5-325 mg one to two tablets q4 hrs, and IV dilaudid 0.2 mg q30 min PRN; judicious use of narcotics given pt's age and increased risk of delirium  - Encourage pulmonary toilet; incentive spirometer at bedside   - Limit opioids given advanced age** Decrease to 1 tab PRN  - Bowel regimen in place while on narcotics   - PT and OT. Per PT pt may need knee immobilizer to start, Rehab recs TCU but pt hopeful to go home with assistance of daughter if she continues to progress to A1  - Hgb slightly decreased but stabilizing     Recent Labs   Lab 05/14/20  0623 05/13/20  0636 05/08/20  1319   HGB 8.9* 8.7* 10.3*       Hx Hypertension  - Continue PTA Norvasc 2.5 mg po every day and Toprol XL 12.5 mg po at bedtime with parameters in place  - Increase hold parameters on CCB to SBP <110 given softer BPs throughout the day SBP 90s after dosing. 110s-120s overnight.  - BPs improving, BB has been held due to pressures     Diet: Advance Diet as Tolerated: Regular Diet Adult  Room Service  Advance Diet as Tolerated    DVT Prophylaxis: Defer to primary service, ASA 325mg/d  Miller Catheter: not present  Code Status: Full Code      Disposition Plan   Expected discharge: Tomorrow, recommended to home with assistance of family vs TCU once safe disposition plan/ TCU bed available and cleared  by Ortho and PT.  Entered: Michelle Lawson PA-C 05/14/2020, 12:14 PM       The patient's care was discussed with the Attending Physician, Dr. Potter and Patient.    Michelle Lawson PA-C ([place])  Hospitalist Service  [facility]    ______________________________________________________________________    Interval History   Seen and examined. Feeling well, minimal pain. No N/V/abd pain. Taking PO. Softer BPs after amlodipine however asymptomatic, denies lightheadedness or dizziness. No SOB. Would like to go home with daughter tomorrow. Progressing with PT. Minimal opioids, none today.    Data reviewed today: I reviewed all medications, new labs and imaging results over the last 24 hours. I personally reviewed no images or EKG's today.    Physical Exam   Vital Signs: Temp: 98.2  F (36.8  C) Temp src: Oral BP: 92/48 Pulse: 79 Heart Rate: 74 Resp: 15 SpO2: 94 % O2 Device: None (Room air)    Weight: 113 lbs 0 oz    General: Awake, alert, elderly woman who appears stated age. Looks comfortable sitting up in bed. No acute distress.  HEENT: Normocephalic, atraumatic. Extraocular movements intact.   Respiratory: Clear to auscultation bilaterally, no rales, wheezing, or rhonchi.  Cardiovascular: Regular rate and rhythm, +S1 and S2, no murmur auscultated. No peripheral edema.   Gastrointestinal: Soft, non-tender, non-distended. Bowel sounds present.  Skin: Warm, dry. No obvious rashes or lesions on exposed skin. Dorsalis pedis pulses palpable bilaterally.  Musculoskeletal: No joint swelling, erythema or tenderness. Lt knee ace wrapped. Wiggles toes bilaterally, dorsi/plantar flexion RLE 5/5, Lt 4/5.  Neurologic: AAO x3. Cranial nerves 2-12 grossly intact, normal strength and sensation.  Psychiatric: Appropriate mood and affect. No obvious anxiety or depression.    Data   Recent Labs   Lab 05/14/20  0623 05/13/20  0636 05/12/20  1124 05/08/20  1319   HGB 8.9* 8.7*  --  10.3*   NA  --  138  --   --     POTASSIUM  --  4.8 3.9 4.1   CHLORIDE  --  108  --   --    CO2  --  26  --   --    BUN  --  26  --   --    CR  --  0.92  --  0.83   ANIONGAP  --  4  --   --    NOHEMY  --  8.7  --   --    GLC  --  122*  --   --      No results found for this or any previous visit (from the past 24 hour(s)).  Medications       amLODIPine  2.5 mg Oral Daily     aspirin  325 mg Oral Daily     metoprolol succinate ER  12.5 mg Oral QPM     polyethylene glycol  17 g Oral Daily     senna-docusate  2 tablet Oral BID     sodium chloride (PF)  3 mL Intracatheter Q8H

## 2020-05-14 NOTE — PLAN OF CARE
Patient is confused at time, woke in the middle of the and ripped off the surgical dressing, staff reinforced and immobilize on. Stable vitals expect, pure in place, adequate urine output. Pure wick changed at about 0500.

## 2020-05-14 NOTE — PROGRESS NOTES
SW:  D: [facility] accepted patient in private room 5/15/20. SW discussed the amenity fee with patient's daughter who agreed. Time of transport will be in the afternoon but TBD tomorrow. PAS completed.    PAS-RR    D: Per DHS regulation, SW completed and submitted PAS-RR to [place] [employer] Direct Connect via the Senior LinkAge Line.  PAS-RR confirmation # is : [id number]    I: SW spoke with daughter and they are aware a PAS-RR has been submitted.  SW reviewed with daughter that they may be contacted for a follow up appointment within 10 days of hospital discharge if their SNF stay is < 30 days.  Contact information for [facility] LinkAge Line was also provided.    A: daughter verbalized understanding.    P: Further questions may be directed to [facility] LinkAge Line at #[phone], option #4 for PAS-RR staff.    ANTONIO Gonzales    [facility]

## 2020-05-15 VITALS
BODY MASS INDEX: 21.34 KG/M2 | OXYGEN SATURATION: 91 % | HEART RATE: 83 BPM | TEMPERATURE: 99.5 F | WEIGHT: 113 LBS | SYSTOLIC BLOOD PRESSURE: 111 MMHG | DIASTOLIC BLOOD PRESSURE: 59 MMHG | RESPIRATION RATE: 16 BRPM | HEIGHT: 61 IN

## 2020-05-15 PROCEDURE — 25000132 ZZH RX MED GY IP 250 OP 250 PS 637: Mod: GY | Performed by: PHYSICIAN ASSISTANT

## 2020-05-15 PROCEDURE — 25000132 ZZH RX MED GY IP 250 OP 250 PS 637: Mod: GY | Performed by: ORTHOPAEDIC SURGERY

## 2020-05-15 PROCEDURE — 99232 SBSQ HOSP IP/OBS MODERATE 35: CPT | Performed by: PHYSICIAN ASSISTANT

## 2020-05-15 RX ORDER — METOPROLOL SUCCINATE 25 MG/1
12.5 TABLET, EXTENDED RELEASE ORAL EVERY EVENING
DISCHARGE
Start: 2020-05-15 | End: 2021-01-11

## 2020-05-15 RX ORDER — HYDROCODONE BITARTRATE AND ACETAMINOPHEN 5; 325 MG/1; MG/1
1 TABLET ORAL EVERY 4 HOURS PRN
Qty: 50 TABLET | Refills: 0 | Status: SHIPPED | DISCHARGE
Start: 2020-05-15 | End: 2020-05-28

## 2020-05-15 RX ADMIN — ASPIRIN 325 MG: 325 TABLET, COATED ORAL at 08:41

## 2020-05-15 RX ADMIN — ACETAMINOPHEN 650 MG: 325 TABLET, FILM COATED ORAL at 08:47

## 2020-05-15 RX ADMIN — SENNOSIDES AND DOCUSATE SODIUM 2 TABLET: 8.6; 5 TABLET ORAL at 08:41

## 2020-05-15 ASSESSMENT — ACTIVITIES OF DAILY LIVING (ADL)
ADLS_ACUITY_SCORE: 26

## 2020-05-15 NOTE — PLAN OF CARE
Pt A&Ox2-3, confused/forgetful overnight. CMS intact. VSS. Up w/ A2 and moraima steady. Denies pain. Incontinent. Turned q2h. Continue to monitor.

## 2020-05-15 NOTE — PROGRESS NOTES
Reviewed AVS with pt.  Verbalized understanding., Paperwork given and placed in pt belongs bag. Packet sent to pt to ct.

## 2020-05-15 NOTE — PLAN OF CARE
Pt A&O X3, disoriented to time. VSS on RA, except BP soft. CMS intact, dressing CDI. Up with moraima steady. Turn/repo q2hrs. Incontinent. Denies pain. Continue to monitor.

## 2020-05-15 NOTE — PLAN OF CARE
PT: Attempted scheduled treatment session. CNAs assisting dressing patient, patient scheduled to discharge to TCU within the hour. Discussed with RN Leena, PT deferred.     Physical Therapy Discharge Summary    Reason for therapy discharge:    Discharged to transitional care facility.    Progress towards therapy goal(s). See goals on Care Plan in [facility] electronic health record for goal details.  Goals not met.  Barriers to achieving goals:   limited tolerance for therapy and discharge from facility.    Therapy recommendation(s):    Continued therapy is recommended.  Rationale/Recommendations:  PT at TCU to progress independence with mobility.

## 2020-05-15 NOTE — PROGRESS NOTES
[facility]    Medicine History and Physical - Hospitalist Service       Date of Admission:  5/12/2020    Assessment & Plan   Maryam Saavedra is a 87 year old female with PMHx of hypertension and osteoarthritis admitted on 5/12/2020 and underwent a right TKA.     Osteoarthritis with recent fall and noted healed, displaced lateral tibial plateau fracture S/P left TKA (5/13/2020).  - Defer routine post-operative cares, IVF, DVT prophylaxis and pain control to primary service    - DVT prophylaxis with ASA 325mg x 6 weeks per ortho.  - Pain control with PRN tylenol and Norco.  - Encourage pulmonary toilet; incentive spirometer at bedside.   - Bowel regimen in place while on narcotics.  - PT eval rec TCU.    Acute blood loss anemia on mild chronic normocytic anemia. Preop hgb 10.3 --> 8.9. Secondary to surgery above and dilution with fluids.  - Consider anemia workup in outpatient setting.    Hypertension.  SBPs low 100s-110s, with anti-hypertensive drops into 90s.  [PTA Norvasc 2.5mg and Toprol XL 25mg QHS]  - Hold Norvasc 2.5mg, follow SBP and restart if indicated.  - Continue PTA beta blocker with hold parameters.    ** ADDENDUM: ** Appears beta blocker was ordered inaccurately during admission at reduced dose of 12.5mg instead of PTA dose 25mg. Given her soft blood pressures, will continue at reduced dose 12.5mg QHS. Note SBP in preop visit was 99; and as above soft trend this adm.    Diet: Advance Diet as Tolerated: Regular Diet Adult  Room Service  Advance Diet as Tolerated  Miller Catheter: not present    DVT Prophylaxis: Pneumatic Compression Devices  Code Status: Full Code    Expected discharge: Today, recommended to transitional care unit once safe disposition plan/ TCU bed available.  The patient's care was discussed with the Attending Physician, Dr. Potter and Patient.    JoAnna K. Barthell, PA-C  Hospitalist Service  [facility]  Hospital    ______________________________________________________________________    Interval History   Pain controlled. Passing gas, but no BM yet. Some intermittent lightheadedness. SBP trend reviewed. Discharge to [facility] today.    Data reviewed today: I reviewed all medications, new labs and imaging results over the last 24 hours. I personally reviewed no images or EKG's today.    Physical Exam   Vital Signs: Temp: 99.5  F (37.5  C) Temp src: Oral BP: 111/59 Pulse: 83 Heart Rate: 78 Resp: 16 SpO2: 91 % O2 Device: None (Room air)    Weight: 113 lbs 0 oz  Constitutional: Appears stated age, no acute distress. Frail.  Respiratory: Breath sounds CTA. No increased work of breathing.  Cardiovascular: RRR, no rub or murmur. 1+ pedal edema LLE.  GI: Soft, very thin non-tender, non-distended.  Skin: Warm, dry, no rashes or lesions.    Medications       [Held by provider] amLODIPine  2.5 mg Oral Daily     aspirin  325 mg Oral Daily     metoprolol succinate ER  12.5 mg Oral QPM     polyethylene glycol  17 g Oral Daily     senna-docusate  2 tablet Oral BID     sodium chloride (PF)  3 mL Intracatheter Q8H       Data   Recent Labs   Lab 05/14/20  0623 05/13/20  0636 05/12/20  1124 05/08/20  1319   HGB 8.9* 8.7*  --  10.3*   NA  --  138  --   --    POTASSIUM  --  4.8 3.9 4.1   CHLORIDE  --  108  --   --    CO2  --  26  --   --    BUN  --  26  --   --    CR  --  0.92  --  0.83   ANIONGAP  --  4  --   --    NOHEMY  --  8.7  --   --    GLC  --  122*  --   --        Imaging:  No results found for this or any previous visit (from the past 24 hour(s)).

## 2020-05-15 NOTE — PROGRESS NOTES
"POD# 3    SUBJECTIVE  Patient is sleeping comfortably this morning  Has been complaining of minimal pain per nursing  Still requiring assist of two for mobility and is unsteady on her feet    OBJECTIVE:   BP 97/50 (BP Location: Left arm)   Pulse 83   Temp 98.9  F (37.2  C) (Oral)   Resp 16   Ht 1.549 m (5' 1\")   Wt 51.3 kg (113 lb)   SpO2 98%   BMI 21.35 kg/m     Hemoglobin   Date Value Ref Range Status   05/14/2020 8.9 (L) 11.7 - 15.7 g/dL Final   ]   Wound: dressing dry      ASSESSMENT   Stable for discharge  Not safe for discharge home    PLAN   Discussed with patient's daughter at length yesterday  The patient is likely very deconditioned secondary to the lengthy time she was nonambulatory prior to her surgery  She would be best served with a short stay in a TCU until she is more independently mobile, and her daughter agrees  Discharge to TCU today, note plans for discharge to [facility] later today  Follow up in clinic 2 weeks postop      Jimbo Phillips MD   "

## 2020-05-15 NOTE — PLAN OF CARE
Occupational Therapy Discharge Summary    Reason for therapy discharge:    Discharged to transitional care facility.    Progress towards therapy goal(s). See goals on Care Plan in [facility] electronic health record for goal details.  Goals not met.  Barriers to achieving goals:   discharge from facility.    Therapy recommendation(s):    Continued therapy is recommended.  Rationale/Recommendations:  at TCU to improve ind w/ ADL's.

## 2020-05-18 ENCOUNTER — NURSING HOME VISIT (OUTPATIENT)
Dept: GERIATRICS | Facility: CLINIC | Age: 85
End: 2020-05-18
Payer: MEDICARE

## 2020-05-18 VITALS
HEART RATE: 64 BPM | DIASTOLIC BLOOD PRESSURE: 55 MMHG | WEIGHT: 103 LBS | BODY MASS INDEX: 19.45 KG/M2 | TEMPERATURE: 98.1 F | OXYGEN SATURATION: 95 % | RESPIRATION RATE: 18 BRPM | HEIGHT: 61 IN | SYSTOLIC BLOOD PRESSURE: 101 MMHG

## 2020-05-18 DIAGNOSIS — Z71.89 ADVANCED DIRECTIVES, COUNSELING/DISCUSSION: ICD-10-CM

## 2020-05-18 DIAGNOSIS — Z96.652 AFTERCARE FOLLOWING LEFT KNEE JOINT REPLACEMENT SURGERY: Primary | ICD-10-CM

## 2020-05-18 DIAGNOSIS — I10 ESSENTIAL HYPERTENSION: ICD-10-CM

## 2020-05-18 DIAGNOSIS — D62 ANEMIA DUE TO BLOOD LOSS, ACUTE: ICD-10-CM

## 2020-05-18 DIAGNOSIS — R53.81 PHYSICAL DECONDITIONING: ICD-10-CM

## 2020-05-18 DIAGNOSIS — Z47.1 AFTERCARE FOLLOWING LEFT KNEE JOINT REPLACEMENT SURGERY: Primary | ICD-10-CM

## 2020-05-18 DIAGNOSIS — M17.12 PRIMARY OSTEOARTHRITIS OF LEFT KNEE: ICD-10-CM

## 2020-05-18 PROCEDURE — 99309 SBSQ NF CARE MODERATE MDM 30: CPT | Performed by: NURSE PRACTITIONER

## 2020-05-18 RX ORDER — ACETAMINOPHEN 325 MG/1
650 TABLET ORAL EVERY 6 HOURS PRN
COMMUNITY
Start: 2020-12-24 | End: 2022-01-16

## 2020-05-18 ASSESSMENT — MIFFLIN-ST. JEOR: SCORE: 839.58

## 2020-05-18 NOTE — DISCHARGE SUMMARY
[facility] Discharge Summary    Maryam Saavedra MRN# [id number]   Age: 87 year old YOB: 1932     Date of Admission:  5/12/2020  Date of Discharge::  5/15/2020 10:46 AM  Admitting Physician:  Jimbo Phillips MD  Discharge Physician:  Jimbo Phillips MD     Home clinic: [facility]          Admission Diagnoses:   Osteoarthritis -- both chronic degenerative as well as subacute post-traumatic          Discharge Diagnosis:   Arthoplasty of the left knee          Procedures:   Procedure(s): Total knee arthoplasty (Left)       No other procedures performed during this admission           Medications Prior to Admission:     No medications prior to admission.             Discharge Medications:     Discharge Medication List as of 5/15/2020 10:25 AM      START taking these medications    Details   aspirin (ASA) 325 MG EC tablet Take 1 tablet (325 mg) by mouth daily, Transitional      senna-docusate (SENOKOT-S/PERICOLACE) 8.6-50 MG tablet Take 2 tablets by mouth 2 times daily, Transitional         CONTINUE these medications which have CHANGED    Details   HYDROcodone-acetaminophen (NORCO) 5-325 MG tablet Take 1 tablet by mouth every 4 hours as needed for moderate to severe pain, Disp-50 tablet,R-0, Transitional      metoprolol succinate ER (TOPROL-XL) 25 MG 24 hr tablet Take 0.5 tablets (12.5 mg) by mouth every evening (Hold for SBP <110 or HR <60), Transitional         CONTINUE these medications which have NOT CHANGED    Details   order for DME 1: Gradient Compression Wraps; 2: BLE knee high 20-30 mm Hg compression stockingsDisp-1 each,R-0, Local Print         STOP taking these medications       Acetaminophen (TYLENOL PO) Comments:   Reason for Stopping:         amLODIPine (NORVASC) 2.5 MG tablet Comments:   Reason for Stopping:         aspirin (ASA) 81 MG tablet Comments:   Reason for Stopping:                     Consultations:   Consultation during this admission received from the  Hospitalist Service for medical comanagement          Brief History of Illness:   This patient was a 87 year old female with a known history of osteoarthritis of the left knee.  She suffered an injury back in January and has been unable to ambulate since then.  She was seen in the emergency room around the time of her injury, but no imaging was done of her lower extremities.  She delayed further evaluation until now because of her concern regarding exposure to COVOD-19.  She was seen in clinic and x-rays revealed a subacute fracture deformity of the lateral compartment of the knee, along with her advanced chronic medial compartment arthritis.  After a lengthy discussion and consultation with Dr. Jimbo Phillips, the patient chose to undergo a left side knee arthroplasty.  She was explained the risks,complications, and benefits of the procedure and elected to have the surgical procedure.  Patient was cleared by her primary care physician for joint replacement.           Hospital Course:   The patient tolerated the procedure well and was taken to postop recovery in stable condition.  Please refer to the full operative note for complete details.  Post-operative films show components in excellent position.     On post-operative day 1, patient's wound was checked and noted to be healing well.   Patient had adequate pain control and was prescribed physical therapy.  She was given 24 hrs of perioperative antibiotics.  Patient had decreased motor strength due to deconditioning and required the assist of two for mobility. The patient was neurovascularly intact in the left side lower extremity. There were no complications throughout the hospital course.  Discharge to a TCU was recommended by PT due to the patient's poor mobility.  Her discharge hemoglobin was 8.9 and the patient did not require a blood transfusion.  She was followed by the hospitalist during this hospital visit to manage her medical problems.     Upon  discharge, the patient was seen by Dr. Jimbo Phillips and all questions were answered.  She was discharged to [facility] on the medications as outlined in medication reconciliation list outlined below.  The patient has instructions that if she has increased pain, fever, erythema, swelling or drainage to immediately call.          Discharge Instructions and Follow-Up:   Discharge diet: Regular   Discharge activity: Activity as tolerated   Discharge follow-up: Follow up with Dr. Jimbo Phillips in 10-14 days   Wound care: Apply bandage daily  Keep wound clean and dry  May get incision wet in shower but do not soak or scrub           Discharge Disposition:   Discharged to nursing home      Attestation:  I have reviewed today's vital signs, notes, medications, labs and imaging.    Jimbo Phillips MD

## 2020-05-18 NOTE — PROGRESS NOTES
[facility]  PRIMARY CARE PROVIDER AND CLINIC:  Truman Norman MD, [address] / [place] [address]  Chief Complaint   Patient presents with     Hospital F/U     [facility] Medical Record Number:  [id number]  Place of Service where encounter took place:  [facility] [[id number]]    Maryam Saavedra  is a 87 year old  (6/9/1932), admitted to the above facility from  [facility]. Hospital stay 5/12/2020 through 5/15/2020..  Admitted to this facility for  rehab, medical management and nursing care.    HPI:    HPI information obtained from: facility chart records, facility staff, patient report and [facility] chart review.   Brief Summary of Hospital Course:   87 year old female with hx of HTN and osteoarthritis of the left knee, d/t injury in January unable to ambulate. Delayed eval due to COVID-19 concerns, when seen in clinic x-rays revealed a subacute fracture deformity of the lateral compartment of the knee, along with her advanced chronic medial compartment arthritis.  Patient chose to undergo a left side knee arthroplasty. Tolerated surgery well, no complications other than ABLA with discharge Hgb 8.9.     Updates on Status Since Skilled nursing Admission:   Patient seen for initial TCU visit. She reports feeling well. Minimal knee pain, rare PRN pain medication use. Denies headaches, dizziness, chest pain, dyspnea, bladder issues. At times feels constipated, several days since last stool. Per EMR review note she is down 5 lbs since admission. SBP range  and sats are 95% on room air. Did have temp 99.1 x 1 since admission, no fever today. She is transferring with mod assist of one.     CODE STATUS/ADVANCE DIRECTIVES DISCUSSION:   CPR/Full code   Patient's living condition: lives with family, child(staci), adult   ALLERGIES: Ace inhibitors and Cyclobenzaprine  PAST MEDICAL HISTORY:  has a past medical history of Benign essential hypertension  (9/1/2016), Chronic low back pain, Hypertension, Impaired fasting glucose, and Osteoarthritis. She also has no past medical history of Breast cancer (H), Breast mass, Cancer (H), Cerebral infarction (H), Congestive heart failure (H), COPD (chronic obstructive pulmonary disease) (H), Cyst of breast, Depressive disorder, Diabetes (H), Heart disease, History of blood transfusion, History of gestational diabetes, Inverted nipple, Malignant neoplasm of colon, unspecified site, Malignant neoplasm of corpus uteri, except isthmus (H), Malignant neoplasm of ovary (H), Need for prophylactic hormone replacement therapy (postmenopausal), Other injury of other sites of trunk, Personal history of other disorders of nervous system and sense organs, Personal history of unspecified circulatory disease, Thyroid disease, or Uncomplicated asthma.  PAST SURGICAL HISTORY:   has a past surgical history that includes orthopedic surgery; Eye surgery (cataracts); Mohs micrographic procedure; Mohs micrographic procedure (Left, 10/27/2016); Mohs micrographic procedure (Right, 5/24/2018); Arthroplasty Hip Anterior (Right, 7/24/2018); and Arthroplasty knee (Left, 5/12/2020).  FAMILY HISTORY: family history includes Diabetes in her father and mother.  SOCIAL HISTORY:   reports that she has quit smoking. She started smoking about 21 years ago. She smoked 0.00 packs per day for 0.00 years. She has quit using smokeless tobacco. She reports current alcohol use. She reports that she does not use drugs.    Post Discharge Medication Reconciliation Status: discharge medications reconciled and changed, per note/orders (see AVS)    Current Outpatient Medications   Medication Sig Dispense Refill     acetaminophen (TYLENOL) 325 MG tablet Take 650 mg by mouth every 6 hours as needed for mild pain       aspirin (ASA) 325 MG EC tablet Take 1 tablet (325 mg) by mouth daily       HYDROcodone-acetaminophen (NORCO) 5-325 MG tablet Take 1 tablet by mouth every 4  "hours as needed for moderate to severe pain 50 tablet 0     metoprolol succinate ER (TOPROL-XL) 25 MG 24 hr tablet Take 0.5 tablets (12.5 mg) by mouth every evening (Hold for SBP <110 or HR <60)       senna-docusate (SENOKOT-S/PERICOLACE) 8.6-50 MG tablet Take 2 tablets by mouth 2 times daily       order for DME 1: Gradient Compression Wraps; 2: BLE knee high 20-30 mm Hg compression stockings 1 each 0       ROS:  10 point ROS of systems including Constitutional, Eyes, Respiratory, Cardiovascular, Gastroenterology, Genitourinary, Integumentary, Musculoskeletal, Psychiatric were all negative except for pertinent positives noted in my HPI.    Vitals:  /55   Pulse 64   Temp 98.1  F (36.7  C)   Resp 18   Ht 1.549 m (5' 1\")   Wt 46.7 kg (103 lb)   SpO2 95%   BMI 19.46 kg/m    Exam:  GENERAL APPEARANCE:  Alert, in no distress, cooperative  ENT:  Mouth and posterior oropharynx normal, moist mucous membranes, normal hearing acuity  EYES:  EOM, conjunctivae, lids, pupils and irises normal  RESP:  no respiratory distress, on room air and no cough  M/S:   Digits and nails normal  left leg immobilizer in place  NEURO:   Cranial nerves 2-12 are normal tested and grossly at patient's baseline, speech clear  PSYCH:  oriented X 3, affect and mood normal    Lab/Diagnostic data:  Most Recent 3 CBC's:  Recent Labs   Lab Test 05/14/20  0623 05/13/20  0636 05/08/20  1319 04/02/20  1934 05/21/19  1124  07/25/18  0659  07/13/18  1620   WBC  --   --   --  5.5 5.1  --   --   --  6.1   HGB 8.9* 8.7* 10.3* 11.2* 13.2   < > 11.5*  --  12.0   MCV  --   --   --  91 93  --   --   --  92   PLT  --   --   --  292 227  --  249   < > 275    < > = values in this interval not displayed.     Most Recent 3 BMP's:  Recent Labs   Lab Test 05/13/20  0636 05/12/20  1124 05/08/20  1319 04/02/20  1934 05/21/19  1124     --   --  137 140   POTASSIUM 4.8 3.9 4.1 3.6 4.1   CHLORIDE 108  --   --  101 103   CO2 26  --   --  30 28   BUN 26  --   -- "  28 37*   CR 0.92  --  0.83 0.90 0.77   ANIONGAP 4  --   --  6 9   NOHEMY 8.7  --   --  9.1 10.1   *  --   --  93 73     Most Recent TSH and T4:  Recent Labs   Lab Test 11/25/17  2027   TSH 4.20*   T4 1.05     Most Recent Hemoglobin A1c:  Recent Labs   Lab Test 07/13/18  1620   A1C 5.6       ASSESSMENT/PLAN:  Aftercare following left knee joint replacement surgery  Primary osteoarthritis of left knee  Physical deconditioning  Acute on chronic, now s/p TKA and doing well. Pain managed without problems, does not like using narcotic so will add PRN tylenol low dose. Therapies as ordered - f/u next visit. Ortho f/u as recommended.     Essential hypertension  Chronic, appears stable at this time. Meds as PTA, vs and wt per TCU routine and f/u with ranges next visit. BMP on 5/20 and f/u with results.     Anemia due to blood loss, acute  Acute with surgery, check Hgb on 5/20 and review results.     Advanced directives, counseling/discussion  Reviewed POLST: asks to be Full Code at this time.      Orders written by provider at facility  1. Full Code  2. Tylenol 650 mg PO every 6 hrs PRN pain, do not exceed 4 gm tylenol/24 hrs  3. BMP, Hgb on 5/20 diagnosis anemia, HTN    Electronically signed by:  JACKIE Delgado CNP

## 2020-05-18 NOTE — LETTER
5/18/2020        RE: Maryam Saavedra  [address]  [place] [address]        [facility]  PRIMARY CARE PROVIDER AND CLINIC:  Truman Norman MD, [address] / [place] [address]  Chief Complaint   Patient presents with     Hospital F/U     [place] Medical Record Number:  [id number]  Place of Service where encounter took place:  [facility] [[id number]]    Maryam Saavedra  is a 87 year old  (6/9/1932), admitted to the above facility from  [facility]. Hospital stay 5/12/2020 through 5/15/2020..  Admitted to this facility for  rehab, medical management and nursing care.    HPI:    HPI information obtained from: facility chart records, facility staff, patient report and [facility] chart review.   Brief Summary of Hospital Course:   87 year old female with hx of HTN and osteoarthritis of the left knee, d/t injury in January unable to ambulate. Delayed eval due to COVID-19 concerns, when seen in clinic x-rays revealed a subacute fracture deformity of the lateral compartment of the knee, along with her advanced chronic medial compartment arthritis.  Patient chose to undergo a left side knee arthroplasty. Tolerated surgery well, no complications other than ABLA with discharge Hgb 8.9.     Updates on Status Since Skilled nursing Admission:   Patient seen for initial TCU visit. She reports feeling well. Minimal knee pain, rare PRN pain medication use. Denies headaches, dizziness, chest pain, dyspnea, bladder issues. At times feels constipated, several days since last stool. Per EMR review note she is down 5 lbs since admission. SBP range  and sats are 95% on room air. Did have temp 99.1 x 1 since admission, no fever today. She is transferring with mod assist of one.     CODE STATUS/ADVANCE DIRECTIVES DISCUSSION:   CPR/Full code   Patient's living condition: lives with family, child(staci), adult   ALLERGIES: Ace inhibitors and  Cyclobenzaprine  PAST MEDICAL HISTORY:  has a past medical history of Benign essential hypertension (9/1/2016), Chronic low back pain, Hypertension, Impaired fasting glucose, and Osteoarthritis. She also has no past medical history of Breast cancer (H), Breast mass, Cancer (H), Cerebral infarction (H), Congestive heart failure (H), COPD (chronic obstructive pulmonary disease) (H), Cyst of breast, Depressive disorder, Diabetes (H), Heart disease, History of blood transfusion, History of gestational diabetes, Inverted nipple, Malignant neoplasm of colon, unspecified site, Malignant neoplasm of corpus uteri, except isthmus (H), Malignant neoplasm of ovary (H), Need for prophylactic hormone replacement therapy (postmenopausal), Other injury of other sites of trunk, Personal history of other disorders of nervous system and sense organs, Personal history of unspecified circulatory disease, Thyroid disease, or Uncomplicated asthma.  PAST SURGICAL HISTORY:   has a past surgical history that includes orthopedic surgery; Eye surgery (cataracts); Mohs micrographic procedure; Mohs micrographic procedure (Left, 10/27/2016); Mohs micrographic procedure (Right, 5/24/2018); Arthroplasty Hip Anterior (Right, 7/24/2018); and Arthroplasty knee (Left, 5/12/2020).  FAMILY HISTORY: family history includes Diabetes in her father and mother.  SOCIAL HISTORY:   reports that she has quit smoking. She started smoking about 21 years ago. She smoked 0.00 packs per day for 0.00 years. She has quit using smokeless tobacco. She reports current alcohol use. She reports that she does not use drugs.    Post Discharge Medication Reconciliation Status: discharge medications reconciled and changed, per note/orders (see AVS)    Current Outpatient Medications   Medication Sig Dispense Refill     acetaminophen (TYLENOL) 325 MG tablet Take 650 mg by mouth every 6 hours as needed for mild pain       aspirin (ASA) 325 MG EC tablet Take 1 tablet (325 mg) by  "mouth daily       HYDROcodone-acetaminophen (NORCO) 5-325 MG tablet Take 1 tablet by mouth every 4 hours as needed for moderate to severe pain 50 tablet 0     metoprolol succinate ER (TOPROL-XL) 25 MG 24 hr tablet Take 0.5 tablets (12.5 mg) by mouth every evening (Hold for SBP <110 or HR <60)       senna-docusate (SENOKOT-S/PERICOLACE) 8.6-50 MG tablet Take 2 tablets by mouth 2 times daily       order for DME 1: Gradient Compression Wraps; 2: BLE knee high 20-30 mm Hg compression stockings 1 each 0       ROS:  10 point ROS of systems including Constitutional, Eyes, Respiratory, Cardiovascular, Gastroenterology, Genitourinary, Integumentary, Musculoskeletal, Psychiatric were all negative except for pertinent positives noted in my HPI.    Vitals:  /55   Pulse 64   Temp 98.1  F (36.7  C)   Resp 18   Ht 1.549 m (5' 1\")   Wt 46.7 kg (103 lb)   SpO2 95%   BMI 19.46 kg/m    Exam:  GENERAL APPEARANCE:  Alert, in no distress, cooperative  ENT:  Mouth and posterior oropharynx normal, moist mucous membranes, normal hearing acuity  EYES:  EOM, conjunctivae, lids, pupils and irises normal  RESP:  no respiratory distress, on room air and no cough  M/S:   Digits and nails normal  left leg immobilizer in place  NEURO:   Cranial nerves 2-12 are normal tested and grossly at patient's baseline, speech clear  PSYCH:  oriented X 3, affect and mood normal    Lab/Diagnostic data:  Most Recent 3 CBC's:  Recent Labs   Lab Test 05/14/20  0623 05/13/20  0636 05/08/20  1319 04/02/20  1934 05/21/19  1124  07/25/18  0659  07/13/18  1620   WBC  --   --   --  5.5 5.1  --   --   --  6.1   HGB 8.9* 8.7* 10.3* 11.2* 13.2   < > 11.5*  --  12.0   MCV  --   --   --  91 93  --   --   --  92   PLT  --   --   --  292 227  --  249   < > 275    < > = values in this interval not displayed.     Most Recent 3 BMP's:  Recent Labs   Lab Test 05/13/20  0636 05/12/20  1124 05/08/20  1319 04/02/20  1934 05/21/19  1124     --   --  137 140 "   POTASSIUM 4.8 3.9 4.1 3.6 4.1   CHLORIDE 108  --   --  101 103   CO2 26  --   --  30 28   BUN 26  --   --  28 37*   CR 0.92  --  0.83 0.90 0.77   ANIONGAP 4  --   --  6 9   NOHEMY 8.7  --   --  9.1 10.1   *  --   --  93 73     Most Recent TSH and T4:  Recent Labs   Lab Test 11/25/17 2027   TSH 4.20*   T4 1.05     Most Recent Hemoglobin A1c:  Recent Labs   Lab Test 07/13/18  1620   A1C 5.6       ASSESSMENT/PLAN:  Aftercare following left knee joint replacement surgery  Primary osteoarthritis of left knee  Physical deconditioning  Acute on chronic, now s/p TKA and doing well. Pain managed without problems, does not like using narcotic so will add PRN tylenol low dose. Therapies as ordered - f/u next visit. Ortho f/u as recommended.     Essential hypertension  Chronic, appears stable at this time. Meds as PTA, vs and wt per TCU routine and f/u with ranges next visit. BMP on 5/20 and f/u with results.     Anemia due to blood loss, acute  Acute with surgery, check Hgb on 5/20 and review results.     Advanced directives, counseling/discussion  Reviewed POLST: asks to be Full Code at this time.      Orders written by provider at facility  1. Full Code  2. Tylenol 650 mg PO every 6 hrs PRN pain, do not exceed 4 gm tylenol/24 hrs  3. BMP, Hgb on 5/20 diagnosis anemia, HTN    Electronically signed by:  JACKIE Delgado CNP                         Sincerely,        JACKIE Delgado CNP

## 2020-05-20 ENCOUNTER — HOSPITAL LABORATORY (OUTPATIENT)
Dept: OTHER | Facility: CLINIC | Age: 85
End: 2020-05-20

## 2020-05-20 ENCOUNTER — TELEPHONE (OUTPATIENT)
Dept: GERIATRICS | Facility: CLINIC | Age: 85
End: 2020-05-20

## 2020-05-20 LAB
ANION GAP SERPL CALCULATED.3IONS-SCNC: 4 MMOL/L (ref 3–14)
BUN SERPL-MCNC: 38 MG/DL (ref 7–30)
CALCIUM SERPL-MCNC: 8.5 MG/DL (ref 8.5–10.1)
CHLORIDE SERPL-SCNC: 115 MMOL/L (ref 94–109)
CO2 SERPL-SCNC: 29 MMOL/L (ref 20–32)
CREAT SERPL-MCNC: 0.79 MG/DL (ref 0.52–1.04)
GFR SERPL CREATININE-BSD FRML MDRD: 67 ML/MIN/{1.73_M2}
GLUCOSE SERPL-MCNC: 86 MG/DL (ref 70–99)
HGB BLD-MCNC: 8 G/DL (ref 11.7–15.7)
POTASSIUM SERPL-SCNC: 3.6 MMOL/L (ref 3.4–5.3)
SODIUM SERPL-SCNC: 148 MMOL/L (ref 133–144)

## 2020-05-20 NOTE — TELEPHONE ENCOUNTER
[facility] TELEPHONE ENCOUNTER    Chief Complaint   Patient presents with     Labs Only       Maryam Saavedra is a 87 year old  (6/9/1932),Nurse called today to report:   --patient with post op anemia, Hgb down to 8 from 8.9 last week  --patient with hypernatremia and increased Na level, appears dry    ASSESSMENT/PLAN  Post op anemia  Hypernatremia      Repeat Hgb, BMP on 5/26 diagnosis anemia, hypernatremia    Fluid intake monitoring x 5 days. Encourage fluids at least 1500 ml daily diagnosis hypernatremia      Electronically signed by:   JACKIE Delgado CNP

## 2020-05-24 ENCOUNTER — TELEPHONE (OUTPATIENT)
Dept: GERIATRICS | Facility: CLINIC | Age: 85
End: 2020-05-24

## 2020-05-24 NOTE — TELEPHONE ENCOUNTER
Staff found a o/a with a dressing present that was saturated.  No follow up looks like it had been done since dressing applied.  Like no orders written.    Staff stated the wound care they were going to put in place for a dressing which was fine.     Will for forward this note to the NP for a FYI    Electronically signed by Leeanna Perales RN, CNP

## 2020-05-26 ENCOUNTER — HOSPITAL LABORATORY (OUTPATIENT)
Dept: OTHER | Facility: CLINIC | Age: 85
End: 2020-05-26

## 2020-05-26 LAB
ANION GAP SERPL CALCULATED.3IONS-SCNC: 6 MMOL/L (ref 3–14)
BUN SERPL-MCNC: 23 MG/DL (ref 7–30)
CALCIUM SERPL-MCNC: 8.6 MG/DL (ref 8.5–10.1)
CHLORIDE SERPL-SCNC: 108 MMOL/L (ref 94–109)
CO2 SERPL-SCNC: 27 MMOL/L (ref 20–32)
CREAT SERPL-MCNC: 0.75 MG/DL (ref 0.52–1.04)
GFR SERPL CREATININE-BSD FRML MDRD: 71 ML/MIN/{1.73_M2}
GLUCOSE SERPL-MCNC: 118 MG/DL (ref 70–99)
HGB BLD-MCNC: 8.7 G/DL (ref 11.7–15.7)
POTASSIUM SERPL-SCNC: 3.7 MMOL/L (ref 3.4–5.3)
SODIUM SERPL-SCNC: 141 MMOL/L (ref 133–144)

## 2020-05-27 ENCOUNTER — NURSING HOME VISIT (OUTPATIENT)
Dept: GERIATRICS | Facility: CLINIC | Age: 85
End: 2020-05-27
Payer: MEDICARE

## 2020-05-27 DIAGNOSIS — Z47.1 AFTERCARE FOLLOWING LEFT KNEE JOINT REPLACEMENT SURGERY: Primary | ICD-10-CM

## 2020-05-27 DIAGNOSIS — Z96.652 AFTERCARE FOLLOWING LEFT KNEE JOINT REPLACEMENT SURGERY: Primary | ICD-10-CM

## 2020-05-27 NOTE — PROGRESS NOTES
Ortho Nursing home visit    Maryam Saavedra is a 87 year old female who resides at [facility]    Patient is seen today for on-site post-op visit, now 2 weeks S/P TKA left, 2nd to traumatic arthritis, is in rehab post-op due to deconditioning .      Past Medical History:   Diagnosis Date     Benign essential hypertension 9/1/2016     Chronic low back pain      Hypertension      Impaired fasting glucose     borderline     Osteoarthritis       Past Surgical History:   Procedure Laterality Date     ARTHROPLASTY HIP ANTERIOR Right 7/24/2018    Procedure: ARTHROPLASTY HIP ANTERIOR;  RIGHT DIRECT ANTERIOR TOTAL HIP ARTHROPLASTY;  Surgeon: Jimbo Phillips MD;  Location:  OR     ARTHROPLASTY KNEE Left 5/12/2020    Procedure: LEFT TOTAL KNEE ARTHROPLASTY;  Surgeon: Jimbo Phillips MD;  Location:  OR     EYE SURGERY  cataracts     MOHS MICROGRAPHIC PROCEDURE      Left lower eyelid      MOHS MICROGRAPHIC PROCEDURE Left 10/27/2016    Procedure: MOHS MICROGRAPHIC PROCEDURE;  Surgeon: Jose Torrez MD;  Location: [facility]     MOHS MICROGRAPHIC PROCEDURE Right 5/24/2018    Procedure: MOHS MICROGRAPHIC PROCEDURE;  RIGHT MEDIAL CANTHUS MOHS CLOSURE;  Surgeon: Jose Torrez MD;  Location: [facility]     ORTHOPEDIC SURGERY      bilateral wrists        Allergies   Allergen Reactions     Ace Inhibitors      Angioedema       Cyclobenzaprine      Angioedema      There were no vitals taken for this visit.     Exam: Left knee healing well, staples removed, steri-strips applied, no noted effusion, min swelling, PROM 90/-10 making slow progress in PT:      ASSESSMENT / PLAN:  Continue to work on goals of discharge, and F/U with Dr. Phillips in 1 month for SIVA das    [id number]          [facility]  [phone] Cell

## 2020-05-28 ENCOUNTER — NURSING HOME VISIT (OUTPATIENT)
Dept: GERIATRICS | Facility: CLINIC | Age: 85
End: 2020-05-28
Payer: MEDICARE

## 2020-05-28 VITALS
TEMPERATURE: 97.3 F | HEIGHT: 61 IN | HEART RATE: 75 BPM | DIASTOLIC BLOOD PRESSURE: 70 MMHG | SYSTOLIC BLOOD PRESSURE: 109 MMHG | RESPIRATION RATE: 18 BRPM | WEIGHT: 103.3 LBS | OXYGEN SATURATION: 95 % | BODY MASS INDEX: 19.5 KG/M2

## 2020-05-28 DIAGNOSIS — Z96.652 AFTERCARE FOLLOWING LEFT KNEE JOINT REPLACEMENT SURGERY: Primary | ICD-10-CM

## 2020-05-28 DIAGNOSIS — Z47.1 AFTERCARE FOLLOWING LEFT KNEE JOINT REPLACEMENT SURGERY: Primary | ICD-10-CM

## 2020-05-28 DIAGNOSIS — I10 ESSENTIAL HYPERTENSION: ICD-10-CM

## 2020-05-28 DIAGNOSIS — R53.81 PHYSICAL DECONDITIONING: ICD-10-CM

## 2020-05-28 DIAGNOSIS — D62 ANEMIA DUE TO BLOOD LOSS, ACUTE: ICD-10-CM

## 2020-05-28 DIAGNOSIS — M17.12 PRIMARY OSTEOARTHRITIS OF LEFT KNEE: ICD-10-CM

## 2020-05-28 PROCEDURE — 99309 SBSQ NF CARE MODERATE MDM 30: CPT | Performed by: NURSE PRACTITIONER

## 2020-05-28 ASSESSMENT — MIFFLIN-ST. JEOR: SCORE: 840.95

## 2020-05-28 NOTE — PROGRESS NOTES
"[facility]  [place] Medical Record Number:  [id number]  Place of Service where encounter took place:  [facility] [[id number]]  Chief Complaint   Patient presents with     RECHECK       HPI:    Maryam Saavedra  is a 87 year old (6/9/1932), who is being seen today for an episodic care visit.  HPI information obtained from: facility chart records, facility staff, patient report and [facility] chart review. Today's concern is:  Patient in TCU following hospitalization due to knee pain. She underwent a left knee arthoplasty due to subacute fracture deformity of lateral component of left knee. Post op complications included ABLA.  Since in TCU she has made progress with therapy. She is walking in halls with assist.   She has little pain.  nursing reports skin tears on LLE.       Past Medical and Surgical History reviewed in Epic today.    MEDICATIONS:    Current Outpatient Medications   Medication Sig Dispense Refill     acetaminophen (TYLENOL) 325 MG tablet Take 650 mg by mouth every 6 hours as needed for mild pain       aspirin (ASA) 325 MG EC tablet Take 1 tablet (325 mg) by mouth daily       HYDROcodone-acetaminophen (NORCO) 5-325 MG tablet Take 1 tablet by mouth every 4 hours as needed for moderate to severe pain 50 tablet 0     metoprolol succinate ER (TOPROL-XL) 25 MG 24 hr tablet Take 0.5 tablets (12.5 mg) by mouth every evening (Hold for SBP <110 or HR <60)       order for DME 1: Gradient Compression Wraps; 2: BLE knee high 20-30 mm Hg compression stockings 1 each 0     senna-docusate (SENOKOT-S/PERICOLACE) 8.6-50 MG tablet Take 2 tablets by mouth 2 times daily           REVIEW OF SYSTEMS:  4 point ROS including Respiratory, CV, GI and , other than that noted in the HPI,  is negative    Objective:  /70   Pulse 75   Temp 97.3  F (36.3  C)   Resp 18   Ht 1.549 m (5' 1\")   Wt 46.9 kg (103 lb 4.8 oz)   SpO2 95%   BMI 19.52 kg/m    Exam:  GENERAL APPEARANCE:  " Alert, in no distress  ENT:  Mouth and posterior oropharynx normal, moist mucous membranes, hearing acuity adequate   EYES:  EOM, conjunctivae, lids, pupils and irises normal    RESP:  respiratory effort and palpation of chest normal, no respiratory distress, Lung sounds clear  CV:  Palpation and auscultation of heart done , rate and rhythm reg, no murmur, no rub or gallop, Edema none  ABDOMEN:  normal bowel sounds, soft, nontender, no hepatosplenomegaly or other masses  M/S:   Gait and station antalgic, Digits and nails normal   SKIN:  Inspection/Palpation of skin and subcutaneous tissue no rash, RLE wrapped with kerlix  NEURO: 2-12 in normal limits and at patient's baseline  PSYCH:  insight and judgement, memory mild cognitive impairment  , affect and mood normal    Labs:   CBC RESULTS:   Recent Labs   Lab Test 05/26/20  0921 05/20/20  0724  04/02/20  1934 05/21/19  1124   WBC  --   --   --  5.5 5.1   RBC  --   --   --  3.77* 4.27   HGB 8.7* 8.0*   < > 11.2* 13.2   HCT  --   --   --  34.1* 39.8   MCV  --   --   --  91 93   MCH  --   --   --  29.7 30.9   MCHC  --   --   --  32.8 33.2   RDW  --   --   --  14.7 14.1   PLT  --   --   --  292 227    < > = values in this interval not displayed.       Last Basic Metabolic Panel:  Recent Labs   Lab Test 05/26/20 0921 05/20/20  0724    148*   POTASSIUM 3.7 3.6   CHLORIDE 108 115*   NOHEMY 8.6 8.5   CO2 27 29   BUN 23 38*   CR 0.75 0.79   * 86           TSH   Date Value Ref Range Status   11/25/2017 4.20 (H) 0.40 - 4.00 mU/L Final   09/08/2017 4.66 (H) 0.40 - 4.00 mU/L Final   ]    Lab Results   Component Value Date    A1C 5.6 07/13/2018    A1C 5.9 05/14/2018           ASSESSMENT/PLAN:  Aftercare following left knee joint replacement surgery  Primary osteoarthritis of left knee  Physical deconditioning  Patient has made progress with therapy. She is walking about 500' with walker and assist. She has minimal pain. She did work on going up and down stairs.she lives  with family in house.   -DISCONTINUE norco  -continue ASA 325mg q day until 6/12  -physical therapy and OCCUPATIONAL THERAPY   Essential hypertension  Last few BPS in /70, 129/79, 124/62. Amlodipine was stopped in hospital  -continue meoprolol 12.5mg q day    Anemia due to blood loss, acute  Hemoglobin   Date Value Ref Range Status   05/26/2020 8.7 (L) 11.7 - 15.7 g/dL Final   05/20/2020 8.0 (L) 11.7 - 15.7 g/dL Final   hgb slowing trending up  -periodic hgb  Skin tears on RLE  Due to dry skin. Wounds are covered.   -continue daily skin care        Electronically signed by:  JACKIE Isabel CNP

## 2020-05-28 NOTE — LETTER
5/28/2020        RE: Maryam Saavedra  [address]  [facility] [address]        [facility]  [place] Record Number:  [id number]  Place of Service where encounter took place:  [facility] [[id number]]  Chief Complaint   Patient presents with     RECHECK       HPI:    Maryam Saavedra  is a 87 year old (6/9/1932), who is being seen today for an episodic care visit.  HPI information obtained from: facility chart records, facility staff, patient report and [facility] chart review. Today's concern is:  Patient in TCU following hospitalization due to knee pain. She underwent a left knee arthoplasty due to subacute fracture deformity of lateral component of left knee. Post op complications included ABLA.  Since in TCU she has made progress with therapy. She is walking in halls with assist.   She has little pain.  nursing reports skin tears on LLE.       Past Medical and Surgical History reviewed in Epic today.    MEDICATIONS:    Current Outpatient Medications   Medication Sig Dispense Refill     acetaminophen (TYLENOL) 325 MG tablet Take 650 mg by mouth every 6 hours as needed for mild pain       aspirin (ASA) 325 MG EC tablet Take 1 tablet (325 mg) by mouth daily       HYDROcodone-acetaminophen (NORCO) 5-325 MG tablet Take 1 tablet by mouth every 4 hours as needed for moderate to severe pain 50 tablet 0     metoprolol succinate ER (TOPROL-XL) 25 MG 24 hr tablet Take 0.5 tablets (12.5 mg) by mouth every evening (Hold for SBP <110 or HR <60)       order for DME 1: Gradient Compression Wraps; 2: BLE knee high 20-30 mm Hg compression stockings 1 each 0     senna-docusate (SENOKOT-S/PERICOLACE) 8.6-50 MG tablet Take 2 tablets by mouth 2 times daily           REVIEW OF SYSTEMS:  4 point ROS including Respiratory, CV, GI and , other than that noted in the HPI,  is negative    Objective:  /70   Pulse 75   Temp 97.3  F (36.3  C)   Resp 18   Ht 1.549 m (5'  "1\")   Wt 46.9 kg (103 lb 4.8 oz)   SpO2 95%   BMI 19.52 kg/m    Exam:  GENERAL APPEARANCE:  Alert, in no distress  ENT:  Mouth and posterior oropharynx normal, moist mucous membranes, hearing acuity adequate   EYES:  EOM, conjunctivae, lids, pupils and irises normal    RESP:  respiratory effort and palpation of chest normal, no respiratory distress, Lung sounds clear  CV:  Palpation and auscultation of heart done , rate and rhythm reg, no murmur, no rub or gallop, Edema none  ABDOMEN:  normal bowel sounds, soft, nontender, no hepatosplenomegaly or other masses  M/S:   Gait and station antalgic, Digits and nails normal   SKIN:  Inspection/Palpation of skin and subcutaneous tissue no rash, RLE wrapped with kerlix  NEURO: 2-12 in normal limits and at patient's baseline  PSYCH:  insight and judgement, memory mild cognitive impairment  , affect and mood normal    Labs:   CBC RESULTS:   Recent Labs   Lab Test 05/26/20  0921 05/20/20  0724  04/02/20  1934 05/21/19  1124   WBC  --   --   --  5.5 5.1   RBC  --   --   --  3.77* 4.27   HGB 8.7* 8.0*   < > 11.2* 13.2   HCT  --   --   --  34.1* 39.8   MCV  --   --   --  91 93   MCH  --   --   --  29.7 30.9   MCHC  --   --   --  32.8 33.2   RDW  --   --   --  14.7 14.1   PLT  --   --   --  292 227    < > = values in this interval not displayed.       Last Basic Metabolic Panel:  Recent Labs   Lab Test 05/26/20 0921 05/20/20  0724    148*   POTASSIUM 3.7 3.6   CHLORIDE 108 115*   NOHEMY 8.6 8.5   CO2 27 29   BUN 23 38*   CR 0.75 0.79   * 86           TSH   Date Value Ref Range Status   11/25/2017 4.20 (H) 0.40 - 4.00 mU/L Final   09/08/2017 4.66 (H) 0.40 - 4.00 mU/L Final   ]    Lab Results   Component Value Date    A1C 5.6 07/13/2018    A1C 5.9 05/14/2018           ASSESSMENT/PLAN:  Aftercare following left knee joint replacement surgery  Primary osteoarthritis of left knee  Physical deconditioning  Patient has made progress with therapy. She is walking about 500' " with walker and assist. She has minimal pain. She did work on going up and down stairs.she lives with family in house.   -DISCONTINUE norco  -continue ASA 325mg q day until 6/12  -physical therapy and OCCUPATIONAL THERAPY   Essential hypertension  Last few BPS in /70, 129/79, 124/62. Amlodipine was stopped in hospital  -continue meoprolol 12.5mg q day    Anemia due to blood loss, acute  Hemoglobin   Date Value Ref Range Status   05/26/2020 8.7 (L) 11.7 - 15.7 g/dL Final   05/20/2020 8.0 (L) 11.7 - 15.7 g/dL Final   hgb slowing trending up  -periodic hgb  Skin tears on RLE  Due to dry skin. Wounds are covered.   -continue daily skin care        Electronically signed by:  JACKIE Isabel CNP               Sincerely,        JACKIE Isabel CNP

## 2020-06-02 ENCOUNTER — HOSPITAL LABORATORY (OUTPATIENT)
Dept: OTHER | Facility: CLINIC | Age: 85
End: 2020-06-02

## 2020-06-02 ENCOUNTER — NURSING HOME VISIT (OUTPATIENT)
Dept: GERIATRICS | Facility: CLINIC | Age: 85
End: 2020-06-02
Payer: MEDICARE

## 2020-06-02 VITALS
HEART RATE: 57 BPM | RESPIRATION RATE: 18 BRPM | TEMPERATURE: 96.4 F | HEIGHT: 61 IN | WEIGHT: 102.6 LBS | DIASTOLIC BLOOD PRESSURE: 61 MMHG | SYSTOLIC BLOOD PRESSURE: 129 MMHG | OXYGEN SATURATION: 98 % | BODY MASS INDEX: 19.37 KG/M2

## 2020-06-02 DIAGNOSIS — M17.12 PRIMARY OSTEOARTHRITIS OF LEFT KNEE: ICD-10-CM

## 2020-06-02 DIAGNOSIS — R41.89 COGNITIVE IMPAIRMENT: ICD-10-CM

## 2020-06-02 DIAGNOSIS — Z96.652 S/P TOTAL KNEE ARTHROPLASTY, LEFT: Primary | ICD-10-CM

## 2020-06-02 DIAGNOSIS — D62 ANEMIA DUE TO BLOOD LOSS, ACUTE: ICD-10-CM

## 2020-06-02 DIAGNOSIS — R53.81 PHYSICAL DECONDITIONING: ICD-10-CM

## 2020-06-02 DIAGNOSIS — I10 BENIGN ESSENTIAL HYPERTENSION: ICD-10-CM

## 2020-06-02 PROCEDURE — 99309 SBSQ NF CARE MODERATE MDM 30: CPT | Performed by: NURSE PRACTITIONER

## 2020-06-02 ASSESSMENT — MIFFLIN-ST. JEOR: SCORE: 837.77

## 2020-06-02 NOTE — LETTER
"    6/2/2020        RE: Maryam Saavedra  [address]  [facility] [address]        [facility]  [place] Medical Record Number:  [id number]  Place of Service where encounter took place:  [facility] [[id number]]  Chief Complaint   Patient presents with     Nursing Home Acute       HPI:    Maryam Saavedra  is a 87 year old (6/9/1932), who is being seen today for an episodic care visit.  HPI information obtained from: facility chart records, facility staff, patient report and [facility] chart review. Today's concern is:  Patient in TCU following hospitalization due to knee pain. She underwent a left knee arthoplasty due to subacute fracture deformity of lateral component of left knee. Post op complications included ABLA.  Since in TCU she has made some progress with therapy but seems to have trouble following step by step directions on how to walk with walker.   She has little pain..     Past Medical and Surgical History reviewed in Epic today.    MEDICATIONS:    Current Outpatient Medications   Medication Sig Dispense Refill     acetaminophen (TYLENOL) 325 MG tablet Take 650 mg by mouth every 6 hours as needed for mild pain       aspirin (ASA) 325 MG EC tablet Take 1 tablet (325 mg) by mouth daily       metoprolol succinate ER (TOPROL-XL) 25 MG 24 hr tablet Take 0.5 tablets (12.5 mg) by mouth every evening (Hold for SBP <110 or HR <60)       order for DME 1: Gradient Compression Wraps; 2: BLE knee high 20-30 mm Hg compression stockings 1 each 0     senna-docusate (SENOKOT-S/PERICOLACE) 8.6-50 MG tablet Take 2 tablets by mouth 2 times daily           REVIEW OF SYSTEMS:  Limited secondary to cognitive impairment but today pt reports no pain    Objective:  /61   Pulse 57   Temp 96.4  F (35.8  C)   Resp 18   Ht 1.549 m (5' 1\")   Wt 46.5 kg (102 lb 9.6 oz)   SpO2 98%   BMI 19.39 kg/m    Exam:  GENERAL APPEARANCE:  Alert, in no distress  ENT:  Mouth and " posterior oropharynx normal, moist mucous membranes, hearing acuity adequate   EYES:  EOM, conjunctivae, lids, pupils and irises normal  RESP:  respiratory effort normal, no respiratory distress,     M/S:   Gait and station unsteady, slow, Digits and nails normal   SKIN:  Inspection/Palpation of skin and subcutaneous tissue left knee incision CDI  NEURO: 2-12 in normal limits and at patient's baseline  PSYCH:  insight and judgement, memory impaired , affect and mood normal    Labs:   CBC RESULTS:   Recent Labs   Lab Test 05/26/20 0921 05/20/20 0724 04/02/20 1934 05/21/19  1124   WBC  --   --   --  5.5 5.1   RBC  --   --   --  3.77* 4.27   HGB 8.7* 8.0*   < > 11.2* 13.2   HCT  --   --   --  34.1* 39.8   MCV  --   --   --  91 93   MCH  --   --   --  29.7 30.9   MCHC  --   --   --  32.8 33.2   RDW  --   --   --  14.7 14.1   PLT  --   --   --  292 227    < > = values in this interval not displayed.       Last Basic Metabolic Panel:  Recent Labs   Lab Test 05/26/20 0921 05/20/20 0724    148*   POTASSIUM 3.7 3.6   CHLORIDE 108 115*   NOHEMY 8.6 8.5   CO2 27 29   BUN 23 38*   CR 0.75 0.79   * 86       Liver Function Studies -   Recent Labs   Lab Test 04/02/20 1934 05/21/19  1124   PROTTOTAL 6.9 7.6   ALBUMIN 2.7* 3.7   BILITOTAL 0.3 0.3   ALKPHOS 207* 136   AST 31 35   ALT 31 39       TSH   Date Value Ref Range Status   11/25/2017 4.20 (H) 0.40 - 4.00 mU/L Final   09/08/2017 4.66 (H) 0.40 - 4.00 mU/L Final   ]    Lab Results   Component Value Date    A1C 5.6 07/13/2018    A1C 5.9 05/14/2018           ASSESSMENT/PLAN:  Aftercare following left knee joint replacement surgery  Primary osteoarthritis of left knee  Physical deconditioning  Patient has made progress with therapy. She is walking about 200' with walker and assist. She has minimal pain. She did work on going up and down stairs.she lives with family in house.   -continue ASA 325mg q day until 6/12  -physical therapy and OCCUPATIONAL THERAPY    Essential hypertension  Last few BPS in /61, 104/65, 131/64 Amlodipine was stopped in hospital  -continue metoprolol 12.5mg q day     Anemia due to blood loss, acute        Hemoglobin   Date Value Ref Range Status   05/26/2020 8.7 (L) 11.7 - 15.7 g/dL Final   05/20/2020 8.0 (L) 11.7 - 15.7 g/dL Final   hgb slowing trending up  -periodic hgb    Cognitive impairment  She struggled following directions for using thewalker with the therapist  JUNE 13//30. This will increase fall risk. She is working with SPEECH THER[facility]Y for cognitive skills  -OCCUPATIONAL THERAPY   -SPEECH THERAPY     Electronically signed by:  JACKIE Isabel CNP             Sincerely,        JACKIE Isabel CNP

## 2020-06-02 NOTE — PROGRESS NOTES
"[place] [facility]  [place] Medical Record Number:  [id number]  Place of Service where encounter took place:  [facility] [[id number]]  Chief Complaint   Patient presents with     Nursing Home Acute       HPI:    Maryam Saavedra  is a 87 year old (6/9/1932), who is being seen today for an episodic care visit.  HPI information obtained from: facility chart records, facility staff, patient report and [facility] chart review. Today's concern is:  Patient in TCU following hospitalization due to knee pain. She underwent a left knee arthoplasty due to subacute fracture deformity of lateral component of left knee. Post op complications included ABLA.  Since in TCU she has made some progress with therapy but seems to have trouble following step by step directions on how to walk with walker.   She has little pain..     Past Medical and Surgical History reviewed in Epic today.    MEDICATIONS:    Current Outpatient Medications   Medication Sig Dispense Refill     acetaminophen (TYLENOL) 325 MG tablet Take 650 mg by mouth every 6 hours as needed for mild pain       aspirin (ASA) 325 MG EC tablet Take 1 tablet (325 mg) by mouth daily       metoprolol succinate ER (TOPROL-XL) 25 MG 24 hr tablet Take 0.5 tablets (12.5 mg) by mouth every evening (Hold for SBP <110 or HR <60)       order for DME 1: Gradient Compression Wraps; 2: BLE knee high 20-30 mm Hg compression stockings 1 each 0     senna-docusate (SENOKOT-S/PERICOLACE) 8.6-50 MG tablet Take 2 tablets by mouth 2 times daily           REVIEW OF SYSTEMS:  Limited secondary to cognitive impairment but today pt reports no pain    Objective:  /61   Pulse 57   Temp 96.4  F (35.8  C)   Resp 18   Ht 1.549 m (5' 1\")   Wt 46.5 kg (102 lb 9.6 oz)   SpO2 98%   BMI 19.39 kg/m    Exam:  GENERAL APPEARANCE:  Alert, in no distress  ENT:  Mouth and posterior oropharynx normal, moist mucous membranes, hearing acuity adequate   EYES:  EOM, " conjunctivae, lids, pupils and irises normal  RESP:  respiratory effort normal, no respiratory distress,     M/S:   Gait and station unsteady, slow, Digits and nails normal   SKIN:  Inspection/Palpation of skin and subcutaneous tissue left knee incision CDI  NEURO: 2-12 in normal limits and at patient's baseline  PSYCH:  insight and judgement, memory impaired , affect and mood normal    Labs:   CBC RESULTS:   Recent Labs   Lab Test 05/26/20  0921 05/20/20  0724  04/02/20 1934 05/21/19  1124   WBC  --   --   --  5.5 5.1   RBC  --   --   --  3.77* 4.27   HGB 8.7* 8.0*   < > 11.2* 13.2   HCT  --   --   --  34.1* 39.8   MCV  --   --   --  91 93   MCH  --   --   --  29.7 30.9   MCHC  --   --   --  32.8 33.2   RDW  --   --   --  14.7 14.1   PLT  --   --   --  292 227    < > = values in this interval not displayed.       Last Basic Metabolic Panel:  Recent Labs   Lab Test 05/26/20 0921 05/20/20  0724    148*   POTASSIUM 3.7 3.6   CHLORIDE 108 115*   NOHEMY 8.6 8.5   CO2 27 29   BUN 23 38*   CR 0.75 0.79   * 86       Liver Function Studies -   Recent Labs   Lab Test 04/02/20 1934 05/21/19  1124   PROTTOTAL 6.9 7.6   ALBUMIN 2.7* 3.7   BILITOTAL 0.3 0.3   ALKPHOS 207* 136   AST 31 35   ALT 31 39       TSH   Date Value Ref Range Status   11/25/2017 4.20 (H) 0.40 - 4.00 mU/L Final   09/08/2017 4.66 (H) 0.40 - 4.00 mU/L Final   ]    Lab Results   Component Value Date    A1C 5.6 07/13/2018    A1C 5.9 05/14/2018           ASSESSMENT/PLAN:  Aftercare following left knee joint replacement surgery  Primary osteoarthritis of left knee  Physical deconditioning  Patient has made progress with therapy. She is walking about 200' with walker and assist. She has minimal pain. She did work on going up and down stairs.she lives with family in house.   -continue ASA 325mg q day until 6/12  -physical therapy and OCCUPATIONAL THERAPY   Essential hypertension  Last few BPS in /61, 104/65, 131/64 Amlodipine was stopped in  hospital  -continue metoprolol 12.5mg q day     Anemia due to blood loss, acute        Hemoglobin   Date Value Ref Range Status   05/26/2020 8.7 (L) 11.7 - 15.7 g/dL Final   05/20/2020 8.0 (L) 11.7 - 15.7 g/dL Final   hgb slowing trending up  -periodic hgb    Cognitive impairment  She struggled following directions for using thewalker with the therapist  SLKYLEIGH 13//30. This will increase fall risk. She is working with [facility] for cognitive skills  -OCCUPATIONAL THERAPY   -SPEECH THERAPY     Electronically signed by:  JACKIE Isabel CNP

## 2020-06-03 LAB
SARS-COV-2 RNA SPEC QL NAA+PROBE: NOT DETECTED
SPECIMEN SOURCE: NORMAL

## 2020-06-05 NOTE — PROGRESS NOTES
"[place] [facility]   Maryam Saavedra is being evaluated via a billable video visit due to the restrictions of the Covid-19 pandemic.   The patient has been notified of following:  This video visit will be conducted via a call between you and your provider. We have found that certain health care needs can be provided without the need for an in-person physical exam.  This service lets us provide the care you need with a video conversation. If during the course of the call the provider feels a video visit is not appropriate, you will not be charged for this service.\"   The provider has received verbal consent for a Video Visit from the patient or first contact? Yes  Patient  or facility staff would like the video invitation sent by: Send to e-mail at: [email or web address]  Video Start Time: 10:15 am  [place] Medical Record Number:  [id number]  Place of Location at the time of visit: [place] [facility]     CHIEF COMPLAINT:  Hospital follow-up/Initial visit    HPI:  Maryam Saavedra  is a 87 year old (6/9/1932), who is being seen today for a visit.  HPI information obtained from: patient report and [facility] chart review.     Past medical history is notable for hypertension, chronic low back pain, impaired fasting glucose, osteoarthritis, and subacute fracture deformity of the lateral compartment of left knee.    She was hospitalized at [facility] from May 12 through May 15, 2020 for elective left total knee arthroplasty.  Postop hemoglobin dropped to 8.7.    Patient is admitted to this facility for rehabilitation and continuation of medical management.    Patient was seen today through a virtual video visit.  She appears frail but comfortable.  She has mild memory impairment.  Her surgical pain is adequately controlled.  She reports no fever, chills, lightheadedness, chest pain, shortness of breath, palpitation, nausea, vomiting, abdominal pain, urinary symptoms, or diarrhea.  She " believes that she had a bowel movement either last night or earlier this morning.  Her heart rate has been in the 50s to 60s, but completely asymptomatic.    CODE STATUS:   CPR/Full code     Past Medical and Surgical History reviewed in [facility] today.    PAST MEDICAL HISTORY:   Hypertension  Chronic low back pain  Impaired fasting glucose  Osteoarthritis  Cognitive impairment    PAST SURGICAL HISTORY:   Past Surgical History:   Procedure Laterality Date     ARTHROPLASTY HIP ANTERIOR Right 7/24/2018    Procedure: ARTHROPLASTY HIP ANTERIOR;  RIGHT DIRECT ANTERIOR TOTAL HIP ARTHROPLASTY;  Surgeon: Jimbo Phillips MD;  Location:  OR     ARTHROPLASTY KNEE Left 5/12/2020    Procedure: LEFT TOTAL KNEE ARTHROPLASTY;  Surgeon: Jimbo Phillips MD;  Location:  OR     EYE SURGERY  cataracts     MOHS MICROGRAPHIC PROCEDURE      Left lower eyelid      MOHS MICROGRAPHIC PROCEDURE Left 10/27/2016    Procedure: MOHS MICROGRAPHIC PROCEDURE;  Surgeon: Jose Torrez MD;  Location: [facility]     MOHS MICROGRAPHIC PROCEDURE Right 5/24/2018    Procedure: MOHS MICROGRAPHIC PROCEDURE;  RIGHT MEDIAL CANTHUS MOHS CLOSURE;  Surgeon: Jose Torrez MD;  Location: [facility]     ORTHOPEDIC SURGERY      bilateral wrists       FAMILY HISTORY:   Family History   Problem Relation Age of Onset     Diabetes Mother      Diabetes Father        SOCIAL HISTORY:  Social History     Tobacco Use     Smoking status: Former Smoker     Packs/day: 0.00     Years: 0.00     Pack years: 0.00     Start date: 7/24/1998     Smokeless tobacco: Former User   Substance Use Topics     Alcohol use: Yes     Alcohol/week: 0.0 standard drinks     Comment: 1-2 drinks per month       MEDICATIONS:  Current Outpatient Medications   Medication Sig Dispense Refill     acetaminophen (TYLENOL) 325 MG tablet Take 650 mg by mouth every 6 hours as needed for mild pain       aspirin (ASA) 325 MG EC tablet Take 1 tablet (325 mg) by mouth daily       metoprolol succinate ER  (TOPROL-XL) 25 MG 24 hr tablet Take 0.5 tablets (12.5 mg) by mouth every evening (Hold for SBP <110 or HR <60)       order for DME 1: Gradient Compression Wraps; 2: BLE knee high 20-30 mm Hg compression stockings 1 each 0     senna-docusate (SENOKOT-S/PERICOLACE) 8.6-50 MG tablet Take 2 tablets by mouth 2 times daily         REVIEW OF SYSTEMS:   10 point review of system was performed as much as possible as stated in HPI.    Objective:   Limited visit exam done given COVID-19 precautions.   Vital signs in the chart were reviewed.  Vital Signs: Blood pressure 128/50, heart rate 62, respiratory rate 18, temperature 97.3  F, chest saturation 98%, weight 95.4 LBS  Constitutional: Frail appearing but comfortable and in no acute distress  HEENT: Conjunctival pallor+  Neurologic: Awake, alert, oriented x2-3  Extremities: Trace left lower extremity edema  Skin/integument: No acute rash, no cyanosis    Labs:   Lab Results   Component Value Date    WBC 5.5 04/02/2020     Lab Results   Component Value Date    RBC 3.77 04/02/2020     Lab Results   Component Value Date    HGB 8.7 05/26/2020     Lab Results   Component Value Date    HCT 34.1 04/02/2020     Lab Results   Component Value Date    MCV 91 04/02/2020     Lab Results   Component Value Date    MCH 29.7 04/02/2020     Lab Results   Component Value Date    MCHC 32.8 04/02/2020     Lab Results   Component Value Date    RDW 14.7 04/02/2020     Lab Results   Component Value Date     04/02/2020     Last Comprehensive Metabolic Panel:  Sodium   Date Value Ref Range Status   05/26/2020 141 133 - 144 mmol/L Final     Potassium   Date Value Ref Range Status   05/26/2020 3.7 3.4 - 5.3 mmol/L Final     Chloride   Date Value Ref Range Status   05/26/2020 108 94 - 109 mmol/L Final     Carbon Dioxide   Date Value Ref Range Status   05/26/2020 27 20 - 32 mmol/L Final     Anion Gap   Date Value Ref Range Status   05/26/2020 6 3 - 14 mmol/L Final     Glucose   Date Value Ref Range  Status   05/26/2020 118 (H) 70 - 99 mg/dL Final     Urea Nitrogen   Date Value Ref Range Status   05/26/2020 23 7 - 30 mg/dL Final     Creatinine   Date Value Ref Range Status   05/26/2020 0.75 0.52 - 1.04 mg/dL Final     GFR Estimate   Date Value Ref Range Status   05/26/2020 71 >60 mL/min/[1.73_m2] Final     Comment:     Non  GFR Calc  Starting 12/18/2018, serum creatinine based estimated GFR (eGFR) will be   calculated using the Chronic Kidney Disease Epidemiology Collaboration   (CKD-EPI) equation.       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  Stable.  Pain is adequately controlled.  Plan:  Pain management with PRN acetaminophen  Aspirin 325 g p.o. daily till June 12 for DVT prophylaxis  Continue PT/OT evaluation and therapy  Follow-up with Ortho as planned    Acute blood loss anemia.  Due to above surgery.  Last hemoglobin was stable at 8.9 on May 14.  Plan:  Monitor hemoglobin periodically    Essential hypertension.  Blood pressure is currently controlled.  Prior to admission to the hospital, she was also on amlodipine 2.5 mg p.o. daily which was discontinued.  Plan:  Continue PTA metoprolol ER 12.5 mg p.o. daily  Monitor blood pressure    Cognitive impairment.  SLUMS score of 13/30.  Plan:  Staff to assist with care    Slow transit constipation.  Plan:  Continue bowel regimen    Orders written by provider at facility:  None.    Electronically signed by:  Tiffany Rausch MD     Video-Visit Details  Type of service:  Video Visit  Video End Time (time video stopped): 10:19 am  Distant Location (provider location):  [place] [facility]

## 2020-06-08 ENCOUNTER — VIRTUAL VISIT (OUTPATIENT)
Dept: GERIATRICS | Facility: CLINIC | Age: 85
End: 2020-06-08
Payer: MEDICARE

## 2020-06-08 VITALS
DIASTOLIC BLOOD PRESSURE: 55 MMHG | TEMPERATURE: 96.2 F | RESPIRATION RATE: 16 BRPM | SYSTOLIC BLOOD PRESSURE: 108 MMHG | BODY MASS INDEX: 18.01 KG/M2 | WEIGHT: 95.4 LBS | OXYGEN SATURATION: 97 % | HEIGHT: 61 IN | HEART RATE: 53 BPM

## 2020-06-08 DIAGNOSIS — R41.89 COGNITIVE IMPAIRMENT: ICD-10-CM

## 2020-06-08 DIAGNOSIS — I10 BENIGN ESSENTIAL HYPERTENSION: ICD-10-CM

## 2020-06-08 DIAGNOSIS — D62 ANEMIA DUE TO BLOOD LOSS, ACUTE: ICD-10-CM

## 2020-06-08 DIAGNOSIS — M17.12 PRIMARY OSTEOARTHRITIS OF LEFT KNEE: Primary | ICD-10-CM

## 2020-06-08 DIAGNOSIS — R53.81 PHYSICAL DECONDITIONING: ICD-10-CM

## 2020-06-08 DIAGNOSIS — Z96.652 S/P TOTAL KNEE ARTHROPLASTY, LEFT: ICD-10-CM

## 2020-06-08 PROCEDURE — 99305 1ST NF CARE MODERATE MDM 35: CPT | Mod: 95 | Performed by: INTERNAL MEDICINE

## 2020-06-08 PROCEDURE — 99207 ZZC CDG-MDM COMPONENT: MEETS MODERATE - UP CODED: CPT | Performed by: INTERNAL MEDICINE

## 2020-06-08 ASSESSMENT — MIFFLIN-ST. JEOR: SCORE: 805.11

## 2020-06-08 NOTE — LETTER
"    6/8/2020        RE: Maryam Saavedra  [address]  [facility] [address]        [place] [facility]   Maryam Saavedra is being evaluated via a billable video visit due to the restrictions of the Covid-19 pandemic.   The patient has been notified of following:  This video visit will be conducted via a call between you and your provider. We have found that certain health care needs can be provided without the need for an in-person physical exam.  This service lets us provide the care you need with a video conversation. If during the course of the call the provider feels a video visit is not appropriate, you will not be charged for this service.\"   The provider has received verbal consent for a Video Visit from the patient or first contact? Yes  Patient  or facility staff would like the video invitation sent by: Send to e-mail at: [email or web address]  Video Start Time: 10:15 am  [place] Medical Record Number:  [id number]  Place of Location at the time of visit: [place] [facility]     CHIEF COMPLAINT:  Hospital follow-up/Initial visit    HPI:  Maryam Saavedra  is a 87 year old (6/9/1932), who is being seen today for a visit.  HPI information obtained from: patient report and [facility] chart review.     Past medical history is notable for hypertension, chronic low back pain, impaired fasting glucose, osteoarthritis, and subacute fracture deformity of the lateral compartment of left knee.    She was hospitalized at [facility] from May 12 through May 15, 2020 for elective left total knee arthroplasty.  Postop hemoglobin dropped to 8.7.    Patient is admitted to this facility for rehabilitation and continuation of medical management.    Patient was seen today through a virtual video visit.  She appears frail but comfortable.  She has mild memory impairment.  Her surgical pain is adequately controlled.  She reports no fever, chills, lightheadedness, chest pain, shortness " of breath, palpitation, nausea, vomiting, abdominal pain, urinary symptoms, or diarrhea.  She believes that she had a bowel movement either last night or earlier this morning.  Her heart rate has been in the 50s to 60s, but completely asymptomatic.    CODE STATUS:   CPR/Full code     Past Medical and Surgical History reviewed in [facility] today.    PAST MEDICAL HISTORY:   Hypertension  Chronic low back pain  Impaired fasting glucose  Osteoarthritis  Cognitive impairment    PAST SURGICAL HISTORY:   Past Surgical History:   Procedure Laterality Date     ARTHROPLASTY HIP ANTERIOR Right 7/24/2018    Procedure: ARTHROPLASTY HIP ANTERIOR;  RIGHT DIRECT ANTERIOR TOTAL HIP ARTHROPLASTY;  Surgeon: Jimbo Phillips MD;  Location:  OR     ARTHROPLASTY KNEE Left 5/12/2020    Procedure: LEFT TOTAL KNEE ARTHROPLASTY;  Surgeon: Jimbo Phillips MD;  Location:  OR     EYE SURGERY  cataracts     MOHS MICROGRAPHIC PROCEDURE      Left lower eyelid      MOHS MICROGRAPHIC PROCEDURE Left 10/27/2016    Procedure: MOHS MICROGRAPHIC PROCEDURE;  Surgeon: Jose Torrez MD;  Location:  [facility]     MOHS MICROGRAPHIC PROCEDURE Right 5/24/2018    Procedure: MOHS MICROGRAPHIC PROCEDURE;  RIGHT MEDIAL CANTHUS MOHS CLOSURE;  Surgeon: Jose Torrez MD;  Location[facility]     ORTHOPEDIC SURGERY      bilateral wrists       FAMILY HISTORY:   Family History   Problem Relation Age of Onset     Diabetes Mother      Diabetes Father        SOCIAL HISTORY:  Social History     Tobacco Use     Smoking status: Former Smoker     Packs/day: 0.00     Years: 0.00     Pack years: 0.00     Start date: 7/24/1998     Smokeless tobacco: Former User   Substance Use Topics     Alcohol use: Yes     Alcohol/week: 0.0 standard drinks     Comment: 1-2 drinks per month       MEDICATIONS:  Current Outpatient Medications   Medication Sig Dispense Refill     acetaminophen (TYLENOL) 325 MG tablet Take 650 mg by mouth every 6 hours as needed for mild pain       aspirin  (ASA) 325 MG EC tablet Take 1 tablet (325 mg) by mouth daily       metoprolol succinate ER (TOPROL-XL) 25 MG 24 hr tablet Take 0.5 tablets (12.5 mg) by mouth every evening (Hold for SBP <110 or HR <60)       order for DME 1: Gradient Compression Wraps; 2: BLE knee high 20-30 mm Hg compression stockings 1 each 0     senna-docusate (SENOKOT-S/PERICOLACE) 8.6-50 MG tablet Take 2 tablets by mouth 2 times daily         REVIEW OF SYSTEMS:   10 point review of system was performed as much as possible as stated in HPI.    Objective:   Limited visit exam done given COVID-19 precautions.   Vital signs in the chart were reviewed.  Vital Signs: Blood pressure 128/50, heart rate 62, respiratory rate 18, temperature 97.3  F, chest saturation 98%, weight 95.4 LBS  Constitutional: Frail appearing but comfortable and in no acute distress  HEENT: Conjunctival pallor+  Neurologic: Awake, alert, oriented x2-3  Extremities: Trace left lower extremity edema  Skin/integument: No acute rash, no cyanosis    Labs:   Lab Results   Component Value Date    WBC 5.5 04/02/2020     Lab Results   Component Value Date    RBC 3.77 04/02/2020     Lab Results   Component Value Date    HGB 8.7 05/26/2020     Lab Results   Component Value Date    HCT 34.1 04/02/2020     Lab Results   Component Value Date    MCV 91 04/02/2020     Lab Results   Component Value Date    MCH 29.7 04/02/2020     Lab Results   Component Value Date    MCHC 32.8 04/02/2020     Lab Results   Component Value Date    RDW 14.7 04/02/2020     Lab Results   Component Value Date     04/02/2020     Last Comprehensive Metabolic Panel:  Sodium   Date Value Ref Range Status   05/26/2020 141 133 - 144 mmol/L Final     Potassium   Date Value Ref Range Status   05/26/2020 3.7 3.4 - 5.3 mmol/L Final     Chloride   Date Value Ref Range Status   05/26/2020 108 94 - 109 mmol/L Final     Carbon Dioxide   Date Value Ref Range Status   05/26/2020 27 20 - 32 mmol/L Final     Anion Gap   Date  Value Ref Range Status   05/26/2020 6 3 - 14 mmol/L Final     Glucose   Date Value Ref Range Status   05/26/2020 118 (H) 70 - 99 mg/dL Final     Urea Nitrogen   Date Value Ref Range Status   05/26/2020 23 7 - 30 mg/dL Final     Creatinine   Date Value Ref Range Status   05/26/2020 0.75 0.52 - 1.04 mg/dL Final     GFR Estimate   Date Value Ref Range Status   05/26/2020 71 >60 mL/min/[1.73_m2] Final     Comment:     Non  GFR Calc  Starting 12/18/2018, serum creatinine based estimated GFR (eGFR) will be   calculated using the Chronic Kidney Disease Epidemiology Collaboration   (CKD-EPI) equation.       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  Stable.  Pain is adequately controlled.  Plan:  Pain management with PRN acetaminophen  Aspirin 325 g p.o. daily till June 12 for DVT prophylaxis  Continue PT/OT evaluation and therapy  Follow-up with Ortho as planned    Acute blood loss anemia.  Due to above surgery.  Last hemoglobin was stable at 8.9 on May 14.  Plan:  Monitor hemoglobin periodically    Essential hypertension.  Blood pressure is currently controlled.  Prior to admission to the hospital, she was also on amlodipine 2.5 mg p.o. daily which was discontinued.  Plan:  Continue PTA metoprolol ER 12.5 mg p.o. daily  Monitor blood pressure    Cognitive impairment.  SLUMS score of 13/30.  Plan:  Staff to assist with care    Slow transit constipation.  Plan:  Continue bowel regimen    Orders written by provider at facility:  None.    Electronically signed by:  Tiffany Rausch MD     Video-Visit Details  Type of service:  Video Visit  Video End Time (time video stopped): 10:19 am  Distant Location (provider location):  [place] [facility]       Sincerely,        Tiffany Rausch MD

## 2020-06-09 ENCOUNTER — NURSING HOME VISIT (OUTPATIENT)
Dept: GERIATRICS | Facility: CLINIC | Age: 85
End: 2020-06-09
Payer: MEDICARE

## 2020-06-09 VITALS
HEART RATE: 75 BPM | RESPIRATION RATE: 18 BRPM | DIASTOLIC BLOOD PRESSURE: 62 MMHG | TEMPERATURE: 95.9 F | SYSTOLIC BLOOD PRESSURE: 163 MMHG | BODY MASS INDEX: 18.01 KG/M2 | OXYGEN SATURATION: 99 % | HEIGHT: 61 IN | WEIGHT: 95.4 LBS

## 2020-06-09 DIAGNOSIS — I10 BENIGN ESSENTIAL HYPERTENSION: ICD-10-CM

## 2020-06-09 DIAGNOSIS — R41.89 COGNITIVE IMPAIRMENT: ICD-10-CM

## 2020-06-09 DIAGNOSIS — D62 ANEMIA DUE TO BLOOD LOSS, ACUTE: ICD-10-CM

## 2020-06-09 DIAGNOSIS — M17.12 PRIMARY OSTEOARTHRITIS OF LEFT KNEE: ICD-10-CM

## 2020-06-09 DIAGNOSIS — Z96.652 S/P TOTAL KNEE ARTHROPLASTY, LEFT: Primary | ICD-10-CM

## 2020-06-09 DIAGNOSIS — R53.81 PHYSICAL DECONDITIONING: ICD-10-CM

## 2020-06-09 PROCEDURE — 99309 SBSQ NF CARE MODERATE MDM 30: CPT | Performed by: NURSE PRACTITIONER

## 2020-06-09 ASSESSMENT — MIFFLIN-ST. JEOR: SCORE: 800.11

## 2020-06-09 NOTE — LETTER
"    6/9/2020        RE: Maryam Saavedra  [address]  [facility] [address]        [place] [facility]  [place] Medical Record Number:  [id number]  Place of Service where encounter took place:  [facility] [[id number]]  Chief Complaint   Patient presents with     Nursing Home Acute       HPI:    Maryam Saavedra  is a 88 year old (6/9/1932), who is being seen today for an episodic care visit.  HPI information obtained from: facility chart records, facility staff, patient report and [facility] chart review. Today's concern is:  Patient in TCU following hospitalization due to knee pain. She underwent a left knee arthoplasty due to subacute fracture deformity of lateral component of left knee. Post op complications included ABLA.  Since in TCU she has made progress in therapy and is able to walk 180' with walker. She did fall out of bed last night. There were no apparent injuries. She denies pain.     Past Medical and Surgical History reviewed in Epic today.    MEDICATIONS:    Current Outpatient Medications   Medication Sig Dispense Refill     acetaminophen (TYLENOL) 325 MG tablet Take 650 mg by mouth every 6 hours as needed for mild pain       aspirin (ASA) 325 MG EC tablet Take 1 tablet (325 mg) by mouth daily       metoprolol succinate ER (TOPROL-XL) 25 MG 24 hr tablet Take 0.5 tablets (12.5 mg) by mouth every evening (Hold for SBP <110 or HR <60)       order for DME 1: Gradient Compression Wraps; 2: BLE knee high 20-30 mm Hg compression stockings 1 each 0     senna-docusate (SENOKOT-S/PERICOLACE) 8.6-50 MG tablet Take 2 tablets by mouth 2 times daily           REVIEW OF SYSTEMS:  Limited secondary to cognitive impairment but today pt reports no pain    Objective:  BP (!) 163/62   Pulse 75   Temp 95.9  F (35.5  C)   Resp 18   Ht 1.549 m (5' 1\")   Wt 43.3 kg (95 lb 6.4 oz)   SpO2 99%   BMI 18.03 kg/m    Exam:  GENERAL APPEARANCE:  Alert, in no distress  ENT:  Mouth " and posterior oropharynx normal, moist mucous membranes, hearing acuity adequate   EYES:  EOM, conjunctivae, lids, pupils and irises normal  RESP:  respiratory effort normal, no respiratory distress,   CV:   Edema none    M/S:   Gait and station antalgic, Digits and nails normal   SKIN:  Inspection/Palpation of skin and subcutaneous tissue right shin- scab closer to ankle. Incision on left knee is CDI  NEURO: 2-12 in normal limits and at patient's baseline  PSYCH:  insight and judgement, memory impaired , affect and mood normal    Labs:   CBC RESULTS:   Recent Labs   Lab Test 05/26/20 0921 05/20/20  0724  04/02/20 1934 05/21/19  1124   WBC  --   --   --  5.5 5.1   RBC  --   --   --  3.77* 4.27   HGB 8.7* 8.0*   < > 11.2* 13.2   HCT  --   --   --  34.1* 39.8   MCV  --   --   --  91 93   MCH  --   --   --  29.7 30.9   MCHC  --   --   --  32.8 33.2   RDW  --   --   --  14.7 14.1   PLT  --   --   --  292 227    < > = values in this interval not displayed.       Last Basic Metabolic Panel:  Recent Labs   Lab Test 05/26/20 0921 05/20/20  0724    148*   POTASSIUM 3.7 3.6   CHLORIDE 108 115*   NOHEMY 8.6 8.5   CO2 27 29   BUN 23 38*   CR 0.75 0.79   * 86       Liver Function Studies -   Recent Labs   Lab Test 04/02/20 1934 05/21/19  1124   PROTTOTAL 6.9 7.6   ALBUMIN 2.7* 3.7   BILITOTAL 0.3 0.3   ALKPHOS 207* 136   AST 31 35   ALT 31 39       TSH   Date Value Ref Range Status   11/25/2017 4.20 (H) 0.40 - 4.00 mU/L Final   09/08/2017 4.66 (H) 0.40 - 4.00 mU/L Final   ]    Lab Results   Component Value Date    A1C 5.6 07/13/2018    A1C 5.9 05/14/2018           ASSESSMENT/PLAN:  Aftercare following left knee joint replacement surgery  Primary osteoarthritis of left knee  Physical deconditioning  Patient has made progress with therapy. She is walking about 200' with walker and assist. She has no pain. She did work on going up and down stairs. She lives with family in house.   -continue ASA 325mg q day until  6/12  -physical therapy and OCCUPATIONAL THERAPY   -home care upon discharge  Essential hypertension  Last few BPS in /62, 108/55, 109/56 Amlodipine was stopped in hospital  -continue metoprolol 12.5mg q day     Anemia due to blood loss, acute            Hemoglobin   Date Value Ref Range Status   05/26/2020 8.7 (L) 11.7 - 15.7 g/dL Final   05/20/2020 8.0 (L) 11.7 - 15.7 g/dL Final   hgb slowing trending up  -periodic hgb- will check in am     Cognitive impairment  She struggled following directions for using thewalker with the therapist. This increases her fall risk due to lack of insight to safety measures  SLUMS 13//30. This will increase fall risk. She has worked with [employer] for cognitive skills    -OCCUPATIONAL THERAPY            Electronically signed by:  JACKIE Isabel CNP             Sincerely,        JACKIE Isabel CNP

## 2020-06-09 NOTE — PROGRESS NOTES
"[place] [facility]  [place] Medical Record Number:  [id number]  Place of Service where encounter took place:  [facility] [[id number]]  Chief Complaint   Patient presents with     Nursing Home Acute       HPI:    Maryam Saavedra  is a 88 year old (6/9/1932), who is being seen today for an episodic care visit.  HPI information obtained from: facility chart records, facility staff, patient report and [facility] chart review. Today's concern is:  Patient in TCU following hospitalization due to knee pain. She underwent a left knee arthoplasty due to subacute fracture deformity of lateral component of left knee. Post op complications included ABLA.  Since in TCU she has made progress in therapy and is able to walk 180' with walker. She did fall out of bed last night. There were no apparent injuries. She denies pain.     Past Medical and Surgical History reviewed in Epic today.    MEDICATIONS:    Current Outpatient Medications   Medication Sig Dispense Refill     acetaminophen (TYLENOL) 325 MG tablet Take 650 mg by mouth every 6 hours as needed for mild pain       aspirin (ASA) 325 MG EC tablet Take 1 tablet (325 mg) by mouth daily       metoprolol succinate ER (TOPROL-XL) 25 MG 24 hr tablet Take 0.5 tablets (12.5 mg) by mouth every evening (Hold for SBP <110 or HR <60)       order for DME 1: Gradient Compression Wraps; 2: BLE knee high 20-30 mm Hg compression stockings 1 each 0     senna-docusate (SENOKOT-S/PERICOLACE) 8.6-50 MG tablet Take 2 tablets by mouth 2 times daily           REVIEW OF SYSTEMS:  Limited secondary to cognitive impairment but today pt reports no pain    Objective:  BP (!) 163/62   Pulse 75   Temp 95.9  F (35.5  C)   Resp 18   Ht 1.549 m (5' 1\")   Wt 43.3 kg (95 lb 6.4 oz)   SpO2 99%   BMI 18.03 kg/m    Exam:  GENERAL APPEARANCE:  Alert, in no distress  ENT:  Mouth and posterior oropharynx normal, moist mucous membranes, hearing acuity adequate   EYES:  EOM, " conjunctivae, lids, pupils and irises normal  RESP:  respiratory effort normal, no respiratory distress,   CV:   Edema none    M/S:   Gait and station antalgic, Digits and nails normal   SKIN:  Inspection/Palpation of skin and subcutaneous tissue right shin- scab closer to ankle. Incision on left knee is CDI  NEURO: 2-12 in normal limits and at patient's baseline  PSYCH:  insight and judgement, memory impaired , affect and mood normal    Labs:   CBC RESULTS:   Recent Labs   Lab Test 05/26/20 0921 05/20/20  0724  04/02/20 1934 05/21/19  1124   WBC  --   --   --  5.5 5.1   RBC  --   --   --  3.77* 4.27   HGB 8.7* 8.0*   < > 11.2* 13.2   HCT  --   --   --  34.1* 39.8   MCV  --   --   --  91 93   MCH  --   --   --  29.7 30.9   MCHC  --   --   --  32.8 33.2   RDW  --   --   --  14.7 14.1   PLT  --   --   --  292 227    < > = values in this interval not displayed.       Last Basic Metabolic Panel:  Recent Labs   Lab Test 05/26/20 0921 05/20/20  0724    148*   POTASSIUM 3.7 3.6   CHLORIDE 108 115*   NOHEMY 8.6 8.5   CO2 27 29   BUN 23 38*   CR 0.75 0.79   * 86       Liver Function Studies -   Recent Labs   Lab Test 04/02/20 1934 05/21/19  1124   PROTTOTAL 6.9 7.6   ALBUMIN 2.7* 3.7   BILITOTAL 0.3 0.3   ALKPHOS 207* 136   AST 31 35   ALT 31 39       TSH   Date Value Ref Range Status   11/25/2017 4.20 (H) 0.40 - 4.00 mU/L Final   09/08/2017 4.66 (H) 0.40 - 4.00 mU/L Final   ]    Lab Results   Component Value Date    A1C 5.6 07/13/2018    A1C 5.9 05/14/2018           ASSESSMENT/PLAN:  Aftercare following left knee joint replacement surgery  Primary osteoarthritis of left knee  Physical deconditioning  Patient has made progress with therapy. She is walking about 200' with walker and assist. She has no pain. She did work on going up and down stairs. She lives with family in house.   -continue ASA 325mg q day until 6/12  -physical therapy and OCCUPATIONAL THERAPY   -home care upon discharge  Essential  hypertension  Last few BPS in /62, 108/55, 109/56 Amlodipine was stopped in hospital  -continue metoprolol 12.5mg q day     Anemia due to blood loss, acute            Hemoglobin   Date Value Ref Range Status   05/26/2020 8.7 (L) 11.7 - 15.7 g/dL Final   05/20/2020 8.0 (L) 11.7 - 15.7 g/dL Final   hgb slowing trending up  -periodic hgb- will check in am     Cognitive impairment  She struggled following directions for using thewalker with the therapist. This increases her fall risk due to lack of insight to safety measures  SLUMS 13//30. This will increase fall risk. She has worked with [employer] for cognitive skills    -OCCUPATIONAL THERAPY            Electronically signed by:  JACKIE Isabel CNP

## 2020-06-10 ENCOUNTER — HOSPITAL LABORATORY (OUTPATIENT)
Dept: OTHER | Facility: CLINIC | Age: 85
End: 2020-06-10

## 2020-06-10 LAB — HGB BLD-MCNC: 10.5 G/DL (ref 11.7–15.7)

## 2020-06-16 ENCOUNTER — NURSING HOME VISIT (OUTPATIENT)
Dept: GERIATRICS | Facility: CLINIC | Age: 85
End: 2020-06-16
Payer: MEDICARE

## 2020-06-16 VITALS
WEIGHT: 93.9 LBS | HEART RATE: 63 BPM | HEIGHT: 61 IN | SYSTOLIC BLOOD PRESSURE: 112 MMHG | TEMPERATURE: 97.7 F | RESPIRATION RATE: 18 BRPM | BODY MASS INDEX: 17.73 KG/M2 | DIASTOLIC BLOOD PRESSURE: 64 MMHG | OXYGEN SATURATION: 96 %

## 2020-06-16 DIAGNOSIS — D62 ANEMIA DUE TO BLOOD LOSS, ACUTE: ICD-10-CM

## 2020-06-16 DIAGNOSIS — M17.12 PRIMARY OSTEOARTHRITIS OF LEFT KNEE: ICD-10-CM

## 2020-06-16 DIAGNOSIS — R53.81 PHYSICAL DECONDITIONING: ICD-10-CM

## 2020-06-16 DIAGNOSIS — Z96.652 S/P TOTAL KNEE ARTHROPLASTY, LEFT: Primary | ICD-10-CM

## 2020-06-16 DIAGNOSIS — I10 BENIGN ESSENTIAL HYPERTENSION: ICD-10-CM

## 2020-06-16 DIAGNOSIS — R41.89 COGNITIVE IMPAIRMENT: ICD-10-CM

## 2020-06-16 PROCEDURE — 99309 SBSQ NF CARE MODERATE MDM 30: CPT | Performed by: NURSE PRACTITIONER

## 2020-06-16 ASSESSMENT — MIFFLIN-ST. JEOR: SCORE: 793.31

## 2020-06-16 NOTE — LETTER
"    6/16/2020        RE: aMryam Saavedra  [address]  [facility] [address]        [place] [facility]  [place] Medical Record Number:  [id number]  Place of Service where encounter took place:  [facility] [[id number]]  Chief Complaint   Patient presents with     Nursing Home Acute       HPI:    Maryam Saavedra  is a 88 year old (6/9/1932), who is being seen today for an episodic care visit.  HPI information obtained from: facility chart records, facility staff, patient report and [facility] chart review. Today's concern is:  Patient in TCU following hospitalization due to knee pain. She underwent a left knee arthoplasty due to subacute fracture deformity of lateral component of left knee. Post op complications included ABLA.  Since in TCU she has made progress in therapy and is able to walk 250' with walker.     Past Medical and Surgical History reviewed in Epic today.    MEDICATIONS:    Current Outpatient Medications   Medication Sig Dispense Refill     acetaminophen (TYLENOL) 325 MG tablet Take 650 mg by mouth every 6 hours as needed for mild pain       aspirin (ASA) 325 MG EC tablet Take 1 tablet (325 mg) by mouth daily       metoprolol succinate ER (TOPROL-XL) 25 MG 24 hr tablet Take 0.5 tablets (12.5 mg) by mouth every evening (Hold for SBP <110 or HR <60)       order for DME 1: Gradient Compression Wraps; 2: BLE knee high 20-30 mm Hg compression stockings 1 each 0     senna-docusate (SENOKOT-S/PERICOLACE) 8.6-50 MG tablet Take 2 tablets by mouth 2 times daily           REVIEW OF SYSTEMS:  4 point ROS including Respiratory, CV, GI and , other than that noted in the HPI,  is negative    Objective: limited due to covid  /64   Pulse 63   Temp 97.7  F (36.5  C)   Resp 18   Ht 1.549 m (5' 1\")   Wt 42.6 kg (93 lb 14.4 oz)   SpO2 96%   BMI 17.74 kg/m    Exam:  GENERAL APPEARANCE:  Alert, in no distress  ENT:  Mouth and posterior oropharynx normal, moist " mucous membranes, hearing acuity adequate   EYES:  EOM, conjunctivae, lids, pupils and irises normal  RESP:  respiratory effort chest normal, no respiratory distress,  CV:   Edema none  ABDOMEN:  normal bowel sounds, soft, nontender, no hepatosplenomegaly or other masses  M/S:   Gait and station antalgic, Digits and nails normal   SKIN:  Inspection/Palpation of skin and subcutaneous tissue no rash  NEURO: 2-12 in normal limits and at patient's baseline  PSYCH:  insight and judgement, memory impaired , affect and mood normal    Labs:   CBC RESULTS:   Recent Labs   Lab Test 06/10/20  0804 05/26/20  0921 04/02/20 1934 05/21/19  1124   WBC  --   --   --  5.5 5.1   RBC  --   --   --  3.77* 4.27   HGB 10.5* 8.7*   < > 11.2* 13.2   HCT  --   --   --  34.1* 39.8   MCV  --   --   --  91 93   MCH  --   --   --  29.7 30.9   MCHC  --   --   --  32.8 33.2   RDW  --   --   --  14.7 14.1   PLT  --   --   --  292 227    < > = values in this interval not displayed.       Last Basic Metabolic Panel:  Recent Labs   Lab Test 05/26/20  0921 05/20/20  0724    148*   POTASSIUM 3.7 3.6   CHLORIDE 108 115*   NOHEMY 8.6 8.5   CO2 27 29   BUN 23 38*   CR 0.75 0.79   * 86       Liver Function Studies -   Recent Labs   Lab Test 04/02/20 1934 05/21/19  1124   PROTTOTAL 6.9 7.6   ALBUMIN 2.7* 3.7   BILITOTAL 0.3 0.3   ALKPHOS 207* 136   AST 31 35   ALT 31 39       TSH   Date Value Ref Range Status   11/25/2017 4.20 (H) 0.40 - 4.00 mU/L Final   09/08/2017 4.66 (H) 0.40 - 4.00 mU/L Final   ]    Lab Results   Component Value Date    A1C 5.6 07/13/2018    A1C 5.9 05/14/2018         ASSESSMENT/PLAN:    S/p left knee replacement  Primary osteoarthritis of left knee  Physical deconditioning  Patient has made progress with therapy. She is walking about 250' with walker and assist. She has no pain. She did work on going up and down stairs. She lives with family in house.   -discontinue ASA 325mg q day- she has completed course  -physical  therapy and OCCUPATIONAL THERAPY   -home care upon discharge  Essential hypertension  Last few BPS in /64, 128/64, 117/56 Amlodipine was stopped in hospital  -continue metoprolol 12.5mg q day     Anemia due to blood loss, acute- much improved  Hemoglobin   Date Value Ref Range Status   06/10/2020 10.5 (L) 11.7 - 15.7 g/dL Final   05/26/2020 8.7 (L) 11.7 - 15.7 g/dL Final      Cognitive impairment  She struggled following directions for using the walker with the therapist. This increases her fall risk due to lack of insight to safety measures.  SLUMS 13//30. This will increase fall risk. She has worked with [employer] for cognitive skills  -OCCUPATIONAL THERAPY         Electronically signed by:  JACKIE Isabel CNP             Sincerely,        JACKIE Isabel CNP

## 2020-06-16 NOTE — PROGRESS NOTES
"[place] [facility]  [place] Medical Record Number:  [id number]  [facility] of [facility] where encounter took place[facility] [[id number]]  Chief Complaint   Patient presents with     Nursing Home Acute       HPI:    Maryam Saavedra  is a 88 year old (6/9/1932), who is being seen today for an episodic care visit.  HPI information obtained from: facility chart records, facility staff, patient report and [facility] chart review. Today's concern is:  Patient in TCU following hospitalization due to knee pain. She underwent a left knee arthoplasty due to subacute fracture deformity of lateral component of left knee. Post op complications included ABLA.  Since in TCU she has made progress in therapy and is able to walk 250' with walker.     Past Medical and Surgical History reviewed in Epic today.    MEDICATIONS:    Current Outpatient Medications   Medication Sig Dispense Refill     acetaminophen (TYLENOL) 325 MG tablet Take 650 mg by mouth every 6 hours as needed for mild pain       aspirin (ASA) 325 MG EC tablet Take 1 tablet (325 mg) by mouth daily       metoprolol succinate ER (TOPROL-XL) 25 MG 24 hr tablet Take 0.5 tablets (12.5 mg) by mouth every evening (Hold for SBP <110 or HR <60)       order for DME 1: Gradient Compression Wraps; 2: BLE knee high 20-30 mm Hg compression stockings 1 each 0     senna-docusate (SENOKOT-S/PERICOLACE) 8.6-50 MG tablet Take 2 tablets by mouth 2 times daily           REVIEW OF SYSTEMS:  4 point ROS including Respiratory, CV, GI and , other than that noted in the HPI,  is negative    Objective: limited due to covid  /64   Pulse 63   Temp 97.7  F (36.5  C)   Resp 18   Ht 1.549 m (5' 1\")   Wt 42.6 kg (93 lb 14.4 oz)   SpO2 96%   BMI 17.74 kg/m    Exam:  GENERAL APPEARANCE:  Alert, in no distress  ENT:  Mouth and posterior oropharynx normal, moist mucous membranes, hearing acuity adequate   EYES:  EOM, conjunctivae, lids, pupils and irises " normal  RESP:  respiratory effort chest normal, no respiratory distress,  CV:   Edema none  ABDOMEN:  normal bowel sounds, soft, nontender, no hepatosplenomegaly or other masses  M/S:   Gait and station antalgic, Digits and nails normal   SKIN:  Inspection/Palpation of skin and subcutaneous tissue no rash  NEURO: 2-12 in normal limits and at patient's baseline  PSYCH:  insight and judgement, memory impaired , affect and mood normal    Labs:   CBC RESULTS:   Recent Labs   Lab Test 06/10/20  0804 05/26/20  0921 04/02/20 1934 05/21/19  1124   WBC  --   --   --  5.5 5.1   RBC  --   --   --  3.77* 4.27   HGB 10.5* 8.7*   < > 11.2* 13.2   HCT  --   --   --  34.1* 39.8   MCV  --   --   --  91 93   MCH  --   --   --  29.7 30.9   MCHC  --   --   --  32.8 33.2   RDW  --   --   --  14.7 14.1   PLT  --   --   --  292 227    < > = values in this interval not displayed.       Last Basic Metabolic Panel:  Recent Labs   Lab Test 05/26/20 0921 05/20/20  0724    148*   POTASSIUM 3.7 3.6   CHLORIDE 108 115*   NOHEMY 8.6 8.5   CO2 27 29   BUN 23 38*   CR 0.75 0.79   * 86       Liver Function Studies -   Recent Labs   Lab Test 04/02/20 1934 05/21/19  1124   PROTTOTAL 6.9 7.6   ALBUMIN 2.7* 3.7   BILITOTAL 0.3 0.3   ALKPHOS 207* 136   AST 31 35   ALT 31 39       TSH   Date Value Ref Range Status   11/25/2017 4.20 (H) 0.40 - 4.00 mU/L Final   09/08/2017 4.66 (H) 0.40 - 4.00 mU/L Final   ]    Lab Results   Component Value Date    A1C 5.6 07/13/2018    A1C 5.9 05/14/2018         ASSESSMENT/PLAN:    S/p left knee replacement  Primary osteoarthritis of left knee  Physical deconditioning  Patient has made progress with therapy. She is walking about 250' with walker and assist. She has no pain. She did work on going up and down stairs. She lives with family in house.   -discontinue ASA 325mg q day- she has completed course  -physical therapy and OCCUPATIONAL THERAPY   -home care upon discharge  Essential hypertension  Last few BPS  in /64, 128/64, 117/56 Amlodipine was stopped in hospital  -continue metoprolol 12.5mg q day     Anemia due to blood loss, acute- much improved  Hemoglobin   Date Value Ref Range Status   06/10/2020 10.5 (L) 11.7 - 15.7 g/dL Final   05/26/2020 8.7 (L) 11.7 - 15.7 g/dL Final      Cognitive impairment  She struggled following directions for using the walker with the therapist. This increases her fall risk due to lack of insight to safety measures.  SLUMS 13//30. This will increase fall risk. She has worked with [employer]ECH [employer]ER[employer] for cognitive skills  -OCCUPATIONAL THERAPY         Electronically signed by:  JACKIE Isabel CNP

## 2020-06-18 ENCOUNTER — HOSPITAL LABORATORY (OUTPATIENT)
Dept: OTHER | Facility: CLINIC | Age: 85
End: 2020-06-18

## 2020-06-20 LAB
COVID-19 VIRUS PCR TO MAYO - RESULT: NORMAL
SPECIMEN SOURCE: NORMAL

## 2020-06-22 ENCOUNTER — DISCHARGE SUMMARY NURSING HOME (OUTPATIENT)
Dept: GERIATRICS | Facility: CLINIC | Age: 85
End: 2020-06-22
Payer: MEDICARE

## 2020-06-22 VITALS
OXYGEN SATURATION: 98 % | DIASTOLIC BLOOD PRESSURE: 86 MMHG | WEIGHT: 94.4 LBS | HEIGHT: 61 IN | TEMPERATURE: 96.8 F | BODY MASS INDEX: 17.82 KG/M2 | SYSTOLIC BLOOD PRESSURE: 144 MMHG | RESPIRATION RATE: 18 BRPM | HEART RATE: 69 BPM

## 2020-06-22 DIAGNOSIS — M17.12 PRIMARY OSTEOARTHRITIS OF LEFT KNEE: ICD-10-CM

## 2020-06-22 DIAGNOSIS — R41.89 COGNITIVE IMPAIRMENT: ICD-10-CM

## 2020-06-22 DIAGNOSIS — R53.81 PHYSICAL DECONDITIONING: ICD-10-CM

## 2020-06-22 DIAGNOSIS — Z96.652 S/P TOTAL KNEE ARTHROPLASTY, LEFT: Primary | ICD-10-CM

## 2020-06-22 DIAGNOSIS — D62 ANEMIA DUE TO BLOOD LOSS, ACUTE: ICD-10-CM

## 2020-06-22 PROCEDURE — 99316 NF DSCHRG MGMT 30 MIN+: CPT | Performed by: NURSE PRACTITIONER

## 2020-06-22 ASSESSMENT — MIFFLIN-ST. JEOR: SCORE: 795.58

## 2020-06-22 NOTE — LETTER
6/22/2020        RE: Maryam Saavedra  [address]  [place] [address]        [place] [facility] DISCHARGE SUMMARY  PATIENT'S NAME: Maryam Saavedra  YOB: 1932  MEDICAL RECORD NUMBER:  [id number]  Place of Service where encounter took place:  [facility] [[id number]]    PRIMARY CARE PROVIDER AND CLINIC RESPONSIBLE AFTER TRANSFER:   Truman Norman MD, [address] / [place] [address]    [facility] Provider     Transferring providers: JACKIE Isabel CNP, M Arbabi, MD  Recent Hospitalization/ED:  [facility] stay 5/12/20 to 5/15/20.  Date of SNF Admission: May / 15 / 2020  Date of SNF (anticipated) Discharge: June / 24 / 2020  Discharged to: previous independent home  Cognitive Scores: SLUMS: 12/30  Physical Function: Ambulating 200 ft with walker  DME: none    CODE STATUS/ADVANCE DIRECTIVES DISCUSSION:  Full Code   ALLERGIES: Ace inhibitors and Cyclobenzaprine    DISCHARGE DIAGNOSIS/NURSING FACILITY COURSE:   Patient in TCU following hospitalization due to knee pain. She underwent a left knee arthoplasty due to subacute fracture deformity of lateral component of left knee. Post op complications included ABLA.  Since in TCU she has made progress in therapy and is able to walk 250' with walker.   S/p left knee replacement  Primary osteoarthritis of left knee  Physical deconditioning  Patient has made progress with therapy. She is walking about 250' with walker and assist. She has no pain. She did work on going up and down stairs. She lives with family in house. She was taking ASA 325mg q day for 4 weeks for DVT prophylaxis. This course has been completed  -physical therapy and OCCUPATIONAL THERAPY, RN and HHA  through home care.   Essential hypertension  Last few BPS in /86, 127/68, 130/76 Amlodipine was stopped in hospital  -continue metoprolol 12.5mg q day     Anemia due to blood loss, acute- much improved         Hemoglobin   Date Value Ref Range Status   06/10/2020 10.5 (L) 11.7 - 15.7 g/dL Final   05/26/2020 8.7 (L) 11.7 - 15.7 g/dL Final      Cognitive impairment  She struggled following directions for using the walker with the therapist. This increases her fall risk due to lack of insight to safety measures.  SLUMS 12//30. This will increase fall risk. She has worked with SPEECH THERAPY for cognitive skills  -OCCUPATIONAL THERAPY upon discharge from home care.     Past Medical History:  has a past medical history of Benign essential hypertension (9/1/2016), Chronic low back pain, Hypertension, Impaired fasting glucose, and Osteoarthritis. She also has no past medical history of Breast cancer (H), Breast mass, Cancer (H), Cerebral infarction (H), Congestive heart failure (H), COPD (chronic obstructive pulmonary disease) (H), Cyst of breast, Depressive disorder, Diabetes (H), Heart disease, History of blood transfusion, History of gestational diabetes, Inverted nipple, Malignant neoplasm of colon, unspecified site, Malignant neoplasm of corpus uteri, except isthmus (H), Malignant neoplasm of ovary (H), Need for prophylactic hormone replacement therapy (postmenopausal), Other injury of other sites of trunk, Personal history of other disorders of nervous system and sense organs, Personal history of unspecified circulatory disease, Thyroid disease, or Uncomplicated asthma.    Discharge Medications:    Current Outpatient Medications   Medication Sig Dispense Refill     acetaminophen (TYLENOL) 325 MG tablet Take 650 mg by mouth every 6 hours as needed for mild pain       metoprolol succinate ER (TOPROL-XL) 25 MG 24 hr tablet Take 0.5 tablets (12.5 mg) by mouth every evening (Hold for SBP <110 or HR <60)       order for DME 1: Gradient Compression Wraps; 2: BLE knee high 20-30 mm Hg compression stockings 1 each 0     senna-docusate (SENOKOT-S/PERICOLACE) 8.6-50 MG tablet Take 2 tablets by mouth 2 times daily         Medication  "Changes/Rationale:     As above    Controlled medications sent with patient:   not applicable/none     ROS:   Limited secondary to cognitive impairment but today pt reports no pain    Physical Exam:   Vitals: BP (!) 144/86   Pulse 69   Temp 96.8  F (36  C)   Resp 18   Ht 1.549 m (5' 1\")   Wt 42.8 kg (94 lb 6.4 oz)   SpO2 98%   BMI 17.84 kg/m    BMI= Body mass index is 17.84 kg/m .  GENERAL APPEARANCE:  Alert, in no distress  ENT:  Mouth and posterior oropharynx normal, moist mucous membranes, hearing acuity adequate   EYES:  EOM, conjunctivae, lids, pupils and irises normal  RESP:  respiratory effort  normal, no respiratory distress,   CV: Edema npne  ABDOMEN:  normal bowel sounds, soft, nontender, no hepatosplenomegaly or other masses  M/S:   Gait and station steady, Digits and nails none  SKIN:  Inspection/Palpation of skin and subcutaneous tissue no rash  NEURO: 2-12 in normal limits and at patient's baseline  PSYCH:  insight and judgement, memory impaired , affect and mood normal     SNF labs: Labs done in SNF are in [facility]. Please refer to them using [employer] Everywhere. and Recent labs in [facility] reviewed by me today.       DISCHARGE PLAN:    Follow up labs: No labs orders/due    Medical Follow Up:      Follow up with primary care provider in 1 weeks    MTM referral needed and placed by this provider: No      Discharge Services: Home Care:  Occupational Therapy, Physical Therapy, Registered Nurse and Home Health Aide    Discharge Instructions Verbalized to Patient at Discharge:     Weight bearing restrictions:  Weight bearing as tolerated.       TOTAL DISCHARGE TIME:   Greater than 30 minutes  Electronically signed by:  JACKIE Isabel CNP     Home care Face to Face documentation done in [facility] attached to Home care orders for [facility].                     Sincerely,        JACKIE Isabel CNP    "

## 2020-06-22 NOTE — PATIENT INSTRUCTIONS
[facility] [place] [facility] Discharge Orders    Name: Maryam Saavedra  : 1932  Planned Discharge Date: 30  Discharged to: previous independent home    MEDICAL FOLLOW UP  Follow up with PCP in 1-2 weeks   Follow up with Specialists none      FUTURE LABS: No labs orders/due    ORDER CHANGES:  Discontinue ASA    DISCHARGE MEDICATIONS:  The patient s pharmacy is authorized to dispense a 30-day supply of medications. Refill requests should be directed to the primary provider, Truman Norman.   No narcotics are prescribed at time of discharge.   Current Outpatient Medications   Medication Sig Dispense Refill     acetaminophen (TYLENOL) 325 MG tablet Take 650 mg by mouth every 6 hours as needed for mild pain       metoprolol succinate ER (TOPROL-XL) 25 MG 24 hr tablet Take 0.5 tablets (12.5 mg) by mouth every evening (Hold for SBP <110 or HR <60)       order for DME 1: Gradient Compression Wraps; 2: BLE knee high 20-30 mm Hg compression stockings 1 each 0     senna-docusate (SENOKOT-S/PERICOLACE) 8.6-50 MG tablet Take 2 tablets by mouth 2 times daily         SERVICES:  Home Care:  Occupational Therapy, Physical Therapy, Registered Nurse and Home Health Aide    ADDITIONAL INSTRUCTIONS:  Weight bearing restrictions:  Weight bearing as tolerated.     JACKIE Isabel CNP  This document was electronically signed on 2020

## 2020-06-22 NOTE — PROGRESS NOTES
[place] DISCHARGE SUMMARY  PATIENT'S NAME: Maryam Saavedra  YOB: 1932  MEDICAL RECORD NUMBER:  [id number]  Place [facility] where encounter took place[facility] [[id number][facility]    PRIMARY CARE PROVIDER AND CLINIC RESPONSIBLE AFTER TRANSFER:   Truman Norman MD, [address] / [place] [address]    [facility] Provider     Transferring providers: JACKIE Isabel CNP, M Arbabi, MD  Recent Hospitalization/ED:  [facility] stay 5/12/20 to 5/15/20.  Date of SNF Admission: May / 15 / 2020  Date of SNF (anticipated) Discharge: June / 24 / 2020  Discharged to: previous independent home  Cognitive Scores: SLUMS: 12/30  Physical Function: Ambulating 200 ft with walker  DME: none    CODE STATUS/ADVANCE DIRECTIVES DISCUSSION:  Full Code   ALLERGIES: Ace inhibitors and Cyclobenzaprine    DISCHARGE DIAGNOSIS/NURSING FACILITY COURSE:   Patient in TCU following hospitalization due to knee pain. She underwent a left knee arthoplasty due to subacute fracture deformity of lateral component of left knee. Post op complications included ABLA.  Since in TCU she has made progress in therapy and is able to walk 250' with walker.   S/p left knee replacement  Primary osteoarthritis of left knee  Physical deconditioning  Patient has made progress with therapy. She is walking about 250' with walker and assist. She has no pain. She did work on going up and down stairs. She lives with family in house. She was taking ASA 325mg q day for 4 weeks for DVT prophylaxis. This course has been completed  -physical therapy and OCCUPATIONAL THERAPY, RN and HHA  through home care.   Essential hypertension  Last few BPS in /86, 127/68, 130/76 Amlodipine was stopped in hospital  -continue metoprolol 12.5mg q day     Anemia due to blood loss, acute- much improved        Hemoglobin   Date Value Ref Range Status   06/10/2020 10.5 (L) 11.7 - 15.7 g/dL Final    05/26/2020 8.7 (L) 11.7 - 15.7 g/dL Final      Cognitive impairment  She struggled following directions for using the walker with the therapist. This increases her fall risk due to lack of insight to safety measures.  SLUMS 12//30. This will increase fall risk. She has worked with SPEECH THERAPY for cognitive skills  -OCCUPATIONAL THERAPY upon discharge from home care.     Past Medical History:  has a past medical history of Benign essential hypertension (9/1/2016), Chronic low back pain, Hypertension, Impaired fasting glucose, and Osteoarthritis. She also has no past medical history of Breast cancer (H), Breast mass, Cancer (H), Cerebral infarction (H), Congestive heart failure (H), COPD (chronic obstructive pulmonary disease) (H), Cyst of breast, Depressive disorder, Diabetes (H), Heart disease, History of blood transfusion, History of gestational diabetes, Inverted nipple, Malignant neoplasm of colon, unspecified site, Malignant neoplasm of corpus uteri, except isthmus (H), Malignant neoplasm of ovary (H), Need for prophylactic hormone replacement therapy (postmenopausal), Other injury of other sites of trunk, Personal history of other disorders of nervous system and sense organs, Personal history of unspecified circulatory disease, Thyroid disease, or Uncomplicated asthma.    Discharge Medications:    Current Outpatient Medications   Medication Sig Dispense Refill     acetaminophen (TYLENOL) 325 MG tablet Take 650 mg by mouth every 6 hours as needed for mild pain       metoprolol succinate ER (TOPROL-XL) 25 MG 24 hr tablet Take 0.5 tablets (12.5 mg) by mouth every evening (Hold for SBP <110 or HR <60)       order for DME 1: Gradient Compression Wraps; 2: BLE knee high 20-30 mm Hg compression stockings 1 each 0     senna-docusate (SENOKOT-S/PERICOLACE) 8.6-50 MG tablet Take 2 tablets by mouth 2 times daily         Medication Changes/Rationale:     As above    Controlled medications sent with patient:   not  "applicable/none     ROS:   Limited secondary to cognitive impairment but today pt reports no pain    Physical Exam:   Vitals: BP (!) 144/86   Pulse 69   Temp 96.8  F (36  C)   Resp 18   Ht 1.549 m (5' 1\")   Wt 42.8 kg (94 lb 6.4 oz)   SpO2 98%   BMI 17.84 kg/m    BMI= Body mass index is 17.84 kg/m .  GENERAL APPEARANCE:  Alert, in no distress  ENT:  Mouth and posterior oropharynx normal, moist mucous membranes, hearing acuity adequate   EYES:  EOM, conjunctivae, lids, pupils and irises normal  RESP:  respiratory effort  normal, no respiratory distress,   CV: Edema npne  ABDOMEN:  normal bowel sounds, soft, nontender, no hepatosplenomegaly or other masses  M/S:   Gait and station steady, Digits and nails none  SKIN:  Inspection/Palpation of skin and subcutaneous tissue no rash  NEURO: 2-12 in normal limits and at patient's baseline  PSYCH:  insight and judgement, memory impaired , affect and mood normal     SNF labs: Labs done in SNF are in [facility] [employer]. Please refer to them using [employer] Everywhere. and Recent labs in [facility] reviewed by me today.       DISCHARGE PLAN:    Follow up labs: No labs orders/due    Medical Follow Up:      Follow up with primary care provider in 1 weeks    MTM referral needed and placed by this provider: No      Discharge Services: Home Care:  Occupational Therapy, Physical Therapy, Registered Nurse and Home Health Aide    Discharge Instructions Verbalized to Patient at Discharge:     Weight bearing restrictions:  Weight bearing as tolerated.       TOTAL DISCHARGE TIME:   Greater than 30 minutes  Electronically signed by:  JACKIE Isabel [facility]     Home care Face to Face documentation done in [facility] attached to Home care orders for [facility].                 "

## 2020-06-26 ENCOUNTER — TELEPHONE (OUTPATIENT)
Dept: FAMILY MEDICINE | Facility: CLINIC | Age: 85
End: 2020-06-26

## 2020-06-26 NOTE — TELEPHONE ENCOUNTER
Verbal approval given per request below.Homecare/hospice agency to fax orders for provider signature.     Provided information on ASA and advised to coordinate a Video Visit with PCP dulce SHANKS RN

## 2020-06-26 NOTE — TELEPHONE ENCOUNTER
Reason for Call:  Home Health Care    Stevo  with [facility] called regarding (reason for call): Orders    Orders are needed for this patient.     PT: Eval and treat     OT: Eval and treat     Skilled Nursin X a week for 1 week, 1 X a week for 3 weeks, and 3 PRN.     Pt was on Asprin RX before knee surgery. HC nurse would like to know if she should resume that RX.     Pt Provider: Dr. Norman     Phone Number Homecare Nurse can be reached at:   [phone]    Can we leave a detailed message on this number? YES    Phone number patient can be reached at: N/A    Best Time: N/A    Call taken on 2020 at 11:31 AM by Lucretia Ruth

## 2020-06-26 NOTE — TELEPHONE ENCOUNTER
TO PCP:     See below - no recent or future visit - pt recently discharged from TCU    Please advise     1) if okay to give verbal on below orders, but advise video visit soon?     2) if patient should be taking ASA? Not on current med list -also noted historically has been both on 325mg and 81mg x2 tabs (162mg) so unsure what dose pt to be taking?    Vikki MCNULTY RN

## 2020-06-26 NOTE — TELEPHONE ENCOUNTER
I quote her discharge summary 'She was taking ASA 325mg q day for 4 weeks for DVT prophylaxis. This course has been completed'  No need for further aspirin

## 2020-06-29 ENCOUNTER — TELEPHONE (OUTPATIENT)
Dept: CARE COORDINATION | Facility: CLINIC | Age: 85
End: 2020-06-29

## 2020-06-29 NOTE — TELEPHONE ENCOUNTER
Could I have the following wound care orders for a small 0.7x0.2 cm pressure ulcer that is completely covered in slough right over her tail bone?: cleanse with warm soapy water then apply Barrier cream BID and as needed, pt. Or pt's daughter to help when nurse not there.    Thank you!    Vikki Gee RN  [facility]  [phone]

## 2020-06-30 ENCOUNTER — TELEPHONE (OUTPATIENT)
Dept: FAMILY MEDICINE | Facility: CLINIC | Age: 85
End: 2020-06-30

## 2020-06-30 NOTE — TELEPHONE ENCOUNTER
Reason for Call:  Home Health Care    Dena with [facility] called regarding (reason for call):     Orders are needed for this patient.     PT: 2x3wks gait and exercise    OT:     Skilled Nursing:     Pt Provider: [facility]    Phone Number Homecare Nurse can be reached at: [phone]    Can we leave a detailed message on this number? YES    Phone number patient can be reached at:     Best Time: any    Call taken on 6/30/2020 at 12:29 PM by Jeyson Cummins

## 2020-06-30 NOTE — TELEPHONE ENCOUNTER
Verbal approval given per request below.Homecare/hospice agency to fax orders for provider signature.     Nathalie SHANKS RN

## 2020-07-02 ENCOUNTER — TELEPHONE (OUTPATIENT)
Dept: FAMILY MEDICINE | Facility: CLINIC | Age: 85
End: 2020-07-02

## 2020-07-02 NOTE — TELEPHONE ENCOUNTER
[facility] now requests orders and shares plan of care/discharge summaries for some patients through [employer].  Please REPLY TO THIS MESSAGE OR ROUTE BACK TO THE AUTHOR in order to give authorization for orders when needed.  This is considered a verbal order, you will still receive a faxed copy of orders for signature.  Thank you for your assistance in improving collaboration for our patients.    ORDER  Pts OT orders to  before able to make visit to home for assessment.     Requesting new orders to begin 20  OT eval and treat to assess need of adaptive equipment in home.

## 2020-07-07 ENCOUNTER — TELEPHONE (OUTPATIENT)
Dept: FAMILY MEDICINE | Facility: CLINIC | Age: 85
End: 2020-07-07

## 2020-07-07 NOTE — TELEPHONE ENCOUNTER
[facility] now requests orders and shares plan of care/discharge summaries for some patients through [employer].  Please REPLY TO THIS MESSAGE OR ROUTE BACK TO THE AUTHOR in order to give authorization for orders when needed.  This is considered a verbal order, you will still receive a faxed copy of orders for signature.  Thank you for your assistance in improving collaboration for our patients.    ORDER: Skilled OT Lymphedema 1w1, 2w2 effective 7/7/2020    MD SUMMARY/PLAN OF CARE: Skilled OT lymphedema visits in the home to address BLE edema management with interventions to include gradient compression bandaging, fluid mobilization exercises, manual lymphatic drainage techniques, return to compression garment use and caregiver/pt education on edema management.    APPLE Garland/PAU MALHOTRA  [email or web address]  [phone]

## 2020-07-08 ENCOUNTER — TELEPHONE (OUTPATIENT)
Dept: FAMILY MEDICINE | Facility: CLINIC | Age: 85
End: 2020-07-08

## 2020-07-08 NOTE — TELEPHONE ENCOUNTER
Called pt, she has NOT been taking any aspirin, and wanted to verify if this was correct.  Verified that yes, per Dr. Norman 6/26/20 note, no aspirin.      Surgery was 5/12/20 - so has been over 6 weeks.    Aspirin was not noted on pt's TCU discharge med list, nor is it on current med list.    Note from Discharge on 5/15/20 states to take 1 tablet of 325mg transitionally, and was discontinued by Adali Swartz on 6/16/20 with note  -discontinue ASA 325mg q day- she has completed course  Dr. Norman also advised to discontinue this on 6/26/20 and pt was notified.

## 2020-07-08 NOTE — TELEPHONE ENCOUNTER
Reason for Call:  Other call back    Detailed comments: Please tell the patient if she should continue taking the two baby aspirin post surg, she did take a whole aspiriin during the month of therapy    Phone Number Patient can be reached at: [phone]    Best Time : anytime    Can we leave a detailed message on this number? YES       Call taken on 7/8/2020 at 9:31 AM by Lashay Hooper

## 2020-07-13 ENCOUNTER — TELEPHONE (OUTPATIENT)
Dept: FAMILY MEDICINE | Facility: CLINIC | Age: 85
End: 2020-07-13

## 2020-07-13 NOTE — TELEPHONE ENCOUNTER
"Patient reports fall over the weekend in her kitchen. Son present during the fall and assisted pt to her feet. Fall occurred while pt using her walker and had slippers on but soles worn and lack traction, thus she \"slid and down I went.\" Pt denies hitting head or any injuries. Bruise noted on R knee and R upper arm, dtr unsure if result of the fall and would follow up with her brother. PT to address falls recovery training with dtr at next visit and OT recommended use of gait belt in the home.    Aline Guthrie, OTR/MARYA, PAU  [email or web address]  [phone]      "

## 2020-07-16 ENCOUNTER — TELEPHONE (OUTPATIENT)
Dept: FAMILY MEDICINE | Facility: CLINIC | Age: 85
End: 2020-07-16

## 2020-07-16 NOTE — TELEPHONE ENCOUNTER
Reason for Call:  Home Health Care    Dena with [facility] called regarding (reason for call): Orders    Orders are needed for this patient.     PT: Home PT  2x a week for 2 weeks  1x a week for 1 week for gate and exercise      Phone Number Homecare Nurse can be reached at: [phone]    Can we leave a detailed message on this number? YES    Phone number patient can be reached at: Home number on file [phone] (home)    Best Time: Any    Call taken on 7/16/2020 at 3:26 PM by Kimberly Goins

## 2020-07-16 NOTE — TELEPHONE ENCOUNTER
Verbal approval given per request below (via detailed message).Homecare/hospice agency to fax orders for provider signature.     Also advised in message that home care have patient schedule a video or office visit with provider dulce MCNULTY RN

## 2020-08-04 ENCOUNTER — TRANSFERRED RECORDS (OUTPATIENT)
Dept: HEALTH INFORMATION MANAGEMENT | Facility: CLINIC | Age: 85
End: 2020-08-04

## 2020-10-29 ENCOUNTER — APPOINTMENT (OUTPATIENT)
Dept: MRI IMAGING | Facility: CLINIC | Age: 85
End: 2020-10-29
Attending: EMERGENCY MEDICINE
Payer: MEDICARE

## 2020-10-29 ENCOUNTER — HOSPITAL ENCOUNTER (EMERGENCY)
Facility: CLINIC | Age: 85
Discharge: HOME | End: 2020-10-29
Attending: EMERGENCY MEDICINE | Admitting: EMERGENCY MEDICINE
Payer: MEDICARE

## 2020-10-29 ENCOUNTER — NURSE TRIAGE (OUTPATIENT)
Dept: FAMILY MEDICINE | Facility: CLINIC | Age: 85
End: 2020-10-29

## 2020-10-29 VITALS
RESPIRATION RATE: 18 BRPM | OXYGEN SATURATION: 99 % | DIASTOLIC BLOOD PRESSURE: 79 MMHG | TEMPERATURE: 96.8 F | HEART RATE: 74 BPM | SYSTOLIC BLOOD PRESSURE: 167 MMHG

## 2020-10-29 DIAGNOSIS — H53.9 VISUAL DISTURBANCE: ICD-10-CM

## 2020-10-29 DIAGNOSIS — G93.89 CEREBRAL VENTRICULOMEGALY: ICD-10-CM

## 2020-10-29 LAB
ALBUMIN UR-MCNC: NEGATIVE MG/DL
ANION GAP SERPL CALCULATED.3IONS-SCNC: 6 MMOL/L (ref 3–14)
APPEARANCE UR: CLEAR
BASOPHILS # BLD AUTO: 0 10E9/L (ref 0–0.2)
BASOPHILS NFR BLD AUTO: 0.5 %
BILIRUB UR QL STRIP: NEGATIVE
BUN SERPL-MCNC: 35 MG/DL (ref 7–30)
CALCIUM SERPL-MCNC: 9.4 MG/DL (ref 8.5–10.1)
CHLORIDE SERPL-SCNC: 106 MMOL/L (ref 94–109)
CO2 SERPL-SCNC: 28 MMOL/L (ref 20–32)
COLOR UR AUTO: ABNORMAL
CREAT SERPL-MCNC: 0.73 MG/DL (ref 0.52–1.04)
DIFFERENTIAL METHOD BLD: ABNORMAL
EOSINOPHIL # BLD AUTO: 0.1 10E9/L (ref 0–0.7)
EOSINOPHIL NFR BLD AUTO: 0.9 %
ERYTHROCYTE [DISTWIDTH] IN BLOOD BY AUTOMATED COUNT: 14.9 % (ref 10–15)
GFR SERPL CREATININE-BSD FRML MDRD: 73 ML/MIN/{1.73_M2}
GLUCOSE SERPL-MCNC: 107 MG/DL (ref 70–99)
GLUCOSE UR STRIP-MCNC: NEGATIVE MG/DL
HCT VFR BLD AUTO: 39.2 % (ref 35–47)
HGB BLD-MCNC: 12.3 G/DL (ref 11.7–15.7)
HGB UR QL STRIP: NEGATIVE
IMM GRANULOCYTES # BLD: 0 10E9/L (ref 0–0.4)
IMM GRANULOCYTES NFR BLD: 0 %
INTERPRETATION ECG - MUSE: NORMAL
KETONES UR STRIP-MCNC: NEGATIVE MG/DL
LEUKOCYTE ESTERASE UR QL STRIP: NEGATIVE
LYMPHOCYTES # BLD AUTO: 1.3 10E9/L (ref 0.8–5.3)
LYMPHOCYTES NFR BLD AUTO: 20.6 %
MCH RBC QN AUTO: 27.9 PG (ref 26.5–33)
MCHC RBC AUTO-ENTMCNC: 31.4 G/DL (ref 31.5–36.5)
MCV RBC AUTO: 89 FL (ref 78–100)
MONOCYTES # BLD AUTO: 0.4 10E9/L (ref 0–1.3)
MONOCYTES NFR BLD AUTO: 6.8 %
MUCOUS THREADS #/AREA URNS LPF: PRESENT /LPF
NEUTROPHILS # BLD AUTO: 4.5 10E9/L (ref 1.6–8.3)
NEUTROPHILS NFR BLD AUTO: 71.2 %
NITRATE UR QL: NEGATIVE
NRBC # BLD AUTO: 0 10*3/UL
NRBC BLD AUTO-RTO: 0 /100
PH UR STRIP: 6.5 PH (ref 5–7)
PLATELET # BLD AUTO: 293 10E9/L (ref 150–450)
POTASSIUM SERPL-SCNC: 3.8 MMOL/L (ref 3.4–5.3)
RBC # BLD AUTO: 4.41 10E12/L (ref 3.8–5.2)
RBC #/AREA URNS AUTO: <1 /HPF (ref 0–2)
SODIUM SERPL-SCNC: 140 MMOL/L (ref 133–144)
SOURCE: ABNORMAL
SP GR UR STRIP: 1.02 (ref 1–1.03)
UROBILINOGEN UR STRIP-MCNC: NORMAL MG/DL (ref 0–2)
WBC # BLD AUTO: 6.3 10E9/L (ref 4–11)
WBC #/AREA URNS AUTO: 1 /HPF (ref 0–5)

## 2020-10-29 PROCEDURE — 70551 MRI BRAIN STEM W/O DYE: CPT

## 2020-10-29 PROCEDURE — 93005 ELECTROCARDIOGRAM TRACING: CPT

## 2020-10-29 PROCEDURE — 80048 BASIC METABOLIC PNL TOTAL CA: CPT | Performed by: EMERGENCY MEDICINE

## 2020-10-29 PROCEDURE — 99285 EMERGENCY DEPT VISIT HI MDM: CPT | Mod: 25

## 2020-10-29 PROCEDURE — 81001 URINALYSIS AUTO W/SCOPE: CPT | Performed by: EMERGENCY MEDICINE

## 2020-10-29 PROCEDURE — 85025 COMPLETE CBC W/AUTO DIFF WBC: CPT | Performed by: EMERGENCY MEDICINE

## 2020-10-29 ASSESSMENT — ENCOUNTER SYMPTOMS
ABDOMINAL PAIN: 0
FEVER: 0
SHORTNESS OF BREATH: 0
DIARRHEA: 0
HEADACHES: 0
CONFUSION: 1
HALLUCINATIONS: 1
COUGH: 0

## 2020-10-29 NOTE — TELEPHONE ENCOUNTER
Reason for Call:  Other call back    Detailed comments: Saray (Daughter req a call back regarding some concerns    Vision, goofyness Cognitive changes please call for advise    Phone Number Saray can be reached at: Other phone number:  [phone]    Best Time: Today    Can we leave a detailed message on this number? Not Applicable    Call taken on 10/29/2020 at 10:15 AM by Rissa Greenberg

## 2020-10-29 NOTE — ED AVS SNAPSHOT
[facility]  [address] [address]  Phone: [phone]  Fax: [phone]                                    Maryam Saavedra   MRN: [id number]    Department: [facility]   Date of Visit: 10/29/2020           After Visit Summary Signature Page    I have received my discharge instructions, and my questions have been answered. I have discussed any challenges I see with this plan with the nurse or doctor.    ..........................................................................................................................................  Patient/Patient Representative Signature      ..........................................................................................................................................  Patient Representative Print Name and Relationship to Patient    ..................................................               ................................................  Date                                   Time    ..........................................................................................................................................  Reviewed by Signature/Title    ...................................................              ..............................................  Date                                               Time          22EPIC Rev 08/18

## 2020-10-29 NOTE — ED TRIAGE NOTES
"Patient with decrease in cognitive status. Patient describes seeing things on the ground and having blurriness with both eyes at times. Also seems to be in a \"dreamlike\" reality over the last weekend.Patient having memory issues.  "

## 2020-10-29 NOTE — ED PROVIDER NOTES
"History     Chief Complaint:  Altered Mental Status and Eye Problem       The history is provided by the patient and a relative.     Maryam Saavedra is a 88 year old year old female with a history of hypertension, osteoarthritis and cognitive impairment who presents for evaluation of altered mental status and eye problems. The patient states that over the past few weeks she has been seeing \"dots\" from both of her eyes in the right visual field and having general blurriness.  Symptoms persist when she closes either eye.  In addition, the patients daughter tells us that this weekend when the patient was with her brother, she was acting strangely and thinking that her daughters   was in the house.  In retrospect, the patient does recall these thoughts and recognizes that they were not true.  Patient denies headaches, use of blood thinners, fever, diarrhea, cough or abdominal pain, dysuria.  Patient's daughter feels like her cognition is currently at baseline and she has not noticed any ataxia or focal weakness or speech disturbance.    Allergies:  Ace Inhibitors  Cyclobenzaprine    Medications:   Toprol-XL  Senokot    Medical History:   Hypertension   Chronic lower back pain   Impaired fasting glucose   Osteoarthritis  Anemia  Cognitive impairment     Surgical History:  Bilateral wrist surgery  MOHS micrographic procedure  Left lower eyelid  Left total knee arthoplasty  Arthroplasty hip anterior     Family History:   Diabetes    Social History:  Smoke: No - Former  Alcohol: Yes  Drug: No        Review of Systems   Constitutional: Negative for fever.   Eyes: Positive for visual disturbance.   Respiratory: Negative for cough and shortness of breath.    Gastrointestinal: Negative for abdominal pain and diarrhea.   Neurological: Negative for headaches.   Psychiatric/Behavioral: Positive for confusion and hallucinations.   All other systems reviewed and are negative.    Physical Exam     Patient Vitals for " the past 24 hrs:   BP Temp Temp src Pulse Resp SpO2   10/29/20 1951 -- -- -- -- -- 99 %   10/29/20 1950 (!) 167/79 -- -- 74 -- 98 %   10/29/20 1900 (!) 148/69 -- -- 57 -- 100 %   10/29/20 1553 (!) 153/49 96.8  F (36  C) Temporal 64 18 100 %       Physical Exam  VS: Reviewed per above  HENT: Mucous membranes moist, no nuchal rigidity  EYES: sclera anicteric  CV: Rate as noted, regular rhythm.   RESP: Effort normal. Breath sounds are normal bilaterally.  GI: no tenderness/rebound/guarding, not distended.  NEURO: GCS 15, cranial nerves II through XII are intact, 5 out of 5 strength in all 4 extremities, sensation is intact light touch in all 4 extremities  MSK: No deformity of the extremities  SKIN: Warm and dry    Emergency Department Course     ECG:  Indication: Altered mental status  Time: 1613  Vent. Rate 63 bpm. OH interval 164. QRS duration 88. QT/QTc 450/460. P-R-T axis 62 -40 34. Sinus rhythm with premature atrial complexes. Left axis deviation. Abnormal ECG. Read time: 1616    Imaging:  Radiology findings were communicated with the patient who voiced understanding of the findings.    MRI Brain w/o contrast:  1. Interval increase in ventriculomegaly of the lateral and third  ventricles since the 2017 brain MRI raising the question of either  developing or worsening normal pressure hydrocephalus versus  ventriculomegaly due to age-related cerebral volume loss. Recommend  clinical correlation for history of normal pressure hydrocephalus.  2. Diffuse cerebral volume loss and cerebral white matter changes  consistent with chronic small vessel ischemic disease. Reading per radiology.     Laboratory:  Laboratory findings were communicated with the patient who voiced understanding of the findings.    CBC: WBC: 6.3, HGB: 12.3, PLT: 293    BMP: Glucose 107(H), o/w WNL (Creatinine: 0.73)    UA with microscopic: Mucous Present o/w WNL    Emergency Department Course:  Past medical records, nursing notes, and vitals  reviewed.    4:16 PM I performed an exam of the patient as documented above.     EKG obtained in the ED, see results above.   IV was inserted and blood was drawn for laboratory testing, results above.  The patient provided a urine sample here in the emergency department. This was sent for laboratory testing, findings above.  The patient was sent for a brain MRI while in the emergency department, results above.     1943 I spoke with Dr. Carolyne Mar from neuro regarding this patient.   1949 I rechecked the patient and discussed the results of her workup thus far.       Findings and plan explained to the Patient. Patient discharged home with instructions regarding supportive care, medications, and reasons to return. The importance of close follow-up was reviewed.    I personally reviewed the laboratory and imaging results with the Patient and answered all related questions prior to discharge.     Impression & Plan       Medical Decision Making:  Maryam Saavedra is a 88 year old female who presents today for evaluation of days to weeks of right-sided visual disturbance and recent episode of confusion versus hallucination.  On arrival vital signs are notable for mildly elevated blood pressure 153/49.  She has no focal neurological deficits on my exam and GCS is 15.  Due to binocular right-sided visual disturbance, monocular pathology was deemed less likely.  MRI of the brain was obtained to rule out stroke/ICH and fortunately there was no evidence of this.  There was evidence of increased ventriculomegaly of unclear significance.  I spoke with neurology who agreed with close outpatient follow-up.  Labs are reassuring and there is no evidence of UTI.  Patient lives with a family member who can keep a close eye on her.  Return precautions were discussed with patient and daughter prior to discharge.      Diagnosis:    ICD-10-CM    1. Visual disturbance  H53.9    2. Cerebral ventriculomegaly  G93.89         Disposition:  Discharged to home.    Scribe Disclosure:  I, Robert Karina, am serving as a scribe at 4:16 PM on 10/29/2020 to document services personally performed by Jasiel Whiting MD, based on my observations and the provider's statements to me.      Jasiel Whiting MD  10/29/20 2842

## 2020-10-29 NOTE — TELEPHONE ENCOUNTER
"Cognitive impairment on baseline  \"[place] a few days ago, in a dream, didn't shake for a couple days, saying weird things, but aware that it was weird.   Obsessed with needing to go to eye doctor having blurred vision started this week, did not have before.  Laughing weirdly, disoriented   Daughter reports speech sounds drunk.  Slurred speech?   Last week, couldn't walk again, brought wheelchair back in   's/60's   Denies weakness on one side of the body, more wobbly  Doesn't eat a ton, not dehydrating \"  Unsure of one sided sx, afebrile, unsure of urinary changes as pt incontinent. .  Due to vision and cognitive changes, advised ED, which daughter states \"Ok I will take her, that matches my gut feeling.\"           Additional Information    Blurred vision or visual changes and present now and sudden onset or new (e.g., minutes, hours, days) (Exception: seeing floaters / black specks OR previously diagnosed migraine headaches with same symptoms)    Protocols used: VISION LOSS OR CHANGE-A-OH      "

## 2020-10-30 NOTE — DISCHARGE INSTRUCTIONS
Return for worsening visual disturbance, weakness on part of the body or confusion or fevers or new concerns.  Follow-up with neurology.

## 2020-12-02 NOTE — OR NURSING
Dr Pruitt at bedside- OK to transport to [place] [id number]- transported by Nurse Aidjordin Parker- report given to Kristen OLIVEIRA  [id number]- called and updated daughter    Cosentyx Counseling:  I discussed with the patient the risks of Cosentyx including but not limited to worsening of Crohn's disease, immunosuppression, allergic reactions and infections.  The patient understands that monitoring is required including a PPD at baseline and must alert us or the primary physician if symptoms of infection or other concerning signs are noted.

## 2020-12-28 NOTE — PROGRESS NOTES
[facility]  PRIMARY CARE PROVIDER AND CLINIC: JACKIE Zayas CNP, [address] / [place] [address]; Phone: [phone]; Fax: [phone]  Chief Complaint   Patient presents with     Establish Care     [facility] Medical Record Number: [id number]  Place of Service where encounter took place: [facility] [[id number]]    Maryam Saavedra is a 88 year old (6/9/1932), admitted to the above facility from due to care needs. Admitted to this facility for medical management and nursing care.    HPI:    HPI information obtained from: facility chart records, [facility] Epic chart review and family/first contact daughter-Saray report.     Was living in the community with her son. Has dementia and over past few months worsening dementia including hallucinations, delusions, up at night, increased falls so prompted move into memory care. Seen today with her daughter present. Use of 2WW very unsteady on feet, limb length discrepancy? Crosses left over right foot at times with walking and impaired vision contributors. Had fall yesterday and another today. Falling asleep during the visit, able for small conversation. Denies pain, chest pain, dysuria. ROM to all extremities, gross neuros intact.    Was seen in ED 10/2020 for visual disturbance. MRI noted cerebral ventriculomegaly some increase of the lateral and third ventricles since 2017 questioning NPH or secondary to age-related cerebral volume loss. Follow up at [facility] (unable to see notes) and daughter says labs with positive Immunoglobulin A elevated and Lambda Light Chain. Was advised to see hematology but further workup declined per daughter.     CODE STATUS/ADVANCE DIRECTIVES DISCUSSION: DNR/DNI  Patient's living condition: moved into memory care  ALLERGIES: Ace inhibitors and Cyclobenzaprine  PAST MEDICAL HISTORY:  has a past medical history of Benign essential hypertension (9/1/2016), Chronic low  back pain, Hypertension, Impaired fasting glucose, and Osteoarthritis. She also has no past medical history of Breast cancer (H), Breast mass, Cancer (H), Cerebral infarction (H), Congestive heart failure (H), COPD (chronic obstructive pulmonary disease) (H), Cyst of breast, Depressive disorder, Diabetes (H), Heart disease, History of blood transfusion, History of gestational diabetes, Inverted nipple, Malignant neoplasm of colon, unspecified site, Malignant neoplasm of corpus uteri, except isthmus (H), Malignant neoplasm of ovary (H), Need for prophylactic hormone replacement therapy (postmenopausal), Other injury of other sites of trunk, Personal history of other disorders of nervous system and sense organs, Personal history of unspecified circulatory disease, Thyroid disease, or Uncomplicated asthma.  PAST SURGICAL HISTORY:   has a past surgical history that includes orthopedic surgery; Eye surgery (cataracts); Mohs micrographic procedure; Mohs micrographic procedure (Left, 10/27/2016); Mohs micrographic procedure (Right, 5/24/2018); Arthroplasty Hip Anterior (Right, 7/24/2018); and Arthroplasty knee (Left, 5/12/2020).  FAMILY HISTORY: family history includes Diabetes in her father and mother.  SOCIAL HISTORY:   reports that she has quit smoking. She started smoking about 22 years ago. She smoked 0.00 packs per day for 0.00 years. She has quit using smokeless tobacco. She reports current alcohol use. She reports that she does not use drugs.    Post Discharge Medication Reconciliation Status: discharge medications reconciled and changed, per note/orders    Current Outpatient Medications   Medication Sig Dispense Refill     acetaminophen (TYLENOL) 325 MG tablet Take 650 mg by mouth every 6 hours as needed for mild pain or pain        metoprolol succinate ER (TOPROL-XL) 25 MG 24 hr tablet Take 0.5 tablets (12.5 mg) by mouth every evening (Hold for SBP <110 or HR <60)       order for DME 1: Gradient Compression  "Wraps; 2: BLE knee high 20-30 mm Hg compression stockings (Patient not taking: Reported on 12/29/2020) 1 each 0     senna-docusate (SENOKOT-S/PERICOLACE) 8.6-50 MG tablet Take 2 tablets by mouth 2 times daily (Patient not taking: Reported on 12/29/2020)       ROS:  Limited secondary to cognitive impairment but today pt reports fine    Vitals:  /62   Pulse 57   Temp 96.8  F (36  C)   Resp 18   Ht 1.549 m (5' 1\")   SpO2 100%   BMI 17.84 kg/m    Exam:   GENERAL APPEARANCE:  sleepy  ENT:  Mouth and posterior oropharynx normal, moist mucous membranes  EYES:  EOM, conjunctivae, lids, pupils and irises normal  NECK:  No adenopathy,masses or thyromegaly  RESP:  lungs clear to auscultation   CV:  Palpation and auscultation of heart done , regular rate and rhythm, no murmur, rub, or gallop  ABDOMEN:  slight distension and firm   M/S:   Gait and station abnormal unsteady  SKIN:  frailgle and frail-dry, scab on RLE  NEURO:   Cranial nerves 2-12 are normal tested and grossly at patient's baseline  PSYCH:  insight and judgement impaired, memory impaired     Lab/Diagnostic data:  CBC RESULTS:   Recent Labs   Lab Test 10/29/20  1608 06/10/20  0804 04/02/20 1934 04/02/20 1934   WBC 6.3  --   --  5.5   RBC 4.41  --   --  3.77*   HGB 12.3 10.5*   < > 11.2*   HCT 39.2  --   --  34.1*   MCV 89  --   --  91   MCH 27.9  --   --  29.7   MCHC 31.4*  --   --  32.8   RDW 14.9  --   --  14.7     --   --  292    < > = values in this interval not displayed.       Last Basic Metabolic Panel:  Recent Labs   Lab Test 10/29/20  1608 05/26/20  0921    141   POTASSIUM 3.8 3.7   CHLORIDE 106 108   NOHEMY 9.4 8.6   CO2 28 27   BUN 35* 23   CR 0.73 0.75   * 118*       Liver Function Studies -   Recent Labs   Lab Test 04/02/20 1934 05/21/19  1124   PROTTOTAL 6.9 7.6   ALBUMIN 2.7* 3.7   BILITOTAL 0.3 0.3   ALKPHOS 207* 136   AST 31 35   ALT 31 39       TSH   Date Value Ref Range Status   11/25/2017 4.20 (H) 0.40 - 4.00 mU/L " Final   09/08/2017 4.66 (H) 0.40 - 4.00 mU/L Final       Lab Results   Component Value Date    A1C 5.6 07/13/2018    A1C 5.9 05/14/2018     MRI Brain w/o contrast:  1. Interval increase in ventriculomegaly of the lateral and third  ventricles since the 2017 brain MRI raising the question of either  developing or worsening normal pressure hydrocephalus versus  ventriculomegaly due to age-related cerebral volume loss. Recommend  clinical correlation for history of normal pressure hydrocephalus.  2. Diffuse cerebral volume loss and cerebral white matter changes  consistent with chronic small vessel ischemic disease. Reading per radiology.     ASSESSMENT/PLAN:  (F01.51) Vascular dementia with behavior disturbance (H)  (primary encounter diagnosis)  Comment: move into memory care  Plan:   -staff assist with ADLs    (M17.12) Primary osteoarthritis of left knee  (R53.81) Physical deconditioning  (R29.6) Falls frequently   Comment: At risk for additional falls  Plan:   -[facility] PT/OT to eval and treat  -Tylenol prn     (I10) Benign essential hypertension  Comment: ongoing-with some LE edema  Plan:   -metoprolol 12.5 mg po daily   -VS with visits   -farrow wraps on am and off HS    (S99.791D) Injury of second toe, right, subsequent encounter  Comment: with callus removal from podiatry   Plan:   -wound supplies from podiatry and daily wound care until healed     (K59.01) Slow transit constipation  Comment: abdominal distension urinary retention v. Constipation?  Plan:   -Senna S 2 tablet po BID PRN. Hold for loose stools  -Miralax 17g po daily (starting with abdominal distension)     (E55.9) Vitamin D deficiency  Comment: low with results from neurology   Plan:   -vitamin D supplement daily         Total time spent with patient visit at the [facility] nursing facility was 60 min including patient visit, review of past records and discussion with daughter for 20 minutes. Greater than 50% of total time spent with counseling and  coordinating care due to new to memory care, new to [place] [facility] onsite provider services. Reviewed rounding schedules with MD/NPs, follow ups, labs, review of POLST     Electronically signed by:  JACKIE Zayas CNP

## 2020-12-29 VITALS
DIASTOLIC BLOOD PRESSURE: 62 MMHG | TEMPERATURE: 96.8 F | HEIGHT: 61 IN | BODY MASS INDEX: 17.84 KG/M2 | HEART RATE: 57 BPM | RESPIRATION RATE: 18 BRPM | OXYGEN SATURATION: 100 % | SYSTOLIC BLOOD PRESSURE: 132 MMHG

## 2020-12-30 ENCOUNTER — ASSISTED LIVING VISIT (OUTPATIENT)
Dept: GERIATRICS | Facility: CLINIC | Age: 85
End: 2020-12-30
Payer: MEDICARE

## 2020-12-30 ENCOUNTER — TRANSFERRED RECORDS (OUTPATIENT)
Dept: HEALTH INFORMATION MANAGEMENT | Facility: CLINIC | Age: 85
End: 2020-12-30

## 2020-12-30 DIAGNOSIS — R53.81 PHYSICAL DECONDITIONING: ICD-10-CM

## 2020-12-30 DIAGNOSIS — I10 BENIGN ESSENTIAL HYPERTENSION: ICD-10-CM

## 2020-12-30 DIAGNOSIS — M17.12 PRIMARY OSTEOARTHRITIS OF LEFT KNEE: ICD-10-CM

## 2020-12-30 DIAGNOSIS — K59.01 SLOW TRANSIT CONSTIPATION: ICD-10-CM

## 2020-12-30 DIAGNOSIS — R29.6 FALLS FREQUENTLY: ICD-10-CM

## 2020-12-30 DIAGNOSIS — E55.9 VITAMIN D DEFICIENCY: ICD-10-CM

## 2020-12-30 DIAGNOSIS — F01.518 VASCULAR DEMENTIA WITH BEHAVIOR DISTURBANCE (H): Primary | ICD-10-CM

## 2020-12-30 DIAGNOSIS — S99.921D: ICD-10-CM

## 2020-12-30 RX ORDER — AMOXICILLIN 250 MG
1 CAPSULE ORAL 2 TIMES DAILY PRN
Qty: 30 TABLET | Refills: 4 | COMMUNITY
Start: 2020-12-30 | End: 2021-09-03

## 2020-12-30 RX ORDER — POLYETHYLENE GLYCOL 3350 17 G/17G
1 POWDER, FOR SOLUTION ORAL DAILY
COMMUNITY
Start: 2020-12-30 | End: 2020-12-30

## 2020-12-30 RX ORDER — POLYETHYLENE GLYCOL 3350 17 G/17G
1 POWDER, FOR SOLUTION ORAL DAILY
Qty: 30 PACKET | Refills: 11 | Status: SHIPPED | OUTPATIENT
Start: 2020-12-30 | End: 2021-08-26

## 2020-12-30 RX ORDER — CHOLECALCIFEROL (VITAMIN D3) 50 MCG
1 TABLET ORAL DAILY
Qty: 30 TABLET | Refills: 11 | Status: SHIPPED | OUTPATIENT
Start: 2020-12-30 | End: 2021-11-28

## 2020-12-30 NOTE — LETTER
12/30/2020        RE: Maryam Saavedra  C/o Saray Saavedra  [address]  [facility] [address]        [facility]  PRIMARY CARE PROVIDER AND CLINIC: JACKIE Zayas CNP, [address] [place] [address] / [place] [address]; Phone: [phone]; Fax: [phone]  Chief Complaint   Patient presents with     Establish Care     [facility] Record Number: [id number]  Place of Service where encounter took place: [facility] [[id number]]    Maryam Saavedra is a 88 year old (6/9/1932), admitted to the above facility from due to care needs. Admitted to this facility for medical management and nursing care.    HPI:    HPI information obtained from: facility chart records, [facility] chart review and family/first contact daughter-Saray report.     Was living in the community with her son. Has dementia and over past few months worsening dementia including hallucinations, delusions, up at night, increased falls so prompted move into memory care. Seen today with her daughter present. Use of 2WW very unsteady on feet, limb length discrepancy? Crosses left over right foot at times with walking and impaired vision contributors. Had fall yesterday and another today. Falling asleep during the visit, able for small conversation. Denies pain, chest pain, dysuria. ROM to all extremities, gross neuros intact.    Was seen in ED 10/2020 for visual disturbance. MRI noted cerebral ventriculomegaly some increase of the lateral and third ventricles since 2017 questioning NPH or secondary to age-related cerebral volume loss. [facility] (unable to see notes) and daughter says labs with positive Immunoglobulin A elevated and Lambda Light Chain. Was advised to see hematology but further workup declined per daughter.     CODE STATUS/ADVANCE DIRECTIVES DISCUSSION: DNR/DNI  Patient's living condition: moved into memory care  ALLERGIES: Ace inhibitors and  Cyclobenzaprine  PAST MEDICAL HISTORY:  has a past medical history of Benign essential hypertension (9/1/2016), Chronic low back pain, Hypertension, Impaired fasting glucose, and Osteoarthritis. She also has no past medical history of Breast cancer (H), Breast mass, Cancer (H), Cerebral infarction (H), Congestive heart failure (H), COPD (chronic obstructive pulmonary disease) (H), Cyst of breast, Depressive disorder, Diabetes (H), Heart disease, History of blood transfusion, History of gestational diabetes, Inverted nipple, Malignant neoplasm of colon, unspecified site, Malignant neoplasm of corpus uteri, except isthmus (H), Malignant neoplasm of ovary (H), Need for prophylactic hormone replacement therapy (postmenopausal), Other injury of other sites of trunk, Personal history of other disorders of nervous system and sense organs, Personal history of unspecified circulatory disease, Thyroid disease, or Uncomplicated asthma.  PAST SURGICAL HISTORY:   has a past surgical history that includes orthopedic surgery; Eye surgery (cataracts); Mohs micrographic procedure; Mohs micrographic procedure (Left, 10/27/2016); Mohs micrographic procedure (Right, 5/24/2018); Arthroplasty Hip Anterior (Right, 7/24/2018); and Arthroplasty knee (Left, 5/12/2020).  FAMILY HISTORY: family history includes Diabetes in her father and mother.  SOCIAL HISTORY:   reports that she has quit smoking. She started smoking about 22 years ago. She smoked 0.00 packs per day for 0.00 years. She has quit using smokeless tobacco. She reports current alcohol use. She reports that she does not use drugs.    Post Discharge Medication Reconciliation Status: discharge medications reconciled and changed, per note/orders    Current Outpatient Medications   Medication Sig Dispense Refill     acetaminophen (TYLENOL) 325 MG tablet Take 650 mg by mouth every 6 hours as needed for mild pain or pain        metoprolol succinate ER (TOPROL-XL) 25 MG 24 hr tablet Take  "0.5 tablets (12.5 mg) by mouth every evening (Hold for SBP <110 or HR <60)       order for DME 1: Gradient Compression Wraps; 2: BLE knee high 20-30 mm Hg compression stockings (Patient not taking: Reported on 12/29/2020) 1 each 0     senna-docusate (SENOKOT-S/PERICOLACE) 8.6-50 MG tablet Take 2 tablets by mouth 2 times daily (Patient not taking: Reported on 12/29/2020)       ROS:  Limited secondary to cognitive impairment but today pt reports fine    Vitals:  /62   Pulse 57   Temp 96.8  F (36  C)   Resp 18   Ht 1.549 m (5' 1\")   SpO2 100%   BMI 17.84 kg/m    Exam:   GENERAL APPEARANCE:  sleepy  ENT:  Mouth and posterior oropharynx normal, moist mucous membranes  EYES:  EOM, conjunctivae, lids, pupils and irises normal  NECK:  No adenopathy,masses or thyromegaly  RESP:  lungs clear to auscultation   CV:  Palpation and auscultation of heart done , regular rate and rhythm, no murmur, rub, or gallop  ABDOMEN:  slight distension and firm   M/S:   Gait and station abnormal unsteady  SKIN:  frailgle and frail-dry, scab on RLE  NEURO:   Cranial nerves 2-12 are normal tested and grossly at patient's baseline  PSYCH:  insight and judgement impaired, memory impaired     Lab/Diagnostic data:  CBC RESULTS:   Recent Labs   Lab Test 10/29/20  1608 06/10/20  0804 04/02/20  1934 04/02/20  1934   WBC 6.3  --   --  5.5   RBC 4.41  --   --  3.77*   HGB 12.3 10.5*   < > 11.2*   HCT 39.2  --   --  34.1*   MCV 89  --   --  91   MCH 27.9  --   --  29.7   MCHC 31.4*  --   --  32.8   RDW 14.9  --   --  14.7     --   --  292    < > = values in this interval not displayed.       Last Basic Metabolic Panel:  Recent Labs   Lab Test 10/29/20  1608 05/26/20  0921    141   POTASSIUM 3.8 3.7   CHLORIDE 106 108   NOHEMY 9.4 8.6   CO2 28 27   BUN 35* 23   CR 0.73 0.75   * 118*       Liver Function Studies -   Recent Labs   Lab Test 04/02/20  1934 05/21/19  1124   PROTTOTAL 6.9 7.6   ALBUMIN 2.7* 3.7   BILITOTAL 0.3 0.3 "   ALKPHOS 207* 136   AST 31 35   ALT 31 39       TSH   Date Value Ref Range Status   11/25/2017 4.20 (H) 0.40 - 4.00 mU/L Final   09/08/2017 4.66 (H) 0.40 - 4.00 mU/L Final       Lab Results   Component Value Date    A1C 5.6 07/13/2018    A1C 5.9 05/14/2018     MRI Brain w/o contrast:  1. Interval increase in ventriculomegaly of the lateral and third  ventricles since the 2017 brain MRI raising the question of either  developing or worsening normal pressure hydrocephalus versus  ventriculomegaly due to age-related cerebral volume loss. Recommend  clinical correlation for history of normal pressure hydrocephalus.  2. Diffuse cerebral volume loss and cerebral white matter changes  consistent with chronic small vessel ischemic disease. Reading per radiology.     ASSESSMENT/PLAN:  (F01.51) Vascular dementia with behavior disturbance (H)  (primary encounter diagnosis)  Comment: move into memory care  Plan:   -staff assist with ADLs    (M17.12) Primary osteoarthritis of left knee  (R53.81) Physical deconditioning  (R29.6) Falls frequently   Comment: At risk for additional falls  Plan:   -[facility] PT/OT to eval and treat  -Tylenol prn     (I10) Benign essential hypertension  Comment: ongoing-with some LE edema  Plan:   -metoprolol 12.5 mg po daily   -VS with visits   -farrow wraps on am and off HS    (S92.271D) Injury of second toe, right, subsequent encounter  Comment: with callus removal from podiatry   Plan:   -wound supplies from podiatry and daily wound care until healed     (K59.01) Slow transit constipation  Comment: abdominal distension urinary retention v. Constipation?  Plan:   -Senna S 2 tablet po BID PRN. Hold for loose stools  -Miralax 17g po daily (starting with abdominal distension)     (E55.9) Vitamin D deficiency  Comment: low with results from neurology   Plan:   -vitamin D supplement daily         Total time spent with patient visit at the skilled nursing facility was 60 min including patient visit, review  of past records and discussion with daughter for 20 minutes. Greater than 50% of total time spent with counseling and coordinating care due to new to memory care, new to [facility] onsite provider services. Reviewed rounding schedules with MD/NPs, follow ups, labs, review of POLST     Electronically signed by:  JACKIE Zayas CNP                   Sincerely,        JACKIE Zayas CNP

## 2021-01-04 ENCOUNTER — DOCUMENTATION ONLY (OUTPATIENT)
Dept: OTHER | Facility: CLINIC | Age: 86
End: 2021-01-04

## 2021-01-06 ENCOUNTER — TRANSFERRED RECORDS (OUTPATIENT)
Dept: HEALTH INFORMATION MANAGEMENT | Facility: CLINIC | Age: 86
End: 2021-01-06

## 2021-01-06 LAB
ALBUMIN SERPL-MCNC: 2.8 G/DL (ref 3.4–5)
ALP SERPL-CCNC: 220 U/L (ref 40–150)
ALT SERPL-CCNC: 31 U/L (ref 0–50)
ANION GAP SERPL CALCULATED.3IONS-SCNC: 4 MMOL/L (ref 3–14)
AST SERPL-CCNC: 23 U/L (ref 0–45)
BILIRUB SERPL-MCNC: 0.5 MG/DL (ref 0.2–1.3)
BILIRUBIN DIRECT: <0.1 MG/DL (ref 0–0.2)
BUN SERPL-MCNC: 26 MG/DL (ref 7–30)
CALCIUM SERPL-MCNC: 9.3 MG/DL (ref 8.5–10.1)
CHLORIDE SERPLBLD-SCNC: 105 MMOL/L (ref 94–109)
CO2 SERPL-SCNC: 30 MMOL/L (ref 20–32)
CREAT SERPL-MCNC: 1.01 MG/DL (ref 0.52–1.04)
ERYTHROCYTE [DISTWIDTH] IN BLOOD BY AUTOMATED COUNT: 15.9 % (ref 10–15)
GFR SERPL CREATININE-BSD FRML MDRD: 49 ML/MIN/1.73_M2
GLUCOSE SERPL-MCNC: 131 MG/DL (ref 70–99)
HCT VFR BLD AUTO: 37.7 % (ref 35–47)
HEMOGLOBIN: 11.7 G/DL (ref 11.7–15.7)
MCH RBC QN AUTO: 28.1 PG (ref 26.5–33)
MCHC RBC AUTO-ENTMCNC: 31 G/DL (ref 31.5–36.5)
MCV RBC AUTO: 90 FL (ref 78–100)
PLATELET # BLD AUTO: 239 10E9/L (ref 150–450)
POTASSIUM SERPL-SCNC: 3.9 MMOL/L (ref 3.4–5.3)
PROT SERPL-MCNC: 7.2 G/DL (ref 6.8–8.8)
RBC # BLD AUTO: 4.17 10E12/L (ref 3.8–5.2)
SODIUM SERPL-SCNC: 139 MMOL/L (ref 133–144)
TSH SERPL-ACNC: 5.73 MU/L (ref 0.4–4)
WBC # BLD AUTO: 5.1 10E9/L (ref 4–11)

## 2021-01-08 DIAGNOSIS — I10 BENIGN ESSENTIAL HYPERTENSION: ICD-10-CM

## 2021-01-11 RX ORDER — METOPROLOL SUCCINATE 25 MG/1
TABLET, EXTENDED RELEASE ORAL
Qty: 15.5 TABLET | Refills: 97 | Status: SHIPPED | OUTPATIENT
Start: 2021-01-11 | End: 2021-03-03

## 2021-01-12 NOTE — PROGRESS NOTES
"[facility]  [facility] Record Number: [id number]  Place of Service where encounter took place: [facility] [[id number]]  Chief Complaint   Patient presents with     RECHECK     [facility] Care Coordination - DME/Supplies     hospital bed       HPI:    Maryam Saavedra is a 88 year old (6/9/1932), who is being seen today for an episodic care visit. HPI information obtained from: facility chart records, facility staff, patient report and [facility] chart review. Today's concern is:    Seen today for ongoing conditions. Chart review shows found on floor 1/7/21. She is unsteady at baseline with 2WW. Chronic generalized weakness. Treated for UTI 1/4/21 and now is more alert and bright from previous visit. Has some edema and does not wear compression. She has no acute concerns today-busy helping others on the unit and seems comfortable  in new environment.    Past Medical and Surgical History reviewed in Epic today.    MEDICATIONS:    Current Outpatient Medications   Medication Sig Dispense Refill     acetaminophen (TYLENOL) 325 MG tablet Take 650 mg by mouth every 6 hours as needed for mild pain or pain        metoprolol succinate ER (TOPROL-XL) 25 MG 24 hr tablet TAKE ONE-HALF TABLET (12.5.MG) BY MOUTH EVERY EVENING (HOLD FOR SBP < 110 OR HR < 60) 15.5 tablet 97     polyethylene glycol (MIRALAX) 17 g packet Take 17 g by mouth daily 30 packet 11     senna-docusate (SENOKOT-S/PERICOLACE) 8.6-50 MG tablet Take 1 tablet by mouth 2 times daily as needed for constipation Hold for loose stools 30 tablet 4     vitamin D3 (CHOLECALCIFEROL) 50 mcg (2000 units) tablet Take 1 tablet (50 mcg) by mouth daily 30 tablet 11     REVIEW OF SYSTEMS:  Limited secondary to cognitive impairment but today pt reports ok    Objective:  BP (!) 141/66   Pulse 60   Temp 98  F (36.7  C)   Resp 17   Ht 1.549 m (5' 1\")   SpO2 96%   BMI 17.84 kg/m    Exam:  GENERAL APPEARANCE: alert and " bright  ENT:  Mouth and posterior oropharynx normal, dry mucous membranes  EYES:  EOM, conjunctivae, lids, pupils and irises normal  RESP:  lungs clear to auscultation   CV:  Palpation and auscultation of heart done , regular rate and rhythm, no murmur, rub, or gallop  ABDOMEN:  slight distension and firm   M/S:   Gait and station abnormal- unsteady with 2ww  SKIN:  fragile  and frail-dry, scab on RLE  NEURO:   Cranial nerves 2-12 are normal tested and grossly at patient's baseline  PSYCH:  insight and judgement impaired, memory impaired     Labs:   CBC RESULTS:   Recent Labs   Lab Test 01/06/21 10/29/20  1608   WBC 5.1 6.3   RBC 4.17 4.41   HGB 11.7 12.3   HCT 37.7 39.2   MCV 90 89   MCH 28.1 27.9   MCHC 31.0* 31.4*   RDW 15.9* 14.9    293     Last Basic Metabolic Panel:  Recent Labs   Lab Test 01/06/21 10/29/20  1608    140   POTASSIUM 3.9 3.8   CHLORIDE 105 106   NOHEMY 9.3 9.4   CO2 30 28   BUN 26 35*   CR 1.01 0.73   * 107*     Liver Function Studies -   Recent Labs   Lab Test 01/06/21 04/02/20  1934   PROTTOTAL 7.2 6.9   ALBUMIN 2.8* 2.7*   BILITOTAL 0.5 0.3   ALKPHOS 220* 207*   AST 23 31   ALT 31 31     TSH   Date Value Ref Range Status   01/06/2021 5.73 (A) 0.40 - 4.00 mU/L Final   11/25/2017 4.20 (H) 0.40 - 4.00 mU/L Final     Lab Results   Component Value Date    A1C 5.6 07/13/2018    A1C 5.9 05/14/2018     ASSESSMENT/PLAN:  (F01.51) Vascular dementia with behavior disturbance (H)  (primary encounter diagnosis)  Comment: ongoing   Plan:   -now resides in   -staff assist for all ADLs    (N18.32) Stage 3b chronic kidney disease  Comment: chronic   Plan:   -renal dose medications and avoid nephrotoxins     (M17.12) Primary osteoarthritis of left knee  (R53.81) Physical deconditioning  (R29.6) Falls frequently  (M25.50) Pain in joint, multiple sites  Comment: ongoing  Plan:  -Tylenol prn   -SBA as able for ambulation. Does not remember or use walker appropriately at all times   -orders for  hospital bed    (Z91.89) At risk for aspiration  Comment: ongoing   Plan:   -upright for all meal and OOB if able   -alternate food and fluids       Electronically signed by:  JACKIE Zayas CNP     Face to Face and Medical Necessity Statement for DME Provider visit    Demographic Information on Maryam Carmona:  Gender: female  : 1932  C/O PAULETTE CARMONA  [address] [address]  [phone] (home)     Medical Record: [id number]  Social Security Number: [id number]  Primary Care Provider: Sandro Canseco  Insurance: Payor: MEDICARE / Plan: MEDICARE / Product Type: Medicare     HPI:   Maryam Carmona is a 88 year old  (1932), who is being seen today for a face to face provider visit at [facility]; medical necessity statement for DME included.     This patient requires the following:    DME Ordered and Medical Necessity Statement   Hospital bed: fully electronic with 2 side rails  The patient does  require positioning of the body in ways not feasible with an ordinary bed due to a medical condition that is expected to last at least 1 month due to Dementia, osteoarthritis, falls frequently, physical deconditioning, pain.  The patient does  require, for the alleviation of pain, postioning of the body in ways not feasible with an ordinary bed.   The patient does  require the head of bed elevated more than 30* most of the time due to CHF, chronic pulmonary disease or aspiration.  The patient does not require traction that can only be attached to a hospital bed.  The patient does  require a bed height different than a fixed height hospital bed to permit tranfers to wheelchair or standing position.   The patient does  require frequent or immediate changes in body position due to pain.     Pt needing above DME with expected length of need of 99   months  due to medical necessity associated with following diagnosis:     Vascular dementia with behavior disturbance (H)  Stage 3b  chronic kidney disease  Primary osteoarthritis of left knee  Physical deconditioning  Falls frequently  Pain in joint, multiple sites  At risk for aspiration      PMH   has a past medical history of Benign essential hypertension (9/1/2016), Chronic low back pain, Hypertension, Impaired fasting glucose, and Osteoarthritis. She also has no past medical history of Breast cancer (H), Breast mass, Cancer (H), Cerebral infarction (H), Congestive heart failure (H), COPD (chronic obstructive pulmonary disease) (H), Cyst of breast, Depressive disorder, Diabetes (H), Heart disease, History of blood transfusion, History of gestational diabetes, Inverted nipple, Malignant neoplasm of colon, unspecified site, Malignant neoplasm of corpus uteri, except isthmus (H), Malignant neoplasm of ovary (H), Need for prophylactic hormone replacement therapy (postmenopausal), Other injury of other sites of trunk, Personal history of other disorders of nervous system and sense organs, Personal history of unspecified circulatory disease, Thyroid disease, or Uncomplicated asthma.    Orders:  1. Facility staff/TC to contact [employer] to get their equipment scheduled    ELECTRONICALLY SIGNED BY ASHU CERTIFIED PROVIDER:  JACKIE Zayas CNP   NPI: [id number]  [facility]  [address] [facility], [address]  [place] [address]

## 2021-01-13 ENCOUNTER — ASSISTED LIVING VISIT (OUTPATIENT)
Dept: GERIATRICS | Facility: CLINIC | Age: 86
End: 2021-01-13
Payer: MEDICARE

## 2021-01-13 VITALS
RESPIRATION RATE: 17 BRPM | SYSTOLIC BLOOD PRESSURE: 141 MMHG | BODY MASS INDEX: 17.84 KG/M2 | OXYGEN SATURATION: 96 % | HEART RATE: 60 BPM | DIASTOLIC BLOOD PRESSURE: 66 MMHG | HEIGHT: 61 IN | TEMPERATURE: 98 F

## 2021-01-13 DIAGNOSIS — R53.81 PHYSICAL DECONDITIONING: ICD-10-CM

## 2021-01-13 DIAGNOSIS — M17.12 PRIMARY OSTEOARTHRITIS OF LEFT KNEE: ICD-10-CM

## 2021-01-13 DIAGNOSIS — N18.32 STAGE 3B CHRONIC KIDNEY DISEASE (H): ICD-10-CM

## 2021-01-13 DIAGNOSIS — F01.518 VASCULAR DEMENTIA WITH BEHAVIOR DISTURBANCE (H): Primary | ICD-10-CM

## 2021-01-13 DIAGNOSIS — R29.6 FALLS FREQUENTLY: ICD-10-CM

## 2021-01-13 DIAGNOSIS — Z91.89 AT RISK FOR ASPIRATION: ICD-10-CM

## 2021-01-13 DIAGNOSIS — M25.50 PAIN IN JOINT, MULTIPLE SITES: ICD-10-CM

## 2021-01-13 PROBLEM — N18.30 CHRONIC KIDNEY DISEASE, STAGE 3 (H): Status: ACTIVE | Noted: 2021-01-13

## 2021-01-13 NOTE — LETTER
"    1/13/2021        RE: Maryam Saavedra  C/o Saray Saavedra  [address] [facility] [address]        [facility]  [place] Medical Record Number: [id number]  Place of Service where encounter took place[facility] [[id number]]  Chief Complaint   Patient presents with     RECHECK     [facility] Care Coordination - DME/Supplies     hospital bed       HPI:    Maryam Saavedra is a 88 year old (6/9/1932), who is being seen today for an episodic care visit. HPI information obtained from: facility chart records, facility staff, patient report and [facility] chart review. Today's concern is:    Past Medical and Surgical History reviewed in Epic today.    MEDICATIONS:    Current Outpatient Medications   Medication Sig Dispense Refill     acetaminophen (TYLENOL) 325 MG tablet Take 650 mg by mouth every 6 hours as needed for mild pain or pain        metoprolol succinate ER (TOPROL-XL) 25 MG 24 hr tablet TAKE ONE-HALF TABLET (12.5.MG) BY MOUTH EVERY EVENING (HOLD FOR SBP < 110 OR HR < 60) 15.5 tablet 97     polyethylene glycol (MIRALAX) 17 g packet Take 17 g by mouth daily 30 packet 11     senna-docusate (SENOKOT-S/PERICOLACE) 8.6-50 MG tablet Take 1 tablet by mouth 2 times daily as needed for constipation Hold for loose stools 30 tablet 4     vitamin D3 (CHOLECALCIFEROL) 50 mcg (2000 units) tablet Take 1 tablet (50 mcg) by mouth daily 30 tablet 11     REVIEW OF SYSTEMS:  Limited secondary to cognitive impairment but today pt reports ok    Objective:  BP (!) 141/66   Pulse 60   Temp 98  F (36.7  C)   Resp 17   Ht 1.549 m (5' 1\")   SpO2 96%   BMI 17.84 kg/m    Exam:  GENERAL APPEARANCE:  sleepy  ENT:  Mouth and posterior oropharynx normal, moist mucous membranes  EYES:  EOM, conjunctivae, lids, pupils and irises normal  NECK:  No adenopathy,masses or thyromegaly  RESP:  lungs clear to auscultation   CV:  Palpation and auscultation of heart done , regular rate and rhythm, no " murmur, rub, or gallop  ABDOMEN:  slight distension and firm   M/S:   Gait and station abnormal unsteady  SKIN:  frailgle and frail-dry, scab on RLE  NEURO:   Cranial nerves 2-12 are normal tested and grossly at patient's baseline  PSYCH:  insight and judgement impaired, memory impaired     Labs:   CBC RESULTS:   Recent Labs   Lab Test 01/06/21 10/29/20  1608   WBC 5.1 6.3   RBC 4.17 4.41   HGB 11.7 12.3   HCT 37.7 39.2   MCV 90 89   MCH 28.1 27.9   MCHC 31.0* 31.4*   RDW 15.9* 14.9    293     Last Basic Metabolic Panel:  Recent Labs   Lab Test 01/06/21 10/29/20  1608    140   POTASSIUM 3.9 3.8   CHLORIDE 105 106   NOHEMY 9.3 9.4   CO2 30 28   BUN 26 35*   CR 1.01 0.73   * 107*     Liver Function Studies -   Recent Labs   Lab Test 01/06/21 04/02/20  1934   PROTTOTAL 7.2 6.9   ALBUMIN 2.8* 2.7*   BILITOTAL 0.5 0.3   ALKPHOS 220* 207*   AST 23 31   ALT 31 31     TSH   Date Value Ref Range Status   01/06/2021 5.73 (A) 0.40 - 4.00 mU/L Final   11/25/2017 4.20 (H) 0.40 - 4.00 mU/L Final     Lab Results   Component Value Date    A1C 5.6 07/13/2018    A1C 5.9 05/14/2018     ASSESSMENT/PLAN:  (F01.51) Vascular dementia with behavior disturbance (H)  (primary encounter diagnosis)  Comment: ongoing   Plan:   -now resides in   -staff assist for all ADLs    (N18.32) Stage 3b chronic kidney disease  Comment: chronic   Plan:   -renal dose medications and avoid nephrotoxins     (M17.12) Primary osteoarthritis of left knee  (R53.81) Physical deconditioning  (R29.6) Falls frequently  (M25.50) Pain in joint, multiple sites  Comment: ongoing  Plan:  -Tylenol prn   -SBA as able for ambulation. Does not remember or use walker appropriately at all times     (Z91.89) At risk for aspiration  Comment: ongoing   Plan:   -upright for all meal and OOB if able   -alternate food and fluids       Electronically signed by:  JACKIE Zayas CNP     Face to Face and Medical Necessity Statement for DME Provider  visit    Demographic Information on Maryam Carmona:  Gender: female  : 1932  C/O PAULETTE CARMONA  [address] [facility] [address] [facility] [address] [phone] (home)     Medical Record: [id number]  Social Security Number: [id number]  Primary Care Provider: Sandro Canseco  Insurance: Payor: MEDICARE / Plan: MEDICARE / Product Type: Medicare     HPI:   Maryam Carmona is a 88 year old  (1932), who is being seen today for a face to face provider visit at [facility]; medical necessity statement for DME included.     This patient requires the following:    DME Ordered and Medical Necessity Statement   Hospital bed: fully electronic with 2 side rails  The patient does  require positioning of the body in ways not feasible with an ordinary bed due to a medical condition that is expected to last at least 1 month due to Dementia, osteoarthritis, falls frequently, physical deconditioning, pain.  The patient does  require, for the alleviation of pain, postioning of the body in ways not feasible with an ordinary bed.   The patient does  require the head of bed elevated more than 30* most of the time due to CHF, chronic pulmonary disease or aspiration.  The patient does not require traction that can only be attached to a hospital bed.  The patient does  require a bed height different than a fixed height hospital bed to permit tranfers to wheelchair or standing position.   The patient does  require frequent or immediate changes in body position due to pain.     Pt needing above DME with expected length of need of 99   months  due to medical necessity associated with following diagnosis:     Vascular dementia with behavior disturbance (H)  Stage 3b chronic kidney disease  Primary osteoarthritis of left knee  Physical deconditioning  Falls frequently  Pain in joint, multiple sites  At risk for aspiration      PMH   has a past medical history of Benign essential hypertension (2016), Chronic low back pain,  Hypertension, Impaired fasting glucose, and Osteoarthritis. She also has no past medical history of Breast cancer (H), Breast mass, Cancer (H), Cerebral infarction (H), Congestive heart failure (H), COPD (chronic obstructive pulmonary disease) (H), Cyst of breast, Depressive disorder, Diabetes (H), Heart disease, History of blood transfusion, History of gestational diabetes, Inverted nipple, Malignant neoplasm of colon, unspecified site, Malignant neoplasm of corpus uteri, except isthmus (H), Malignant neoplasm of ovary (H), Need for prophylactic hormone replacement therapy (postmenopausal), Other injury of other sites of trunk, Personal history of other disorders of nervous system and sense organs, Personal history of unspecified circulatory disease, Thyroid disease, or Uncomplicated asthma.    Orders:  1. Facility staff/TC to contact [employer] ([facility]) to get their equipment scheduled    ELECTRONICALLY SIGNED BY ASHU CERTIFIED PROVIDER:  JACKIE Zayas CNP   NPI: [id number]  [facility]  [facility], [address]  [place] [address]      [facility]  [place] Medical Record Number: [id number]  Place of Service where encounter took place: [facility] [[id number]]  Chief Complaint   Patient presents with     RECHECK     FVP Care Coordination - DME/Supplies     hospital bed       HPI:    Maryam Saavedra is a 88 year old (6/9/1932), who is being seen today for an episodic care visit. HPI information obtained from: facility chart records, facility staff, patient report and [facility] chart review. Today's concern is:    Seen today for ongoing conditions. Chart review shows found on floor 1/7/21. She is unsteady at baseline with 2WW. Chronic generalized weakness. Treated for UTI 1/4/21 and now is more alert and bright from previous visit. Has some edema and does not wear compression. She has no acute concerns today-busy helping others on  "the unit and seems comfortable  in new environment.    Past Medical and Surgical History reviewed in Epic today.    MEDICATIONS:    Current Outpatient Medications   Medication Sig Dispense Refill     acetaminophen (TYLENOL) 325 MG tablet Take 650 mg by mouth every 6 hours as needed for mild pain or pain        metoprolol succinate ER (TOPROL-XL) 25 MG 24 hr tablet TAKE ONE-HALF TABLET (12.5.MG) BY MOUTH EVERY EVENING (HOLD FOR SBP < 110 OR HR < 60) 15.5 tablet 97     polyethylene glycol (MIRALAX) 17 g packet Take 17 g by mouth daily 30 packet 11     senna-docusate (SENOKOT-S/PERICOLACE) 8.6-50 MG tablet Take 1 tablet by mouth 2 times daily as needed for constipation Hold for loose stools 30 tablet 4     vitamin D3 (CHOLECALCIFEROL) 50 mcg (2000 units) tablet Take 1 tablet (50 mcg) by mouth daily 30 tablet 11     REVIEW OF SYSTEMS:  Limited secondary to cognitive impairment but today pt reports ok    Objective:  BP (!) 141/66   Pulse 60   Temp 98  F (36.7  C)   Resp 17   Ht 1.549 m (5' 1\")   SpO2 96%   BMI 17.84 kg/m    Exam:  GENERAL APPEARANCE: alert and bright  ENT:  Mouth and posterior oropharynx normal, dry mucous membranes  EYES:  EOM, conjunctivae, lids, pupils and irises normal  RESP:  lungs clear to auscultation   CV:  Palpation and auscultation of heart done , regular rate and rhythm, no murmur, rub, or gallop  ABDOMEN:  slight distension and firm   M/S:   Gait and station abnormal- unsteady with 2ww  SKIN:  fragile  and frail-dry, scab on RLE  NEURO:   Cranial nerves 2-12 are normal tested and grossly at patient's baseline  PSYCH:  insight and judgement impaired, memory impaired     Labs:   CBC RESULTS:   Recent Labs   Lab Test 01/06/21 10/29/20  1608   WBC 5.1 6.3   RBC 4.17 4.41   HGB 11.7 12.3   HCT 37.7 39.2   MCV 90 89   MCH 28.1 27.9   MCHC 31.0* 31.4*   RDW 15.9* 14.9    293     Last Basic Metabolic Panel:  Recent Labs   Lab Test 01/06/21 10/29/20  1608    140   POTASSIUM 3.9 3.8 "   CHLORIDE 105 106   NOHEMY 9.3 9.4   CO2 30 28   BUN 26 35*   CR 1.01 0.73   * 107*     Liver Function Studies -   Recent Labs   Lab Test 21  1934   PROTTOTAL 7.2 6.9   ALBUMIN 2.8* 2.7*   BILITOTAL 0.5 0.3   ALKPHOS 220* 207*   AST 23 31   ALT 31 31     TSH   Date Value Ref Range Status   2021 5.73 (A) 0.40 - 4.00 mU/L Final   2017 4.20 (H) 0.40 - 4.00 mU/L Final     Lab Results   Component Value Date    A1C 5.6 2018    A1C 5.9 2018     ASSESSMENT/PLAN:  (F01.51) Vascular dementia with behavior disturbance (H)  (primary encounter diagnosis)  Comment: ongoing   Plan:   -now resides in   -staff assist for all ADLs    (N18.32) Stage 3b chronic kidney disease  Comment: chronic   Plan:   -renal dose medications and avoid nephrotoxins     (M17.12) Primary osteoarthritis of left knee  (R53.81) Physical deconditioning  (R29.6) Falls frequently  (M25.50) Pain in joint, multiple sites  Comment: ongoing  Plan:  -Tylenol prn   -SBA as able for ambulation. Does not remember or use walker appropriately at all times   -orders for hospital bed    (Z91.89) At risk for aspiration  Comment: ongoing   Plan:   -upright for all meal and OOB if able   -alternate food and fluids       Electronically signed by:  JACKIE Zayas CNP     Face to Face and Medical Necessity Statement for DME Provider visit    Demographic Information on Maryam Carmona:  Gender: female  : 1932  C/O PAULETTE CARMONA  [address] [facility] [address]  [phone] (home)     Medical Record: [id number]  Social Security Number: [id number]  Primary Care Provider: Sandro Canseco  Insurance: Payor: MEDICARE / Plan: MEDICARE / Product Type: Medicare     HPI:   Maryam Carmona is a 88 year old  (1932), who is being seen today for a face to face provider visit at [facility] [address]; medical necessity statement for DME included.     This patient requires the following:    DME Ordered and Medical  Necessity Statement   Hospital bed: fully electronic with 2 side rails  The patient does  require positioning of the body in ways not feasible with an ordinary bed due to a medical condition that is expected to last at least 1 month due to Dementia, osteoarthritis, falls frequently, physical deconditioning, pain.  The patient does  require, for the alleviation of pain, postioning of the body in ways not feasible with an ordinary bed.   The patient does  require the head of bed elevated more than 30* most of the time due to CHF, chronic pulmonary disease or aspiration.  The patient does not require traction that can only be attached to a hospital bed.  The patient does  require a bed height different than a fixed height hospital bed to permit tranfers to wheelchair or standing position.   The patient does  require frequent or immediate changes in body position due to pain.     Pt needing above DME with expected length of need of 99   months  due to medical necessity associated with following diagnosis:     Vascular dementia with behavior disturbance (H)  Stage 3b chronic kidney disease  Primary osteoarthritis of left knee  Physical deconditioning  Falls frequently  Pain in joint, multiple sites  At risk for aspiration      PMH   has a past medical history of Benign essential hypertension (9/1/2016), Chronic low back pain, Hypertension, Impaired fasting glucose, and Osteoarthritis. She also has no past medical history of Breast cancer (H), Breast mass, Cancer (H), Cerebral infarction (H), Congestive heart failure (H), COPD (chronic obstructive pulmonary disease) (H), Cyst of breast, Depressive disorder, Diabetes (H), Heart disease, History of blood transfusion, History of gestational diabetes, Inverted nipple, Malignant neoplasm of colon, unspecified site, Malignant neoplasm of corpus uteri, except isthmus (H), Malignant neoplasm of ovary (H), Need for prophylactic hormone replacement therapy (postmenopausal), Other  injury of other sites of trunk, Personal history of other disorders of nervous system and sense organs, Personal history of unspecified circulatory disease, Thyroid disease, or Uncomplicated asthma.    Orders:  1. Facility staff/TC to contact [employer] ([employer]) to get their equipment scheduled    ELECTRONICALLY SIGNED BY ASHU CERTIFIED PROVIDER:  JACKIE Zayas CNP   NPI: [id number]  [facility]  [facility], [address]  [place] [address]          Sincerely,        JACKIE Zayas CNP

## 2021-01-15 ENCOUNTER — HEALTH MAINTENANCE LETTER (OUTPATIENT)
Age: 86
End: 2021-01-15

## 2021-01-20 ENCOUNTER — ASSISTED LIVING VISIT (OUTPATIENT)
Dept: GERIATRICS | Facility: CLINIC | Age: 86
End: 2021-01-20
Payer: MEDICARE

## 2021-01-20 VITALS
HEART RATE: 77 BPM | RESPIRATION RATE: 17 BRPM | DIASTOLIC BLOOD PRESSURE: 74 MMHG | TEMPERATURE: 98 F | SYSTOLIC BLOOD PRESSURE: 165 MMHG | OXYGEN SATURATION: 96 %

## 2021-01-20 DIAGNOSIS — E55.9 VITAMIN D DEFICIENCY: ICD-10-CM

## 2021-01-20 DIAGNOSIS — G30.9 MIXED DEMENTIA (H): ICD-10-CM

## 2021-01-20 DIAGNOSIS — N18.31 STAGE 3A CHRONIC KIDNEY DISEASE (H): ICD-10-CM

## 2021-01-20 DIAGNOSIS — F01.50 MIXED DEMENTIA (H): ICD-10-CM

## 2021-01-20 DIAGNOSIS — R29.6 FALLS FREQUENTLY: Primary | ICD-10-CM

## 2021-01-20 DIAGNOSIS — I10 BENIGN ESSENTIAL HYPERTENSION: ICD-10-CM

## 2021-01-20 DIAGNOSIS — F02.80 MIXED DEMENTIA (H): ICD-10-CM

## 2021-01-20 NOTE — LETTER
1/20/2021        RE: Maryam Saavedra  C/o Saray Saavedra  [address] [address]        Maryam Saavedra is a 88 year old female seen January 20, 2021 at [facility] where she has resided for one month (admit 12/2020) seen for initial visit.  Pt is seen on the unit up to chair, pleasant and answers questions appropriately.   Denies pain of any kind, states she eats and sleeps well.   She stands to ambulate, abandons her walker and needs max cuing to use it.    States she is feeling well.   By chart review, patient had [facility] hospitalization and [place] [facility] stay in June 2020 for subacute fracture deformity of lateral left knee and underwent left TKA.  She rehabbed fairly well in TCU and discharged to live with her family /[facility].  In October she was seen in the ED for visual changes, hallucinations and confusion.   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   Pt was seen by Neurology and no further workup planned by family.    Pt's care needs increased and family unable to safely manage her at home, so moved to [facility] unit for permanent placement      Biggest concern has continued to be frequent falls which have occurred since admission as well, fortunately without significant injury to date.     No behavioral concerns reported by [facility] nursing staff.       Past Medical History:   Diagnosis Date     Benign essential hypertension 9/1/2016     Chronic low back pain      Hypertension      Impaired fasting glucose     borderline     Osteoarthritis    Late onset dementia  Hallucinations  Frequent falls    Past Surgical History:   Procedure Laterality Date     ARTHROPLASTY HIP ANTERIOR Right 7/24/2018    Procedure: ARTHROPLASTY HIP ANTERIOR;  RIGHT DIRECT ANTERIOR TOTAL HIP ARTHROPLASTY;  Surgeon: Jimbo Phillips MD;  Location:  OR     ARTHROPLASTY KNEE Left 5/12/2020    Procedure: LEFT TOTAL KNEE ARTHROPLASTY;  Surgeon: Jimbo Phillips MD;  Location:  OR     EYE SURGERY  cataracts      MOHS MICROGRAPHIC PROCEDURE      Left lower eyelid      MOHS MICROGRAPHIC PROCEDURE Left 10/27/2016    Procedure: MOHS MICROGRAPHIC PROCEDURE;  Surgeon: Jose Torrez MD;  Location: [facility]     MOHS MICROGRAPHIC PROCEDURE Right 5/24/2018    Procedure: MOHS MICROGRAPHIC PROCEDURE;  RIGHT MEDIAL CANTHUS MOHS CLOSURE;  Surgeon: Jose Torrez MD;  Location: [facility]     ORTHOPEDIC SURGERY      bilateral wrists     SH:  , she has 2 daughters Saray and Dena and a son Terrell who all live locally.   Previously lived with her son before move to [facility]     ROS:  Ambulatory with FWW  SLUMS 12/30  Wt Readings from Last 5 Encounters:   06/22/20 42.8 kg (94 lb 6.4 oz)   06/16/20 42.6 kg (93 lb 14.4 oz)   06/09/20 43.3 kg (95 lb 6.4 oz)   06/08/20 43.3 kg (95 lb 6.4 oz)   06/02/20 46.5 kg (102 lb 9.6 oz)      EXAM:  NAD  BP (!) 165/74   Pulse 77   Temp 98  F (36.7  C)   Resp 17   SpO2 96%    VS during visit:   /80   HR 56   O2sat 98%    Neck supple without adenopathy  Lungs clear bilaterally  Heart RRR s1s2 @60  Ext with 1+ ankle edema, wearing velcro wraps  Neuro: STML, no focal findings, ambulatory with unsteady, scissor gait  Needs directional cuing  Psych: affect okay, pleasant.       Last Comprehensive Metabolic Panel:  Sodium   Date Value Ref Range Status   01/06/2021 139 133 - 144 mmol/L Final     Potassium   Date Value Ref Range Status   01/06/2021 3.9 3.4 - 5.3 mmol/L Final     Chloride   Date Value Ref Range Status   01/06/2021 105 94 - 109 mmol/L Final     Carbon Dioxide   Date Value Ref Range Status   01/06/2021 30 20 - 32 mmol/L Final     Anion Gap   Date Value Ref Range Status   01/06/2021 4 3 - 14 mmol/L Final     Glucose   Date Value Ref Range Status   01/06/2021 131 (A) 70 - 99 mg/dL Final     Urea Nitrogen   Date Value Ref Range Status   01/06/2021 26 7 - 30 mg/dL Final     Creatinine   Date Value Ref Range Status   01/06/2021 1.01 0.52 - 1.04 mg/dL Final     GFR Estimate   Date Value Ref Range  Status   01/06/2021 49 (L) >60 ml/min/1.73_m2 Final     Calcium   Date Value Ref Range Status   01/06/2021 9.3 8.5 - 10.1 mg/dL Final     Lab Results   Component Value Date    AST 23 01/06/2021      ALBUMIN 2.8 01/06/2021     Lab Results   Component Value Date    PROTTOTAL 7.2 01/06/2021      Lab Results   Component Value Date    ALKPHOS 220 01/06/2021     Lab Results   Component Value Date    WBC 5.1 01/06/2021      HGB 11.7 01/06/2021      MCV 90 01/06/2021       01/06/2021     TSH   Date Value Ref Range Status   01/06/2021 5.73 (A) 0.40 - 4.00 mU/L Final      10/29/2020   MRI Brain w/o contrast:  1. Interval increase in ventriculomegaly of the lateral and third  ventricles since the 2017 brain MRI raising the question of either  developing or worsening normal pressure hydrocephalus versus  ventriculomegaly due to age-related cerebral volume loss. Recommend  clinical correlation for history of normal pressure hydrocephalus.  2. Diffuse cerebral volume loss and cerebral white matter changes  consistent with chronic small vessel ischemic disease      IMP/PLAN:   (R29.6) Falls frequently    Comment: unsteady gait, poor safety awareness, deconditioning  Plan: [facility] PHYSICAL THERAPY / OCCUPATIONAL THERAPY for strengthening, balance and gait.   Hospital bed has been ordered to allow for safe transfers.      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         Georgie Bae MD         Sincerely,        Georgie Bae MD

## 2021-02-01 NOTE — PROGRESS NOTES
Maryam Saavedra is a 88 year old female seen January 20, 2021 at [facility] where she has resided for one month (admit 12/2020) seen for initial visit.  Pt is seen on the unit up to chair, pleasant and answers questions appropriately.   Denies pain of any kind, states she eats and sleeps well.   She stands to ambulate, abandons her walker and needs max cuing to use it.    States she is feeling well.   By chart review, patient had [facility] hospitalization and [facility] stay in June 2020 for subacute fracture deformity of lateral left knee and underwent left TKA.  She rehabbed fairly well in TCU and discharged to live with her family /[facility].  In October she was seen in the ED for visual changes, hallucinations and confusion.   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   Pt was seen by Neurology and no further workup planned by family.    Pt's care needs increased and family unable to safely manage her at home, so moved to [facility] unit for permanent placement      Biggest concern has continued to be frequent falls which have occurred since admission as well, fortunately without significant injury to date.     No behavioral concerns reported by [facility] nursing staff.       Past Medical History:   Diagnosis Date     Benign essential hypertension 9/1/2016     Chronic low back pain      Hypertension      Impaired fasting glucose     borderline     Osteoarthritis    Late onset dementia  Hallucinations  Frequent falls    Past Surgical History:   Procedure Laterality Date     ARTHROPLASTY HIP ANTERIOR Right 7/24/2018    Procedure: ARTHROPLASTY HIP ANTERIOR;  RIGHT DIRECT ANTERIOR TOTAL HIP ARTHROPLASTY;  Surgeon: Jimbo Phillips MD;  Location:  OR     ARTHROPLASTY KNEE Left 5/12/2020    Procedure: LEFT TOTAL KNEE ARTHROPLASTY;  Surgeon: Jimbo Phillips MD;  Location:  OR     EYE SURGERY  cataracts     MOHS MICROGRAPHIC PROCEDURE      Left lower eyelid      MOHS MICROGRAPHIC PROCEDURE Left 10/27/2016     Procedure: MOHS MICROGRAPHIC PROCEDURE;  Surgeon: Jose Torrez MD;  Location: [facility]     MOHS MICROGRAPHIC PROCEDURE Right 5/24/2018    Procedure: MOHS MICROGRAPHIC PROCEDURE;  RIGHT MEDIAL CANTHUS MOHS CLOSURE;  Surgeon: Jose Torrez MD;  Location: [facility]     ORTHOPEDIC SURGERY      bilateral wrists     SH:  , she has 2 daughters Saray and Dena and a son Terrell who all live locally.   Previously lived with her son before move to [place]     ROS:  Ambulatory with FWW  SLUMS 12/30  Wt Readings from Last 5 Encounters:   06/22/20 42.8 kg (94 lb 6.4 oz)   06/16/20 42.6 kg (93 lb 14.4 oz)   06/09/20 43.3 kg (95 lb 6.4 oz)   06/08/20 43.3 kg (95 lb 6.4 oz)   06/02/20 46.5 kg (102 lb 9.6 oz)      EXAM:  NAD  BP (!) 165/74   Pulse 77   Temp 98  F (36.7  C)   Resp 17   SpO2 96%    VS during visit:   /80   HR 56   O2sat 98%    Neck supple without adenopathy  Lungs clear bilaterally  Heart RRR s1s2 @60  Ext with 1+ ankle edema, wearing velcro wraps  Neuro: STML, no focal findings, ambulatory with unsteady, scissor gait  Needs directional cuing  Psych: affect okay, pleasant.       Last Comprehensive Metabolic Panel:  Sodium   Date Value Ref Range Status   01/06/2021 139 133 - 144 mmol/L Final     Potassium   Date Value Ref Range Status   01/06/2021 3.9 3.4 - 5.3 mmol/L Final     Chloride   Date Value Ref Range Status   01/06/2021 105 94 - 109 mmol/L Final     Carbon Dioxide   Date Value Ref Range Status   01/06/2021 30 20 - 32 mmol/L Final     Anion Gap   Date Value Ref Range Status   01/06/2021 4 3 - 14 mmol/L Final     Glucose   Date Value Ref Range Status   01/06/2021 131 (A) 70 - 99 mg/dL Final     Urea Nitrogen   Date Value Ref Range Status   01/06/2021 26 7 - 30 mg/dL Final     Creatinine   Date Value Ref Range Status   01/06/2021 1.01 0.52 - 1.04 mg/dL Final     GFR Estimate   Date Value Ref Range Status   01/06/2021 49 (L) >60 ml/min/1.73_m2 Final     Calcium   Date Value Ref Range Status   01/06/2021  9.3 8.5 - 10.1 mg/dL Final     Lab Results   Component Value Date    AST 23 01/06/2021      ALBUMIN 2.8 01/06/2021     Lab Results   Component Value Date    PROTTOTAL 7.2 01/06/2021      Lab Results   Component Value Date    ALKPHOS 220 01/06/2021     Lab Results   Component Value Date    WBC 5.1 01/06/2021      HGB 11.7 01/06/2021      MCV 90 01/06/2021       01/06/2021     TSH   Date Value Ref Range Status   01/06/2021 5.73 (A) 0.40 - 4.00 mU/L Final      10/29/2020   MRI Brain w/o contrast:  1. Interval increase in ventriculomegaly of the lateral and third  ventricles since the 2017 brain MRI raising the question of either  developing or worsening normal pressure hydrocephalus versus  ventriculomegaly due to age-related cerebral volume loss. Recommend  clinical correlation for history of normal pressure hydrocephalus.  2. Diffuse cerebral volume loss and cerebral white matter changes  consistent with chronic small vessel ischemic disease      IMP/PLAN:   (R29.6) Falls frequently    Comment: unsteady gait, poor safety awareness, deconditioning  Plan: [facility] PHYSICAL THERAPY / OCCUPATIONAL THERAPY for strengthening, balance and gait.   Hospital bed has been ordered to allow for safe transfers.      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         Georgie Bae MD

## 2021-03-03 ENCOUNTER — ASSISTED LIVING VISIT (OUTPATIENT)
Dept: GERIATRICS | Facility: CLINIC | Age: 86
End: 2021-03-03
Payer: MEDICARE

## 2021-03-03 VITALS
WEIGHT: 94 LBS | HEIGHT: 61 IN | TEMPERATURE: 96.8 F | OXYGEN SATURATION: 95 % | RESPIRATION RATE: 16 BRPM | DIASTOLIC BLOOD PRESSURE: 69 MMHG | BODY MASS INDEX: 17.75 KG/M2 | HEART RATE: 63 BPM | SYSTOLIC BLOOD PRESSURE: 120 MMHG

## 2021-03-03 DIAGNOSIS — F01.50 MIXED DEMENTIA (H): ICD-10-CM

## 2021-03-03 DIAGNOSIS — R29.6 FALLS FREQUENTLY: Primary | ICD-10-CM

## 2021-03-03 DIAGNOSIS — G30.9 MIXED DEMENTIA (H): ICD-10-CM

## 2021-03-03 DIAGNOSIS — F02.80 MIXED DEMENTIA (H): ICD-10-CM

## 2021-03-03 DIAGNOSIS — I10 BENIGN ESSENTIAL HYPERTENSION: ICD-10-CM

## 2021-03-03 DIAGNOSIS — R74.8 ELEVATED SERUM ALKALINE PHOSPHATASE LEVEL: ICD-10-CM

## 2021-03-03 RX ORDER — METOPROLOL SUCCINATE 25 MG/1
TABLET, EXTENDED RELEASE ORAL
Qty: 15.5 TABLET | Refills: 97 | COMMUNITY
Start: 2021-03-03 | End: 2021-11-10

## 2021-03-03 ASSESSMENT — MIFFLIN-ST. JEOR: SCORE: 793.76

## 2021-03-03 NOTE — PROGRESS NOTES
[place]  [place] Medical Record Number:  [id number]  Place of Service where encounter took place:  [facility] [[id number]]  Chief Complaint   Patient presents with     RECHECK       HPI:    Maryam Saavedra  is a 88 year old (6/9/1932), who is being seen today for an episodic care visit.  HPI information obtained from: facility chart records, facility staff, patient report and [facility] chart review. Today's concern is:    Seen today for recheck of chronic conditions. In her recliner chair but legs down encouraged to elevate. Chronic LE edema and wears Farrow wraps. No acute concerns. Says she feels tired, some neck discomfort post fall, no abdominal pain, dysuria. Facility chart review and fall on 3/1 and 3/2. History of frequent falls. Her pendent is not on so I assist with that and remind her to use before transfers with chronic gait instability.     By MD chart review, patient had [facility] hospitalization and [facility] stay in June 2020 for subacute fracture deformity of lateral left knee and underwent left TKA.  She rehabbed fairly well in TCU and discharged to live with her family /[facility].  In October she was seen in the ED for visual changes, hallucinations and confusion.   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   Pt was seen by Neurology and no further workup planned by family.    Past Medical and Surgical History reviewed in Epic today.    MEDICATIONS:    Current Outpatient Medications   Medication Sig Dispense Refill     acetaminophen (TYLENOL) 325 MG tablet Take 650 mg by mouth every 6 hours as needed for mild pain or pain        metoprolol succinate ER (TOPROL-XL) 25 MG 24 hr tablet TAKE ONE-HALF TABLET (12.5.MG) BY MOUTH EVERY EVENING (HOLD FOR SBP < 110 OR HR < 60) 15.5 tablet 97     polyethylene glycol (MIRALAX) 17 g packet Take 17 g by mouth daily 30 packet 11     senna-docusate (SENOKOT-S/PERICOLACE) 8.6-50 MG tablet Take 1 tablet by  "mouth 2 times daily as needed for constipation Hold for loose stools 30 tablet 4     vitamin D3 (CHOLECALCIFEROL) 50 mcg (2000 units) tablet Take 1 tablet (50 mcg) by mouth daily 30 tablet 11     REVIEW OF SYSTEMS:  4 point ROS including Respiratory, CV, GI and , other than that noted in the HPI,  is negative    Objective:  /71   Pulse 55   Temp 96.8  F (36  C)   Resp 21   Ht 1.549 m (5' 1\")   Wt 42.6 kg (94 lb)   SpO2 95%   BMI 17.76 kg/m    Exam:  GENERAL APPEARANCE: alert and bright  ENT:  Mouth and posterior oropharynx normal, dry mucous membranes  EYES:  EOM, conjunctivae, lids, pupils and irises normal  RESP:  lungs clear to auscultation   CV:  Palpation and auscultation of heart done , regular rate and rhythm, no murmur, rub, or gallop, +1 LE edema, wraps on   ABDOMEN:  slight distension and firm   M/S:   Gait and station abnormal- unsteady with 2ww  SKIN:  fragile  and frail-dry, scab on RLE  NEURO:   Cranial nerves 2-12 are normal tested and grossly at patient's baseline  PSYCH:  insight and judgement impaired, memory impaired     Labs:   CBC RESULTS:   Recent Labs   Lab Test 01/06/21 10/29/20  1608   WBC 5.1 6.3   RBC 4.17 4.41   HGB 11.7 12.3   HCT 37.7 39.2   MCV 90 89   MCH 28.1 27.9   MCHC 31.0* 31.4*   RDW 15.9* 14.9    293       Last Basic Metabolic Panel:  Recent Labs   Lab Test 01/06/21 10/29/20  1608    140   POTASSIUM 3.9 3.8   CHLORIDE 105 106   NOHEMY 9.3 9.4   CO2 30 28   BUN 26 35*   CR 1.01 0.73   * 107*       Liver Function Studies -   Recent Labs   Lab Test 01/06/21 04/02/20  1934   PROTTOTAL 7.2 6.9   ALBUMIN 2.8* 2.7*   BILITOTAL 0.5 0.3   ALKPHOS 220* 207*   AST 23 31   ALT 31 31       TSH   Date Value Ref Range Status   01/06/2021 5.73 (A) 0.40 - 4.00 mU/L Final   11/25/2017 4.20 (H) 0.40 - 4.00 mU/L Final       Lab Results   Component Value Date    A1C 5.6 07/13/2018    A1C 5.9 05/14/2018     ASSESSMENT/PLAN:  (R29.6) Falls frequently  (primary " encounter diagnosis)  Comment: risk for additional falls possible NPH contributor   Plan:   -community room as able for additional oversight  -reminders to use walker    (I10) Benign essential hypertension  Comment: stable  Plan:   -discontinue parameters around metoprolol  -follow VS with visits-consider discontinuing if BP/HR WNL   -BMP periodically     (R74.8) Elevated serum alkaline phosphatase level  Comment: unclear etiology   Plan:   -monitor for now and labs periodically     (G30.9,  F01.50,  F02.80) Mixed dementia (H)  Comment: small vessel ischemic disease and possible NPH  Plan:   -[facility] unit for assist with meds, meals, activity, secure unit           Electronically signed by:  JACKIE Zayas CNP

## 2021-03-03 NOTE — LETTER
3/3/2021        RE: Maryam Saavedra  C/o Saray Saavedra  [address] [facility] [address]        No notes on file      Sincerely,        JACKIE Zayas CNP

## 2021-03-03 NOTE — LETTER
3/3/2021        RE: Maryam Saavedra  C/o Saray Saavedra  [address] [facility] [address]        [facility] [facility]  [place] [facility] Record Number:  [id number]  Place of Service where encounter took place:  [facility] [[id number]]  Chief Complaint   Patient presents with     RECHECK       HPI:    Maryam Saavedra  is a 88 year old (6/9/1932), who is being seen today for an episodic care visit.  HPI information obtained from: facility chart records, facility staff, patient report and [facility] chart review. Today's concern is:    Seen today for recheck of chronic conditions. In her recliner chair but legs down encouraged to elevate. Chronic LE edema and wears Farrow wraps. No acute concerns. Says she feels tired, some neck discomfort post fall, no abdominal pain, dysuria. Facility chart review and fall on 3/1 and 3/2. History of frequent falls. Her pendent is not on so I assist with that and remind her to use before transfers with chronic gait instability.     By MD chart review, patient had [facility] hospitalization and [place] [facility] stay in June 2020 for subacute fracture deformity of lateral left knee and underwent left TKA.  She rehabbed fairly well in TCU and discharged to live with her family /[facility].  In October she was seen in the ED for visual changes, hallucinations and confusion.   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   Pt was seen by Neurology and no further workup planned by family.    Past Medical and Surgical History reviewed in Epic today.    MEDICATIONS:    Current Outpatient Medications   Medication Sig Dispense Refill     acetaminophen (TYLENOL) 325 MG tablet Take 650 mg by mouth every 6 hours as needed for mild pain or pain        metoprolol succinate ER (TOPROL-XL) 25 MG 24 hr tablet TAKE ONE-HALF TABLET (12.5.MG) BY MOUTH EVERY EVENING (HOLD FOR SBP < 110 OR HR < 60) 15.5 tablet 97     polyethylene glycol (MIRALAX) 17 g packet Take 17 g  "by mouth daily 30 packet 11     senna-docusate (SENOKOT-S/PERICOLACE) 8.6-50 MG tablet Take 1 tablet by mouth 2 times daily as needed for constipation Hold for loose stools 30 tablet 4     vitamin D3 (CHOLECALCIFEROL) 50 mcg (2000 units) tablet Take 1 tablet (50 mcg) by mouth daily 30 tablet 11     REVIEW OF SYSTEMS:  4 point ROS including Respiratory, CV, GI and , other than that noted in the HPI,  is negative    Objective:  /71   Pulse 55   Temp 96.8  F (36  C)   Resp 21   Ht 1.549 m (5' 1\")   Wt 42.6 kg (94 lb)   SpO2 95%   BMI 17.76 kg/m    Exam:  GENERAL APPEARANCE: alert and bright  ENT:  Mouth and posterior oropharynx normal, dry mucous membranes  EYES:  EOM, conjunctivae, lids, pupils and irises normal  RESP:  lungs clear to auscultation   CV:  Palpation and auscultation of heart done , regular rate and rhythm, no murmur, rub, or gallop, +1 LE edema, wraps on   ABDOMEN:  slight distension and firm   M/S:   Gait and station abnormal- unsteady with 2ww  SKIN:  fragile  and frail-dry, scab on RLE  NEURO:   Cranial nerves 2-12 are normal tested and grossly at patient's baseline  PSYCH:  insight and judgement impaired, memory impaired     Labs:   CBC RESULTS:   Recent Labs   Lab Test 01/06/21 10/29/20  1608   WBC 5.1 6.3   RBC 4.17 4.41   HGB 11.7 12.3   HCT 37.7 39.2   MCV 90 89   MCH 28.1 27.9   MCHC 31.0* 31.4*   RDW 15.9* 14.9    293       Last Basic Metabolic Panel:  Recent Labs   Lab Test 01/06/21 10/29/20  1608    140   POTASSIUM 3.9 3.8   CHLORIDE 105 106   NOHEMY 9.3 9.4   CO2 30 28   BUN 26 35*   CR 1.01 0.73   * 107*       Liver Function Studies -   Recent Labs   Lab Test 01/06/21 04/02/20  1934   PROTTOTAL 7.2 6.9   ALBUMIN 2.8* 2.7*   BILITOTAL 0.5 0.3   ALKPHOS 220* 207*   AST 23 31   ALT 31 31       TSH   Date Value Ref Range Status   01/06/2021 5.73 (A) 0.40 - 4.00 mU/L Final   11/25/2017 4.20 (H) 0.40 - 4.00 mU/L Final       Lab Results   Component Value Date    " A1C 5.6 07/13/2018    A1C 5.9 05/14/2018     ASSESSMENT/PLAN:  (R29.6) Falls frequently  (primary encounter diagnosis)  Comment: risk for additional falls possible NPH contributor   Plan:   -community room as able for additional oversight  -reminders to use walker    (I10) Benign essential hypertension  Comment: stable  Plan:   -discontinue parameters around metoprolol  -follow VS with visits-consider discontinuing if BP/HR WNL   -BMP periodically     (R74.8) Elevated serum alkaline phosphatase level  Comment: unclear etiology   Plan:   -monitor for now and labs periodically     (G30.9,  F01.50,  F02.80) Mixed dementia (H)  Comment: small vessel ischemic disease and possible NPH  Plan:   -[facility] Care unit for assist with meds, meals, activity, secure unit           Electronically signed by:  JACKIE Zayas CNP                 Sincerely,        JACKIE Zayas CNP

## 2021-04-02 ENCOUNTER — TELEPHONE (OUTPATIENT)
Dept: GERIATRICS | Facility: CLINIC | Age: 86
End: 2021-04-02

## 2021-04-02 NOTE — TELEPHONE ENCOUNTER
"[place] TRIAGE BRIDGE COMMUNICATION DOCUMENTATION ENCOUNTER    Maryam Saavedra is a 88 year old  (6/9/1932) female.   Facility Nurse, Sera messaged [facility] via the \"Bridge\" today to report:     04/02/2021 2:57:29 PM - By: Sera Jo;  Mrs. Saavedra's daughter is concern about her mothers lymphedema getting worse. she said the L. leg used to be ok but it looks as bad as the right.   she proposed having it wrapped and is wondering if you can look at it and give us an order for it as well as training on how to do it.    04/02/21 2:25 PM Progress Note S: Lymphedema  B: resident resides in the memory care.  A: Leg is worn to touch non tender, skin dry and intact. +2 pitting edema on bilateral leg. Resident wears lymphedema braces manage by herself   R: Daughter will be informed    Disposition:  Message sent to the PCP as FYI and Message sent to provider on call to address    Requests:  Please reply with Quicknote or open the encounter to document  and Please route back to Geriatrics Triage Support pool    Electronically signed by:   Kzizy Smith RN  "

## 2021-04-02 NOTE — TELEPHONE ENCOUNTER
Certainly agree to leg wrap order. Sandro will be out all next week, what can we help with for instructions or supplies?

## 2021-04-05 NOTE — TELEPHONE ENCOUNTER
"[facility] TRIAGE BRIDGE COMMUNICATION DOCUMENTATION ENCOUNTER    Maryam Saavedra is a 88 year old  (6/9/1932) female.   Facility Nurse, Linda messaged [facility] via the \"Bridge\" today to report:    04/03/2021 10:22:35 AM - By: Linda Medina       Given this Resident's past hx, and following a discussion with her daughter, we are requesting an order for Lymphedema Therapy to elijah Dunham for the confusion.     Disposition:  Message sent to provider on call to address    Requests:  Please reply with Quicknote or open the encounter to document , Please approve orders in Bridge  and Please route back to Geriatrics Triage Support pool    Electronically signed by:   Kizzy Smith RN  "

## 2021-05-12 ENCOUNTER — ASSISTED LIVING VISIT (OUTPATIENT)
Dept: GERIATRICS | Facility: CLINIC | Age: 86
End: 2021-05-12
Payer: MEDICARE

## 2021-05-12 VITALS
OXYGEN SATURATION: 97 % | HEIGHT: 61 IN | DIASTOLIC BLOOD PRESSURE: 71 MMHG | SYSTOLIC BLOOD PRESSURE: 139 MMHG | HEART RATE: 51 BPM | TEMPERATURE: 98 F | BODY MASS INDEX: 19.11 KG/M2 | WEIGHT: 101.2 LBS

## 2021-05-12 DIAGNOSIS — M17.12 PRIMARY OSTEOARTHRITIS OF LEFT KNEE: ICD-10-CM

## 2021-05-12 DIAGNOSIS — F02.80 MIXED DEMENTIA (H): Primary | ICD-10-CM

## 2021-05-12 DIAGNOSIS — R29.6 FALLS FREQUENTLY: ICD-10-CM

## 2021-05-12 DIAGNOSIS — G30.9 MIXED DEMENTIA (H): Primary | ICD-10-CM

## 2021-05-12 DIAGNOSIS — R60.0 BILATERAL LOWER EXTREMITY EDEMA: ICD-10-CM

## 2021-05-12 DIAGNOSIS — Z87.440 PERSONAL HISTORY OF URINARY TRACT INFECTION: ICD-10-CM

## 2021-05-12 DIAGNOSIS — F01.50 MIXED DEMENTIA (H): Primary | ICD-10-CM

## 2021-05-12 DIAGNOSIS — R26.81 UNSTEADY GAIT: ICD-10-CM

## 2021-05-12 DIAGNOSIS — I10 BENIGN ESSENTIAL HYPERTENSION: ICD-10-CM

## 2021-05-12 ASSESSMENT — MIFFLIN-ST. JEOR: SCORE: 826.42

## 2021-05-12 NOTE — LETTER
5/12/2021        RE: Maryam Saavedra  C/o Saray Saavedra  [address] [address]        [place] [facility]  Chief Complaint   Patient presents with     Annual Visit     [place] [facility] [place] Medical Record Number:  [id number]  Place of Service where encounter took place:  [facility] [[id number]]    HPI:    Maryam Saavedra  is a 88 year old  (6/9/1932), who is being seen today for an annual comprehensive visit. HPI information obtained from: facility chart records, patient report and [facility] Epic chart review.  Today's concerns are:    Seen today for follow up to chronic conditions. In her room, a bit disheveled. Has some word finding difficulty but reports to doing well. Does get up a few times at night to toilet and hx of UTIs. Unsteady gait at baseline and last fall 4/29. Questioning NPH as contributor.     By MD chart review, patient had [facility] hospitalization and [facility] stay in June 2020 for subacute fracture deformity of lateral left knee and underwent left TKA. She rehabbed fairly well in TCU and discharged to live with her family /[facility].  In October she was seen in the ED for visual changes, hallucinations and confusion.   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 Pt was seen by Neurology and no further workup planned by family.    ALLERGIES: Ace inhibitors and Cyclobenzaprine  PAST MEDICAL HISTORY:  has a past medical history of Benign essential hypertension (9/1/2016), Chronic low back pain, Hypertension, Impaired fasting glucose, and Osteoarthritis. She also has no past medical history of Breast cancer (H), Breast mass, Cancer (H), Cerebral infarction (H), Congestive heart failure (H), COPD (chronic obstructive pulmonary disease) (H), Cyst of breast, Depressive disorder, Diabetes (H), Heart disease, History of blood transfusion, History of gestational diabetes, Inverted nipple, Malignant neoplasm of colon, unspecified site, Malignant neoplasm of  corpus uteri, except isthmus (H), Malignant neoplasm of ovary (H), Need for prophylactic hormone replacement therapy (postmenopausal), Other injury of other sites of trunk, Personal history of other disorders of nervous system and sense organs, Personal history of unspecified circulatory disease, Thyroid disease, or Uncomplicated asthma.  PAST SURGICAL HISTORY:  has a past surgical history that includes orthopedic surgery; Eye surgery (cataracts); Mohs micrographic procedure; Mohs micrographic procedure (Left, 10/27/2016); Mohs micrographic procedure (Right, 5/24/2018); Arthroplasty Hip Anterior (Right, 7/24/2018); and Arthroplasty knee (Left, 5/12/2020).  IMMUNIZATIONS:  Immunization History   Administered Date(s) Administered     COVID-19,PF,Moderna 01/15/2021, 02/12/2021     Flu 65+ Years 09/23/2019     Influenza (High Dose) 3 valent vaccine 10/07/2016, 09/28/2017, 10/29/2018     Influenza, Quad, High Dose, Pf, 65yr + 10/13/2020     Pneumo Conj 13-V (2010&after) 10/07/2016     Pneumococcal 23 valent 09/28/2017     TDAP Vaccine (Adacel) 06/26/2013     Above immunizations pulled from [employer]. MIIC and facility records also reconciled. Outstanding information sent to  to update [employer].  Future immunizations are not needed at this point as all recommended immunizations are up to date.     Current Outpatient Medications   Medication Sig Dispense Refill     acetaminophen (TYLENOL) 325 MG tablet Take 650 mg by mouth every 6 hours as needed for mild pain or pain        metoprolol succinate ER (TOPROL-XL) 25 MG 24 hr tablet TAKE ONE-HALF TABLET (12.5.MG) BY MOUTH EVERY EVENING 15.5 tablet 97     polyethylene glycol (MIRALAX) 17 g packet Take 17 g by mouth daily 30 packet 11     senna-docusate (SENOKOT-S/PERICOLACE) 8.6-50 MG tablet Take 1 tablet by mouth 2 times daily as needed for constipation Hold for loose stools 30 tablet 4     vitamin D3 (CHOLECALCIFEROL) 50 mcg (2000 units) tablet Take 1  "tablet (50 mcg) by mouth daily 30 tablet 11     Case Management:  I have reviewed the Assisted Living care plan, current immunizations and preventive care/cancer screening. .Future cancer screening is not clinically indicated secondary to age/goals of care Patient's desire to return to the community is not assessible due to cognitive impairment. Current Level of Care is appropriate.    Advance Directive Discussion:    I reviewed the current advanced directives as reflected in EPIC, the POLST and the facility chart, and verified the congruency of orders. I contacted the first party daughter Saray and left a message regarding the plan of Care.  I did not due to cognitive impairment review the advance directives with the resident.     Team Discussion:  I communicated with the appropriate disciplines involved with the Plan of Care:   Nursing    Patient's goal is pain control and comfort. Treat reversible conditions   Information reviewed:  Medications, vital signs, orders, and nursing notes.    ROS:  Limited secondary to cognitive impairment but today pt reports fine     Vitals:  /71   Pulse 51   Temp 98  F (36.7  C)   Ht 1.549 m (5' 1\")   Wt 45.9 kg (101 lb 3.2 oz)   SpO2 97%   BMI 19.12 kg/m   Body mass index is 19.12 kg/m .  Exam:  GENERAL APPEARANCE: alert and bright  ENT:  Mouth and posterior oropharynx normal, dry mucous membranes  EYES:  EOM, conjunctivae, lids, pupils and irises normal  RESP:  lungs clear to auscultation   CV:  Palpation and auscultation of heart done , regular rate and rhythm, no murmur, rub, or gallop, +1 LE edema, wraps on   ABDOMEN:  slight distension and firm is baseline   M/S:   Gait and station abnormal- unsteady with 2ww  SKIN:  fragile  and frail-dry-limited exam   NEURO:   Cranial nerves 2-12 are normal tested and grossly at patient's baseline  PSYCH:  insight and judgement impaired, memory impaired, STML    Lab/Diagnostic data:   CBC RESULTS:   Recent Labs   Lab Test " 01/06/21 10/29/20  1608   WBC 5.1 6.3   RBC 4.17 4.41   HGB 11.7 12.3   HCT 37.7 39.2   MCV 90 89   MCH 28.1 27.9   MCHC 31.0* 31.4*   RDW 15.9* 14.9    293       Last Basic Metabolic Panel:  Recent Labs   Lab Test 01/06/21 10/29/20  1608    140   POTASSIUM 3.9 3.8   CHLORIDE 105 106   NOHEMY 9.3 9.4   CO2 30 28   BUN 26 35*   CR 1.01 0.73   * 107*       Liver Function Studies -   Recent Labs   Lab Test 01/06/21 04/02/20  1934   PROTTOTAL 7.2 6.9   ALBUMIN 2.8* 2.7*   BILITOTAL 0.5 0.3   ALKPHOS 220* 207*   AST 23 31   ALT 31 31       TSH   Date Value Ref Range Status   01/06/2021 5.73 (A) 0.40 - 4.00 mU/L Final   11/25/2017 4.20 (H) 0.40 - 4.00 mU/L Final       Lab Results   Component Value Date    A1C 5.6 07/13/2018    A1C 5.9 05/14/2018     ASSESSMENT/PLAN  (G30.9,  F01.50,  F02.80) Mixed dementia (H)  (primary encounter diagnosis)  Comment: appropriate for   Plan:   -staff assist for ADL    (M17.12) Primary osteoarthritis of left knee  (R26.81) Unsteady gait  (R29.6) Falls frequently  Comment: Prefers Advil over Tylenol  Plan:   -Tylenol 650 mg po BID prn (rarely used)     (I10) Benign essential hypertension  Comment: stable   Plan:   -metoprolol 12.5 mg po daily at HS  -discontinue BP/HR monitoring     (R60.0) Bilateral lower extremity edema  Comment: recently followed by lymphedema   Plan:   -new wrap on am and off HS as tolerated     (Z87.440) Personal history of urinary tract infection  Comment: last treated 1/2020  Plan:  -monitor for s/s and test      -family requesting foot peddles for WC to use for distance. Attempting to locate supplier to order foot pedals         Electronically signed by:  JACKIE Zayas CNP               Sincerely,        JACKIE Zayas CNP

## 2021-05-12 NOTE — PROGRESS NOTES
[place] [facility]  Chief Complaint   Patient presents with     Annual Visit     [facility] Medical Record Number:  [id number]  Place of Service where encounter took place:  [facility] [[id number]]    HPI:    Maryam Saavedra  is a 88 year old  (6/9/1932), who is being seen today for an annual comprehensive visit. HPI information obtained from: facility chart records, patient report and [facility] chart review.  Today's concerns are:    Seen today for follow up to chronic conditions. In her room, a bit disheveled. Has some word finding difficulty but reports to doing well. Does get up a few times at night to toilet and hx of UTIs. Unsteady gait at baseline and last fall 4/29. Questioning NPH as contributor.     By MD chart review, patient had [facility] hospitalization and [place] [facility] stay in June 2020 for subacute fracture deformity of lateral left knee and underwent left TKA. She rehabbed fairly well in TCU and discharged to live with her family /[facility].  In October she was seen in the ED for visual changes, hallucinations and confusion.   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 Pt was seen by Neurology and no further workup planned by family.    ALLERGIES: Ace inhibitors and Cyclobenzaprine  PAST MEDICAL HISTORY:  has a past medical history of Benign essential hypertension (9/1/2016), Chronic low back pain, Hypertension, Impaired fasting glucose, and Osteoarthritis. She also has no past medical history of Breast cancer (H), Breast mass, Cancer (H), Cerebral infarction (H), Congestive heart failure (H), COPD (chronic obstructive pulmonary disease) (H), Cyst of breast, Depressive disorder, Diabetes (H), Heart disease, History of blood transfusion, History of gestational diabetes, Inverted nipple, Malignant neoplasm of colon, unspecified site, Malignant neoplasm of corpus uteri, except isthmus (H), Malignant neoplasm of ovary (H), Need for prophylactic hormone  replacement therapy (postmenopausal), Other injury of other sites of trunk, Personal history of other disorders of nervous system and sense organs, Personal history of unspecified circulatory disease, Thyroid disease, or Uncomplicated asthma.  PAST SURGICAL HISTORY:  has a past surgical history that includes orthopedic surgery; Eye surgery (cataracts); Mohs micrographic procedure; Mohs micrographic procedure (Left, 10/27/2016); Mohs micrographic procedure (Right, 5/24/2018); Arthroplasty Hip Anterior (Right, 7/24/2018); and Arthroplasty knee (Left, 5/12/2020).  IMMUNIZATIONS:  Immunization History   Administered Date(s) Administered     COVID-19,PF,Moderna 01/15/2021, 02/12/2021     Flu 65+ Years 09/23/2019     Influenza (High Dose) 3 valent vaccine 10/07/2016, 09/28/2017, 10/29/2018     Influenza, Quad, High Dose, Pf, 65yr + 10/13/2020     Pneumo Conj 13-V (2010&after) 10/07/2016     Pneumococcal 23 valent 09/28/2017     TDAP Vaccine (Adacel) 06/26/2013     Above immunizations pulled from [employer]. MIIC and facility records also reconciled. Outstanding information sent to  to update [employer].  Future immunizations are not needed at this point as all recommended immunizations are up to date.     Current Outpatient Medications   Medication Sig Dispense Refill     acetaminophen (TYLENOL) 325 MG tablet Take 650 mg by mouth every 6 hours as needed for mild pain or pain        metoprolol succinate ER (TOPROL-XL) 25 MG 24 hr tablet TAKE ONE-HALF TABLET (12.5.MG) BY MOUTH EVERY EVENING 15.5 tablet 97     polyethylene glycol (MIRALAX) 17 g packet Take 17 g by mouth daily 30 packet 11     senna-docusate (SENOKOT-S/PERICOLACE) 8.6-50 MG tablet Take 1 tablet by mouth 2 times daily as needed for constipation Hold for loose stools 30 tablet 4     vitamin D3 (CHOLECALCIFEROL) 50 mcg (2000 units) tablet Take 1 tablet (50 mcg) by mouth daily 30 tablet 11     Case Management:  I have reviewed the Assisted  "Living care plan, current immunizations and preventive care/cancer screening. .Future cancer screening is not clinically indicated secondary to age/goals of care Patient's desire to return to the community is not assessible due to cognitive impairment. Current Level of Care is appropriate.    Advance Directive Discussion:    I reviewed the current advanced directives as reflected in EPIC, the POLST and the facility chart, and verified the congruency of orders. I contacted the first party daughter Saray and left a message regarding the plan of Care.  I did not due to cognitive impairment review the advance directives with the resident.     Team Discussion:  I communicated with the appropriate disciplines involved with the Plan of Care:   Nursing    Patient's goal is pain control and comfort. Treat reversible conditions   Information reviewed:  Medications, vital signs, orders, and nursing notes.    ROS:  Limited secondary to cognitive impairment but today pt reports fine     Vitals:  /71   Pulse 51   Temp 98  F (36.7  C)   Ht 1.549 m (5' 1\")   Wt 45.9 kg (101 lb 3.2 oz)   SpO2 97%   BMI 19.12 kg/m   Body mass index is 19.12 kg/m .  Exam:  GENERAL APPEARANCE: alert and bright  ENT:  Mouth and posterior oropharynx normal, dry mucous membranes  EYES:  EOM, conjunctivae, lids, pupils and irises normal  RESP:  lungs clear to auscultation   CV:  Palpation and auscultation of heart done , regular rate and rhythm, no murmur, rub, or gallop, +1 LE edema, wraps on   ABDOMEN:  slight distension and firm is baseline   M/S:   Gait and station abnormal- unsteady with 2ww  SKIN:  fragile  and frail-dry-limited exam   NEURO:   Cranial nerves 2-12 are normal tested and grossly at patient's baseline  PSYCH:  insight and judgement impaired, memory impaired, STML    Lab/Diagnostic data:   CBC RESULTS:   Recent Labs   Lab Test 01/06/21 10/29/20  1608   WBC 5.1 6.3   RBC 4.17 4.41   HGB 11.7 12.3   HCT 37.7 39.2   MCV 90 89 "   MCH 28.1 27.9   MCHC 31.0* 31.4*   RDW 15.9* 14.9    293       Last Basic Metabolic Panel:  Recent Labs   Lab Test 01/06/21 10/29/20  1608    140   POTASSIUM 3.9 3.8   CHLORIDE 105 106   NOHEMY 9.3 9.4   CO2 30 28   BUN 26 35*   CR 1.01 0.73   * 107*       Liver Function Studies -   Recent Labs   Lab Test 01/06/21 04/02/20  1934   PROTTOTAL 7.2 6.9   ALBUMIN 2.8* 2.7*   BILITOTAL 0.5 0.3   ALKPHOS 220* 207*   AST 23 31   ALT 31 31       TSH   Date Value Ref Range Status   01/06/2021 5.73 (A) 0.40 - 4.00 mU/L Final   11/25/2017 4.20 (H) 0.40 - 4.00 mU/L Final       Lab Results   Component Value Date    A1C 5.6 07/13/2018    A1C 5.9 05/14/2018     ASSESSMENT/PLAN  (G30.9,  F01.50,  F02.80) Mixed dementia (H)  (primary encounter diagnosis)  Comment: appropriate for   Plan:   -staff assist for ADL    (M17.12) Primary osteoarthritis of left knee  (R26.81) Unsteady gait  (R29.6) Falls frequently  Comment: Prefers Advil over Tylenol  Plan:   -Tylenol 650 mg po BID prn (rarely used)     (I10) Benign essential hypertension  Comment: stable   Plan:   -metoprolol 12.5 mg po daily at HS  -discontinue BP/HR monitoring     (R60.0) Bilateral lower extremity edema  Comment: recently followed by lymphedema   Plan:   -new wrap on am and off HS as tolerated     (Z87.440) Personal history of urinary tract infection  Comment: last treated 1/2020  Plan:  -monitor for s/s and test      -family requesting foot peddles for WC to use for distance. Attempting to locate supplier to order foot pedals         Electronically signed by:  Sandro Canseco, JACKIE CNP

## 2021-05-29 ENCOUNTER — TELEPHONE (OUTPATIENT)
Dept: GERIATRICS | Facility: CLINIC | Age: 86
End: 2021-05-29

## 2021-05-29 NOTE — TELEPHONE ENCOUNTER
Staff feeling she has pink eye and so asking for eye drops.    On the bridge gave orders for Bleph 10 solution 10% 1 gtt each eye TID for 7 days for conjuncitivits    Electronically signed by Leeanna Perales RN, CNP

## 2021-07-30 ENCOUNTER — LAB REQUISITION (OUTPATIENT)
Dept: LAB | Facility: CLINIC | Age: 86
End: 2021-07-30
Payer: MEDICARE

## 2021-07-30 DIAGNOSIS — U07.1 COVID-19: ICD-10-CM

## 2021-07-30 PROCEDURE — U0003 INFECTIOUS AGENT DETECTION BY NUCLEIC ACID (DNA OR RNA); SEVERE ACUTE RESPIRATORY SYNDROME CORONAVIRUS 2 (SARS-COV-2) (CORONAVIRUS DISEASE [COVID-19]), AMPLIFIED PROBE TECHNIQUE, MAKING USE OF HIGH THROUGHPUT TECHNOLOGIES AS DESCRIBED BY CMS-2020-01-R: HCPCS | Mod: ORL | Performed by: FAMILY MEDICINE

## 2021-07-31 LAB — SARS-COV-2 RNA RESP QL NAA+PROBE: NEGATIVE

## 2021-08-05 ENCOUNTER — LAB REQUISITION (OUTPATIENT)
Dept: LAB | Facility: CLINIC | Age: 86
End: 2021-08-05
Payer: MEDICARE

## 2021-08-05 DIAGNOSIS — U07.1 COVID-19: ICD-10-CM

## 2021-08-09 ENCOUNTER — LAB REQUISITION (OUTPATIENT)
Dept: LAB | Facility: CLINIC | Age: 86
End: 2021-08-09
Payer: MEDICARE

## 2021-08-09 DIAGNOSIS — U07.1 COVID-19: ICD-10-CM

## 2021-08-09 PROCEDURE — U0003 INFECTIOUS AGENT DETECTION BY NUCLEIC ACID (DNA OR RNA); SEVERE ACUTE RESPIRATORY SYNDROME CORONAVIRUS 2 (SARS-COV-2) (CORONAVIRUS DISEASE [COVID-19]), AMPLIFIED PROBE TECHNIQUE, MAKING USE OF HIGH THROUGHPUT TECHNOLOGIES AS DESCRIBED BY CMS-2020-01-R: HCPCS | Mod: ORL | Performed by: FAMILY MEDICINE

## 2021-08-10 LAB — SARS-COV-2 RNA RESP QL NAA+PROBE: NEGATIVE

## 2021-08-12 ENCOUNTER — LAB REQUISITION (OUTPATIENT)
Dept: LAB | Facility: CLINIC | Age: 86
End: 2021-08-12
Payer: MEDICARE

## 2021-08-12 DIAGNOSIS — U07.1 COVID-19: ICD-10-CM

## 2021-08-13 PROCEDURE — U0003 INFECTIOUS AGENT DETECTION BY NUCLEIC ACID (DNA OR RNA); SEVERE ACUTE RESPIRATORY SYNDROME CORONAVIRUS 2 (SARS-COV-2) (CORONAVIRUS DISEASE [COVID-19]), AMPLIFIED PROBE TECHNIQUE, MAKING USE OF HIGH THROUGHPUT TECHNOLOGIES AS DESCRIBED BY CMS-2020-01-R: HCPCS | Mod: ORL | Performed by: FAMILY MEDICINE

## 2021-08-14 LAB — SARS-COV-2 RNA RESP QL NAA+PROBE: NEGATIVE

## 2021-08-25 ENCOUNTER — ASSISTED LIVING VISIT (OUTPATIENT)
Dept: GERIATRICS | Facility: CLINIC | Age: 86
End: 2021-08-25
Payer: MEDICARE

## 2021-08-25 DIAGNOSIS — F02.80 MIXED DEMENTIA (H): Primary | ICD-10-CM

## 2021-08-25 DIAGNOSIS — R60.0 BILATERAL LOWER EXTREMITY EDEMA: ICD-10-CM

## 2021-08-25 DIAGNOSIS — K59.01 SLOW TRANSIT CONSTIPATION: ICD-10-CM

## 2021-08-25 DIAGNOSIS — Z87.440 PERSONAL HISTORY OF URINARY TRACT INFECTION: ICD-10-CM

## 2021-08-25 DIAGNOSIS — I10 BENIGN ESSENTIAL HYPERTENSION: ICD-10-CM

## 2021-08-25 DIAGNOSIS — R26.81 UNSTEADY GAIT: ICD-10-CM

## 2021-08-25 DIAGNOSIS — M17.12 PRIMARY OSTEOARTHRITIS OF LEFT KNEE: ICD-10-CM

## 2021-08-25 DIAGNOSIS — E03.9 HYPOTHYROIDISM, UNSPECIFIED TYPE: ICD-10-CM

## 2021-08-25 DIAGNOSIS — F01.50 MIXED DEMENTIA (H): Primary | ICD-10-CM

## 2021-08-25 DIAGNOSIS — G30.9 MIXED DEMENTIA (H): Primary | ICD-10-CM

## 2021-08-25 DIAGNOSIS — R74.8 ELEVATED SERUM ALKALINE PHOSPHATASE LEVEL: ICD-10-CM

## 2021-08-25 ASSESSMENT — MIFFLIN-ST. JEOR: SCORE: 884.92

## 2021-08-25 NOTE — PROGRESS NOTES
[place] [facility]  [place] Medical Record Number:  [id number]  Place of Service where encounter took place:  [facility] [[id number]]  Chief Complaint   Patient presents with     RECHECK     Routine     Wellness Visit       HPI:    Maryam Saavedra  is a 89 year old (6/9/1932), who is being seen today for an episodic care visit.  HPI information obtained from: facility chart records and [facility] chart review. Today's concern is:    Seen today for follow up to acute and chronic conditions. Her abdomen looks distended from baseline. She denies any pain, nausea, vomiting, constipation but it feels firm and hard today below the umbilical region.  Also, continues to have elevated alk phos for unclear reasons. Unsteady gait but looks improved from previous visit and no noted falls in chart review. History of UTIs and staff monitors for s/s. Has some word finding difficulty, not new. Does get up a few times at night to toilet.     By MD chart review, patient had [facility] hospitalization and [facility] stay in June 2020 for subacute fracture deformity of lateral left knee and underwent left TKA. She rehabbed fairly well in TCU and discharged to live with her family.  In October (2020) she was seen in the ED for visual changes, hallucinations and confusion. 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 Pt was seen by Neurology and no further workup planned by family.     Past Medical and Surgical History reviewed in Epic today.    MEDICATIONS:    Current Outpatient Medications   Medication Sig Dispense Refill     acetaminophen (TYLENOL) 325 MG tablet Take 650 mg by mouth every 6 hours as needed for mild pain or pain        metoprolol succinate ER (TOPROL-XL) 25 MG 24 hr tablet TAKE ONE-HALF TABLET (12.5.MG) BY MOUTH EVERY EVENING 15.5 tablet 97     polyethylene glycol (MIRALAX) 17 g packet Take 17 g by mouth daily 30 packet 11     senna-docusate (SENOKOT-S/PERICOLACE) 8.6-50  "MG tablet Take 1 tablet by mouth 2 times daily as needed for constipation Hold for loose stools 30 tablet 4     vitamin D3 (CHOLECALCIFEROL) 50 mcg (2000 units) tablet Take 1 tablet (50 mcg) by mouth daily 30 tablet 11     REVIEW OF SYSTEMS:  Limited secondary to cognitive impairment but today pt reports fine    Objective:  /70   Pulse 67   Temp 97.6  F (36.4  C)   Resp 16   Ht 1.549 m (5' 1\")   Wt 52.3 kg (115 lb 3.2 oz)   SpO2 100%   BMI 21.77 kg/m    Exam:  GENERAL APPEARANCE: alert and bright, slightly anxious  ENT:  Mouth and posterior oropharynx normal, dry mucous membranes  EYES:  EOM, conjunctivae, lids, pupils and irises normal  RESP:  lungs clear to auscultation   CV:  Palpation and auscultation of heart done , regular rate and rhythm, no murmur, rub, or gallop, +1 LE edema, wraps on   ABDOMEN:  slight distension, firmer than baseline, no pain with palpation   M/S:   Gait and station with 4WW improved from previous visit   SKIN:  fragile  and frail-dry-limited exam   NEURO:   Cranial nerves 2-12 are normal tested and grossly at patient's baseline  PSYCH:  insight and judgement impaired, memory impaired, STML    Labs:   CBC RESULTS: Recent Labs   Lab Test 01/06/21  0000 10/29/20  1608   WBC 5.1 6.3   RBC 4.17 4.41   HGB 11.7 12.3   HCT 37.7 39.2   MCV 90 89   MCH 28.1 27.9   MCHC 31.0* 31.4*   RDW 15.9* 14.9    293       Last Basic Metabolic Panel:  Recent Labs   Lab Test 01/06/21  0000 10/29/20  1608    140   POTASSIUM 3.9 3.8   CHLORIDE 105 106   NOHEMY 9.3 9.4   CO2 30 28   BUN 26 35*   CR 1.01 0.73   * 107*       Liver Function Studies -   Recent Labs   Lab Test 01/06/21  0000 04/02/20  1934   PROTTOTAL 7.2 6.9   ALBUMIN 2.8* 2.7*   BILITOTAL 0.5 0.3   ALKPHOS 220* 207*   AST 23 31   ALT 31 31       TSH   Date Value Ref Range Status   01/06/2021 5.73 (A) 0.40 - 4.00 mU/L Final   11/25/2017 4.20 (H) 0.40 - 4.00 mU/L Final       Lab Results   Component Value Date    A1C 5.6 " "07/13/2018    A1C 5.9 05/14/2018     ASSESSMENT/PLAN:  (G30.9,  F01.50,  F02.80) Mixed dementia (H)  (primary encounter diagnosis)  Comment: continues in  which is appropriate   Plan:   -staff assist for meds, grooming    (R74.8) Elevated serum alkaline phosphatase level  Comment: unclear etiology-no pain with exam  Plan:   -monitor for now. No hx of gallbladder disease. ALT/AST ok.    (M17.12) Primary osteoarthritis of left knee  (R26.81) Unsteady gait  Comment: continues to be falls risk   Plan:   -Tylenol prn     (I10) Benign essential hypertension  Comment: stable   Plan:   --metoprolol 12.5 mg po daily at HS    (R60.0) Bilateral lower extremity edema  Comment: previously followed by lymphedema   Plan:   -lymph wraps on am and off HS     (E03.9) Hypothyroidism, unspecified type  Comment: Ok for geriatrics  Plan:   -Follow TSH for now    (Z87.440) Personal history of urinary tract infection  Comment: s/s included increased confusion, agitation  Plan:   -monitor for s/s   -UA/UC if indicated     (K59.01) Slow transit constipation  Comment: past history of large volume of retained colonic stool. Urinary retention v. Constipation? Unable to bladder scan at [facility]. Miralax discontinued 12/2020 and Senna S reduced with loose stools   Plan:   -Senna S 1 tablet po BID PRN. Hold for loose stools  -abdominal x-ray       Annual Wellness Visit    Are you in the first 12 months of your Medicare Part B coverage?  No    Physical Health:    In general, how would you rate your overall physical health? good    Outside of work, how many days during the week do you exercise?2-3 days/week    Outside of work, approximately how many minutes a day do you exercise?not applicable    If you drink alcohol do you typically have >3 drinks per day or >7 drinks per week? No    Do you usually eat at least 4 servings of fruit and vegetables a day, include whole grains & fiber and avoid regularly eating high fat or \"junk\" foods? Yes    Do you have " any problems taking medications regularly? No    Do you have any side effects from medications? none    Needs assistance for the following daily activities: transportation, shopping, preparing meals, housework, bathing, laundry, money management, taking medicine and staff assist with most ADLs    Which of the following safety concerns are present in your home?  none identified     Hearing impairment: No    In the past 6 months, have you been bothered by leaking of urine? no    Mental Health:    In general, how would you rate your overall mental or emotional health? good    PHQ-2 Score:       PHQ-2 ( 1999 [employer]) 8/26/2021 5/8/2020   Q1: Little interest or pleasure in doing things 0 0   Q2: Feeling down, depressed or hopeless 0 0   PHQ-2 Score 0 0   Q1: Little interest or pleasure in doing things - -   Q2: Feeling down, depressed or hopeless - -   PHQ-2 Score - -          Do you feel safe in your environment? Yes    Have you ever done Advance Care Planning? (For example, a Health Directive, POLST, or a discussion with a medical provider or your loved ones about your wishes)? Yes, advance care planning is on file.    Fall risk: score of 8: PT/OT prn      Cognitive Screening:GDS score of 5    Current providers sharing in care for this patient include:  Patient Care Team:  Sandro Canseco APRN CNP as PCP - General (Internal Medicine)  Georgie Bae MD as MD (Internal Medicine)  Jennifer Larson as Nursing Assistant (Geriatric Medicine)  Sandro Canseco APRN CNP as Assigned PCP  ([facility]), [facility]        Electronically signed by:  JACKIE Zayas CNP

## 2021-08-25 NOTE — LETTER
8/25/2021        RE: Maryam Saavedra  C/o Saray Saavedra  [address] [facility] [address]        [place] [facility]  [place] [facility] Record Number:  [id number]  Place of Service where encounter took place:  No question data found.  No chief complaint on file.      HPI:    Maryam Saavedra  is a 89 year old (6/9/1932), who is being seen today for an episodic care visit.  HPI information obtained from: facility chart records and [facility] chart review. Today's concern is:    Seen today for follow up to chronic conditions. Continues to have elevated alk phos for unclear reasons. Unsteady gait at baseline but no noted falls in chart review. History of UTIs and staff monitors for s/s.   Has some word finding difficulty but reports to doing well. Does get up a few times at night to toilet and hx of UTIs. Unsteady gait at baseline and last fall 4/29. Questioning NPH as contributor.      By MD chart review, patient had [facility] hospitalization and [facility] stay in June 2020 for subacute fracture deformity of lateral left knee and underwent left TKA. She rehabbed fairly well in TCU and discharged to live with her family /[facility].  In October she was seen in the ED for visual changes, hallucinations and confusion.   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 Pt was seen by Neurology and no further workup planned by family.       Past Medical and Surgical History reviewed in Epic today.    MEDICATIONS:    Current Outpatient Medications   Medication Sig Dispense Refill     acetaminophen (TYLENOL) 325 MG tablet Take 650 mg by mouth every 6 hours as needed for mild pain or pain        metoprolol succinate ER (TOPROL-XL) 25 MG 24 hr tablet TAKE ONE-HALF TABLET (12.5.MG) BY MOUTH EVERY EVENING 15.5 tablet 97     polyethylene glycol (MIRALAX) 17 g packet Take 17 g by mouth daily 30 packet 11     senna-docusate (SENOKOT-S/PERICOLACE) 8.6-50 MG tablet Take 1 tablet by mouth 2 times daily as needed for  constipation Hold for loose stools 30 tablet 4     vitamin D3 (CHOLECALCIFEROL) 50 mcg (2000 units) tablet Take 1 tablet (50 mcg) by mouth daily 30 tablet 11     REVIEW OF SYSTEMS:  Limited secondary to cognitive impairment but today pt reports fine    Objective:  There were no vitals taken for this visit.  Exam:  GENERAL APPEARANCE: alert and bright  ENT:  Mouth and posterior oropharynx normal, dry mucous membranes  EYES:  EOM, conjunctivae, lids, pupils and irises normal  RESP:  lungs clear to auscultation   CV:  Palpation and auscultation of heart done , regular rate and rhythm, no murmur, rub, or gallop, +1 LE edema, wraps on   ABDOMEN:  slight distension and firm is baseline   M/S:   Gait and station abnormal- unsteady with 2ww  SKIN:  fragile  and frail-dry-limited exam   NEURO:   Cranial nerves 2-12 are normal tested and grossly at patient's baseline  PSYCH:  insight and judgement impaired, memory impaired, STML    Labs:   CBC RESULTS: Recent Labs   Lab Test 01/06/21  0000 10/29/20  1608   WBC 5.1 6.3   RBC 4.17 4.41   HGB 11.7 12.3   HCT 37.7 39.2   MCV 90 89   MCH 28.1 27.9   MCHC 31.0* 31.4*   RDW 15.9* 14.9    293       Last Basic Metabolic Panel:  Recent Labs   Lab Test 01/06/21  0000 10/29/20  1608    140   POTASSIUM 3.9 3.8   CHLORIDE 105 106   NOHEMY 9.3 9.4   CO2 30 28   BUN 26 35*   CR 1.01 0.73   * 107*       Liver Function Studies -   Recent Labs   Lab Test 01/06/21  0000 04/02/20  1934   PROTTOTAL 7.2 6.9   ALBUMIN 2.8* 2.7*   BILITOTAL 0.5 0.3   ALKPHOS 220* 207*   AST 23 31   ALT 31 31       TSH   Date Value Ref Range Status   01/06/2021 5.73 (A) 0.40 - 4.00 mU/L Final   11/25/2017 4.20 (H) 0.40 - 4.00 mU/L Final       Lab Results   Component Value Date    A1C 5.6 07/13/2018    A1C 5.9 05/14/2018     ASSESSMENT/PLAN:  {[facility]GS [id number]}      Electronically signed by:  JACKIE Zayas CNP                 Sincerely,        JACKIE Zayas CNP

## 2021-08-25 NOTE — LETTER
8/25/2021        RE: Maryam Saavedra  C/o Saray Saavedra  [address] [facility] [address]        [place] [facility]  [place] Record Number:  [id number]  Place of Service where encounter took place:  [facility] [[id number]]  Chief Complaint   Patient presents with     RECHECK     Routine     Wellness Visit       HPI:    Maryam Saavedra  is a 89 year old (6/9/1932), who is being seen today for an episodic care visit.  HPI information obtained from: facility chart records and [facility] chart review. Today's concern is:    Seen today for follow up to acute and chronic conditions. Her abdomen looks distended from baseline. She denies any pain, nausea, vomiting, constipation but it feels firm and hard today below the umbilical region.  Also, continues to have elevated alk phos for unclear reasons. Unsteady gait but looks improved from previous visit and no noted falls in chart review. History of UTIs and staff monitors for s/s. Has some word finding difficulty, not new. Does get up a few times at night to toilet.     By MD chart review, patient had [facility] hospitalization and [place] [facility] stay in June 2020 for subacute fracture deformity of lateral left knee and underwent left TKA. She rehabbed fairly well in TCU and discharged to live with her family.  In October (2020) she was seen in the ED for visual changes, hallucinations and confusion. 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 Pt was seen by Neurology and no further workup planned by family.     Past Medical and Surgical History reviewed in Epic today.    MEDICATIONS:    Current Outpatient Medications   Medication Sig Dispense Refill     acetaminophen (TYLENOL) 325 MG tablet Take 650 mg by mouth every 6 hours as needed for mild pain or pain        metoprolol succinate ER (TOPROL-XL) 25 MG 24 hr tablet TAKE ONE-HALF TABLET (12.5.MG) BY MOUTH EVERY EVENING 15.5 tablet 97     polyethylene glycol  "(MIRALAX) 17 g packet Take 17 g by mouth daily 30 packet 11     senna-docusate (SENOKOT-S/PERICOLACE) 8.6-50 MG tablet Take 1 tablet by mouth 2 times daily as needed for constipation Hold for loose stools 30 tablet 4     vitamin D3 (CHOLECALCIFEROL) 50 mcg (2000 units) tablet Take 1 tablet (50 mcg) by mouth daily 30 tablet 11     REVIEW OF SYSTEMS:  Limited secondary to cognitive impairment but today pt reports fine    Objective:  /70   Pulse 67   Temp 97.6  F (36.4  C)   Resp 16   Ht 1.549 m (5' 1\")   Wt 52.3 kg (115 lb 3.2 oz)   SpO2 100%   BMI 21.77 kg/m    Exam:  GENERAL APPEARANCE: alert and bright, slightly anxious  ENT:  Mouth and posterior oropharynx normal, dry mucous membranes  EYES:  EOM, conjunctivae, lids, pupils and irises normal  RESP:  lungs clear to auscultation   CV:  Palpation and auscultation of heart done , regular rate and rhythm, no murmur, rub, or gallop, +1 LE edema, wraps on   ABDOMEN:  slight distension, firmer than baseline, no pain with palpation   M/S:   Gait and station with 4WW improved from previous visit   SKIN:  fragile  and frail-dry-limited exam   NEURO:   Cranial nerves 2-12 are normal tested and grossly at patient's baseline  PSYCH:  insight and judgement impaired, memory impaired, STML    Labs:   CBC RESULTS: Recent Labs   Lab Test 01/06/21  0000 10/29/20  1608   WBC 5.1 6.3   RBC 4.17 4.41   HGB 11.7 12.3   HCT 37.7 39.2   MCV 90 89   MCH 28.1 27.9   MCHC 31.0* 31.4*   RDW 15.9* 14.9    293       Last Basic Metabolic Panel:  Recent Labs   Lab Test 01/06/21  0000 10/29/20  1608    140   POTASSIUM 3.9 3.8   CHLORIDE 105 106   NOHEMY 9.3 9.4   CO2 30 28   BUN 26 35*   CR 1.01 0.73   * 107*       Liver Function Studies -   Recent Labs   Lab Test 01/06/21  0000 04/02/20  1934   PROTTOTAL 7.2 6.9   ALBUMIN 2.8* 2.7*   BILITOTAL 0.5 0.3   ALKPHOS 220* 207*   AST 23 31   ALT 31 31       TSH   Date Value Ref Range Status   01/06/2021 5.73 (A) 0.40 - 4.00 " mU/L Final   11/25/2017 4.20 (H) 0.40 - 4.00 mU/L Final       Lab Results   Component Value Date    A1C 5.6 07/13/2018    A1C 5.9 05/14/2018     ASSESSMENT/PLAN:  (G30.9,  F01.50,  F02.80) Mixed dementia (H)  (primary encounter diagnosis)  Comment: continues in  which is appropriate   Plan:   -staff assist for meds, grooming    (R74.8) Elevated serum alkaline phosphatase level  Comment: unclear etiology-no pain with exam  Plan:   -monitor for now. No hx of gallbladder disease. ALT/AST ok.    (M17.12) Primary osteoarthritis of left knee  (R26.81) Unsteady gait  Comment: continues to be falls risk   Plan:   -Tylenol prn     (I10) Benign essential hypertension  Comment: stable   Plan:   --metoprolol 12.5 mg po daily at HS    (R60.0) Bilateral lower extremity edema  Comment: previously followed by lymphedema   Plan:   -lymph wraps on am and off HS     (E03.9) Hypothyroidism, unspecified type  Comment: Ok for geriatrics  Plan:   -Follow TSH for now    (Z87.440) Personal history of urinary tract infection  Comment: s/s included increased confusion, agitation  Plan:   -monitor for s/s   -UA/UC if indicated     (K59.01) Slow transit constipation  Comment: past history of large volume of retained colonic stool. Urinary retention v. Constipation? Unable to bladder scan at [facility]. Miralax discontinued 12/2020 and Senna S reduced with loose stools   Plan:   -Senna S 1 tablet po BID PRN. Hold for loose stools  -abdominal x-ray       Annual Wellness Visit    Are you in the first 12 months of your Medicare Part B coverage?  No    Physical Health:    In general, how would you rate your overall physical health? good    Outside of work, how many days during the week do you exercise?2-3 days/week    Outside of work, approximately how many minutes a day do you exercise?not applicable    If you drink alcohol do you typically have >3 drinks per day or >7 drinks per week? No    Do you usually eat at least 4 servings of fruit and vegetables  "a day, include whole grains & fiber and avoid regularly eating high fat or \"junk\" foods? Yes    Do you have any problems taking medications regularly? No    Do you have any side effects from medications? none    Needs assistance for the following daily activities: transportation, shopping, preparing meals, housework, bathing, laundry, money management, taking medicine and staff assist with most ADLs    Which of the following safety concerns are present in your home?  none identified     Hearing impairment: No    In the past 6 months, have you been bothered by leaking of urine? no    Mental Health:    In general, how would you rate your overall mental or emotional health? good    PHQ-2 Score:       PHQ-2 ( 1999 [employer]) 8/26/2021 5/8/2020   Q1: Little interest or pleasure in doing things 0 0   Q2: Feeling down, depressed or hopeless 0 0   PHQ-2 Score 0 0   Q1: Little interest or pleasure in doing things - -   Q2: Feeling down, depressed or hopeless - -   PHQ-2 Score - -          Do you feel safe in your environment? Yes    Have you ever done Advance Care Planning? (For example, a Health Directive, POLST, or a discussion with a medical provider or your loved ones about your wishes)? Yes, advance care planning is on file.    Fall risk: score of 8: PT/OT prn      Cognitive Screening:GDS score of 5    Current providers sharing in care for this patient include:  Patient Care Team:  Sandro Canseco APRN CNP as PCP - General (Internal Medicine)  Georgie Bae MD as MD (Internal Medicine)  Jennifer Larson as Nursing Assistant (Geriatric Medicine)  Sandro Canseco APRN CNP as Assigned PCP  ([facility]), [facility]        Electronically signed by:  JACKIE Zayas CNP                      Sincerely,        JACKIE Zayas CNP    "

## 2021-08-26 ENCOUNTER — TRANSFERRED RECORDS (OUTPATIENT)
Dept: HEALTH INFORMATION MANAGEMENT | Facility: CLINIC | Age: 86
End: 2021-08-26

## 2021-08-26 VITALS
TEMPERATURE: 97.6 F | DIASTOLIC BLOOD PRESSURE: 70 MMHG | HEART RATE: 67 BPM | RESPIRATION RATE: 16 BRPM | BODY MASS INDEX: 21.75 KG/M2 | OXYGEN SATURATION: 100 % | WEIGHT: 115.2 LBS | SYSTOLIC BLOOD PRESSURE: 120 MMHG | HEIGHT: 61 IN

## 2021-09-03 DIAGNOSIS — K59.00 CONSTIPATION: Primary | ICD-10-CM

## 2021-09-03 DIAGNOSIS — K59.01 SLOW TRANSIT CONSTIPATION: ICD-10-CM

## 2021-09-03 RX ORDER — AMOXICILLIN 250 MG
1 CAPSULE ORAL 2 TIMES DAILY PRN
Qty: 30 TABLET | Refills: 4 | Status: SHIPPED | OUTPATIENT
Start: 2021-09-03 | End: 2021-10-04

## 2021-09-05 ENCOUNTER — HEALTH MAINTENANCE LETTER (OUTPATIENT)
Age: 86
End: 2021-09-05

## 2021-09-21 ENCOUNTER — TRANSFERRED RECORDS (OUTPATIENT)
Dept: HEALTH INFORMATION MANAGEMENT | Facility: CLINIC | Age: 86
End: 2021-09-21

## 2021-09-21 PROCEDURE — 87086 URINE CULTURE/COLONY COUNT: CPT | Mod: ORL | Performed by: NURSE PRACTITIONER

## 2021-09-21 PROCEDURE — 81001 URINALYSIS AUTO W/SCOPE: CPT | Mod: ORL | Performed by: NURSE PRACTITIONER

## 2021-09-22 ENCOUNTER — LAB REQUISITION (OUTPATIENT)
Dept: LAB | Facility: CLINIC | Age: 86
End: 2021-09-22
Payer: MEDICARE

## 2021-09-22 DIAGNOSIS — N39.0 URINARY TRACT INFECTION, SITE NOT SPECIFIED: ICD-10-CM

## 2021-09-22 LAB
ALBUMIN UR-MCNC: NEGATIVE MG/DL
APPEARANCE UR: CLEAR
BILIRUB UR QL STRIP: NEGATIVE
COLOR UR AUTO: ABNORMAL
GLUCOSE UR STRIP-MCNC: NEGATIVE MG/DL
HGB UR QL STRIP: NEGATIVE
KETONES UR STRIP-MCNC: NEGATIVE MG/DL
LEUKOCYTE ESTERASE UR QL STRIP: ABNORMAL
NITRATE UR QL: NEGATIVE
PH UR STRIP: 8 [PH] (ref 5–7)
RBC URINE: 1 /HPF
SP GR UR STRIP: 1.01 (ref 1–1.03)
SQUAMOUS EPITHELIAL: 1 /HPF
UROBILINOGEN UR STRIP-MCNC: NORMAL MG/DL
WBC URINE: 10 /HPF

## 2021-09-23 LAB — BACTERIA UR CULT: NORMAL

## 2021-09-26 DIAGNOSIS — H04.123 DRY EYES: Primary | ICD-10-CM

## 2021-09-26 RX ORDER — CARBOXYMETHYLCELLULOSE SODIUM 2.5 MG/ML
SOLUTION/ DROPS OPHTHALMIC
Qty: 15 ML | Refills: 97 | Status: SHIPPED | OUTPATIENT
Start: 2021-09-26 | End: 2022-11-07

## 2021-10-02 DIAGNOSIS — K59.01 SLOW TRANSIT CONSTIPATION: ICD-10-CM

## 2021-10-04 ENCOUNTER — TRANSFERRED RECORDS (OUTPATIENT)
Dept: HEALTH INFORMATION MANAGEMENT | Facility: CLINIC | Age: 86
End: 2021-10-04

## 2021-10-04 RX ORDER — DOCUSATE SODIUM 50MG AND SENNOSIDES 8.6MG 8.6; 5 MG/1; MG/1
TABLET, FILM COATED ORAL
Qty: 14 TABLET | Status: SHIPPED | OUTPATIENT
Start: 2021-10-04 | End: 2022-09-29

## 2021-11-09 VITALS
OXYGEN SATURATION: 96 % | WEIGHT: 114.6 LBS | HEIGHT: 61 IN | SYSTOLIC BLOOD PRESSURE: 100 MMHG | TEMPERATURE: 97.3 F | HEART RATE: 63 BPM | RESPIRATION RATE: 18 BRPM | DIASTOLIC BLOOD PRESSURE: 62 MMHG | BODY MASS INDEX: 21.64 KG/M2

## 2021-11-09 RX ORDER — POLYETHYLENE GLYCOL 3350 17 G/17G
17 POWDER, FOR SOLUTION ORAL DAILY
Qty: 510 G | COMMUNITY
Start: 2021-11-09 | End: 2022-10-10

## 2021-11-09 ASSESSMENT — MIFFLIN-ST. JEOR: SCORE: 882.2

## 2021-11-09 NOTE — PROGRESS NOTES
[place] [facility]  [place] Medical Record Number:  [id number]  Place of Service where encounter took place:  [facility] [[id number]]  Chief Complaint   Patient presents with     RECHECK     Routine       HPI:    Maryam Saavedra  is a 89 year old (6/9/1932), who is being seen today for an episodic care visit.  HPI information obtained from: facility chart records, patient report and [facility] chart review. Today's concern is:    Seen today for follow up to chronic conditions. Senna S reduced to 3x weekly as resident prefers to have prune juice. Hx of bowel constipation. Facility chart reviewed and last bowel movement recorded was 11/2 but does self toilet. Also on Miralax daily. Some firmness to right mid quadrant to abdomen. CT 2020 was negative for acute finding. Alk Phos continues with elevation for no known reasons. No abdominal pain, nausea, vomiting.    Tested for UTI 9/2021 and was negative for infection.     Continues to spend much time in her room. Will attend only a few activities. Does appear mostly content. Up taking a shower today (staff in room for monitoring). Had complaints of discomfort in left ear-no wax, redness or drainage noted. Denies jaw/mouth pain, denies neck pain. Did remove some wax from her right ear.     By MD chart review, patient had [facility] hospitalization and [place] [facility] stay in June 2020 for subacute fracture deformity of lateral left knee and underwent left TKA. She rehabbed fairly well in TCU and discharged to live with her family.  In October (2020) she was seen in the ED for visual changes, hallucinations and confusion. 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 Pt was seen by Neurology and no further workup planned by family. Ambulates with walker, unsteady at times so will continue to be a fall risk. Last fall was 9/2021.      Past Medical and Surgical History reviewed in Epic today.    MEDICATIONS:    Current Outpatient  "Medications   Medication Sig Dispense Refill     polyethylene glycol (MIRALAX) 17 GM/Dose powder Take 17 g by mouth daily 510 g      acetaminophen (TYLENOL) 325 MG tablet Take 650 mg by mouth every 6 hours as needed for mild pain or pain        metoprolol succinate ER (TOPROL-XL) 25 MG 24 hr tablet TAKE ONE-HALF TABLET (12.5.MG) BY MOUTH EVERY EVENING 15.5 tablet 97     SENEXON-S 8.6-50 MG tablet TAKE ONE TABLET BY MOUTH AT BEDTIME THREE TIMES PER WEEK;& TAKE ONE TABLET TWICE DAILY AS NEEDED FOR CONSTIPATION 14 tablet PRN     THERATEARS 0.25 % SOLN INSTILL ONE DROP IN EACH EYE TWICE DAILY FOR DRY EYES 15 mL 97     vitamin D3 (CHOLECALCIFEROL) 50 mcg (2000 units) tablet Take 1 tablet (50 mcg) by mouth daily 30 tablet 11     REVIEW OF SYSTEMS:  Limited secondary to cognitive impairment but today pt reports ok    Objective:  /62   Pulse 63   Temp 97.3  F (36.3  C)   Resp 18   Ht 1.549 m (5' 1\")   Wt 52 kg (114 lb 9.6 oz)   SpO2 96%   BMI 21.65 kg/m    Exam:  GENERAL APPEARANCE: alert and bright  ENT:  Mouth and posterior oropharynx normal, dry mucous membranes  EYES:  EOM, conjunctivae, lids, pupils and irises normal  RESP:  lungs clear to auscultation   CV:  Palpation and auscultation of heart done , regular rate and rhythm, no murmur, rub, or gallop, +1 LE edema, wraps off  ABDOMEN:  slight distension to right mid quadrant, no pain with palpation, soft   M/S:   Gait and station with 4WW improved from previous visit   SKIN:  intact to visualized areas, pale   NEURO:   Cranial nerves 2-12 are normal tested and grossly at patient's baseline  PSYCH:  insight and judgement impaired, memory impaired, STML    Labs:   CBC RESULTS: Recent Labs   Lab Test 01/06/21  0000 10/29/20  1608   WBC 5.1 6.3   RBC 4.17 4.41   HGB 11.7 12.3   HCT 37.7 39.2   MCV 90 89   MCH 28.1 27.9   MCHC 31.0* 31.4*   RDW 15.9* 14.9    293       Last Basic Metabolic Panel:  Recent Labs   Lab Test 01/06/21  0000 10/29/20  1608   NA " 139 140   POTASSIUM 3.9 3.8   CHLORIDE 105 106   NOHEMY 9.3 9.4   CO2 30 28   BUN 26 35*   CR 1.01 0.73   * 107*       Liver Function Studies -   Recent Labs   Lab Test 01/06/21  0000 04/02/20  1934   PROTTOTAL 7.2 6.9   ALBUMIN 2.8* 2.7*   BILITOTAL 0.5 0.3   ALKPHOS 220* 207*   AST 23 31   ALT 31 31       TSH   Date Value Ref Range Status   01/06/2021 5.73 (A) 0.40 - 4.00 mU/L Final   11/25/2017 4.20 (H) 0.40 - 4.00 mU/L Final       Lab Results   Component Value Date    A1C 5.6 07/13/2018    A1C 5.9 05/14/2018     ASSESSMENT/PLAN:  (K59.01) Slow transit constipation  Comment: past history of large volume of retained colonic stool. Urinary retention v. Constipation? Unable to bladder scan at AL. Miralax discontinued 12/2020 and Senna S reduced with loose stools   Plan:   -Senna S 1 tablet po at bedtime 3x weekly and 1 tablet po BID prn. Hold for loose stools  -Miralax daily  -supp daily prn     (Z87.440) Personal history of urinary tract infection  Comment: s/s included increased confusion, agitation  Plan:   -monitor for s/s   -UA/UC if indicated      (R60.0) Bilateral lower extremity edema  Comment: previously followed by lymphedema   Plan:   -lymph wraps on am and off HS      (E03.9) Hypothyroidism, unspecified type  Comment: Ok for geriatrics  Plan:   -Follow TSH for now    (M17.12) Primary osteoarthritis of left knee  (R26.81) Unsteady gait  Comment: continues to be falls risk   Plan:   -Tylenol prn      (I10) Benign essential hypertension  Comment: stable   Plan:   -metoprolol 12.5 mg po daily at HS with lower BP/HR will discontinue and monitor     (R74.8) Elevated serum alkaline phosphatase level  Comment: unclear etiology-no pain with exam  Plan:   -monitor for now. No hx of gallbladder disease. ALT/AST ok.     (H92.02) Left ear pain  Comment: free of wax, no redness or drainage, pearly tympanic membrane  Plan:  -continue to monitor for s/s         -call to [facility] pharmacy for Shingrix vaccine. Per  daughter is covered and would like her to have. Active order on file     Electronically signed by:  JACKIE Zayas CNP

## 2021-11-10 ENCOUNTER — ASSISTED LIVING VISIT (OUTPATIENT)
Dept: GERIATRICS | Facility: CLINIC | Age: 86
End: 2021-11-10
Payer: MEDICARE

## 2021-11-10 DIAGNOSIS — M17.12 PRIMARY OSTEOARTHRITIS OF LEFT KNEE: ICD-10-CM

## 2021-11-10 DIAGNOSIS — R60.0 BILATERAL LOWER EXTREMITY EDEMA: ICD-10-CM

## 2021-11-10 DIAGNOSIS — R74.8 ELEVATED SERUM ALKALINE PHOSPHATASE LEVEL: ICD-10-CM

## 2021-11-10 DIAGNOSIS — E03.9 HYPOTHYROIDISM, UNSPECIFIED TYPE: ICD-10-CM

## 2021-11-10 DIAGNOSIS — I10 BENIGN ESSENTIAL HYPERTENSION: ICD-10-CM

## 2021-11-10 DIAGNOSIS — H92.02 LEFT EAR PAIN: ICD-10-CM

## 2021-11-10 DIAGNOSIS — Z87.440 PERSONAL HISTORY OF URINARY TRACT INFECTION: ICD-10-CM

## 2021-11-10 DIAGNOSIS — K59.01 SLOW TRANSIT CONSTIPATION: Primary | ICD-10-CM

## 2021-11-10 NOTE — LETTER
11/10/2021        RE: Maryam Saavedra  C/o Saray Saavedra  [address] [address]        [place] [facility]  [place] Medical Record Number:  [id number]  Place of Service where encounter took place:  [facility] [[id number]]  Chief Complaint   Patient presents with     RECHECK     Routine       HPI:    Maryam Saavedra  is a 89 year old (6/9/1932), who is being seen today for an episodic care visit.  HPI information obtained from: facility chart records, patient report and [facility] chart review. Today's concern is:    Seen today for follow up to chronic conditions. Senna S reduced to 3x weekly as resident prefers to have prune juice. Hx of bowel constipation. Facility chart reviewed and last bowel movement recorded was 11/2 but does self toilet. Also on Miralax daily. Some firmness to right mid quadrant to abdomen. CT 2020 was negative for acute finding. Alk Phos continues with elevation for no known reasons. No abdominal pain, nausea, vomiting.    Tested for UTI 9/2021 and was negative for infection.     Continues to spend much time in her room. Will attend only a few activities. Does appear mostly content. Up taking a shower today (staff in room for monitoring). Had complaints of discomfort in left ear-no wax, redness or drainage noted. Denies jaw/mouth pain, denies neck pain. Did remove some wax from her right ear.     By MD chart review, patient had [facility] and [place] [facility] stay in June 2020 for subacute fracture deformity of lateral left knee and underwent left TKA. She rehabbed fairly well in TCU and discharged to live with her family.  In October (2020) she was seen in the ED for visual changes, hallucinations and confusion. 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 Pt was seen by Neurology and no further workup planned by family. Ambulates with walker, unsteady at times so will continue to be a fall risk. Last fall was  "9/2021.      Past Medical and Surgical History reviewed in Epic today.    MEDICATIONS:    Current Outpatient Medications   Medication Sig Dispense Refill     polyethylene glycol (MIRALAX) 17 GM/Dose powder Take 17 g by mouth daily 510 g      acetaminophen (TYLENOL) 325 MG tablet Take 650 mg by mouth every 6 hours as needed for mild pain or pain        metoprolol succinate ER (TOPROL-XL) 25 MG 24 hr tablet TAKE ONE-HALF TABLET (12.5.MG) BY MOUTH EVERY EVENING 15.5 tablet 97     SENEXON-S 8.6-50 MG tablet TAKE ONE TABLET BY MOUTH AT BEDTIME THREE TIMES PER WEEK;& TAKE ONE TABLET TWICE DAILY AS NEEDED FOR CONSTIPATION 14 tablet PRN     THERATEARS 0.25 % SOLN INSTILL ONE DROP IN EACH EYE TWICE DAILY FOR DRY EYES 15 mL 97     vitamin D3 (CHOLECALCIFEROL) 50 mcg (2000 units) tablet Take 1 tablet (50 mcg) by mouth daily 30 tablet 11     REVIEW OF SYSTEMS:  Limited secondary to cognitive impairment but today pt reports ok    Objective:  /62   Pulse 63   Temp 97.3  F (36.3  C)   Resp 18   Ht 1.549 m (5' 1\")   Wt 52 kg (114 lb 9.6 oz)   SpO2 96%   BMI 21.65 kg/m    Exam:  GENERAL APPEARANCE: alert and bright  ENT:  Mouth and posterior oropharynx normal, dry mucous membranes  EYES:  EOM, conjunctivae, lids, pupils and irises normal  RESP:  lungs clear to auscultation   CV:  Palpation and auscultation of heart done , regular rate and rhythm, no murmur, rub, or gallop, +1 LE edema, wraps off  ABDOMEN:  slight distension to right mid quadrant, no pain with palpation, soft   M/S:   Gait and station with 4WW improved from previous visit   SKIN:  intact to visualized areas, pale   NEURO:   Cranial nerves 2-12 are normal tested and grossly at patient's baseline  PSYCH:  insight and judgement impaired, memory impaired, STML    Labs:   CBC RESULTS: Recent Labs   Lab Test 01/06/21  0000 10/29/20  1608   WBC 5.1 6.3   RBC 4.17 4.41   HGB 11.7 12.3   HCT 37.7 39.2   MCV 90 89   MCH 28.1 27.9   MCHC 31.0* 31.4*   RDW 15.9* " 14.9    293       Last Basic Metabolic Panel:  Recent Labs   Lab Test 01/06/21  0000 10/29/20  1608    140   POTASSIUM 3.9 3.8   CHLORIDE 105 106   NOHEMY 9.3 9.4   CO2 30 28   BUN 26 35*   CR 1.01 0.73   * 107*       Liver Function Studies -   Recent Labs   Lab Test 01/06/21  0000 04/02/20  1934   PROTTOTAL 7.2 6.9   ALBUMIN 2.8* 2.7*   BILITOTAL 0.5 0.3   ALKPHOS 220* 207*   AST 23 31   ALT 31 31       TSH   Date Value Ref Range Status   01/06/2021 5.73 (A) 0.40 - 4.00 mU/L Final   11/25/2017 4.20 (H) 0.40 - 4.00 mU/L Final       Lab Results   Component Value Date    A1C 5.6 07/13/2018    A1C 5.9 05/14/2018     ASSESSMENT/PLAN:  (K59.01) Slow transit constipation  Comment: past history of large volume of retained colonic stool. Urinary retention v. Constipation? Unable to bladder scan at [facility]. Miralax discontinued 12/2020 and Senna S reduced with loose stools   Plan:   -Senna S 1 tablet po at bedtime 3x weekly and 1 tablet po BID prn. Hold for loose stools  -Miralax daily  -supp daily prn     (Z87.440) Personal history of urinary tract infection  Comment: s/s included increased confusion, agitation  Plan:   -monitor for s/s   -UA/UC if indicated      (R60.0) Bilateral lower extremity edema  Comment: previously followed by lymphedema   Plan:   -lymph wraps on am and off HS      (E03.9) Hypothyroidism, unspecified type  Comment: Ok for geriatrics  Plan:   -Follow TSH for now    (M17.12) Primary osteoarthritis of left knee  (R26.81) Unsteady gait  Comment: continues to be falls risk   Plan:   -Tylenol prn      (I10) Benign essential hypertension  Comment: stable   Plan:   -metoprolol 12.5 mg po daily at HS with lower BP/HR will discontinue and monitor     (R74.8) Elevated serum alkaline phosphatase level  Comment: unclear etiology-no pain with exam  Plan:   -monitor for now. No hx of gallbladder disease. ALT/AST ok.     (H92.02) Left ear pain  Comment: free of wax, no redness or drainage, pearly  tympanic membrane  Plan:  -continue to monitor for s/s         -call to [facility] pharmacy for Shingrix vaccine. Per daughter is covered and would like her to have. Active order on file     Electronically signed by:  JACKIE Zayas CNP                 Sincerely,        JACKIE Zayas CNP

## 2021-11-10 NOTE — LETTER
Order for Maryam Saavedra    1. Discontinue metoprolol     JACKIE Zayas CNP on 11/10/2021 at 2:23 PM

## 2021-11-10 NOTE — LETTER
"    11/10/2021        RE: Maryam Saavedra  C/o Saray Saavedra  [address] [facility] [address]        [place] [facility]  [place] Medical Record Number:  [id number]  Place of Service where encounter took place:  [facility] [[id number]]  Chief Complaint   Patient presents with     RECHECK     Routine       HPI:    Maryam Saavedra  is a 89 year old (6/9/1932), who is being seen today for an episodic care visit.  HPI information obtained from: {[facility] HPI:310370}. Today's concern is:  {[facility] DX:195649}    Past Medical and Surgical History reviewed in Epic today.    MEDICATIONS:  {Providers Please double check the med list (in the plan section >> meds & orders tab) and Discontinue any of the meds flagged by the TC to be discontinued}  Current Outpatient Medications   Medication Sig Dispense Refill     acetaminophen (TYLENOL) 325 MG tablet Take 650 mg by mouth every 6 hours as needed for mild pain or pain        metoprolol succinate ER (TOPROL-XL) 25 MG 24 hr tablet TAKE ONE-HALF TABLET (12.5.MG) BY MOUTH EVERY EVENING 15.5 tablet 97     SENEXON-S 8.6-50 MG tablet TAKE ONE TABLET BY MOUTH AT BEDTIME THREE TIMES PER WEEK;& TAKE ONE TABLET TWICE DAILY AS NEEDED FOR CONSTIPATION 14 tablet PRN     THERATEARS 0.25 % SOLN INSTILL ONE DROP IN EACH EYE TWICE DAILY FOR DRY EYES 15 mL 97     vitamin D3 (CHOLECALCIFEROL) 50 mcg (2000 units) tablet Take 1 tablet (50 mcg) by mouth daily 30 tablet 11     ***    REVIEW OF SYSTEMS:  {ROS FGS:458494}    Objective:  /62   Pulse 63   Temp 97.3  F (36.3  C)   Resp 18   Ht 1.549 m (5' 1\")   Wt 52 kg (114 lb 9.6 oz)   SpO2 96%   BMI 21.65 kg/m    Exam:  {Nursing home physical exam :522030}    Labs:   {fgslab:888933}    ASSESSMENT/PLAN:  {FGS DX:876809}    {fgsorders:316727}  ***    {fgstime1:004829}  Electronically signed by:  Jennifer Bravo ***  {Providers Please double check the med list (in the plan section >> meds & orders tab) and " Discontinue any of the meds flagged by the TC to be discontinued}              Sincerely,        JACKIE Zayas CNP

## 2021-11-26 DIAGNOSIS — E55.9 VITAMIN D DEFICIENCY: ICD-10-CM

## 2021-11-28 RX ORDER — CHOLECALCIFEROL (VITAMIN D3) 50 MCG
TABLET ORAL
Qty: 28 TABLET | Refills: 97 | Status: SHIPPED | OUTPATIENT
Start: 2021-11-28 | End: 2022-11-25

## 2021-11-30 DIAGNOSIS — R05.9 COUGH: Primary | ICD-10-CM

## 2021-11-30 RX ORDER — GUAIFENESIN AND DEXTROMETHORPHAN HYDROBROMIDE 600; 30 MG/1; MG/1
1 TABLET, EXTENDED RELEASE ORAL EVERY 12 HOURS
Qty: 14 TABLET | Refills: 0 | Status: SHIPPED | OUTPATIENT
Start: 2021-11-30 | End: 2021-12-08

## 2021-12-01 ENCOUNTER — ASSISTED LIVING VISIT (OUTPATIENT)
Dept: GERIATRICS | Facility: CLINIC | Age: 86
End: 2021-12-01
Payer: MEDICARE

## 2021-12-01 DIAGNOSIS — R05.9 COUGH: Primary | ICD-10-CM

## 2021-12-01 ASSESSMENT — MIFFLIN-ST. JEOR: SCORE: 882.2

## 2021-12-01 NOTE — PROGRESS NOTES
"[place] [facility]  [place] [facility] Record Number:  [id number]  Place of Service where encounter took place:  [facility] [[id number]]  Chief Complaint   Patient presents with     RECHECK       HPI:    Maryam Saavedra  is a 89 year old (6/9/1932), who is being seen today for an episodic care visit.  HPI information obtained from: facility staff. Today's concern is:    Resident with cough and congestion noticed by staff at her [facility] as many others on this unit with similar s/s. COVID-19 swab per facility pending. Seen in her room today she reports her cough started Sunday. Denies sore throat, N/V, abdominal pain, SOB. VSS. She is reporting some nasal drainage. Able to sleep.     Past Medical and Surgical History reviewed in Epic today.    MEDICATIONS:    Current Outpatient Medications   Medication Sig Dispense Refill     acetaminophen (TYLENOL) 325 MG tablet Take 650 mg by mouth every 6 hours as needed for mild pain or pain        dextromethorphan-guaiFENesin (MUCINEX DM)  MG 12 hr tablet Take 1 tablet by mouth every 12 hours for 7 days 14 tablet 0     polyethylene glycol (MIRALAX) 17 GM/Dose powder Take 17 g by mouth daily 510 g      SENEXON-S 8.6-50 MG tablet TAKE ONE TABLET BY MOUTH AT BEDTIME THREE TIMES PER WEEK;& TAKE ONE TABLET TWICE DAILY AS NEEDED FOR CONSTIPATION 14 tablet PRN     THERATEARS 0.25 % SOLN INSTILL ONE DROP IN EACH EYE TWICE DAILY FOR DRY EYES 15 mL 97     VITAMIN D3 50 MCG (2000 UT) tablet TAKE 1 TABLET BY MOUTH ONCE DAILY 28 tablet 97     REVIEW OF SYSTEMS:  4 point ROS including Respiratory, CV, GI and , other than that noted in the HPI,  is negative    Objective:  /70   Pulse 68   Temp (!) 89  F (31.7  C)   Resp 16   Ht 1.549 m (5' 1\")   Wt 52 kg (114 lb 9.6 oz)   SpO2 97%   BMI 21.65 kg/m    Exam:  GENERAL APPEARANCE: alert, tired looking   ENT:  Mouth and posterior oropharynx normal, dry mucous membranes  EYES:  EOM, conjunctivae, " lids, pupils and irises normal  RESP:  lungs clear to auscultation, dry cough today  CV:  Palpation and auscultation of heart done , regular rate and rhythm, no murmur, rub, or gallop, +1 LE edema, wraps off  ABDOMEN:  slight distension to right mid quadrant, no pain with palpation, soft   M/S:   Gait and station with 4WW improved from previous visit   SKIN:  intact to visualized areas, pale   NEURO:   Cranial nerves 2-12 are normal tested and grossly at patient's baseline  PSYCH:  insight and judgement impaired, memory impaired, STML    Labs:   CBC RESULTS: Recent Labs   Lab Test 01/06/21  0000 10/29/20  1608   WBC 5.1 6.3   RBC 4.17 4.41   HGB 11.7 12.3   HCT 37.7 39.2   MCV 90 89   MCH 28.1 27.9   MCHC 31.0* 31.4*   RDW 15.9* 14.9    293       Last Basic Metabolic Panel:  Recent Labs   Lab Test 01/06/21  0000 10/29/20  1608    140   POTASSIUM 3.9 3.8   CHLORIDE 105 106   NOHEMY 9.3 9.4   CO2 30 28   BUN 26 35*   CR 1.01 0.73   * 107*       Liver Function Studies -   Recent Labs   Lab Test 01/06/21  0000 04/02/20  1934   PROTTOTAL 7.2 6.9   ALBUMIN 2.8* 2.7*   BILITOTAL 0.5 0.3   ALKPHOS 220* 207*   AST 23 31   ALT 31 31       TSH   Date Value Ref Range Status   01/06/2021 5.73 (A) 0.40 - 4.00 mU/L Final   11/25/2017 4.20 (H) 0.40 - 4.00 mU/L Final       Lab Results   Component Value Date    A1C 5.6 07/13/2018    A1C 5.9 05/14/2018     ASSESSMENT/PLAN:  (R05.9) Cough  (primary encounter diagnosis)  Comment: acute   Plan:   -Mucinex DM 1 tab po BID prn   -tylenol for comfort   -monitor for improving or worsening condition       Electronically signed by:  Sandro Canseco, JACKIE CNP

## 2021-12-01 NOTE — LETTER
"    12/1/2021        RE: Maryam Saavedra  C/o Saray Saavedra  [address] [facility] [address]        [place] [facility]  [place] [facility] Record Number:  [id number]  Place of Service where encounter took place:  [facility] [[id number]]  Chief Complaint   Patient presents with     RECHECK       HPI:    Maryam Saavedra  is a 89 year old (6/9/1932), who is being seen today for an episodic care visit.  HPI information obtained from: facility staff. Today's concern is:    Resident with cough and congestion noticed by staff at her [facility] as many others on this unit with similar s/s. COVID-19 swab per facility pending. Seen in her room today she reports her cough started Sunday. Denies sore throat, N/V, abdominal pain, SOB. VSS. She is reporting some nasal drainage. Able to sleep.     Past Medical and Surgical History reviewed in Epic today.    MEDICATIONS:    Current Outpatient Medications   Medication Sig Dispense Refill     acetaminophen (TYLENOL) 325 MG tablet Take 650 mg by mouth every 6 hours as needed for mild pain or pain        dextromethorphan-guaiFENesin (MUCINEX DM)  MG 12 hr tablet Take 1 tablet by mouth every 12 hours for 7 days 14 tablet 0     polyethylene glycol (MIRALAX) 17 GM/Dose powder Take 17 g by mouth daily 510 g      SENEXON-S 8.6-50 MG tablet TAKE ONE TABLET BY MOUTH AT BEDTIME THREE TIMES PER WEEK;& TAKE ONE TABLET TWICE DAILY AS NEEDED FOR CONSTIPATION 14 tablet PRN     THERATEARS 0.25 % SOLN INSTILL ONE DROP IN EACH EYE TWICE DAILY FOR DRY EYES 15 mL 97     VITAMIN D3 50 MCG (2000 UT) tablet TAKE 1 TABLET BY MOUTH ONCE DAILY 28 tablet 97     REVIEW OF SYSTEMS:  4 point ROS including Respiratory, CV, GI and , other than that noted in the HPI,  is negative    Objective:  /70   Pulse 68   Temp (!) 89  F (31.7  C)   Resp 16   Ht 1.549 m (5' 1\")   Wt 52 kg (114 lb 9.6 oz)   SpO2 97%   BMI 21.65 kg/m    Exam:  GENERAL APPEARANCE: alert, " tired looking   ENT:  Mouth and posterior oropharynx normal, dry mucous membranes  EYES:  EOM, conjunctivae, lids, pupils and irises normal  RESP:  lungs clear to auscultation, dry cough today  CV:  Palpation and auscultation of heart done , regular rate and rhythm, no murmur, rub, or gallop, +1 LE edema, wraps off  ABDOMEN:  slight distension to right mid quadrant, no pain with palpation, soft   M/S:   Gait and station with 4WW improved from previous visit   SKIN:  intact to visualized areas, pale   NEURO:   Cranial nerves 2-12 are normal tested and grossly at patient's baseline  PSYCH:  insight and judgement impaired, memory impaired, STML    Labs:   CBC RESULTS: Recent Labs   Lab Test 01/06/21  0000 10/29/20  1608   WBC 5.1 6.3   RBC 4.17 4.41   HGB 11.7 12.3   HCT 37.7 39.2   MCV 90 89   MCH 28.1 27.9   MCHC 31.0* 31.4*   RDW 15.9* 14.9    293       Last Basic Metabolic Panel:  Recent Labs   Lab Test 01/06/21  0000 10/29/20  1608    140   POTASSIUM 3.9 3.8   CHLORIDE 105 106   NOHEMY 9.3 9.4   CO2 30 28   BUN 26 35*   CR 1.01 0.73   * 107*       Liver Function Studies -   Recent Labs   Lab Test 01/06/21  0000 04/02/20  1934   PROTTOTAL 7.2 6.9   ALBUMIN 2.8* 2.7*   BILITOTAL 0.5 0.3   ALKPHOS 220* 207*   AST 23 31   ALT 31 31       TSH   Date Value Ref Range Status   01/06/2021 5.73 (A) 0.40 - 4.00 mU/L Final   11/25/2017 4.20 (H) 0.40 - 4.00 mU/L Final       Lab Results   Component Value Date    A1C 5.6 07/13/2018    A1C 5.9 05/14/2018     ASSESSMENT/PLAN:  (R05.9) Cough  (primary encounter diagnosis)  Comment: acute   Plan:   -Mucinex DM 1 tab po BID prn   -tylenol for comfort   -monitor for improving or worsening condition       Electronically signed by:  JACKIE Zayas CNP                 Sincerely,        JACKIE Zayas CNP

## 2021-12-02 VITALS
SYSTOLIC BLOOD PRESSURE: 116 MMHG | HEART RATE: 68 BPM | OXYGEN SATURATION: 97 % | DIASTOLIC BLOOD PRESSURE: 70 MMHG | HEIGHT: 61 IN | WEIGHT: 114.6 LBS | BODY MASS INDEX: 21.64 KG/M2 | TEMPERATURE: 89 F | RESPIRATION RATE: 16 BRPM

## 2021-12-03 ENCOUNTER — LAB REQUISITION (OUTPATIENT)
Dept: LAB | Facility: CLINIC | Age: 86
End: 2021-12-03
Payer: MEDICARE

## 2021-12-03 DIAGNOSIS — U07.1 COVID-19: ICD-10-CM

## 2021-12-08 ENCOUNTER — TELEPHONE (OUTPATIENT)
Dept: GERIATRICS | Facility: CLINIC | Age: 86
End: 2021-12-08
Payer: MEDICARE

## 2021-12-08 DIAGNOSIS — R05.9 COUGH: Primary | ICD-10-CM

## 2021-12-08 RX ORDER — GUAIFENESIN AND DEXTROMETHORPHAN HYDROBROMIDE 600; 30 MG/1; MG/1
1 TABLET, EXTENDED RELEASE ORAL EVERY 12 HOURS
Qty: 10 TABLET | Refills: 0 | Status: SHIPPED | OUTPATIENT
Start: 2021-12-08 | End: 2021-12-13

## 2021-12-08 NOTE — TELEPHONE ENCOUNTER
[place] [facility] TELEPHONE ENCOUNTER    No chief complaint on file.      Maryam Saavedra is a 89 year old  (6/9/1932),Nurse reporting  today that cough continues, somewhat improved but requesting to extend Mucinex course.     ASSESSMENT/PLAN    dextromethorphan-guaiFENesin (MUCINEX DM)  MG 12 hr tablet Take 1 tablet by mouth every 12 hours for 5 days       Electronically signed by:   JACKIE Zayas CNP

## 2022-01-16 DIAGNOSIS — M15.8 OTHER OSTEOARTHRITIS INVOLVING MULTIPLE JOINTS: Primary | ICD-10-CM

## 2022-01-16 RX ORDER — ACETAMINOPHEN 325 MG/1
TABLET ORAL
Qty: 60 TABLET | Refills: 97 | Status: SHIPPED | OUTPATIENT
Start: 2022-01-16 | End: 2022-02-02

## 2022-01-21 ENCOUNTER — LAB REQUISITION (OUTPATIENT)
Dept: LAB | Facility: CLINIC | Age: 87
End: 2022-01-21
Payer: MEDICARE

## 2022-01-21 DIAGNOSIS — U07.1 COVID-19: ICD-10-CM

## 2022-01-21 PROCEDURE — U0003 INFECTIOUS AGENT DETECTION BY NUCLEIC ACID (DNA OR RNA); SEVERE ACUTE RESPIRATORY SYNDROME CORONAVIRUS 2 (SARS-COV-2) (CORONAVIRUS DISEASE [COVID-19]), AMPLIFIED PROBE TECHNIQUE, MAKING USE OF HIGH THROUGHPUT TECHNOLOGIES AS DESCRIBED BY CMS-2020-01-R: HCPCS | Mod: ORL | Performed by: INTERNAL MEDICINE

## 2022-01-22 LAB — SARS-COV-2 RNA RESP QL NAA+PROBE: NEGATIVE

## 2022-01-26 ENCOUNTER — LAB REQUISITION (OUTPATIENT)
Dept: LAB | Facility: CLINIC | Age: 87
End: 2022-01-26
Payer: MEDICARE

## 2022-01-26 DIAGNOSIS — M25.561 ACUTE PAIN OF RIGHT KNEE: Primary | ICD-10-CM

## 2022-01-26 DIAGNOSIS — U07.1 COVID-19: ICD-10-CM

## 2022-01-26 PROCEDURE — U0005 INFEC AGEN DETEC AMPLI PROBE: HCPCS | Mod: ORL | Performed by: INTERNAL MEDICINE

## 2022-01-26 RX ORDER — MENTHOL AND METHYL SALICYLATE 10; 30 G/100G; G/100G
CREAM TOPICAL 2 TIMES DAILY
Qty: 85 G | Refills: 3 | Status: SHIPPED | OUTPATIENT
Start: 2022-01-26 | End: 2023-05-08

## 2022-01-26 NOTE — PROGRESS NOTES
May need to schedule tylenol if continues. Will see her next week for increased right knee pain.     1. Begin   Menthol-Methyl Salicylate (ICY HOT EXTRA STRENGTH) 10-30 % CREA Externally apply topically 2 times daily To right knee     JACKIE Zayas CNP on 1/26/2022 at 2:28 PM

## 2022-01-27 LAB — SARS-COV-2 RNA RESP QL NAA+PROBE: NEGATIVE

## 2022-01-28 ENCOUNTER — ASSISTED LIVING VISIT (OUTPATIENT)
Dept: GERIATRICS | Facility: CLINIC | Age: 87
End: 2022-01-28
Payer: MEDICARE

## 2022-01-28 VITALS
WEIGHT: 116.6 LBS | BODY MASS INDEX: 22.01 KG/M2 | HEIGHT: 61 IN | OXYGEN SATURATION: 95 % | DIASTOLIC BLOOD PRESSURE: 72 MMHG | SYSTOLIC BLOOD PRESSURE: 141 MMHG | TEMPERATURE: 97.3 F | RESPIRATION RATE: 18 BRPM | HEART RATE: 80 BPM

## 2022-01-28 DIAGNOSIS — F01.50 MIXED DEMENTIA (H): ICD-10-CM

## 2022-01-28 DIAGNOSIS — G30.9 MIXED DEMENTIA (H): ICD-10-CM

## 2022-01-28 DIAGNOSIS — M25.561 ACUTE PAIN OF RIGHT KNEE: Primary | ICD-10-CM

## 2022-01-28 DIAGNOSIS — I10 BENIGN ESSENTIAL HYPERTENSION: ICD-10-CM

## 2022-01-28 DIAGNOSIS — N18.31 STAGE 3A CHRONIC KIDNEY DISEASE (H): ICD-10-CM

## 2022-01-28 DIAGNOSIS — E55.9 VITAMIN D DEFICIENCY: ICD-10-CM

## 2022-01-28 DIAGNOSIS — M15.0 PRIMARY OSTEOARTHRITIS INVOLVING MULTIPLE JOINTS: ICD-10-CM

## 2022-01-28 DIAGNOSIS — F02.80 MIXED DEMENTIA (H): ICD-10-CM

## 2022-01-28 ASSESSMENT — MIFFLIN-ST. JEOR: SCORE: 891.27

## 2022-01-28 NOTE — LETTER
"    1/28/2022        RE: Maryam Saavedra  C/o Saray Saavedra  [address] [facility] [address]        Maryam Saavedra is a 89 year old female seen January 28, 2022 at [facility] where she has resided for one year (admit 12/2020).  Pt is seen in her room up to recliner.  Reports some pain in her right knee, believes she turned it when transferring.  Pain increases with walking and is relieved by Tylenol.   She does have a bruise there.   Also notes \"I have curvature of the spine\"  Follows with Dr Phillips.  Ambulates with FWW.     Otherwise pt reports she is feeling okay, eats /sleeps well.      By chart review, patient had [facility] hospitalization and [place] [facility] stay in June 2020 for subacute fracture deformity of lateral left knee and underwent left TKA.  She rehabbed fairly well in TCU and discharged to live with her family.  In October she was seen in the ED for visual changes, hallucinations and confusion.   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   Pt was seen by Neurology and no further workup planned by family.    Pt's care needs increased and family unable to safely manage her at home, so moved to [facility] for permanent placement      Biggest concern has continued to be frequent falls which have occurred since admission as well, fortunately without significant injury to date.     No behavioral concerns reported by [facility] nursing staff.       Past Medical History:   Diagnosis Date     Benign essential hypertension 9/1/2016     Chronic low back pain      Hypertension      Impaired fasting glucose     borderline     Osteoarthritis    Late onset dementia  Hallucinations  Frequent falls    Past Surgical History:   Procedure Laterality Date     ARTHROPLASTY HIP ANTERIOR Right 7/24/2018    Procedure: ARTHROPLASTY HIP ANTERIOR;  RIGHT DIRECT ANTERIOR TOTAL HIP ARTHROPLASTY;  Surgeon: Jimbo Phillips MD;  Location: [facility] OR     ARTHROPLASTY KNEE Left 5/12/2020    Procedure: LEFT TOTAL KNEE " "ARTHROPLASTY;  Surgeon: Jimbo Phillips MD;  Location:  OR     EYE SURGERY  cataracts     MOHS MICROGRAPHIC PROCEDURE      Left lower eyelid      MOHS MICROGRAPHIC PROCEDURE Left 10/27/2016    Procedure: MOHS MICROGRAPHIC PROCEDURE;  Surgeon: Jose Torrez MD;  Location:  [facility]     MOHS MICROGRAPHIC PROCEDURE Right 5/24/2018    Procedure: MOHS MICROGRAPHIC PROCEDURE;  RIGHT MEDIAL CANTHUS MOHS CLOSURE;  Surgeon: Jose Torrez MD;  Location: [facility]     ORTHOPEDIC SURGERY      bilateral wrists     SH:  , she has 2 daughters Saray and Dena and a son Terrell who all live locally.   Previously lived with her son before move to [facility]     ROS:  Ambulatory with [facility]  SLUMS 12/30  Weight in 2020 was 94 lbs  01/28/22 52.9 kg (116 lb 9.6 oz)   12/01/21 52 kg (114 lb 9.6 oz)   11/09/21 52 kg (114 lb 9.6 oz)   08/25/21 52.3 kg (115 lb 3.2 oz)      EXAM:  NAD  BP (!) 141/72   Pulse 80   Temp 97.3  F (36.3  C)   Resp 18   Ht 1.549 m (5' 1\")   Wt 52.9 kg (116 lb 9.6 oz)   SpO2 95%   BMI 22.03 kg/m     Neck supple without adenopathy  Lungs clear bilaterally  Heart RRR s1s2 @60  Ext with 1+ ankle edema, wearing velcro wraps  Right knee with bruising and tenderness medial anterior aspect     No erythema or warmth, ROM okay and she is able to bear weight.      Neuro: STML, no focal findings, ambulatory with unsteady, scissor gait  Needs directional cuing  Psych: affect okay, pleasant.       Labs reviewed    10/29/2020   MRI Brain w/o contrast:  1. Interval increase in ventriculomegaly of the lateral and third  ventricles since the 2017 brain MRI raising the question of either  developing or worsening normal pressure hydrocephalus versus  ventriculomegaly due to age-related cerebral volume loss. Recommend  clinical correlation for history of normal pressure hydrocephalus.  2. Diffuse cerebral volume loss and cerebral white matter changes  consistent with chronic small vessel ischemic disease      IMP/PLAN:   "   (M25.561) Acute pain of right knee    (M89.49) Primary osteoarthritis involving multiple joints  Comment: pt states she twisted her knee, does have a bruise and some tenderness   Otherwise exam is unremarkable and she can bear weight.     Plan: Local measures and scheduled acetaminophen for pain management       (I10) Benign essential hypertension  (N18.31) Stage 3a chronic kidney disease  Comment: GFR 49 at last check.  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         Georgie Bae MD         Sincerely,        Georgie Bae MD

## 2022-02-01 ASSESSMENT — MIFFLIN-ST. JEOR: SCORE: 891.27

## 2022-02-01 NOTE — PROGRESS NOTES
"[place] [facility]  [place] Medical Record Number:  [id number]  Place of Service where encounter took place:  [facility] ([facility] [[id number]]  Chief Complaint   Patient presents with     RECHECK       HPI:    Maryam Saavedra  is a 89 year old (6/9/1932), who is being seen today for an episodic care visit.  HPI information obtained from: facility chart records, patient report, [facility] chart review and family/first contact daughter Saray report. Today's concern is:    Ongoing right knee pain causing difficultly ambulating. She denies recent fall but reported fall on 1/8/22.  Was started on icy-hot cream last week BID but not providing much relief. Able to bear weight and walk with 2WW but much slower from baseline and non-verbal indications of pain. OK for PROM and AROM. Hx includes OA, left TKA and right IZZY.     Past Medical and Surgical History reviewed in Epic today.    MEDICATIONS:    Current Outpatient Medications   Medication Sig Dispense Refill     acetaminophen (TYLENOL) 325 MG tablet Take 2 tablets (650 mg) by mouth 2 times daily And twice daily as needed 60 tablet 97     Menthol-Methyl Salicylate (ICY HOT EXTRA STRENGTH) 10-30 % CREA Externally apply topically 2 times daily To right knee 85 g 3     polyethylene glycol (MIRALAX) 17 GM/Dose powder Take 17 g by mouth daily 510 g      SENEXON-S 8.6-50 MG tablet TAKE ONE TABLET BY MOUTH AT BEDTIME THREE TIMES PER WEEK;& TAKE ONE TABLET TWICE DAILY AS NEEDED FOR CONSTIPATION 14 tablet PRN     THERATEARS 0.25 % SOLN INSTILL ONE DROP IN EACH EYE TWICE DAILY FOR DRY EYES 15 mL 97     VITAMIN D3 50 MCG (2000 UT) tablet TAKE 1 TABLET BY MOUTH ONCE DAILY 28 tablet 97     REVIEW OF SYSTEMS:  Limited secondary to cognitive impairment but today pt reports alright     Objective:  /75   Pulse 78   Temp 97.3  F (36.3  C)   Resp 16   Ht 1.549 m (5' 1\")   Wt 52.9 kg (116 lb 9.6 oz)   SpO2 95%   BMI 22.03 kg/m  "   Exam:  GENERAL APPEARANCE: alert, tired looking   ENT:  Mouth and posterior oropharynx normal, dry mucous membranes  EYES:  EOM, conjunctivae, lids, pupils and irises normal  RESP:  lungs clear to auscultation  CV:  Palpation and auscultation of heart done , regular rate and rhythm, no murmur, rub, or gallop, +2 LE edema, wraps off  ABDOMEN:  slight distension to right mid quadrant, no pain with palpation, soft   M/S:   Gait and station with 2WW worse from previous visit   SKIN:  intact to visualized areas, pale   NEURO:   Cranial nerves 2-12 are normal tested and grossly at patient's baseline  PSYCH:  insight and judgement impaired, memory impaired, STML    Labs:   CBC RESULTS: Recent Labs   Lab Test 01/06/21  0000 10/29/20  1608   WBC 5.1 6.3   RBC 4.17 4.41   HGB 11.7 12.3   HCT 37.7 39.2   MCV 90 89   MCH 28.1 27.9   MCHC 31.0* 31.4*   RDW 15.9* 14.9    293       Last Basic Metabolic Panel:  Recent Labs   Lab Test 01/06/21  0000 10/29/20  1608    140   POTASSIUM 3.9 3.8   CHLORIDE 105 106   NOHEMY 9.3 9.4   CO2 30 28   BUN 26 35*   CR 1.01 0.73   * 107*       Liver Function Studies -   Recent Labs   Lab Test 01/06/21  0000 04/02/20  1934   PROTTOTAL 7.2 6.9   ALBUMIN 2.8* 2.7*   BILITOTAL 0.5 0.3   ALKPHOS 220* 207*   AST 23 31   ALT 31 31       TSH   Date Value Ref Range Status   01/06/2021 5.73 (A) 0.40 - 4.00 mU/L Final   11/25/2017 4.20 (H) 0.40 - 4.00 mU/L Final       Lab Results   Component Value Date    A1C 5.6 07/13/2018    A1C 5.9 05/14/2018     ASSESSMENT/PLAN:  (M25.561) Acute pain of right knee  (primary encounter diagnosis)  (M15.8) Other osteoarthritis involving multiple joints  Comment: acute   Plan:   -acetaminophen (TYLENOL) from prn tp 650 mg po BID and twice daily prn  -continue icy-hot BID  -X-ray (2-3 views, AP/Lateral/Sunrise) of right knee for pain  -ortho PA prn           (N18.31) Stage 3a chronic kidney disease (H)  Comment: creatinine 0.79-1.01   Plan:   -renal dose  and avoid nephrotoxins     (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 No hx of gallbladder disease. ALT/AST ok    -updated lab orders today: BMP, CBC, LFTs, TSH       Electronically signed by:  JACKIE Zayas CNP

## 2022-02-02 ENCOUNTER — LAB REQUISITION (OUTPATIENT)
Dept: LAB | Facility: CLINIC | Age: 87
End: 2022-02-02
Payer: MEDICARE

## 2022-02-02 ENCOUNTER — ASSISTED LIVING VISIT (OUTPATIENT)
Dept: GERIATRICS | Facility: CLINIC | Age: 87
End: 2022-02-02
Payer: MEDICARE

## 2022-02-02 VITALS
RESPIRATION RATE: 16 BRPM | SYSTOLIC BLOOD PRESSURE: 138 MMHG | HEIGHT: 61 IN | TEMPERATURE: 97.3 F | DIASTOLIC BLOOD PRESSURE: 75 MMHG | OXYGEN SATURATION: 95 % | HEART RATE: 78 BPM | BODY MASS INDEX: 22.01 KG/M2 | WEIGHT: 116.6 LBS

## 2022-02-02 DIAGNOSIS — M25.561 ACUTE PAIN OF RIGHT KNEE: Primary | ICD-10-CM

## 2022-02-02 DIAGNOSIS — R74.8 ELEVATED SERUM ALKALINE PHOSPHATASE LEVEL: ICD-10-CM

## 2022-02-02 DIAGNOSIS — F01.518 VASCULAR DEMENTIA WITH BEHAVIOR DISTURBANCE (H): ICD-10-CM

## 2022-02-02 DIAGNOSIS — M15.8 OTHER OSTEOARTHRITIS INVOLVING MULTIPLE JOINTS: ICD-10-CM

## 2022-02-02 DIAGNOSIS — U07.1 COVID-19: ICD-10-CM

## 2022-02-02 DIAGNOSIS — N18.31 STAGE 3A CHRONIC KIDNEY DISEASE (H): ICD-10-CM

## 2022-02-02 PROCEDURE — U0003 INFECTIOUS AGENT DETECTION BY NUCLEIC ACID (DNA OR RNA); SEVERE ACUTE RESPIRATORY SYNDROME CORONAVIRUS 2 (SARS-COV-2) (CORONAVIRUS DISEASE [COVID-19]), AMPLIFIED PROBE TECHNIQUE, MAKING USE OF HIGH THROUGHPUT TECHNOLOGIES AS DESCRIBED BY CMS-2020-01-R: HCPCS | Mod: ORL | Performed by: INTERNAL MEDICINE

## 2022-02-02 RX ORDER — ACETAMINOPHEN 325 MG/1
650 TABLET ORAL 2 TIMES DAILY
Qty: 60 TABLET | Refills: 97 | Status: SHIPPED | OUTPATIENT
Start: 2022-02-02 | End: 2023-02-17

## 2022-02-02 NOTE — LETTER
Additional orders for Maryam Saavedra    1. BMP, CBC, LFTs, for CKD next week on lab day  2. TSH for hypothyroidism next week on lab day    JACKIE Zayas CNP on 2/2/2022 at 3:15 PM

## 2022-02-02 NOTE — LETTER
2/2/2022        RE: Maryam Saavedra  C/o Saray Saavedra  [address] [facility] [address]        [place] [facility]  [place] Medical Record Number:  [id number]  Place of Service where encounter took place:  [facility] [[id number]]  Chief Complaint   Patient presents with     RECHECK       HPI:    Maryam Saavedra  is a 89 year old (6/9/1932), who is being seen today for an episodic care visit.  HPI information obtained from: facility chart records, patient report, [facility] chart review and family/first contact daughter Saray report. Today's concern is:    Ongoing right knee pain causing difficultly ambulating. She denies recent fall but reported fall on 1/8/22.  Was started on icy-hot cream last week BID but not providing much relief. Able to bear weight and walk with 2WW but much slower from baseline and non-verbal indications of pain. OK for PROM and AROM. Hx includes OA, left TKA and right IZZY.     Past Medical and Surgical History reviewed in Epic today.    MEDICATIONS:    Current Outpatient Medications   Medication Sig Dispense Refill     acetaminophen (TYLENOL) 325 MG tablet Take 2 tablets (650 mg) by mouth 2 times daily And twice daily as needed 60 tablet 97     Menthol-Methyl Salicylate (ICY HOT EXTRA STRENGTH) 10-30 % CREA Externally apply topically 2 times daily To right knee 85 g 3     polyethylene glycol (MIRALAX) 17 GM/Dose powder Take 17 g by mouth daily 510 g      SENEXON-S 8.6-50 MG tablet TAKE ONE TABLET BY MOUTH AT BEDTIME THREE TIMES PER WEEK;& TAKE ONE TABLET TWICE DAILY AS NEEDED FOR CONSTIPATION 14 tablet PRN     THERATEARS 0.25 % SOLN INSTILL ONE DROP IN EACH EYE TWICE DAILY FOR DRY EYES 15 mL 97     VITAMIN D3 50 MCG (2000 UT) tablet TAKE 1 TABLET BY MOUTH ONCE DAILY 28 tablet 97     REVIEW OF SYSTEMS:  Limited secondary to cognitive impairment but today pt reports alright     Objective:  /75   Pulse 78   Temp 97.3  F (36.3  C)    "Resp 16   Ht 1.549 m (5' 1\")   Wt 52.9 kg (116 lb 9.6 oz)   SpO2 95%   BMI 22.03 kg/m    Exam:  GENERAL APPEARANCE: alert, tired looking   ENT:  Mouth and posterior oropharynx normal, dry mucous membranes  EYES:  EOM, conjunctivae, lids, pupils and irises normal  RESP:  lungs clear to auscultation  CV:  Palpation and auscultation of heart done , regular rate and rhythm, no murmur, rub, or gallop, +2 LE edema, wraps off  ABDOMEN:  slight distension to right mid quadrant, no pain with palpation, soft   M/S:   Gait and station with 2WW worse from previous visit   SKIN:  intact to visualized areas, pale   NEURO:   Cranial nerves 2-12 are normal tested and grossly at patient's baseline  PSYCH:  insight and judgement impaired, memory impaired, STML    Labs:   CBC RESULTS: Recent Labs   Lab Test 01/06/21  0000 10/29/20  1608   WBC 5.1 6.3   RBC 4.17 4.41   HGB 11.7 12.3   HCT 37.7 39.2   MCV 90 89   MCH 28.1 27.9   MCHC 31.0* 31.4*   RDW 15.9* 14.9    293       Last Basic Metabolic Panel:  Recent Labs   Lab Test 01/06/21  0000 10/29/20  1608    140   POTASSIUM 3.9 3.8   CHLORIDE 105 106   NOHEMY 9.3 9.4   CO2 30 28   BUN 26 35*   CR 1.01 0.73   * 107*       Liver Function Studies -   Recent Labs   Lab Test 01/06/21  0000 04/02/20  1934   PROTTOTAL 7.2 6.9   ALBUMIN 2.8* 2.7*   BILITOTAL 0.5 0.3   ALKPHOS 220* 207*   AST 23 31   ALT 31 31       TSH   Date Value Ref Range Status   01/06/2021 5.73 (A) 0.40 - 4.00 mU/L Final   11/25/2017 4.20 (H) 0.40 - 4.00 mU/L Final       Lab Results   Component Value Date    A1C 5.6 07/13/2018    A1C 5.9 05/14/2018     ASSESSMENT/PLAN:  (M25.561) Acute pain of right knee  (primary encounter diagnosis)  (M15.8) Other osteoarthritis involving multiple joints  Comment: acute   Plan:   -acetaminophen (TYLENOL) from prn tp 650 mg po BID and twice daily prn  -continue icy-hot BID  -X-ray (2-3 views, AP/Lateral/Sunrise) of right knee for pain  -ortho PA prn           (N18.31) " Stage 3a chronic kidney disease (H)  Comment: creatinine 0.79-1.01   Plan:   -renal dose and avoid nephrotoxins     (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 No hx of gallbladder disease. ALT/AST ok    -updated lab orders today: BMP, CBC, LFTs, TSH       Electronically signed by:  JACKIE Zayas CNP                 Sincerely,        JACKIE Zayas CNP

## 2022-02-02 NOTE — LETTER
New Orders for Maryam Rice:    1. Discontinue current orders for Tylenol and begin   acetaminophen (TYLENOL) 325 MG tablet Take 2 tablets (650 mg) by mouth 2 times daily And twice daily as needed     2. X-ray (2-3 views, AP/Lateral/Sunrise) of right knee for pain    JACKIE Zayas CNP on 2/2/2022 at 12:48 PM

## 2022-02-03 ENCOUNTER — TRANSFERRED RECORDS (OUTPATIENT)
Dept: HEALTH INFORMATION MANAGEMENT | Facility: CLINIC | Age: 87
End: 2022-02-03
Payer: MEDICARE

## 2022-02-03 ENCOUNTER — TELEPHONE (OUTPATIENT)
Dept: GERIATRICS | Facility: CLINIC | Age: 87
End: 2022-02-03
Payer: MEDICARE

## 2022-02-03 LAB — SARS-COV-2 RNA RESP QL NAA+PROBE: NOT DETECTED

## 2022-02-03 NOTE — TELEPHONE ENCOUNTER
[facility] Triage Nurse Telephone Encounter    Provider: JACKIE Louise CNP  Facility: [facility]        Facility Type:  AL    Caller: Colette  Call Back Number: [phone]    Allergies:    Allergies   Allergen Reactions     Ace Inhibitors Angioedema     Cyclobenzaprine Angioedema        Reason for call:     Verbal Order/Direction given by Provider: Continue scheduled Tylenol. Ice packs 15-20min q shift if she tolerates.    Provider giving Order:  JACKIE Louise CNP    Verbal Order given to: HUGO Hall RN

## 2022-02-05 NOTE — PROGRESS NOTES
"Maryam Saavedra is a 89 year old female seen January 28, 2022 at [facility] where she has resided for one year (admit 12/2020).  Pt is seen in her room up to recliner.  Reports some pain in her right knee, believes she turned it when transferring.  Pain increases with walking and is relieved by Tylenol.   She does have a bruise there.   Also notes \"I have curvature of the spine\"  Follows with Dr Phillips.  Ambulates with FWW.     Otherwise pt reports she is feeling okay, eats /sleeps well.      By chart review, patient had [facility] hospitalization and [facility] stay in June 2020 for subacute fracture deformity of lateral left knee and underwent left TKA.  She rehabbed fairly well in TCU and discharged to live with her family.  In October she was seen in the ED for visual changes, hallucinations and confusion.   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   Pt was seen by Neurology and no further workup planned by family.    Pt's care needs increased and family unable to safely manage her at home, so moved to [facility] unit for permanent placement      Biggest concern has continued to be frequent falls which have occurred since admission as well, fortunately without significant injury to date.     No behavioral concerns reported by AL nursing staff.       Past Medical History:   Diagnosis Date     Benign essential hypertension 9/1/2016     Chronic low back pain      Hypertension      Impaired fasting glucose     borderline     Osteoarthritis    Late onset dementia  Hallucinations  Frequent falls    Past Surgical History:   Procedure Laterality Date     ARTHROPLASTY HIP ANTERIOR Right 7/24/2018    Procedure: ARTHROPLASTY HIP ANTERIOR;  RIGHT DIRECT ANTERIOR TOTAL HIP ARTHROPLASTY;  Surgeon: Jimbo Phillips MD;  Location:  OR     ARTHROPLASTY KNEE Left 5/12/2020    Procedure: LEFT TOTAL KNEE ARTHROPLASTY;  Surgeon: Jimbo Phillips MD;  Location:  OR     EYE SURGERY  cataracts     MOHS " "MICROGRAPHIC PROCEDURE      Left lower eyelid      MOHS MICROGRAPHIC PROCEDURE Left 10/27/2016    Procedure: MOHS MICROGRAPHIC PROCEDURE;  Surgeon: oJse Torrez MD;  Location: [facility]     MOHS MICROGRAPHIC PROCEDURE Right 5/24/2018    Procedure: MOHS MICROGRAPHIC PROCEDURE;  RIGHT MEDIAL CANTHUS MOHS CLOSURE;  Surgeon: Jose Torrez MD;  Location: [facility]     ORTHOPEDIC SURGERY      bilateral wrists     SH:  , she has 2 daughters Saray and Dena and a son Terrell who all live locally.   Previously lived with her son before move to [place]     ROS:  Ambulatory with W  SLUMS 12/30  Weight in 2020 was 94 lbs  01/28/22 52.9 kg (116 lb 9.6 oz)   12/01/21 52 kg (114 lb 9.6 oz)   11/09/21 52 kg (114 lb 9.6 oz)   08/25/21 52.3 kg (115 lb 3.2 oz)      EXAM:  NAD  BP (!) 141/72   Pulse 80   Temp 97.3  F (36.3  C)   Resp 18   Ht 1.549 m (5' 1\")   Wt 52.9 kg (116 lb 9.6 oz)   SpO2 95%   BMI 22.03 kg/m     Neck supple without adenopathy  Lungs clear bilaterally  Heart RRR s1s2 @60  Ext with 1+ ankle edema, wearing velcro wraps  Right knee with bruising and tenderness medial anterior aspect     No erythema or warmth, ROM okay and she is able to bear weight.      Neuro: STML, no focal findings, ambulatory with unsteady, scissor gait  Needs directional cuing  Psych: affect okay, pleasant.       Labs reviewed    10/29/2020   MRI Brain w/o contrast:  1. Interval increase in ventriculomegaly of the lateral and third  ventricles since the 2017 brain MRI raising the question of either  developing or worsening normal pressure hydrocephalus versus  ventriculomegaly due to age-related cerebral volume loss. Recommend  clinical correlation for history of normal pressure hydrocephalus.  2. Diffuse cerebral volume loss and cerebral white matter changes  consistent with chronic small vessel ischemic disease      IMP/PLAN:     (M25.561) Acute pain of right knee    (M89.49) Primary osteoarthritis involving multiple joints  Comment: pt " states she twisted her knee, does have a bruise and some tenderness   Otherwise exam is unremarkable and she can bear weight.     Plan: Local measures and scheduled acetaminophen for pain management       (I10) Benign essential hypertension  (N18.31) Stage 3a chronic kidney disease  Comment: GFR 49 at last check.  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         Georgie Bae MD

## 2022-02-07 ENCOUNTER — ASSISTED LIVING VISIT (OUTPATIENT)
Dept: GERIATRICS | Facility: CLINIC | Age: 87
End: 2022-02-07
Payer: MEDICARE

## 2022-02-07 DIAGNOSIS — M15.8 OTHER OSTEOARTHRITIS INVOLVING MULTIPLE JOINTS: Primary | ICD-10-CM

## 2022-02-07 PROCEDURE — 99207 PR NO CHARGE LOS: CPT | Performed by: PHYSICIAN ASSISTANT

## 2022-02-07 NOTE — PROGRESS NOTES
Ortho Nursing home visit    Maryam Saavedra is a 89 year old female who resides at [facility];    Patient is seen today for Right knee pain , hs of OA, has had left knee replaced, uses walker, in apt; for mobility  Seen today on-site;      Past Medical History:   Diagnosis Date     Benign essential hypertension 9/1/2016     Chronic low back pain      Hypertension      Impaired fasting glucose     borderline     Osteoarthritis       Past Surgical History:   Procedure Laterality Date     ARTHROPLASTY HIP ANTERIOR Right 7/24/2018    Procedure: ARTHROPLASTY HIP ANTERIOR;  RIGHT DIRECT ANTERIOR TOTAL HIP ARTHROPLASTY;  Surgeon: Jimbo Phillips MD;  Location:  OR     ARTHROPLASTY KNEE Left 5/12/2020    Procedure: LEFT TOTAL KNEE ARTHROPLASTY;  Surgeon: Jimbo Phillips MD;  Location:  OR     EYE SURGERY  cataracts     MOHS MICROGRAPHIC PROCEDURE      Left lower eyelid      MOHS MICROGRAPHIC PROCEDURE Left 10/27/2016    Procedure: MOHS MICROGRAPHIC PROCEDURE;  Surgeon: Jose Torrez MD;  Location: [facility]     MOHS MICROGRAPHIC PROCEDURE Right 5/24/2018    Procedure: MOHS MICROGRAPHIC PROCEDURE;  RIGHT MEDIAL CANTHUS MOHS CLOSURE;  Surgeon: Jose Torrez MD;  Location: [facility]     ORTHOPEDIC SURGERY      bilateral wrists        Allergies   Allergen Reactions     Ace Inhibitors Angioedema     Cyclobenzaprine Angioedema      There were no vitals taken for this visit.     Exam: Seated; has PROM 0/90 lig intact/ pain medial joint, no locking, no noted effusion or swelling;  Some crepitus with ROM:      X-rays show : Medial joint space narrowing, OA      ASSESSMENT / PLAN:  Discussed R&B of steroid injection; topical medication;    Patient wishes to proceed with cortisone injection right knee    Procedure: Right knee prepped; injected with 1 ml depo medrol, 4cc 1% lidocaine into medial joint space;  Tolerated well no complaints, [id number]          [facility]  [phone] Cell

## 2022-02-08 ENCOUNTER — LAB REQUISITION (OUTPATIENT)
Dept: LAB | Facility: CLINIC | Age: 87
End: 2022-02-08
Payer: MEDICARE

## 2022-02-08 DIAGNOSIS — U07.1 COVID-19: ICD-10-CM

## 2022-02-08 PROCEDURE — U0005 INFEC AGEN DETEC AMPLI PROBE: HCPCS | Mod: ORL | Performed by: INTERNAL MEDICINE

## 2022-02-09 ENCOUNTER — LAB REQUISITION (OUTPATIENT)
Dept: LAB | Facility: CLINIC | Age: 87
End: 2022-02-09
Payer: MEDICARE

## 2022-02-09 DIAGNOSIS — E03.9 HYPOTHYROIDISM, UNSPECIFIED: ICD-10-CM

## 2022-02-09 DIAGNOSIS — N18.30 CHRONIC KIDNEY DISEASE, STAGE 3 UNSPECIFIED (H): ICD-10-CM

## 2022-02-09 LAB — SARS-COV-2 RNA RESP QL NAA+PROBE: NOT DETECTED

## 2022-02-10 LAB
ALBUMIN SERPL-MCNC: 2.8 G/DL (ref 3.4–5)
ALP SERPL-CCNC: 163 U/L (ref 40–150)
ALT SERPL W P-5'-P-CCNC: 22 U/L (ref 0–50)
ANION GAP SERPL CALCULATED.3IONS-SCNC: 5 MMOL/L (ref 3–14)
AST SERPL W P-5'-P-CCNC: 19 U/L (ref 0–45)
BILIRUB DIRECT SERPL-MCNC: <0.1 MG/DL (ref 0–0.2)
BILIRUB SERPL-MCNC: 0.3 MG/DL (ref 0.2–1.3)
BUN SERPL-MCNC: 28 MG/DL (ref 7–30)
CALCIUM SERPL-MCNC: 9.1 MG/DL (ref 8.5–10.1)
CHLORIDE BLD-SCNC: 106 MMOL/L (ref 94–109)
CO2 SERPL-SCNC: 29 MMOL/L (ref 20–32)
CREAT SERPL-MCNC: 0.81 MG/DL (ref 0.52–1.04)
ERYTHROCYTE [DISTWIDTH] IN BLOOD BY AUTOMATED COUNT: 14.5 % (ref 10–15)
GFR SERPL CREATININE-BSD FRML MDRD: 69 ML/MIN/1.73M2
GLUCOSE BLD-MCNC: 81 MG/DL (ref 70–99)
HCT VFR BLD AUTO: 37.4 % (ref 35–47)
HGB BLD-MCNC: 11.6 G/DL (ref 11.7–15.7)
MCH RBC QN AUTO: 28.4 PG (ref 26.5–33)
MCHC RBC AUTO-ENTMCNC: 31 G/DL (ref 31.5–36.5)
MCV RBC AUTO: 92 FL (ref 78–100)
PLATELET # BLD AUTO: 291 10E3/UL (ref 150–450)
POTASSIUM BLD-SCNC: 3.5 MMOL/L (ref 3.4–5.3)
PROT SERPL-MCNC: 6.7 G/DL (ref 6.8–8.8)
RBC # BLD AUTO: 4.08 10E6/UL (ref 3.8–5.2)
SODIUM SERPL-SCNC: 140 MMOL/L (ref 133–144)
TSH SERPL DL<=0.005 MIU/L-ACNC: 2.96 MU/L (ref 0.4–4)
WBC # BLD AUTO: 5.7 10E3/UL (ref 4–11)

## 2022-02-10 PROCEDURE — 36415 COLL VENOUS BLD VENIPUNCTURE: CPT | Mod: ORL | Performed by: NURSE PRACTITIONER

## 2022-02-10 PROCEDURE — 85027 COMPLETE CBC AUTOMATED: CPT | Mod: ORL | Performed by: NURSE PRACTITIONER

## 2022-02-10 PROCEDURE — 84443 ASSAY THYROID STIM HORMONE: CPT | Mod: ORL | Performed by: NURSE PRACTITIONER

## 2022-02-10 PROCEDURE — 80053 COMPREHEN METABOLIC PANEL: CPT | Mod: ORL | Performed by: NURSE PRACTITIONER

## 2022-02-10 PROCEDURE — 82248 BILIRUBIN DIRECT: CPT | Mod: ORL | Performed by: NURSE PRACTITIONER

## 2022-02-10 PROCEDURE — P9604 ONE-WAY ALLOW PRORATED TRIP: HCPCS | Mod: ORL | Performed by: NURSE PRACTITIONER

## 2022-02-16 ENCOUNTER — ASSISTED LIVING VISIT (OUTPATIENT)
Dept: GERIATRICS | Facility: CLINIC | Age: 87
End: 2022-02-16
Payer: MEDICARE

## 2022-02-16 ENCOUNTER — TRANSFERRED RECORDS (OUTPATIENT)
Dept: HEALTH INFORMATION MANAGEMENT | Facility: CLINIC | Age: 87
End: 2022-02-16

## 2022-02-16 VITALS
DIASTOLIC BLOOD PRESSURE: 73 MMHG | BODY MASS INDEX: 22.36 KG/M2 | HEART RATE: 98 BPM | OXYGEN SATURATION: 95 % | WEIGHT: 118.4 LBS | RESPIRATION RATE: 19 BRPM | TEMPERATURE: 97.1 F | SYSTOLIC BLOOD PRESSURE: 126 MMHG | HEIGHT: 61 IN

## 2022-02-16 DIAGNOSIS — I82.411 ACUTE DEEP VEIN THROMBOSIS (DVT) OF FEMORAL VEIN OF RIGHT LOWER EXTREMITY (H): Primary | ICD-10-CM

## 2022-02-16 NOTE — LETTER
New orders for Maryam Quentin    apixaban ANTICOAGULANT (ELIQUIS) 5 MG tablet Begin 10 mg po twice daily for 7 days then reduce to 5 mg po twice daily thereafter until 8/24/22     -discontinue compression to right lower extremity for now  -continue tylenol scheduled and prn for pain     JACKIE Zayas CNP on 2/17/2022 at 6:49 AM

## 2022-02-16 NOTE — LETTER
"    2/16/2022        RE: Maryam Saavedra  C/o Saray Saavedra  [address] [address]        [facility]  [facility] Medical Record Number:  [id number]  Place of Service where encounter took place:  [facility] [[id number]]  Chief Complaint   Patient presents with     RECHECK       HPI:    Maryam Saavedra  is a 89 year old (6/9/1932), who is being seen today for an episodic care visit.  HPI information obtained from: facility chart records, facility staff, patient report and [facility] chart review.     Today's concern is:  Pain and lower extremity edema of right leg now positive for DVT (impression/findings below) could be secondary to decreased mobility with knee pain.     Past Medical and Surgical History reviewed in Epic today.    MEDICATIONS:    Current Outpatient Medications   Medication Sig Dispense Refill     acetaminophen (TYLENOL) 325 MG tablet Take 2 tablets (650 mg) by mouth 2 times daily And twice daily as needed 60 tablet 97     Menthol-Methyl Salicylate (ICY HOT EXTRA STRENGTH) 10-30 % CREA Externally apply topically 2 times daily To right knee 85 g 3     polyethylene glycol (MIRALAX) 17 GM/Dose powder Take 17 g by mouth daily 510 g      SENEXON-S 8.6-50 MG tablet TAKE ONE TABLET BY MOUTH AT BEDTIME THREE TIMES PER WEEK;& TAKE ONE TABLET TWICE DAILY AS NEEDED FOR CONSTIPATION 14 tablet PRN     THERATEARS 0.25 % SOLN INSTILL ONE DROP IN EACH EYE TWICE DAILY FOR DRY EYES 15 mL 97     VITAMIN D3 50 MCG (2000 UT) tablet TAKE 1 TABLET BY MOUTH ONCE DAILY 28 tablet 97     REVIEW OF SYSTEMS:  Limited secondary to cognitive impairment but today pt reports ok    Objective:  /73   Pulse 98   Temp 97.1  F (36.2  C)   Resp 19   Ht 1.549 m (5' 1\")   Wt 53.7 kg (118 lb 6.4 oz)   SpO2 95%   BMI 22.37 kg/m    Exam:  GENERAL APPEARANCE: alert, tired looking   ENT:  Mouth and posterior oropharynx normal, dry mucous membranes  EYES:  EOM, " conjunctivae, lids, pupils and irises normal  RESP:  lungs clear to auscultation  CV:  Palpation and auscultation of heart done , regular rate and rhythm, no murmur, rub, or gallop, +2 LE edema on right  ABDOMEN:  slight distension to right mid quadrant, no pain with palpation, soft   M/S:   Gait and station with 2WW worse from previous visit   SKIN:  intact to visualized areas, pale   NEURO:   Cranial nerves 2-12 are normal tested and grossly at patient's baseline  PSYCH:  insight and judgement impaired, memory impaired, STML    Labs and Imaging:     US and Doppler 2/16/22:        CBC RESULTS: Recent Labs   Lab Test 02/10/22  0545 01/06/21  0000   WBC 5.7 5.1   RBC 4.08 4.17   HGB 11.6* 11.7   HCT 37.4 37.7   MCV 92 90   MCH 28.4 28.1   MCHC 31.0* 31.0*   RDW 14.5 15.9*    239       Last Basic Metabolic Panel:  Recent Labs   Lab Test 02/10/22  0545 01/06/21  0000    139   POTASSIUM 3.5 3.9   CHLORIDE 106 105   NOHEMY 9.1 9.3   CO2 29 30   BUN 28 26   CR 0.81 1.01   GLC 81 131*       Liver Function Studies -   Recent Labs   Lab Test 02/10/22  0545 01/06/21  0000   PROTTOTAL 6.7* 7.2   ALBUMIN 2.8* 2.8*   BILITOTAL 0.3 0.5   ALKPHOS 163* 220*   AST 19 23   ALT 22 31       TSH   Date Value Ref Range Status   02/10/2022 2.96 0.40 - 4.00 mU/L Final   01/06/2021 5.73 (A) 0.40 - 4.00 mU/L Final   11/25/2017 4.20 (H) 0.40 - 4.00 mU/L Final       Lab Results   Component Value Date    A1C 5.6 07/13/2018    A1C 5.9 05/14/2018     ASSESSMENT/PLAN:  (I82.411) Acute deep vein thrombosis (DVT) of femoral vein of right lower extremity (H)  (primary encounter diagnosis)  Comment: causing pain and swelling to RLE  Plan:     apixaban ANTICOAGULANT (ELIQUIS) 5 MG tablet Begin 10 mg po twice daily for 7 days then reduce to 5 mg po twice daily thereafter until 8/24/22     -discontinue compression to right lower extremity for now  -continue tylenol scheduled and prn for pain     Electronically signed by:  JACKIE Zayas  CNP                 Sincerely,        Sandro Canseco APRN CNP

## 2022-02-16 NOTE — PROGRESS NOTES
"[facility]  [facility] Record Number:  [id number]  Place of Service where encounter took place:  [facility] [[id number]]  Chief Complaint   Patient presents with     RECHECK     Addendum: apixaban not covered by insurance so switching to Xarelto.    HPI:    Maryam Saavedra  is a 89 year old (6/9/1932), who is being seen today for an episodic care visit.  HPI information obtained from: facility chart records, facility staff, patient report and [facility] chart review.     Today's concern is:  Pain and lower extremity edema of right leg now positive for DVT (impression/findings below) could be secondary to decreased mobility with knee pain.     Past Medical and Surgical History reviewed in Epic today.    MEDICATIONS:    Current Outpatient Medications   Medication Sig Dispense Refill     acetaminophen (TYLENOL) 325 MG tablet Take 2 tablets (650 mg) by mouth 2 times daily And twice daily as needed 60 tablet 97     Menthol-Methyl Salicylate (ICY HOT EXTRA STRENGTH) 10-30 % CREA Externally apply topically 2 times daily To right knee 85 g 3     polyethylene glycol (MIRALAX) 17 GM/Dose powder Take 17 g by mouth daily 510 g      SENEXON-S 8.6-50 MG tablet TAKE ONE TABLET BY MOUTH AT BEDTIME THREE TIMES PER WEEK;& TAKE ONE TABLET TWICE DAILY AS NEEDED FOR CONSTIPATION 14 tablet PRN     THERATEARS 0.25 % SOLN INSTILL ONE DROP IN EACH EYE TWICE DAILY FOR DRY EYES 15 mL 97     VITAMIN D3 50 MCG (2000 UT) tablet TAKE 1 TABLET BY MOUTH ONCE DAILY 28 tablet 97     REVIEW OF SYSTEMS:  Limited secondary to cognitive impairment but today pt reports ok    Objective:  /73   Pulse 98   Temp 97.1  F (36.2  C)   Resp 19   Ht 1.549 m (5' 1\")   Wt 53.7 kg (118 lb 6.4 oz)   SpO2 95%   BMI 22.37 kg/m    Exam:  GENERAL APPEARANCE: alert, tired looking   ENT:  Mouth and posterior oropharynx normal, dry mucous membranes  EYES:  EOM, conjunctivae, lids, pupils and irises " normal  RESP:  lungs clear to auscultation  CV:  Palpation and auscultation of heart done , regular rate and rhythm, no murmur, rub, or gallop, +2 LE edema on right  ABDOMEN:  slight distension to right mid quadrant, no pain with palpation, soft   M/S:   Gait and station with 2WW worse from previous visit   SKIN:  intact to visualized areas, pale   NEURO:   Cranial nerves 2-12 are normal tested and grossly at patient's baseline  PSYCH:  insight and judgement impaired, memory impaired, STML    Labs and Imaging:     US and Doppler 2/16/22:        CBC RESULTS: Recent Labs   Lab Test 02/10/22  0545 01/06/21  0000   WBC 5.7 5.1   RBC 4.08 4.17   HGB 11.6* 11.7   HCT 37.4 37.7   MCV 92 90   MCH 28.4 28.1   MCHC 31.0* 31.0*   RDW 14.5 15.9*    239       Last Basic Metabolic Panel:  Recent Labs   Lab Test 02/10/22  0545 01/06/21  0000    139   POTASSIUM 3.5 3.9   CHLORIDE 106 105   NOHEMY 9.1 9.3   CO2 29 30   BUN 28 26   CR 0.81 1.01   GLC 81 131*       Liver Function Studies -   Recent Labs   Lab Test 02/10/22  0545 01/06/21  0000   PROTTOTAL 6.7* 7.2   ALBUMIN 2.8* 2.8*   BILITOTAL 0.3 0.5   ALKPHOS 163* 220*   AST 19 23   ALT 22 31       TSH   Date Value Ref Range Status   02/10/2022 2.96 0.40 - 4.00 mU/L Final   01/06/2021 5.73 (A) 0.40 - 4.00 mU/L Final   11/25/2017 4.20 (H) 0.40 - 4.00 mU/L Final       Lab Results   Component Value Date    A1C 5.6 07/13/2018    A1C 5.9 05/14/2018     ASSESSMENT/PLAN:  (I82.411) Acute deep vein thrombosis (DVT) of femoral vein of right lower extremity (H)  (primary encounter diagnosis)  Comment: causing pain and swelling to RLE  Plan:     rivaroxaban ANTICOAGULANT (XARELTO ANTICOAGULANT) 15 MG TABS tablet Take 1 tablet (15 mg) by mouth 2 times daily (with meals) for 21 days Then 20 mg po daily       -discontinue compression to right lower extremity for now  -continue tylenol scheduled and prn for pain     Electronically signed by:  JACKIE Zayas CNP

## 2022-02-16 NOTE — LETTER
Maryam Saavedra  1. Discontinue apixaban  2. Begin   rivaroxaban ANTICOAGULANT (XARELTO ANTICOAGULANT) 15 MG TABS tablet Take 1 tablet (15 mg) by mouth 2 times daily (with meals) for 21 days Then 20 mg po daily starting  on day 22     rivaroxaban ANTICOAGULANT (XARELTO ANTICOAGULANT) 20 MG TABS tablet Take 1 tablet (20 mg) by mouth daily (with dinner) Begin after 21 days of Xarelto 15 mg po BID         3. Continue other previous orders     JACKIE Zayas CNP on 2/17/2022 at 10:44 AM

## 2022-02-27 ENCOUNTER — TELEPHONE (OUTPATIENT)
Dept: GERIATRICS | Facility: CLINIC | Age: 87
End: 2022-02-27
Payer: MEDICARE

## 2022-02-27 NOTE — TELEPHONE ENCOUNTER
[facility] TRIAGE ENCOUNTER    Chief Complaint   Patient presents with     Fall       Maryam Saavedra is a 89 year old  (6/9/1932), Nurse called today to report: patient had a fall today, she had a fall 2 days ago. She is no longer able to ambulate with her walker. The staff are using wheelchair for safety. She has a history of DVT in her leg that she feels is weaker.    ASSESSMENT/PLAN    PT/OT to eval to treat    Continue with wheelchair transfers    Continue to monitor    Electronically signed by:   Korin Li, NP

## 2022-03-02 ENCOUNTER — ASSISTED LIVING VISIT (OUTPATIENT)
Dept: GERIATRICS | Facility: CLINIC | Age: 87
End: 2022-03-02
Payer: MEDICARE

## 2022-03-02 VITALS
HEIGHT: 61 IN | OXYGEN SATURATION: 98 % | TEMPERATURE: 97.3 F | BODY MASS INDEX: 22.36 KG/M2 | DIASTOLIC BLOOD PRESSURE: 82 MMHG | SYSTOLIC BLOOD PRESSURE: 145 MMHG | HEART RATE: 64 BPM | WEIGHT: 118.4 LBS | RESPIRATION RATE: 16 BRPM

## 2022-03-02 DIAGNOSIS — I82.411 ACUTE DEEP VEIN THROMBOSIS (DVT) OF FEMORAL VEIN OF RIGHT LOWER EXTREMITY (H): ICD-10-CM

## 2022-03-02 DIAGNOSIS — M62.81 GENERALIZED MUSCLE WEAKNESS: ICD-10-CM

## 2022-03-02 DIAGNOSIS — M25.561 ACUTE PAIN OF RIGHT KNEE: ICD-10-CM

## 2022-03-02 DIAGNOSIS — M15.0 PRIMARY OSTEOARTHRITIS INVOLVING MULTIPLE JOINTS: Primary | ICD-10-CM

## 2022-03-02 NOTE — NURSING NOTE
[facility]  [place] [facility] Record Number:  [id number]  Place of Service where encounter took place:  [facility] [[id number]]  Chief Complaint   Patient presents with     RECHECK     DVT, fall       HPI:    Maryam Saavedra  is a 89 year old (6/9/1932), who is being seen today for an episodic care visit.  HPI information obtained from: facility chart records, facility staff, patient report and [facility] chart review. Today's concern is:  Pt seen today for follow up on recent DVT and fall. Maryam is seen in her room, she has not been getting up as she is afraid of falling. She has been using her wheelchair for ambulation. Pt will be beginning PT today. RLE edema present but improved. Pt reports pain in RLE is not getting worse and only experiences pain with ambulation.     Past Medical and Surgical History reviewed in Epic today.    MEDICATIONS:    Current Outpatient Medications   Medication Sig Dispense Refill     acetaminophen (TYLENOL) 325 MG tablet Take 2 tablets (650 mg) by mouth 2 times daily And twice daily as needed 60 tablet 97     Menthol-Methyl Salicylate (ICY HOT EXTRA STRENGTH) 10-30 % CREA Externally apply topically 2 times daily To right knee 85 g 3     polyethylene glycol (MIRALAX) 17 GM/Dose powder Take 17 g by mouth daily 510 g      rivaroxaban ANTICOAGULANT (XARELTO ANTICOAGULANT) 15 MG TABS tablet Take 1 tablet (15 mg) by mouth 2 times daily (with meals) Then 20 mg po daily 60 tablet 0     rivaroxaban ANTICOAGULANT (XARELTO ANTICOAGULANT) 20 MG TABS tablet Take 1 tablet (20 mg) by mouth daily (with dinner) Begin after 21 days of Xarelto 15 mg po BID 30 tablet 3     SENEXON-S 8.6-50 MG tablet TAKE ONE TABLET BY MOUTH AT BEDTIME THREE TIMES PER WEEK;& TAKE ONE TABLET TWICE DAILY AS NEEDED FOR CONSTIPATION 14 tablet PRN     THERATEARS 0.25 % SOLN INSTILL ONE DROP IN EACH EYE TWICE DAILY FOR DRY EYES 15 mL 97     VITAMIN D3 50 MCG (2000 UT) tablet  "TAKE 1 TABLET BY MOUTH ONCE DAILY 28 tablet 97         REVIEW OF SYSTEMS:  4 point ROS including Respiratory, CV, GI and , other than that noted in the HPI,  is negative    Objective:  BP (!) 189/76   Pulse 89   Temp 98.1  F (36.7  C)   Resp 18   Ht 1.549 m (5' 1\")   Wt 53.7 kg (118 lb 6.4 oz)   SpO2 95%   BMI 22.37 kg/m    Exam:  GENERAL APPEARANCE:  Alert, cooperative  ENT:  Mouth and posterior oropharynx normal, moist mucous membranes  RESP:  respiratory effort and palpation of chest normal, lungs clear to auscultation , no respiratory distress  CV:  Palpation and auscultation of heart done , regular rate and rhythm, no murmur, rub, or gallop, peripheral edema 2+ in RLE  ABDOMEN:  normal bowel sounds, soft, nontender, no hepatosplenomegaly or other masses  M/S:   gait and strength below baseline, has been using w/c and not ambulating  SKIN:  Inspection of skin and subcutaneous tissue baseline  NEURO:   Cranial nerves 2-12 are normal tested and grossly at patient's baseline  PSYCH:  insight and judgement impaired, memory impaired , affect and mood normal    Labs:   No results found for this or any previous visit (from the past 240 hour(s)).    ASSESSMENT/PLAN:  (M89.49) Primary osteoarthritis involving multiple joints  (primary encounter diagnosis)  (M25.561) Acute pain of right knee  (M62.81) Generalized muscle weakness  Comment: ongoing, using wheelchair  Plan:   -continue Tylenol 650 mg po BID and twice daily as needed  -icy hot twice daily as needed   -cortisone injection to R knee  -PT/OT     (I82.411) Acute deep vein thrombosis (DVT) of femoral vein of right lower extremity (H)   Pain and swelling in RLE  - has improved, Xarelto 15 mg po BID x 21 days (3/10/22)  then starting Xarelto 20 mg po daily           Electronically signed by:  Michaelle Alvarado RN         "

## 2022-03-02 NOTE — PROGRESS NOTES
[facility]  [facility] Medical Record Number:  [id number]  Place of Service where encounter took place:  [facility] [[id number]]  Chief Complaint   Patient presents with     RECHECK     DVT, fall       HPI:    Maryam Saavedra  is a 89 year old (6/9/1932), who is being seen today for an episodic care visit.  HPI information obtained from: facility chart records, facility staff, patient report and [facility] chart review.     Today's concern is:  Seen today for fall follow up, increased leg weakness, right knee pain and acute DVT. In her room, she says now has a fear of falling and did fall on 2/25 and  2/27 while attempting to ambulate independently to the bathroom and in her room with her walker. Some concern since now on anticoagulation. She continues to have right knee pain but thinks it hasn't gotten worse however this could be contributing to reluctance to ambulate.  Also currently being treated for right lower extremity DVT. Denies any other concerns. VSS.     Past Medical and Surgical History reviewed in Epic today.    MEDICATIONS:    Current Outpatient Medications   Medication Sig Dispense Refill     acetaminophen (TYLENOL) 325 MG tablet Take 2 tablets (650 mg) by mouth 2 times daily And twice daily as needed 60 tablet 97     Menthol-Methyl Salicylate (ICY HOT EXTRA STRENGTH) 10-30 % CREA Externally apply topically 2 times daily To right knee 85 g 3     polyethylene glycol (MIRALAX) 17 GM/Dose powder Take 17 g by mouth daily 510 g      rivaroxaban ANTICOAGULANT (XARELTO ANTICOAGULANT) 15 MG TABS tablet Take 1 tablet (15 mg) by mouth 2 times daily (with meals) Then 20 mg po daily 60 tablet 0     rivaroxaban ANTICOAGULANT (XARELTO ANTICOAGULANT) 20 MG TABS tablet Take 1 tablet (20 mg) by mouth daily (with dinner) Begin after 21 days of Xarelto 15 mg po BID 30 tablet 3     SENEXON-S 8.6-50 MG tablet TAKE ONE TABLET BY MOUTH AT BEDTIME THREE TIMES PER WEEK;& TAKE  "ONE TABLET TWICE DAILY AS NEEDED FOR CONSTIPATION 14 tablet PRN     THERATEARS 0.25 % SOLN INSTILL ONE DROP IN EACH EYE TWICE DAILY FOR DRY EYES 15 mL 97     VITAMIN D3 50 MCG (2000 UT) tablet TAKE 1 TABLET BY MOUTH ONCE DAILY 28 tablet 97     REVIEW OF SYSTEMS:  Limited secondary to cognitive impairment but today pt reports ok    Objective:  BP (!) 145/82   Pulse 64   Temp 97.3  F (36.3  C)   Resp 16   Ht 1.549 m (5' 1\")   Wt 53.7 kg (118 lb 6.4 oz)   SpO2 98%   BMI 22.37 kg/m    Exam:  GENERAL APPEARANCE: alert, bright   ENT:  Mouth and posterior oropharynx normal, dry mucous membranes  EYES:  EOM, conjunctivae, lids, pupils and irises normal  RESP:  lungs clear to auscultation  CV:  Palpation and auscultation of heart done , regular rate and rhythm, no murmur, rub, or gallop, 1+ LE edema (uneven distribution pattern) on right and trace on left. No redness or warmth  ABDOMEN:  slight distension to right mid quadrant is baseline, +BS  M/S:  less ambulatory from baseline, walker, pedal pulses 1+  SKIN:  intact to visualized areas, pale   NEURO:   Cranial nerves 2-12 are normal tested and grossly at patient's baseline  PSYCH:  insight and judgement impaired, memory impaired, STML    Labs:   CBC RESULTS: Recent Labs   Lab Test 02/10/22  0545 01/06/21  0000   WBC 5.7 5.1   RBC 4.08 4.17   HGB 11.6* 11.7   HCT 37.4 37.7   MCV 92 90   MCH 28.4 28.1   MCHC 31.0* 31.0*   RDW 14.5 15.9*    239       Last Basic Metabolic Panel:  Recent Labs   Lab Test 02/10/22  0545 01/06/21  0000    139   POTASSIUM 3.5 3.9   CHLORIDE 106 105   NOHEMY 9.1 9.3   CO2 29 30   BUN 28 26   CR 0.81 1.01   GLC 81 131*       Liver Function Studies -   Recent Labs   Lab Test 02/10/22  0545 01/06/21  0000   PROTTOTAL 6.7* 7.2   ALBUMIN 2.8* 2.8*   BILITOTAL 0.3 0.5   ALKPHOS 163* 220*   AST 19 23   ALT 22 31       TSH   Date Value Ref Range Status   02/10/2022 2.96 0.40 - 4.00 mU/L Final   01/06/2021 5.73 (A) 0.40 - 4.00 mU/L Final "   11/25/2017 4.20 (H) 0.40 - 4.00 mU/L Final       Lab Results   Component Value Date    A1C 5.6 07/13/2018    A1C 5.9 05/14/2018     ASSESSMENT/PLAN:  (M89.49) Primary osteoarthritis involving multiple joints  (primary encounter diagnosis)  (M25.561) Acute pain of right knee  (M62.81) Generalized muscle weakness  Comment: ongoing and now using WC around unit  Plan:   -Tylenol 650 mg po BID and twice daily as needed  -icy hot twice daily as needed   -cortisone injection 2/7/22 and prn   -PT/OT to eval and treat for ongoing weakness  -elevate and ice prn     (I82.411) Acute deep vein thrombosis (DVT) of femoral vein of right lower extremity (H)  Comment: ongoing   Plan:   -Xarelto 15 mg po BID x 21 days (3/10/22)  then starting Xarelto 20 mg po daily   -no compression for now. May start tubi  pending MD visit   -consider additional imaging with MD visit to assess status of DVT          Electronically signed by:  JACKIE Zayas CNP

## 2022-03-02 NOTE — LETTER
3/2/2022        RE: Maryam Saavedra  C/o Saray Saavedra  [address] [address]        [facility]  [facility] Record Number:  [id number]  Place of Service where encounter took place:  [facility] [[id number]]  Chief Complaint   Patient presents with     RECHECK     DVT, fall       HPI:    Maryam Saavedra  is a 89 year old (6/9/1932), who is being seen today for an episodic care visit.  HPI information obtained from: facility chart records, facility staff, patient report and [facility] chart review.     Today's concern is:  Seen today for fall follow up, increased leg weakness, right knee pain and acute DVT. In her room, she says now has a fear of falling and did fall on 2/25 and  2/27 while attempting to ambulate independently to the bathroom and in her room with her walker. Some concern since now on anticoagulation. She continues to have right knee pain but thinks it hasn't gotten worse however this could be contributing to reluctance to ambulate.  Also currently being treated for right lower extremity DVT. Denies any other concerns. VSS.     Past Medical and Surgical History reviewed in Epic today.    MEDICATIONS:    Current Outpatient Medications   Medication Sig Dispense Refill     acetaminophen (TYLENOL) 325 MG tablet Take 2 tablets (650 mg) by mouth 2 times daily And twice daily as needed 60 tablet 97     Menthol-Methyl Salicylate (ICY HOT EXTRA STRENGTH) 10-30 % CREA Externally apply topically 2 times daily To right knee 85 g 3     polyethylene glycol (MIRALAX) 17 GM/Dose powder Take 17 g by mouth daily 510 g      rivaroxaban ANTICOAGULANT (XARELTO ANTICOAGULANT) 15 MG TABS tablet Take 1 tablet (15 mg) by mouth 2 times daily (with meals) Then 20 mg po daily 60 tablet 0     rivaroxaban ANTICOAGULANT (XARELTO ANTICOAGULANT) 20 MG TABS tablet Take 1 tablet (20 mg) by mouth daily (with dinner) Begin after 21 days of Xarelto 15 mg po BID 30  "tablet 3     SENEXON-S 8.6-50 MG tablet TAKE ONE TABLET BY MOUTH AT BEDTIME THREE TIMES PER WEEK;& TAKE ONE TABLET TWICE DAILY AS NEEDED FOR CONSTIPATION 14 tablet PRN     THERATEARS 0.25 % SOLN INSTILL ONE DROP IN EACH EYE TWICE DAILY FOR DRY EYES 15 mL 97     VITAMIN D3 50 MCG (2000 UT) tablet TAKE 1 TABLET BY MOUTH ONCE DAILY 28 tablet 97     REVIEW OF SYSTEMS:  Limited secondary to cognitive impairment but today pt reports ok    Objective:  BP (!) 145/82   Pulse 64   Temp 97.3  F (36.3  C)   Resp 16   Ht 1.549 m (5' 1\")   Wt 53.7 kg (118 lb 6.4 oz)   SpO2 98%   BMI 22.37 kg/m    Exam:  GENERAL APPEARANCE: alert, bright   ENT:  Mouth and posterior oropharynx normal, dry mucous membranes  EYES:  EOM, conjunctivae, lids, pupils and irises normal  RESP:  lungs clear to auscultation  CV:  Palpation and auscultation of heart done , regular rate and rhythm, no murmur, rub, or gallop, 1+ LE edema (uneven distribution pattern) on right and trace on left. No redness or warmth  ABDOMEN:  slight distension to right mid quadrant is baseline, +BS  M/S:  less ambulatory from baseline, walker, pedal pulses 1+  SKIN:  intact to visualized areas, pale   NEURO:   Cranial nerves 2-12 are normal tested and grossly at patient's baseline  PSYCH:  insight and judgement impaired, memory impaired, STML    Labs:   CBC RESULTS: Recent Labs   Lab Test 02/10/22  0545 01/06/21  0000   WBC 5.7 5.1   RBC 4.08 4.17   HGB 11.6* 11.7   HCT 37.4 37.7   MCV 92 90   MCH 28.4 28.1   MCHC 31.0* 31.0*   RDW 14.5 15.9*    239       Last Basic Metabolic Panel:  Recent Labs   Lab Test 02/10/22  0545 01/06/21  0000    139   POTASSIUM 3.5 3.9   CHLORIDE 106 105   NOHEMY 9.1 9.3   CO2 29 30   BUN 28 26   CR 0.81 1.01   GLC 81 131*       Liver Function Studies -   Recent Labs   Lab Test 02/10/22  0545 01/06/21  0000   PROTTOTAL 6.7* 7.2   ALBUMIN 2.8* 2.8*   BILITOTAL 0.3 0.5   ALKPHOS 163* 220*   AST 19 23   ALT 22 31       TSH   Date Value " Ref Range Status   02/10/2022 2.96 0.40 - 4.00 mU/L Final   01/06/2021 5.73 (A) 0.40 - 4.00 mU/L Final   11/25/2017 4.20 (H) 0.40 - 4.00 mU/L Final       Lab Results   Component Value Date    A1C 5.6 07/13/2018    A1C 5.9 05/14/2018     ASSESSMENT/PLAN:  (M89.49) Primary osteoarthritis involving multiple joints  (primary encounter diagnosis)  (M25.561) Acute pain of right knee  (M62.81) Generalized muscle weakness  Comment: ongoing and now using WC around unit  Plan:   -Tylenol 650 mg po BID and twice daily as needed  -icy hot twice daily as needed   -cortisone injection 2/7/22 and prn   -PT/OT to eval and treat for ongoing weakness  -elevate and ice prn     (I82.411) Acute deep vein thrombosis (DVT) of femoral vein of right lower extremity (H)  Comment: ongoing   Plan:   -Xarelto 15 mg po BID x 21 days (3/10/22)  then starting Xarelto 20 mg po daily   -no compression for now. May start tubi  pending MD visit   -consider additional imaging with MD visit to assess status of DVT          Electronically signed by:  JACKIE Zayas CNP                 Sincerely,        JACKIE Zayas CNP

## 2022-03-09 ENCOUNTER — ASSISTED LIVING VISIT (OUTPATIENT)
Dept: GERIATRICS | Facility: CLINIC | Age: 87
End: 2022-03-09
Payer: MEDICARE

## 2022-03-09 VITALS
BODY MASS INDEX: 22.09 KG/M2 | TEMPERATURE: 97.3 F | HEART RATE: 73 BPM | WEIGHT: 117 LBS | DIASTOLIC BLOOD PRESSURE: 72 MMHG | RESPIRATION RATE: 16 BRPM | SYSTOLIC BLOOD PRESSURE: 138 MMHG | HEIGHT: 61 IN | OXYGEN SATURATION: 95 %

## 2022-03-09 DIAGNOSIS — M25.361 RIGHT KNEE BUCKLING: ICD-10-CM

## 2022-03-09 DIAGNOSIS — I82.411 ACUTE DEEP VEIN THROMBOSIS (DVT) OF FEMORAL VEIN OF RIGHT LOWER EXTREMITY (H): Primary | ICD-10-CM

## 2022-03-09 DIAGNOSIS — M25.561 ACUTE PAIN OF RIGHT KNEE: ICD-10-CM

## 2022-03-09 DIAGNOSIS — M15.0 PRIMARY OSTEOARTHRITIS INVOLVING MULTIPLE JOINTS: ICD-10-CM

## 2022-03-09 DIAGNOSIS — L84 CALLUS: ICD-10-CM

## 2022-03-09 DIAGNOSIS — M62.81 GENERALIZED MUSCLE WEAKNESS: ICD-10-CM

## 2022-03-09 DIAGNOSIS — L97.519: ICD-10-CM

## 2022-03-09 NOTE — NURSING NOTE
[facility]  [facility] Record Number:  [id number]  Place of Service where encounter took place:  [facility] [[id number]]  Chief Complaint   Patient presents with     Clinic Care Coordination - Face To Face       HPI:    Maryam Saavedra  is a 89 year old (6/9/1932), who is being seen today for an episodic care visit.  HPI information obtained from: facility chart records, facility staff, patient report and [facility] chart review. Today's concern is:    Pt seen for follow up on RLE DVT and frequent falls. RLE remains swollen and painful. Pt has been working with PT, R knee jordin with ambulation. Pt will be fitted for wheelchair.     Past Medical and Surgical History reviewed in Epic today.    MEDICATIONS:    Current Outpatient Medications   Medication Sig Dispense Refill     acetaminophen (TYLENOL) 325 MG tablet Take 2 tablets (650 mg) by mouth 2 times daily And twice daily as needed 60 tablet 97     Menthol-Methyl Salicylate (ICY HOT EXTRA STRENGTH) 10-30 % CREA Externally apply topically 2 times daily To right knee 85 g 3     polyethylene glycol (MIRALAX) 17 GM/Dose powder Take 17 g by mouth daily 510 g      rivaroxaban ANTICOAGULANT (XARELTO ANTICOAGULANT) 15 MG TABS tablet Take 1 tablet (15 mg) by mouth 2 times daily (with meals) Then 20 mg po daily 60 tablet 0     rivaroxaban ANTICOAGULANT (XARELTO ANTICOAGULANT) 20 MG TABS tablet Take 1 tablet (20 mg) by mouth daily (with dinner) Begin after 21 days of Xarelto 15 mg po BID 30 tablet 3     SENEXON-S 8.6-50 MG tablet TAKE ONE TABLET BY MOUTH AT BEDTIME THREE TIMES PER WEEK;& TAKE ONE TABLET TWICE DAILY AS NEEDED FOR CONSTIPATION 14 tablet PRN     THERATEARS 0.25 % SOLN INSTILL ONE DROP IN EACH EYE TWICE DAILY FOR DRY EYES 15 mL 97     VITAMIN D3 50 MCG (2000 UT) tablet TAKE 1 TABLET BY MOUTH ONCE DAILY 28 tablet 97         REVIEW OF SYSTEMS:  Limited secondary to aphasia impairment but today pt reports  "feeling well     Objective:  /72   Pulse 73   Temp 97.3  F (36.3  C)   Resp 16   Ht 1.549 m (5' 1\")   Wt 53.1 kg (117 lb)   SpO2 95%   BMI 22.11 kg/m    Exam:  GENERAL APPEARANCE:  Alert, cooperative  ENT:  Mouth and posterior oropharynx normal, moist mucous membranes, normal hearing acuity  RESP:  respiratory effort and palpation of chest normal, lungs clear to auscultation   CV:  Palpation and auscultation of heart done , regular rate and rhythm, no murmur, rub, or gallop, peripheral edema 2+ in RLE  ABDOMEN:  normal bowel sounds, soft, nontender, no hepatosplenomegaly or other masses  M/S:   BLE weakness, uses wheelchair  SKIN:  R 2nd toe blister  NEURO:   Cranial nerves 2-12 are normal tested and grossly at patient's baseline  PSYCH:  insight and judgement impaired, memory impaired , affect and mood normal    Labs:   Recent Results (from the past 720 hour(s))   COVID-19 Virus (Coronavirus) by PCR Nose    Collection Time: 02/08/22  1:00 PM    Specimen: Nose; Swab   Result Value Ref Range    COVID-19 Virus PCR - Result NOT DETECTED    Basic metabolic panel    Collection Time: 02/10/22  5:45 AM   Result Value Ref Range    Sodium 140 133 - 144 mmol/L    Potassium 3.5 3.4 - 5.3 mmol/L    Chloride 106 94 - 109 mmol/L    Carbon Dioxide (CO2) 29 20 - 32 mmol/L    Anion Gap 5 3 - 14 mmol/L    Urea Nitrogen 28 7 - 30 mg/dL    Creatinine 0.81 0.52 - 1.04 mg/dL    Calcium 9.1 8.5 - 10.1 mg/dL    Glucose 81 70 - 99 mg/dL    GFR Estimate 69 >60 mL/min/1.73m2   CBC with platelets    Collection Time: 02/10/22  5:45 AM   Result Value Ref Range    WBC Count 5.7 4.0 - 11.0 10e3/uL    RBC Count 4.08 3.80 - 5.20 10e6/uL    Hemoglobin 11.6 (L) 11.7 - 15.7 g/dL    Hematocrit 37.4 35.0 - 47.0 %    MCV 92 78 - 100 fL    MCH 28.4 26.5 - 33.0 pg    MCHC 31.0 (L) 31.5 - 36.5 g/dL    RDW 14.5 10.0 - 15.0 %    Platelet Count 291 150 - 450 10e3/uL   Hepatic function panel    Collection Time: 02/10/22  5:45 AM   Result Value Ref " Range    Bilirubin Total 0.3 0.2 - 1.3 mg/dL    Bilirubin Direct <0.1 0.0 - 0.2 mg/dL    Protein Total 6.7 (L) 6.8 - 8.8 g/dL    Albumin 2.8 (L) 3.4 - 5.0 g/dL    Alkaline Phosphatase 163 (H) 40 - 150 U/L    AST 19 0 - 45 U/L    ALT 22 0 - 50 U/L   TSH    Collection Time: 02/10/22  5:45 AM   Result Value Ref Range    TSH 2.96 0.40 - 4.00 mU/L       ASSESSMENT/PLAN:  (I82.411) Acute deep vein thrombosis (DVT) of femoral vein of right lower extremity (H)   -Xarelto 15 mg po BID x 21 days, then starting Xarelto 20 mg po daily on 3/10/22  -repeat ultrasound     (M89.49) Primary osteoarthritis involving multiple joints  (primary encounter diagnosis)  (M25.561) Acute pain of right knee  (M62.81) Generalized muscle weakness  (M25.361) Right knee buckling   -continue Tylenol 650 mg po BID and twice daily as needed  -icy hot twice daily as needed   -continue PT/OT   -pt requires wheelchair due to frequent falls and knee buckling       (L97.519) Right second toe ulcer, with unspecified severity (H)  (L84) Callus  -podiatry visit in two weeks   -pt will wear tennis shoes when out of bed    Electronically signed by:  Michaelle Alvarado, RN, NP Student

## 2022-03-09 NOTE — PROGRESS NOTES
[facility]  [facility] Record Number:  [id number]  Place of Service where encounter took place:  [facility] [[id number]]  Chief Complaint   Patient presents with     Clinic Care Coordination - Face To Face       HPI:    Maryam Saavedra  is a 89 year old (6/9/1932), who is being seen today for an episodic care visit.  HPI information obtained from: facility chart records, facility staff and [facility] chart review. Today's concern is:    Continues to have falls with ambulation. Currently working with PT and they are requesting  for resident 2/2 safe ambulation and transfers with ongoing knee buckling and weakness. She is also being treated for DVT of right femoral vein. Initially with some reduction of swelling the RLE continues to have pockets firm tissue/ swelling behind the knee and both anterior/posterior right leg. Popliteal, ankle and pedal pulses are weak, no warmth or redness. Wondering if falls is contributor to presentation?     Also noted with larger callus and pressure ulcer to 2nd right toe. This also has some swelling and slight redness but no warmth. Follows regularly with podiatry and will be seen again in 2 weeks per daughter.       Past Medical and Surgical History reviewed in Epic today.    MEDICATIONS:    Current Outpatient Medications   Medication Sig Dispense Refill     acetaminophen (TYLENOL) 325 MG tablet Take 2 tablets (650 mg) by mouth 2 times daily And twice daily as needed 60 tablet 97     Menthol-Methyl Salicylate (ICY HOT EXTRA STRENGTH) 10-30 % CREA Externally apply topically 2 times daily To right knee 85 g 3     polyethylene glycol (MIRALAX) 17 GM/Dose powder Take 17 g by mouth daily 510 g      rivaroxaban ANTICOAGULANT (XARELTO ANTICOAGULANT) 15 MG TABS tablet Take 1 tablet (15 mg) by mouth 2 times daily (with meals) Then 20 mg po daily 60 tablet 0     rivaroxaban ANTICOAGULANT (XARELTO ANTICOAGULANT) 20 MG TABS tablet Take  "1 tablet (20 mg) by mouth daily (with dinner) Begin after 21 days of Xarelto 15 mg po BID 30 tablet 3     SENEXON-S 8.6-50 MG tablet TAKE ONE TABLET BY MOUTH AT BEDTIME THREE TIMES PER WEEK;& TAKE ONE TABLET TWICE DAILY AS NEEDED FOR CONSTIPATION 14 tablet PRN     THERATEARS 0.25 % SOLN INSTILL ONE DROP IN EACH EYE TWICE DAILY FOR DRY EYES 15 mL 97     VITAMIN D3 50 MCG (2000 UT) tablet TAKE 1 TABLET BY MOUTH ONCE DAILY 28 tablet 97     REVIEW OF SYSTEMS:  Limited secondary to cognitive impairment but today pt reports oh fine     Objective:  /72   Pulse 73   Temp 97.3  F (36.3  C)   Resp 16   Ht 1.549 m (5' 1\")   Wt 53.1 kg (117 lb)   SpO2 95%   BMI 22.11 kg/m    Exam:  GENERAL APPEARANCE:  Alert, cooperative  ENT:  Mouth and posterior oropharynx normal, moist mucous membranes  RESP:  respiratory effort and palpation of chest normal, lungs clear to auscultation , no respiratory distress  CV:  Palpation and auscultation of heart done , regular rate and rhythm, no murmur, rub, or gallop, peripheral edema 2+ in RLE  ABDOMEN:  normal bowel sounds, soft, nontender  M/S:   gait and strength below baseline, has been using w/c and not ambulating, right LE weakness   SKIN:  Inspection of skin and subcutaneous tissue-ulcer and callus to right 2nd toe, dry yellowish hard flaky skin, black hardened area adjacent, slight swelling/redness no warmth to toe  NEURO:   Cranial nerves 2-12 are normal tested and grossly at patient's baseline  PSYCH:  insight and judgement impaired, memory impaired , affect and mood normal    Labs:   CBC RESULTS: Recent Labs   Lab Test 02/10/22  0545 01/06/21  0000   WBC 5.7 5.1   RBC 4.08 4.17   HGB 11.6* 11.7   HCT 37.4 37.7   MCV 92 90   MCH 28.4 28.1   MCHC 31.0* 31.0*   RDW 14.5 15.9*    239       Last Basic Metabolic Panel:  Recent Labs   Lab Test 02/10/22  0545 01/06/21  0000    139   POTASSIUM 3.5 3.9   CHLORIDE 106 105   NOHEMY 9.1 9.3   CO2 29 30   BUN 28 26   CR 0.81 " 1.01   GLC 81 131*       Liver Function Studies -   Recent Labs   Lab Test 02/10/22  0545 21  0000   PROTTOTAL 6.7* 7.2   ALBUMIN 2.8* 2.8*   BILITOTAL 0.3 0.5   ALKPHOS 163* 220*   AST 19 23   ALT 22 31       TSH   Date Value Ref Range Status   02/10/2022 2.96 0.40 - 4.00 mU/L Final   2021 5.73 (A) 0.40 - 4.00 mU/L Final   2017 4.20 (H) 0.40 - 4.00 mU/L Final       Lab Results   Component Value Date    A1C 5.6 2018    A1C 5.9 2018     ASSESSMENT/PLAN:  (I82.411) Acute deep vein thrombosis (DVT) of femoral vein of right lower extremity (H)   Comment: ongoing swelling/firmness behind knee, posterior LE  -Xarelto 15 mg po BID x 21 days (3/10/22)  then starting Xarelto 20 mg po daily   -repeat US and Doppler    (M89.49) Primary osteoarthritis involving multiple joints  (primary encounter diagnosis)  (M25.561) Acute pain of right knee  (M62.81) Generalized muscle weakness  (M25.361) Right knee buckling   Comment: ongoing, using wheelchair  Plan:   -continue Tylenol 650 mg po BID and twice daily as needed  -icy hot twice daily as needed   -PT/OT   -needs WC for safe transfers and ambulating      (L97.519) Right second toe ulcer, with unspecified severity (H)  (L84) Callus  Comment: chronic callus, no complaints of pain to tip of toe  Plan:   -podiatry in 2 weeks and they will eval potential ulcer   -wearing tennis shoes when OOB or chair         Face to Face and Medical Necessity Statement for DME Provider visit    Demographic Information on Maryam Carmona:  Gender: female  : 1932  C[address]LETTE CARMONA  [address] [address] [phone] (home)     Medical Record: [id number]  Social Security Number:   Primary Care Provider: Sandro Canseco  Insurance: Payor: MEDICARE / Plan: MEDICARE / Product Type: Medicare /     HPI:   Maryam Carmona is a 89 year old  (1932), who is being seen today for a face to face provider visit at [facility];  medical necessity statement for DME included. This patient requires the following:  DME Ordered and Medical Necessity Statement     Wheelchair Documentation  Size: standard 16 x 16 austyn-height with break extensions secondary to OA in her shoulders  Corresponding cushion: pressure relief cushion  Standard foot rests: Yes  Elevating leg rests: No  Arm rests: No  Lap tray: No  Break Extensions   Dose the patient use oxygen? No   Is the patient able to propel wheelchair? Yes If no why not?  And is there someone who can? yes  1. The patient has mobility limitations that impairs their ability to participate in one or more mobility related activities: Toileting, Feeding, Grooming and Bathing.  The wheelchair is suitable and necessary for use in the patient's home.  2. The patient's mobility limitations cannot be safely resolved by using a cane/walker:No    Reason why a cane or walker will not meet the patient's needs secondary to  balance, tolerance, level of assistance, knee weakness   3. The patients home has adequate access to use a manual wheelchair:Yes  4. The use of a manual wheelchair on a regular basis will improve the patients ability to participate in mobility related ADL's at home:Yes  5. The patient is willing to use a manual wheelchair at home:Yes  6. The patient has adequate upper body strength and the mental capability to safely use a manual wheelchair and/or has a caregiver that is able to assist: Yes  7. Does the patient have a lower extremity injury or edema?Yes  Reason for Type of Wheelchair: OA  Patient weight: 117 lbs  Light Weight Wheelchair: Patient is unable to self-propel a standard wheelchair in the home but can self propel a light weight wheelchair.       Pt needing above DME with expected length of need of 99  months  due to medical necessity associated with following diagnosis:     Acute deep vein thrombosis (DVT) of femoral vein of right lower extremity (H)  Primary osteoarthritis involving  multiple joints  Acute pain of right knee  Generalized muscle weakness  Right knee buckling  Right second toe ulcer, with unspecified severity (H)  Callus        Orders:  1. Facility staff/TC to contact DME company to get their order: [facility]     ELECTRONICALLY SIGNED BY ASHU CERTIFIED PROVIDER:  JACKIE Zayas CNP   NPI: [id number]  [facility]  [facility], [address]  [place] [address]

## 2022-03-09 NOTE — LETTER
3/9/2022        RE: Maryam Saavedra  C/o Saray Saavedra  [address] [facility] [address]        [facility]  [facility] Record Number:  [id number]  Place of Service where encounter took place:  [facility] [[id number]]  Chief Complaint   Patient presents with     Clinic Care Coordination - Face To Face       HPI:    Maryam Saavedra  is a 89 year old (6/9/1932), who is being seen today for an episodic care visit.  HPI information obtained from: facility chart records, facility staff and [facility] chart review. Today's concern is:    Continues to have falls with ambulation. Currently working with PT and they are requesting WC for resident 2/2 safe ambulation and transfers with ongoing knee buckling and weakness. She is also being treated for DVT of right femoral vein. Initially with some reduction of swelling the RLE continues to have pockets firm tissue/ swelling behind the knee and both anterior/posterior right leg. Popliteal, ankle and pedal pulses are weak, no warmth or redness. Wondering if falls is contributor to presentation?     Also noted with larger callus and pressure ulcer to 2nd right toe. This also has some swelling and slight redness but no warmth. Follows regularly with podiatry and will be seen again in 2 weeks per daughter.       Past Medical and Surgical History reviewed in Epic today.    MEDICATIONS:    Current Outpatient Medications   Medication Sig Dispense Refill     acetaminophen (TYLENOL) 325 MG tablet Take 2 tablets (650 mg) by mouth 2 times daily And twice daily as needed 60 tablet 97     Menthol-Methyl Salicylate (ICY HOT EXTRA STRENGTH) 10-30 % CREA Externally apply topically 2 times daily To right knee 85 g 3     polyethylene glycol (MIRALAX) 17 GM/Dose powder Take 17 g by mouth daily 510 g      rivaroxaban ANTICOAGULANT (XARELTO ANTICOAGULANT) 15 MG TABS tablet Take 1 tablet (15 mg) by mouth 2 times daily (with meals) Then  "20 mg po daily 60 tablet 0     rivaroxaban ANTICOAGULANT (XARELTO ANTICOAGULANT) 20 MG TABS tablet Take 1 tablet (20 mg) by mouth daily (with dinner) Begin after 21 days of Xarelto 15 mg po BID 30 tablet 3     SENEXON-S 8.6-50 MG tablet TAKE ONE TABLET BY MOUTH AT BEDTIME THREE TIMES PER WEEK;& TAKE ONE TABLET TWICE DAILY AS NEEDED FOR CONSTIPATION 14 tablet PRN     THERATEARS 0.25 % SOLN INSTILL ONE DROP IN EACH EYE TWICE DAILY FOR DRY EYES 15 mL 97     VITAMIN D3 50 MCG (2000 UT) tablet TAKE 1 TABLET BY MOUTH ONCE DAILY 28 tablet 97     REVIEW OF SYSTEMS:  Limited secondary to cognitive impairment but today pt reports oh fine     Objective:  /72   Pulse 73   Temp 97.3  F (36.3  C)   Resp 16   Ht 1.549 m (5' 1\")   Wt 53.1 kg (117 lb)   SpO2 95%   BMI 22.11 kg/m    Exam:  GENERAL APPEARANCE:  Alert, cooperative  ENT:  Mouth and posterior oropharynx normal, moist mucous membranes  RESP:  respiratory effort and palpation of chest normal, lungs clear to auscultation , no respiratory distress  CV:  Palpation and auscultation of heart done , regular rate and rhythm, no murmur, rub, or gallop, peripheral edema 2+ in RLE  ABDOMEN:  normal bowel sounds, soft, nontender  M/S:   gait and strength below baseline, has been using w/c and not ambulating, right LE weakness   SKIN:  Inspection of skin and subcutaneous tissue-ulcer and callus to right 2nd toe, dry yellowish hard flaky skin, black hardened area adjacent, slight swelling/redness no warmth to toe  NEURO:   Cranial nerves 2-12 are normal tested and grossly at patient's baseline  PSYCH:  insight and judgement impaired, memory impaired , affect and mood normal    Labs:   CBC RESULTS: Recent Labs   Lab Test 02/10/22  0545 01/06/21  0000   WBC 5.7 5.1   RBC 4.08 4.17   HGB 11.6* 11.7   HCT 37.4 37.7   MCV 92 90   MCH 28.4 28.1   MCHC 31.0* 31.0*   RDW 14.5 15.9*    239       Last Basic Metabolic Panel:  Recent Labs   Lab Test 02/10/22  0545 " 21  0000    139   POTASSIUM 3.5 3.9   CHLORIDE 106 105   NOHEMY 9.1 9.3   CO2 29 30   BUN 28 26   CR 0.81 1.01   GLC 81 131*       Liver Function Studies -   Recent Labs   Lab Test 02/10/22  0545 21  0000   PROTTOTAL 6.7* 7.2   ALBUMIN 2.8* 2.8*   BILITOTAL 0.3 0.5   ALKPHOS 163* 220*   AST 19 23   ALT 22 31       TSH   Date Value Ref Range Status   02/10/2022 2.96 0.40 - 4.00 mU/L Final   2021 5.73 (A) 0.40 - 4.00 mU/L Final   2017 4.20 (H) 0.40 - 4.00 mU/L Final       Lab Results   Component Value Date    A1C 5.6 2018    A1C 5.9 2018     ASSESSMENT/PLAN:  (I82.411) Acute deep vein thrombosis (DVT) of femoral vein of right lower extremity (H)   Comment: ongoing swelling/firmness behind knee, posterior LE  -Xarelto 15 mg po BID x 21 days (3/10/22)  then starting Xarelto 20 mg po daily   -repeat US and Doppler    (M89.49) Primary osteoarthritis involving multiple joints  (primary encounter diagnosis)  (M25.561) Acute pain of right knee  (M62.81) Generalized muscle weakness  (M25.361) Right knee buckling   Comment: ongoing, using wheelchair  Plan:   -continue Tylenol 650 mg po BID and twice daily as needed  -icy hot twice daily as needed   -PT/OT   -needs WC for safe transfers and ambulating      (L97.519) Right second toe ulcer, with unspecified severity (H)  (L84) Callus  Comment: chronic callus, no complaints of pain to tip of toe  Plan:   -podiatry in 2 weeks and they will eval potential ulcer   -wearing tennis shoes when OOB or chair         Face to Face and Medical Necessity Statement for DME Provider visit    Demographic Information on Maryam Carmona:  Gender: female  : 1932  C/O PAULETTE CARMONA  [address] [facility] [address]  [phone] (home)     Medical Record: [id number]  Social Security Number:   Primary Care Provider: Sandro Canseco  Insurance: Payor: MEDICARE / Plan: MEDICARE / Product Type: Medicare /     HPI:   Maryam Carmona is a  89 year old  (6/9/1932), who is being seen today for a face to face provider visit at [facility]; medical necessity statement for DME included. This patient requires the following:  DME Ordered and Medical Necessity Statement     Wheelchair Documentation  Size: standard 16 x 16 austyn-height with break extensions secondary to OA in her shoulders  Corresponding cushion: pressure relief cushion  Standard foot rests: Yes  Elevating leg rests: No  Arm rests: No  Lap tray: No  Break Extensions   Dose the patient use oxygen? No   Is the patient able to propel wheelchair? Yes If no why not?  And is there someone who can? yes  1. The patient has mobility limitations that impairs their ability to participate in one or more mobility related activities: Toileting, Feeding, Grooming and Bathing.  The wheelchair is suitable and necessary for use in the patient's home.  2. The patient's mobility limitations cannot be safely resolved by using a cane/walker:No    Reason why a cane or walker will not meet the patient's needs secondary to  balance, tolerance, level of assistance, knee weakness   3. The patients home has adequate access to use a manual wheelchair:Yes  4. The use of a manual wheelchair on a regular basis will improve the patients ability to participate in mobility related ADL's at home:Yes  5. The patient is willing to use a manual wheelchair at home:Yes  6. The patient has adequate upper body strength and the mental capability to safely use a manual wheelchair and/or has a caregiver that is able to assist: Yes  7. Does the patient have a lower extremity injury or edema?Yes  Reason for Type of Wheelchair: OA  Patient weight: 117 lbs  Light Weight Wheelchair: Patient is unable to self-propel a standard wheelchair in the home but can self propel a light weight wheelchair.       Pt needing above DME with expected length of need of 99  months  due to medical necessity associated with following diagnosis:     Acute  deep vein thrombosis (DVT) of femoral vein of right lower extremity (H)  Primary osteoarthritis involving multiple joints  Acute pain of right knee  Generalized muscle weakness  Right knee buckling  Right second toe ulcer, with unspecified severity (H)  Callus        Orders:  1. Facility staff/TC to contact [employer] company to get their order: [facility]     ELECTRONICALLY SIGNED BY ASHU CERTIFIED PROVIDER:  JACKIE Zayas CNP   NPI: [id number]  [facility]  [address] [place] [address], [address]  [place] [address]            Sincerely,        JACKIE Zayas CNP

## 2022-03-09 NOTE — LETTER
New orders for Maryam Saavedra     1. US and Doppler for ongoing right LE DVT. Please compare to PPX US and Doppler of 2/16/22 and note if presentation is improving or worse on imaging with ongoing pain and swelling in extremity.       ELECTRONICALLY SIGNED BY:  JACKIE Zayas CNP

## 2022-03-16 ENCOUNTER — ASSISTED LIVING VISIT (OUTPATIENT)
Dept: GERIATRICS | Facility: CLINIC | Age: 87
End: 2022-03-16
Payer: MEDICARE

## 2022-03-16 VITALS
RESPIRATION RATE: 21 BRPM | DIASTOLIC BLOOD PRESSURE: 79 MMHG | OXYGEN SATURATION: 95 % | HEIGHT: 61 IN | WEIGHT: 117 LBS | TEMPERATURE: 97.1 F | SYSTOLIC BLOOD PRESSURE: 137 MMHG | BODY MASS INDEX: 22.09 KG/M2 | HEART RATE: 89 BPM

## 2022-03-16 DIAGNOSIS — I10 BENIGN ESSENTIAL HYPERTENSION: ICD-10-CM

## 2022-03-16 DIAGNOSIS — G30.9 MIXED DEMENTIA (H): ICD-10-CM

## 2022-03-16 DIAGNOSIS — I82.411 ACUTE DEEP VEIN THROMBOSIS (DVT) OF FEMORAL VEIN OF RIGHT LOWER EXTREMITY (H): Primary | ICD-10-CM

## 2022-03-16 DIAGNOSIS — F02.80 MIXED DEMENTIA (H): ICD-10-CM

## 2022-03-16 DIAGNOSIS — L97.519: ICD-10-CM

## 2022-03-16 DIAGNOSIS — F01.50 MIXED DEMENTIA (H): ICD-10-CM

## 2022-03-16 NOTE — LETTER
3/16/2022        RE: Maryam Saavedra  C/o Saray Saavedra  [address] [facility] [address]        Maryam Saavedra is a 89 year old female seen March 16, 2022 at [facility] where she has resided for >one year (admit 12/2020).  Pt is seen in her room up to recliner.  Pt had become quite immobile after fall in late January causing pain in her right knee and lower leg below. She developed swelling and on 2/16 Doppler U/S revealed a non-occlusive thrombus in the right distal superficial femoral vein.  Started on apixaban.  Still pain and swelling calf and shin, old scar from a prior wound.   Also has a scabby wound on right second toe.     Daughter Saray was here this morning.    Talked by phone with her during visit.    Otherwise pt reports she is feeling okay, eats /sleeps well.   Pt had right knee x-ray, showed medial joint space narrowing and OA.   Had steroid injection on 2/7 although this did not seem to help her pain.   She has been working with [facility] but not able to ambulate secondary to right knee giving way.       By chart review, patient had [facility] hospitalization and [place] [facility] stay in June 2020 for subacute fracture deformity of lateral left knee and underwent left TKA.  She rehabbed fairly well in TCU and discharged to live with her family.  In October she was seen in the ED for visual changes, hallucinations and confusion.   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   Pt was seen by Neurology and no further workup planned by family.    Pt's care needs increased and family unable to safely manage her at home, so moved to [facility] for permanent placement      Biggest concern has continued to be frequent falls which have occurred since admission as well.    Past Medical History:   Diagnosis Date     Benign essential hypertension 9/1/2016     Chronic low back pain      Hypertension      Impaired fasting glucose     borderline     Osteoarthritis    Late onset  "dementia  Hallucinations  Frequent falls    Past Surgical History:   Procedure Laterality Date     ARTHROPLASTY HIP ANTERIOR Right 7/24/2018    Procedure: ARTHROPLASTY HIP ANTERIOR;  RIGHT DIRECT ANTERIOR TOTAL HIP ARTHROPLASTY;  Surgeon: Jimbo Phillips MD;  Location:  OR     ARTHROPLASTY KNEE Left 5/12/2020    Procedure: LEFT TOTAL KNEE ARTHROPLASTY;  Surgeon: Jimbo Phillips MD;  Location:  OR     EYE SURGERY  cataracts     MOHS MICROGRAPHIC PROCEDURE      Left lower eyelid      MOHS MICROGRAPHIC PROCEDURE Left 10/27/2016    Procedure: MOHS MICROGRAPHIC PROCEDURE;  Surgeon: Jose Torrez MD;  Location:  SD     MOHS MICROGRAPHIC PROCEDURE Right 5/24/2018    Procedure: MOHS MICROGRAPHIC PROCEDURE;  RIGHT MEDIAL CANTHUS MOHS CLOSURE;  Surgeon: Jose Torrez MD;  Location: [facility]     ORTHOPEDIC SURGERY      bilateral wrists     SH:  , she has 2 daughters Saray and Dena and a son Terrell who all live locally.   Previously lived with her son before move to [place]     ROS:  Ambulatory with FWW  SLUMS 12/30   Weight in 2020 was 94 lbs >>>>was 115 lbs in August 2021   Wt Readings from Last 5 Encounters:   03/16/22 53.1 kg (117 lb)   03/09/22 53.1 kg (117 lb)   03/02/22 53.7 kg (118 lb 6.4 oz)   02/16/22 53.7 kg (118 lb 6.4 oz)   02/01/22 52.9 kg (116 lb 9.6 oz)      EXAM:  NAD  /79   Pulse 89   Temp 97.1  F (36.2  C)   Resp 21   Ht 1.549 m (5' 1\")   Wt 53.1 kg (117 lb)   SpO2 95%   BMI 22.11 kg/m     Neck supple without adenopathy  Lungs clear bilaterally  Heart RRR s1s2 @60  RLE with asymmetric edema above scar on shin    Second toe with scabbed lesion around toenail, about 1cm  Neuro: STML, no focal findings  Needs directional cuing  Psych: affect okay, pleasant.       Last Comprehensive Metabolic Panel:  Sodium   Date Value Ref Range Status   02/10/2022 140 133 - 144 mmol/L Final     Potassium   Date Value Ref Range Status   02/10/2022 3.5 3.4 - 5.3 mmol/L Final     Carbon Dioxide " (CO2)   Date Value Ref Range Status   02/10/2022 29 20 - 32 mmol/L Final     Glucose   Date Value Ref Range Status   02/10/2022 81 70 - 99 mg/dL Final     Urea Nitrogen   Date Value Ref Range Status   02/10/2022 28 7 - 30 mg/dL Final     Creatinine   Date Value Ref Range Status   02/10/2022 0.81 0.52 - 1.04 mg/dL Final     GFR Estimate   Date Value Ref Range Status   02/10/2022 69 >60 mL/min/1.73m2 Final     Calcium   Date Value Ref Range Status   02/10/2022 9.1 8.5 - 10.1 mg/dL Final     Lab Results   Component Value Date    AST 19 02/10/2022      ALBUMIN 2.8 02/10/2022      ALKPHOS 163 02/10/2022     Lab Results   Component Value Date    WBC 5.7 02/10/2022      HGB 11.6 02/10/2022      MCV 92 02/10/2022       02/10/2022     TSH   Date Value Ref Range Status   02/10/2022 2.96 0.40 - 4.00 mU/L Final   01/06/2021 5.73 (A) 0.40 - 4.00 mU/L Final      10/29/2020   MRI Brain w/o contrast:  1. Interval increase in ventriculomegaly of the lateral and third  ventricles since the 2017 brain MRI raising the question of either  developing or worsening normal pressure hydrocephalus versus  ventriculomegaly due to age-related cerebral volume loss. Recommend  clinical correlation for history of normal pressure hydrocephalus.  2. Diffuse cerebral volume loss and cerebral white matter changes  consistent with chronic small vessel ischemic disease      IMP/PLAN:      (I82.411) Acute deep vein thrombosis (DVT) of femoral vein of right lower extremity (H)   Comment: provoked thrombus, now on rivaroxaban 20 mg/day after initial treatment of 15 mg bid   Continued edema and pain, remains quite immobile.    Plan: Reviewed with pt and daughter, would continue anticoagulation for at least 3 months  Pain management with scheduled acetaminophen, local measures.       (L97.519) Right second toe ulcer, with unspecified severity (H)  Comment: unclear if this from trauma or ischemia, latter may explain her ongoing pain    Has been to  podiatrist   Plan: check arterial studies as next step.     Monitor wound.      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         Georgie Bae MD         Sincerely,        Georgie Bae MD

## 2022-03-16 NOTE — LETTER
New orders for Maryam Saavedra    1. Tubi  or TG shape to right lower extremity. On in the am and off at HS for LE edema. Homecare to supply.    2. RYLEY (ankle brachial index) with bilateral lower extremity arterial duplex for peripheral arterial  disease from PPX    3. Encouraged for WC ambulation for safety. Use of new grey slipper shoes on am and off HS versus tennis shoes for now      JACKIE Zayas CNP on 3/16/2022 at 4:20 PM

## 2022-03-16 NOTE — PROGRESS NOTES
Maryam Saavedra is a 89 year old female seen March 16, 2022 at [facility] where she has resided for >one year (admit 12/2020).  Pt is seen in her room up to recliner.  Pt had become quite immobile after fall in late January causing pain in her right knee and lower leg below. She developed swelling and on 2/16 Doppler U/S revealed a non-occlusive thrombus in the right distal superficial femoral vein.  Started on apixaban.  Still pain and swelling calf and shin, old scar from a prior wound.   Also has a scabby wound on right second toe.     Daughter Saray was here this morning.    Talked by phone with her during visit.    Otherwise pt reports she is feeling okay, eats /sleeps well.   Pt had right knee x-ray, showed medial joint space narrowing and OA.   Had steroid injection on 2/7 although this did not seem to help her pain.   She has been working with [facility] but not able to ambulate secondary to right knee giving way.       By chart review, patient had [facility] hospitalization and [place] [facility] stay in June 2020 for subacute fracture deformity of lateral left knee and underwent left TKA.  She rehabbed fairly well in TCU and discharged to live with her family.  In October she was seen in the ED for visual changes, hallucinations and confusion.   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   Pt was seen by Neurology and no further workup planned by family.    Pt's care needs increased and family unable to safely manage her at home, so moved to [facility] for permanent placement      Biggest concern has continued to be frequent falls which have occurred since admission as well.    Past Medical History:   Diagnosis Date     Benign essential hypertension 9/1/2016     Chronic low back pain      Hypertension      Impaired fasting glucose     borderline     Osteoarthritis    Late onset dementia  Hallucinations  Frequent falls    Past Surgical History:   Procedure Laterality Date     ARTHROPLASTY  "HIP ANTERIOR Right 7/24/2018    Procedure: ARTHROPLASTY HIP ANTERIOR;  RIGHT DIRECT ANTERIOR TOTAL HIP ARTHROPLASTY;  Surgeon: Jimbo Phillips MD;  Location:  OR     ARTHROPLASTY KNEE Left 5/12/2020    Procedure: LEFT TOTAL KNEE ARTHROPLASTY;  Surgeon: Jimbo Phillips MD;  Location:  OR     EYE SURGERY  cataracts     MOHS MICROGRAPHIC PROCEDURE      Left lower eyelid      MOHS MICROGRAPHIC PROCEDURE Left 10/27/2016    Procedure: MOHS MICROGRAPHIC PROCEDURE;  Surgeon: Jose Torrez MD;  Location:  SD     MOHS MICROGRAPHIC PROCEDURE Right 5/24/2018    Procedure: MOHS MICROGRAPHIC PROCEDURE;  RIGHT MEDIAL CANTHUS MOHS CLOSURE;  Surgeon: Jose Torrez MD;  Location: [facility]     ORTHOPEDIC SURGERY      bilateral wrists     SH:  , she has 2 daughters Saray and Dena and a son Terrell who all live locally.   Previously lived with her son before move to [place]     ROS:  Ambulatory with FWW  SLUMS 12/30   Weight in 2020 was 94 lbs >>>>was 115 lbs in August 2021   Wt Readings from Last 5 Encounters:   03/16/22 53.1 kg (117 lb)   03/09/22 53.1 kg (117 lb)   03/02/22 53.7 kg (118 lb 6.4 oz)   02/16/22 53.7 kg (118 lb 6.4 oz)   02/01/22 52.9 kg (116 lb 9.6 oz)      EXAM:  NAD  /79   Pulse 89   Temp 97.1  F (36.2  C)   Resp 21   Ht 1.549 m (5' 1\")   Wt 53.1 kg (117 lb)   SpO2 95%   BMI 22.11 kg/m     Neck supple without adenopathy  Lungs clear bilaterally  Heart RRR s1s2 @60  RLE with asymmetric edema above scar on shin    Second toe with scabbed lesion around toenail, about 1cm  Neuro: STML, no focal findings  Needs directional cuing  Psych: affect okay, pleasant.       Last Comprehensive Metabolic Panel:  Sodium   Date Value Ref Range Status   02/10/2022 140 133 - 144 mmol/L Final     Potassium   Date Value Ref Range Status   02/10/2022 3.5 3.4 - 5.3 mmol/L Final     Carbon Dioxide (CO2)   Date Value Ref Range Status   02/10/2022 29 20 - 32 mmol/L Final     Glucose   Date Value Ref Range Status "   02/10/2022 81 70 - 99 mg/dL Final     Urea Nitrogen   Date Value Ref Range Status   02/10/2022 28 7 - 30 mg/dL Final     Creatinine   Date Value Ref Range Status   02/10/2022 0.81 0.52 - 1.04 mg/dL Final     GFR Estimate   Date Value Ref Range Status   02/10/2022 69 >60 mL/min/1.73m2 Final     Calcium   Date Value Ref Range Status   02/10/2022 9.1 8.5 - 10.1 mg/dL Final     Lab Results   Component Value Date    AST 19 02/10/2022      ALBUMIN 2.8 02/10/2022      ALKPHOS 163 02/10/2022     Lab Results   Component Value Date    WBC 5.7 02/10/2022      HGB 11.6 02/10/2022      MCV 92 02/10/2022       02/10/2022     TSH   Date Value Ref Range Status   02/10/2022 2.96 0.40 - 4.00 mU/L Final   01/06/2021 5.73 (A) 0.40 - 4.00 mU/L Final      10/29/2020   MRI Brain w/o contrast:  1. Interval increase in ventriculomegaly of the lateral and third  ventricles since the 2017 brain MRI raising the question of either  developing or worsening normal pressure hydrocephalus versus  ventriculomegaly due to age-related cerebral volume loss. Recommend  clinical correlation for history of normal pressure hydrocephalus.  2. Diffuse cerebral volume loss and cerebral white matter changes  consistent with chronic small vessel ischemic disease      IMP/PLAN:      (I82.411) Acute deep vein thrombosis (DVT) of femoral vein of right lower extremity (H)   Comment: provoked thrombus, now on rivaroxaban 20 mg/day after initial treatment of 15 mg bid   Continued edema and pain, remains quite immobile.    Plan: Reviewed with pt and daughter, would continue anticoagulation for at least 3 months  Pain management with scheduled acetaminophen, local measures.       (L97.519) Right second toe ulcer, with unspecified severity (H)  Comment: unclear if this from trauma or ischemia, latter may explain her ongoing pain    Has been to podiatrist   Plan: check arterial studies as next step.     Monitor wound.      (I10) Benign essential  hypertension  (N18.31) Stage 3a chronic kidney disease  Comment:   BP Readings from Last 3 Encounters:   03/16/22 137/79   03/09/22 138/72   03/02/22 (!) 145/82      Plan: no longer requiring anti-hypertensive medications     (E55.9) Vitamin D deficiency  Comment: per Neurology workup    Plan: vit D3 50 mcg/day       (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         Georgie Bae MD

## 2022-03-18 ENCOUNTER — TRANSFERRED RECORDS (OUTPATIENT)
Dept: HEALTH INFORMATION MANAGEMENT | Facility: CLINIC | Age: 87
End: 2022-03-18
Payer: MEDICARE

## 2022-04-20 ENCOUNTER — ASSISTED LIVING VISIT (OUTPATIENT)
Dept: GERIATRICS | Facility: CLINIC | Age: 87
End: 2022-04-20
Payer: MEDICARE

## 2022-04-20 DIAGNOSIS — M15.8 OTHER OSTEOARTHRITIS INVOLVING MULTIPLE JOINTS: Primary | ICD-10-CM

## 2022-04-20 PROCEDURE — 99207 PR NO CHARGE LOS: CPT | Performed by: PHYSICIAN ASSISTANT

## 2022-04-20 NOTE — PROGRESS NOTES
[facility]th[facility] Nursing home visit    Maryam Saavedra is a 89 year old female who resides at [facility] memory care unit;    Patient is seen today for Right shoulder pain, hs of OA and prior shoulder injections at [facility]> discussed with daughter yesterday, cortisone injection shot for pain relief; I would do this at the facility vs having patient go into the clinic.      Past Medical History:   Diagnosis Date     Benign essential hypertension 9/1/2016     Chronic low back pain      Hypertension      Impaired fasting glucose     borderline     Osteoarthritis       Past Surgical History:   Procedure Laterality Date     ARTHROPLASTY HIP ANTERIOR Right 7/24/2018    Procedure: ARTHROPLASTY HIP ANTERIOR;  RIGHT DIRECT ANTERIOR TOTAL HIP ARTHROPLASTY;  Surgeon: Jimbo Phillips MD;  Location:  OR     ARTHROPLASTY KNEE Left 5/12/2020    Procedure: LEFT TOTAL KNEE ARTHROPLASTY;  Surgeon: Jimbo Phillips MD;  Location:  OR     EYE SURGERY  cataracts     MOHS MICROGRAPHIC PROCEDURE      Left lower eyelid      MOHS MICROGRAPHIC PROCEDURE Left 10/27/2016    Procedure: MOHS MICROGRAPHIC PROCEDURE;  Surgeon: Jose Torrez MD;  Location: [facility]     MOHS MICROGRAPHIC PROCEDURE Right 5/24/2018    Procedure: MOHS MICROGRAPHIC PROCEDURE;  RIGHT MEDIAL CANTHUS MOHS CLOSURE;  Surgeon: Jose Torrez MD;  Location: [facility]     ORTHOPEDIC SURGERY      bilateral wrists        Allergies   Allergen Reactions     Ace Inhibitors Angioedema     Cyclobenzaprine Angioedema      There were no vitals taken for this visit.     Exam: seated  Allows PROM to right shoulder, moderate crepitus, pain with IR/ ER/ FE/ abduction. Discussed the injection today, vs using topical creams, patient is ok with proceeding with injection today.      X-rays not done:    ASSESSMENT / PLAN:  Right shoulder prepped, injected with 1 ml depo medrol, 4 ml 1% lidocaine, into sub/acromial space, tolerated well no complaints, F/U PRN 4 months    [id number]          Kaden  [facility]  [phone] Cell

## 2022-05-25 ENCOUNTER — ASSISTED LIVING VISIT (OUTPATIENT)
Dept: GERIATRICS | Facility: CLINIC | Age: 87
End: 2022-05-25
Payer: MEDICARE

## 2022-05-25 VITALS
WEIGHT: 108 LBS | HEART RATE: 60 BPM | RESPIRATION RATE: 16 BRPM | TEMPERATURE: 97.3 F | HEIGHT: 61 IN | BODY MASS INDEX: 20.39 KG/M2 | SYSTOLIC BLOOD PRESSURE: 122 MMHG | DIASTOLIC BLOOD PRESSURE: 60 MMHG | OXYGEN SATURATION: 96 %

## 2022-05-25 DIAGNOSIS — M15.0 PRIMARY OSTEOARTHRITIS INVOLVING MULTIPLE JOINTS: ICD-10-CM

## 2022-05-25 DIAGNOSIS — M62.81 GENERALIZED MUSCLE WEAKNESS: ICD-10-CM

## 2022-05-25 DIAGNOSIS — I82.411 ACUTE DEEP VEIN THROMBOSIS (DVT) OF FEMORAL VEIN OF RIGHT LOWER EXTREMITY (H): Primary | ICD-10-CM

## 2022-05-25 DIAGNOSIS — M25.361 RIGHT KNEE BUCKLING: ICD-10-CM

## 2022-05-25 DIAGNOSIS — M25.561 ACUTE PAIN OF RIGHT KNEE: ICD-10-CM

## 2022-05-25 DIAGNOSIS — K59.01 SLOW TRANSIT CONSTIPATION: ICD-10-CM

## 2022-05-25 NOTE — PROGRESS NOTES
[facility]  [place] Medical Record Number:  [id number]  Place of Service where encounter took place:  [facility] [[id number]]  Chief Complaint   Patient presents with     RECHECK     OA pain       HPI:    Maryam Saavedra  is a 89 year old (6/9/1932), who is being seen today for an episodic care visit.  HPI information obtained from: facility chart records, patient report and [facility] chart review. Today's concern is:    Follow to OA of multiple joints and acute DVT of femoral vein right lower extremity. Says the right shoulder felt better for a bit but now back to mostly hurting. The right knee continues with some swelling and pain also. She is ambulating more with walker versus WC. Using tubi  verus compression socks and LE edema is below baseline edema (1+). Possible with constipation per chart review but staff reporting she refuses Senna S and prefers to take prune juice. OT discharged, PT will continue to follow until custom WC in place.     Past Medical and Surgical History reviewed in Epic today.    MEDICATIONS:    Current Outpatient Medications   Medication Sig Dispense Refill     acetaminophen (TYLENOL) 325 MG tablet Take 2 tablets (650 mg) by mouth 2 times daily And twice daily as needed 60 tablet 97     Menthol-Methyl Salicylate (ICY HOT EXTRA STRENGTH) 10-30 % CREA Externally apply topically 2 times daily To right knee 85 g 3     polyethylene glycol (MIRALAX) 17 GM/Dose powder Take 17 g by mouth daily 510 g      rivaroxaban ANTICOAGULANT (XARELTO ANTICOAGULANT) 15 MG TABS tablet Take 1 tablet (15 mg) by mouth 2 times daily (with meals) Then 20 mg po daily 60 tablet 0     rivaroxaban ANTICOAGULANT (XARELTO ANTICOAGULANT) 20 MG TABS tablet Take 1 tablet (20 mg) by mouth daily (with dinner) Begin after 21 days of Xarelto 15 mg po BID 30 tablet 3     SENEXON-S 8.6-50 MG tablet TAKE ONE TABLET BY MOUTH AT BEDTIME THREE TIMES PER WEEK;& TAKE ONE TABLET  "TWICE DAILY AS NEEDED FOR CONSTIPATION 14 tablet PRN     THERATEARS 0.25 % SOLN INSTILL ONE DROP IN EACH EYE TWICE DAILY FOR DRY EYES 15 mL 97     VITAMIN D3 50 MCG (2000 UT) tablet TAKE 1 TABLET BY MOUTH ONCE DAILY 28 tablet 97     REVIEW OF SYSTEMS:  Limited secondary to cognitive impairment but today pt reports ok    Objective:  /60   Pulse 60   Temp 97.3  F (36.3  C)   Resp 16   Ht 1.549 m (5' 1\")   Wt 49 kg (108 lb)   SpO2 96%   BMI 20.41 kg/m    Exam:  GENERAL APPEARANCE:  Alert, cooperative  ENT:  Mouth and posterior oropharynx normal, moist mucous membranes, normal hearing acuity  RESP:  respiratory effort and palpation of chest normal, lungs clear to auscultation   CV:  Palpation and auscultation of heart done , regular rate and rhythm, no murmur, rub, or gallop, peripheral edema trace, 1+ swelling to right knee, no redness or warmth, tubi  on   ABDOMEN:  normal bowel sounds, soft, nontender  M/S:   BLE weakness improving, ambulating with walker, custom WC on order  SKIN: intact to visualized areas   NEURO:   Cranial nerves 2-12 are normal tested and grossly at patient's baseline  PSYCH:  insight and judgement impaired, memory impaired , affect and mood normal    Labs:   CBC RESULTS: Recent Labs   Lab Test 02/10/22  0545 01/06/21  0000   WBC 5.7 5.1   RBC 4.08 4.17   HGB 11.6* 11.7   HCT 37.4 37.7   MCV 92 90   MCH 28.4 28.1   MCHC 31.0* 31.0*   RDW 14.5 15.9*    239       Last Basic Metabolic Panel:  Recent Labs   Lab Test 02/10/22  0545 01/06/21  0000    139   POTASSIUM 3.5 3.9   CHLORIDE 106 105   NOHEMY 9.1 9.3   CO2 29 30   BUN 28 26   CR 0.81 1.01   GLC 81 131*       Liver Function Studies -   Recent Labs   Lab Test 02/10/22  0545 01/06/21  0000   PROTTOTAL 6.7* 7.2   ALBUMIN 2.8* 2.8*   BILITOTAL 0.3 0.5   ALKPHOS 163* 220*   AST 19 23   ALT 22 31       TSH   Date Value Ref Range Status   02/10/2022 2.96 0.40 - 4.00 mU/L Final   01/06/2021 5.73 (A) 0.40 - 4.00 mU/L Final "   11/25/2017 4.20 (H) 0.40 - 4.00 mU/L Final       Lab Results   Component Value Date    A1C 5.6 07/13/2018    A1C 5.9 05/14/2018     ASSESSMENT/PLAN:  (I82.411) Acute deep vein thrombosis (DVT) of femoral vein of right lower extremity (H)   -Xarelto 15 mg po BID x 21 days, then starting Xarelto 20 mg po daily on 3/10/2. 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 Reviewed need today with med compliance   Plan:  -continue Senna S at HS 3x weekly and twice daily prn   -miralax daily          Electronically signed by:  JACKIE Zayas CNP

## 2022-05-25 NOTE — LETTER
5/25/2022        RE: Maryam Saavedra  C/o Saray Saavedra  [address] [address]        [facility]  [place] Medical Record Number:  [id number]  Place of Service where encounter took place:  [facility] [[id number]]  Chief Complaint   Patient presents with     RECHECK     OA pain       HPI:    Maryam Saavedra  is a 89 year old (6/9/1932), who is being seen today for an episodic care visit.  HPI information obtained from: facility chart records, patient report and [facility] chart review. Today's concern is:    Follow to OA of multiple joints and acute DVT of femoral vein right lower extremity. Says the right shoulder felt better for a bit but now back to mostly hurting. The right knee continues with some swelling and pain also. She is ambulating more with walker versus WC. Using tubi  verus compression socks and LE edema is below baseline edema (1+). Possible with constipation per chart review but staff reporting she refuses Senna S and prefers to take prune juice. OT discharged, PT will continue to follow until custom WC in place.     Past Medical and Surgical History reviewed in Epic today.    MEDICATIONS:    Current Outpatient Medications   Medication Sig Dispense Refill     acetaminophen (TYLENOL) 325 MG tablet Take 2 tablets (650 mg) by mouth 2 times daily And twice daily as needed 60 tablet 97     Menthol-Methyl Salicylate (ICY HOT EXTRA STRENGTH) 10-30 % CREA Externally apply topically 2 times daily To right knee 85 g 3     polyethylene glycol (MIRALAX) 17 GM/Dose powder Take 17 g by mouth daily 510 g      rivaroxaban ANTICOAGULANT (XARELTO ANTICOAGULANT) 15 MG TABS tablet Take 1 tablet (15 mg) by mouth 2 times daily (with meals) Then 20 mg po daily 60 tablet 0     rivaroxaban ANTICOAGULANT (XARELTO ANTICOAGULANT) 20 MG TABS tablet Take 1 tablet (20 mg) by mouth daily (with dinner) Begin after 21 days of Xarelto 15 mg po BID 30 tablet 3  "    SENEXON-S 8.6-50 MG tablet TAKE ONE TABLET BY MOUTH AT BEDTIME THREE TIMES PER WEEK;& TAKE ONE TABLET TWICE DAILY AS NEEDED FOR CONSTIPATION 14 tablet PRN     THERATEARS 0.25 % SOLN INSTILL ONE DROP IN EACH EYE TWICE DAILY FOR DRY EYES 15 mL 97     VITAMIN D3 50 MCG (2000 UT) tablet TAKE 1 TABLET BY MOUTH ONCE DAILY 28 tablet 97     REVIEW OF SYSTEMS:  Limited secondary to cognitive impairment but today pt reports ok    Objective:  /60   Pulse 60   Temp 97.3  F (36.3  C)   Resp 16   Ht 1.549 m (5' 1\")   Wt 49 kg (108 lb)   SpO2 96%   BMI 20.41 kg/m    Exam:  GENERAL APPEARANCE:  Alert, cooperative  ENT:  Mouth and posterior oropharynx normal, moist mucous membranes, normal hearing acuity  RESP:  respiratory effort and palpation of chest normal, lungs clear to auscultation   CV:  Palpation and auscultation of heart done , regular rate and rhythm, no murmur, rub, or gallop, peripheral edema trace, 1+ swelling to right knee, no redness or warmth, tubi  on   ABDOMEN:  normal bowel sounds, soft, nontender  M/S:   BLE weakness improving, ambulating with walker, custom WC on order  SKIN: intact to visualized areas   NEURO:   Cranial nerves 2-12 are normal tested and grossly at patient's baseline  PSYCH:  insight and judgement impaired, memory impaired , affect and mood normal    Labs:   CBC RESULTS: Recent Labs   Lab Test 02/10/22  0545 01/06/21  0000   WBC 5.7 5.1   RBC 4.08 4.17   HGB 11.6* 11.7   HCT 37.4 37.7   MCV 92 90   MCH 28.4 28.1   MCHC 31.0* 31.0*   RDW 14.5 15.9*    239       Last Basic Metabolic Panel:  Recent Labs   Lab Test 02/10/22  0545 01/06/21  0000    139   POTASSIUM 3.5 3.9   CHLORIDE 106 105   NOHEMY 9.1 9.3   CO2 29 30   BUN 28 26   CR 0.81 1.01   GLC 81 131*       Liver Function Studies -   Recent Labs   Lab Test 02/10/22  0545 01/06/21  0000   PROTTOTAL 6.7* 7.2   ALBUMIN 2.8* 2.8*   BILITOTAL 0.3 0.5   ALKPHOS 163* 220*   AST 19 23   ALT 22 31       TSH   Date " Value Ref Range Status   02/10/2022 2.96 0.40 - 4.00 mU/L Final   01/06/2021 5.73 (A) 0.40 - 4.00 mU/L Final   11/25/2017 4.20 (H) 0.40 - 4.00 mU/L Final       Lab Results   Component Value Date    A1C 5.6 07/13/2018    A1C 5.9 05/14/2018     ASSESSMENT/PLAN:  (I82.411) Acute deep vein thrombosis (DVT) of femoral vein of right lower extremity (H)   -Xarelto 15 mg po BID x 21 days, then starting Xarelto 20 mg po daily on 3/10/2. 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 Reviewed need today with med compliance   Plan:  -continue Senna S at HS 3x weekly and twice daily prn   -miralax daily          Electronically signed by:  JACKIE Zayas CNP                 Sincerely,        JACKIE Zayas CNP

## 2022-05-29 ENCOUNTER — LAB REQUISITION (OUTPATIENT)
Dept: LAB | Facility: CLINIC | Age: 87
End: 2022-05-29
Payer: MEDICARE

## 2022-05-29 DIAGNOSIS — U07.1 COVID-19: ICD-10-CM

## 2022-05-29 PROCEDURE — U0005 INFEC AGEN DETEC AMPLI PROBE: HCPCS | Mod: ORL | Performed by: INTERNAL MEDICINE

## 2022-05-30 LAB — SARS-COV-2 RNA RESP QL NAA+PROBE: NEGATIVE

## 2022-07-06 ENCOUNTER — ASSISTED LIVING VISIT (OUTPATIENT)
Dept: GERIATRICS | Facility: CLINIC | Age: 87
End: 2022-07-06
Payer: MEDICARE

## 2022-07-06 VITALS
HEART RATE: 74 BPM | HEIGHT: 61 IN | WEIGHT: 107.6 LBS | OXYGEN SATURATION: 95 % | TEMPERATURE: 97.1 F | SYSTOLIC BLOOD PRESSURE: 142 MMHG | RESPIRATION RATE: 18 BRPM | DIASTOLIC BLOOD PRESSURE: 67 MMHG | BODY MASS INDEX: 20.32 KG/M2

## 2022-07-06 DIAGNOSIS — M15.0 PRIMARY OSTEOARTHRITIS INVOLVING MULTIPLE JOINTS: Primary | ICD-10-CM

## 2022-07-06 DIAGNOSIS — K59.01 SLOW TRANSIT CONSTIPATION: ICD-10-CM

## 2022-07-06 DIAGNOSIS — G89.29 CHRONIC PAIN OF RIGHT KNEE: ICD-10-CM

## 2022-07-06 DIAGNOSIS — M54.41 BILATERAL LOW BACK PAIN WITH RIGHT-SIDED SCIATICA, UNSPECIFIED CHRONICITY: ICD-10-CM

## 2022-07-06 DIAGNOSIS — M25.561 CHRONIC PAIN OF RIGHT KNEE: ICD-10-CM

## 2022-07-06 DIAGNOSIS — I10 BENIGN ESSENTIAL HYPERTENSION: ICD-10-CM

## 2022-07-06 DIAGNOSIS — F01.518 VASCULAR DEMENTIA WITH BEHAVIOR DISTURBANCE (H): ICD-10-CM

## 2022-07-06 DIAGNOSIS — M62.81 GENERALIZED MUSCLE WEAKNESS: ICD-10-CM

## 2022-07-06 RX ORDER — BISACODYL 10 MG
10 SUPPOSITORY, RECTAL RECTAL DAILY PRN
COMMUNITY
End: 2022-09-14

## 2022-07-06 NOTE — LETTER
7/6/2022        RE: Maryam Saavedra  C/o Saray Saavedra  [address] [facility] [address]        [facility]  Chief Complaint   Patient presents with     Annual Comprehensive Exam Assisted Living     [place] Medical Record Number:  [id number]  Place of Service where encounter took place:  [facility] [[id number]]    HPI:    Maryam Savaedra  is a 89 year old  (6/9/1932), who is being seen today for an annual comprehensive visit. HPI information obtained from: facility chart records, patient report, [facility] chart review and family/first contact daughter report.  Today's concerns are:    Seen today for chronic disease management. Continues to have right knee discomfort with swelling, generalized weakness although is walking more versus WC. She bends knees and leans forward. No recent falls. Some drag of feet. Is now stating LBP with pain down posterior right leg. Unclear if received new WC. Denies numbness or tingling. Facility chart reviewed refuses bowel medications at times. Compression off today?     ALLERGIES: Ace inhibitors and Cyclobenzaprine  PAST MEDICAL HISTORY:  has a past medical history of Benign essential hypertension (9/1/2016), Chronic low back pain, Hypertension, Impaired fasting glucose, and Osteoarthritis.    She has no past medical history of Breast cancer (H), Breast mass, Cancer (H), Cerebral infarction (H), Congestive heart failure (H), COPD (chronic obstructive pulmonary disease) (H), Cyst of breast, Depressive disorder, Diabetes (H), Heart disease, History of blood transfusion, History of gestational diabetes, Inverted nipple, Malignant neoplasm of colon, unspecified site, Malignant neoplasm of corpus uteri, except isthmus (H), Malignant neoplasm of ovary (H), Need for prophylactic hormone replacement therapy (postmenopausal), Other injury of other sites of trunk, Personal history of other disorders of nervous system and sense  organs, Personal history of unspecified circulatory disease, Thyroid disease, or Uncomplicated asthma.  PAST SURGICAL HISTORY:  has a past surgical history that includes orthopedic surgery; Eye surgery (cataracts); Mohs micrographic procedure; Mohs micrographic procedure (Left, 10/27/2016); Mohs micrographic procedure (Right, 5/24/2018); Arthroplasty Hip Anterior (Right, 7/24/2018); and Arthroplasty knee (Left, 5/12/2020).  IMMUNIZATIONS:  Immunization History   Administered Date(s) Administered     COVID-19,PF,Moderna 01/15/2021, 02/12/2021, 11/23/2021     Flu 65+ Years 09/23/2019     Influenza (High Dose) 3 valent vaccine 10/07/2016, 09/28/2017, 10/29/2018     Influenza, Quad, High Dose, Pf, 65yr+ (Fluzone HD) 10/13/2020, 09/23/2021     Pneumo Conj 13-V (2010&after) 10/07/2016     Pneumococcal 23 valent 09/28/2017     TDAP Vaccine (Adacel) 06/26/2013     Above immunizations pulled from [facility]. [facility] and facility records also reconciled. Outstanding information sent to  to update [facility].  Future immunizations are not needed at this point as all recommended immunizations are up to date.     Current Outpatient Medications   Medication Sig Dispense Refill     acetaminophen (TYLENOL) 325 MG tablet Take 2 tablets (650 mg) by mouth 2 times daily And twice daily as needed 60 tablet 97     bisacodyl (DULCOLAX) 10 MG suppository Place 10 mg rectally daily as needed for constipation       Menthol-Methyl Salicylate (ICY HOT EXTRA STRENGTH) 10-30 % CREA Externally apply topically 2 times daily To right knee 85 g 3     polyethylene glycol (MIRALAX) 17 GM/Dose powder Take 17 g by mouth daily 510 g      SENEXON-S 8.6-50 MG tablet TAKE ONE TABLET BY MOUTH AT BEDTIME THREE TIMES PER WEEK;& TAKE ONE TABLET TWICE DAILY AS NEEDED FOR CONSTIPATION 14 tablet PRN     THERATEARS 0.25 % SOLN INSTILL ONE DROP IN EACH EYE TWICE DAILY FOR DRY EYES 15 mL 97     VITAMIN D3 50 MCG (2000 UT) tablet TAKE 1 TABLET BY  "MOUTH ONCE DAILY 28 tablet 97     Case Management:  I have reviewed the Assisted Living care plan, current immunizations and preventive care/cancer screening. .Future cancer screening is not clinically indicated secondary to age/goals of care Patient's desire to return to the community is present, but is not able due to care needs . Current Level of Care is appropriate.    Advance Directive Discussion:    I reviewed the current advanced directives as reflected in EPIC, the POLST and the facility chart, and verified the congruency of orders. I contacted the first party daughter Saray and discussed the plan of Care.  I did not due to cognitive impairment review the advance directives with the resident.     Team Discussion:  I communicated with the appropriate disciplines involved with the Plan of Care:   Nursing    Patient's goal is pain control and comfort.Treat reversible conditions  Information reviewed:  Medications, vital signs, orders, and nursing notes.    ROS:  Limited secondary to cognitive impairment but today pt reports ok I guess    Vitals:  BP (!) 142/67   Pulse 74   Temp 97.1  F (36.2  C)   Resp 18   Ht 1.549 m (5' 1\")   Wt 48.8 kg (107 lb 9.6 oz)   SpO2 95%   BMI 20.33 kg/m   Body mass index is 20.33 kg/m .  Exam:  GENERAL APPEARANCE:  Alert, cooperative  ENT:  Mouth and posterior oropharynx normal, moist mucous membranes, [place]  RESP:  respiratory effort and palpation of chest normal, lungs clear to auscultation   CV:  Palpation and auscultation of heart done , regular rate and rhythm, no murmur, rub, or gallop, peripheral edema 2+,  swelling to right knee, no redness or warmth, tubi  on   ABDOMEN:  normal bowel sounds, soft, nontender  M/S:   BLE weakness improving, ambulating with walker, slight drag of feet, bent knees, custom WC on order  SKIN: intact to visualized areas   NEURO:   Cranial nerves 2-12 are normal tested and grossly at patient's baseline  PSYCH:  insight and judgement " impaired, memory impaired , affect and mood normal    Lab/Diagnostic data:   CBC RESULTS: Recent Labs   Lab Test 02/10/22  0545 01/06/21  0000   WBC 5.7 5.1   RBC 4.08 4.17   HGB 11.6* 11.7   HCT 37.4 37.7   MCV 92 90   MCH 28.4 28.1   MCHC 31.0* 31.0*   RDW 14.5 15.9*    239       Last Basic Metabolic Panel:  Recent Labs   Lab Test 02/10/22  0545 01/06/21  0000    139   POTASSIUM 3.5 3.9   CHLORIDE 106 105   NOHEMY 9.1 9.3   CO2 29 30   BUN 28 26   CR 0.81 1.01   GLC 81 131*       Liver Function Studies -   Recent Labs   Lab Test 02/10/22  0545 01/06/21  0000   PROTTOTAL 6.7* 7.2   ALBUMIN 2.8* 2.8*   BILITOTAL 0.3 0.5   ALKPHOS 163* 220*   AST 19 23   ALT 22 31       TSH   Date Value Ref Range Status   02/10/2022 2.96 0.40 - 4.00 mU/L Final   01/06/2021 5.73 (A) 0.40 - 4.00 mU/L Final   11/25/2017 4.20 (H) 0.40 - 4.00 mU/L Final       Lab Results   Component Value Date    A1C 5.6 07/13/2018    A1C 5.9 05/14/2018     ASSESSMENT/PLAN  (M89.49) Primary osteoarthritis involving multiple joints  (primary encounter diagnosis)  (M25.561,  G89.29) Chronic pain of right knee  (M62.81) Generalized muscle weakness  (M54.41) Bilateral low back pain with right-sided sciatica, unspecified chronicity  Comment: ongoing   Plan:   -continue current plan of care. Discussed gabapentin with daughter today and deferring for now    (I10) Benign essential hypertension  Comment: stable  Plan:   -continue current plan of care     (F01.51) Vascular dementia with behavior disturbance (H)  Comment: stable   Plan:   - staff assist with ADLs    (K59.01) Slow transit constipation  Comment: intermittent   Plan:   -encourage to take Miralax 17g po daily   -supp daily prn   -Senna S TAKE ONE TABLET BY MOUTH AT BEDTIME THREE TIMES PER WEEK;& TAKE ONE TABLET TWICE DAILY AS NEEDED FOR CONSTIPATION        Electronically signed by:  JACKIE Zayas CNP               Sincerely,        JACKIE Zayas CNP       No indicators present

## 2022-07-06 NOTE — PROGRESS NOTES
[facility]  Chief Complaint   Patient presents with     Annual Comprehensive Exam Assisted Living     [facility] Medical Record Number:  [id number]  Place of Service where encounter took place:  [facility] [[id number]]    HPI:    Maryam Saavedra  is a 89 year old  (6/9/1932), who is being seen today for an annual comprehensive visit. HPI information obtained from: facility chart records, patient report, [facility] Epic chart review and family/first contact daughter report.  Today's concerns are:    Seen today for chronic disease management. Continues to have right knee discomfort with swelling, generalized weakness although is walking more versus WC. She bends knees and leans forward. No recent falls. Some drag of feet. Is now stating LBP with pain down posterior right leg. Unclear if received new WC. Denies numbness or tingling. Facility chart reviewed refuses bowel medications at times. Compression off today?     ALLERGIES: Ace inhibitors and Cyclobenzaprine  PAST MEDICAL HISTORY:  has a past medical history of Benign essential hypertension (9/1/2016), Chronic low back pain, Hypertension, Impaired fasting glucose, and Osteoarthritis.    She has no past medical history of Breast cancer (H), Breast mass, Cancer (H), Cerebral infarction (H), Congestive heart failure (H), COPD (chronic obstructive pulmonary disease) (H), Cyst of breast, Depressive disorder, Diabetes (H), Heart disease, History of blood transfusion, History of gestational diabetes, Inverted nipple, Malignant neoplasm of colon, unspecified site, Malignant neoplasm of corpus uteri, except isthmus (H), Malignant neoplasm of ovary (H), Need for prophylactic hormone replacement therapy (postmenopausal), Other injury of other sites of trunk, Personal history of other disorders of nervous system and sense organs, Personal history of unspecified circulatory disease, Thyroid disease, or Uncomplicated asthma.  PAST  SURGICAL HISTORY:  has a past surgical history that includes orthopedic surgery; Eye surgery (cataracts); Mohs micrographic procedure; Mohs micrographic procedure (Left, 10/27/2016); Mohs micrographic procedure (Right, 5/24/2018); Arthroplasty Hip Anterior (Right, 7/24/2018); and Arthroplasty knee (Left, 5/12/2020).  IMMUNIZATIONS:  Immunization History   Administered Date(s) Administered     COVID-19,PF,Moderna 01/15/2021, 02/12/2021, 11/23/2021     Flu 65+ Years 09/23/2019     Influenza (High Dose) 3 valent vaccine 10/07/2016, 09/28/2017, 10/29/2018     Influenza, Quad, High Dose, Pf, 65yr+ (Fluzone HD) 10/13/2020, 09/23/2021     Pneumo Conj 13-V (2010&after) 10/07/2016     Pneumococcal 23 valent 09/28/2017     TDAP Vaccine (Adacel) 06/26/2013     Above immunizations pulled from [facility]. [facility] and facility records also reconciled. Outstanding information sent to  to update [facility].  Future immunizations are not needed at this point as all recommended immunizations are up to date.     Current Outpatient Medications   Medication Sig Dispense Refill     acetaminophen (TYLENOL) 325 MG tablet Take 2 tablets (650 mg) by mouth 2 times daily And twice daily as needed 60 tablet 97     bisacodyl (DULCOLAX) 10 MG suppository Place 10 mg rectally daily as needed for constipation       Menthol-Methyl Salicylate (ICY HOT EXTRA STRENGTH) 10-30 % CREA Externally apply topically 2 times daily To right knee 85 g 3     polyethylene glycol (MIRALAX) 17 GM/Dose powder Take 17 g by mouth daily 510 g      SENEXON-S 8.6-50 MG tablet TAKE ONE TABLET BY MOUTH AT BEDTIME THREE TIMES PER WEEK;& TAKE ONE TABLET TWICE DAILY AS NEEDED FOR CONSTIPATION 14 tablet PRN     THERATEARS 0.25 % SOLN INSTILL ONE DROP IN EACH EYE TWICE DAILY FOR DRY EYES 15 mL 97     VITAMIN D3 50 MCG (2000 UT) tablet TAKE 1 TABLET BY MOUTH ONCE DAILY 28 tablet 97     Case Management:  I have reviewed the Assisted Living care plan, current  "immunizations and preventive care/cancer screening. .Future cancer screening is not clinically indicated secondary to age/goals of care Patient's desire to return to the community is present, but is not able due to care needs . Current Level of Care is appropriate.    Advance Directive Discussion:    I reviewed the current advanced directives as reflected in EPIC, the POLST and the facility chart, and verified the congruency of orders. I contacted the first party daughter Saray and discussed the plan of Care.  I did not due to cognitive impairment review the advance directives with the resident.     Team Discussion:  I communicated with the appropriate disciplines involved with the Plan of Care:   Nursing    Patient's goal is pain control and comfort.Treat reversible conditions  Information reviewed:  Medications, vital signs, orders, and nursing notes.    ROS:  Limited secondary to cognitive impairment but today pt reports ok I guess    Vitals:  BP (!) 142/67   Pulse 74   Temp 97.1  F (36.2  C)   Resp 18   Ht 1.549 m (5' 1\")   Wt 48.8 kg (107 lb 9.6 oz)   SpO2 95%   BMI 20.33 kg/m   Body mass index is 20.33 kg/m .  Exam:  GENERAL APPEARANCE:  Alert, cooperative  ENT:  Mouth and posterior oropharynx normal, moist mucous membranes, [place]  RESP:  respiratory effort and palpation of chest normal, lungs clear to auscultation   CV:  Palpation and auscultation of heart done , regular rate and rhythm, no murmur, rub, or gallop, peripheral edema 2+,  swelling to right knee, no redness or warmth, tubi  on   ABDOMEN:  normal bowel sounds, soft, nontender  M/S:   BLE weakness improving, ambulating with walker, slight drag of feet, bent knees, custom WC on order  SKIN: intact to visualized areas   NEURO:   Cranial nerves 2-12 are normal tested and grossly at patient's baseline  PSYCH:  insight and judgement impaired, memory impaired , affect and mood normal    Lab/Diagnostic data:   CBC RESULTS: Recent Labs   Lab Test " 02/10/22  0545 01/06/21  0000   WBC 5.7 5.1   RBC 4.08 4.17   HGB 11.6* 11.7   HCT 37.4 37.7   MCV 92 90   MCH 28.4 28.1   MCHC 31.0* 31.0*   RDW 14.5 15.9*    239       Last Basic Metabolic Panel:  Recent Labs   Lab Test 02/10/22  0545 01/06/21  0000    139   POTASSIUM 3.5 3.9   CHLORIDE 106 105   NOHEMY 9.1 9.3   CO2 29 30   BUN 28 26   CR 0.81 1.01   GLC 81 131*       Liver Function Studies -   Recent Labs   Lab Test 02/10/22  0545 01/06/21  0000   PROTTOTAL 6.7* 7.2   ALBUMIN 2.8* 2.8*   BILITOTAL 0.3 0.5   ALKPHOS 163* 220*   AST 19 23   ALT 22 31       TSH   Date Value Ref Range Status   02/10/2022 2.96 0.40 - 4.00 mU/L Final   01/06/2021 5.73 (A) 0.40 - 4.00 mU/L Final   11/25/2017 4.20 (H) 0.40 - 4.00 mU/L Final       Lab Results   Component Value Date    A1C 5.6 07/13/2018    A1C 5.9 05/14/2018     ASSESSMENT/PLAN  (M89.49) Primary osteoarthritis involving multiple joints  (primary encounter diagnosis)  (M25.561,  G89.29) Chronic pain of right knee  (M62.81) Generalized muscle weakness  (M54.41) Bilateral low back pain with right-sided sciatica, unspecified chronicity  Comment: ongoing   Plan:   -continue current plan of care. Discussed gabapentin with daughter today and deferring for now    (I10) Benign essential hypertension  Comment: stable  Plan:   -continue current plan of care     (F01.51) Vascular dementia with behavior disturbance (H)  Comment: stable   Plan:   - staff assist with ADLs    (K59.01) Slow transit constipation  Comment: intermittent   Plan:   -encourage to take Miralax 17g po daily   -supp daily prn   -Senna S TAKE ONE TABLET BY MOUTH AT BEDTIME THREE TIMES PER WEEK;& TAKE ONE TABLET TWICE DAILY AS NEEDED FOR CONSTIPATION        Electronically signed by:  JACKIE Zayas CNP

## 2022-07-13 ENCOUNTER — LAB REQUISITION (OUTPATIENT)
Dept: LAB | Facility: CLINIC | Age: 87
End: 2022-07-13
Payer: MEDICARE

## 2022-07-13 DIAGNOSIS — N39.0 URINARY TRACT INFECTION, SITE NOT SPECIFIED: ICD-10-CM

## 2022-07-13 LAB
ALBUMIN UR-MCNC: 10 MG/DL
APPEARANCE UR: CLEAR
BILIRUB UR QL STRIP: NEGATIVE
COLOR UR AUTO: YELLOW
GLUCOSE UR STRIP-MCNC: NEGATIVE MG/DL
HGB UR QL STRIP: NEGATIVE
HYALINE CASTS: 7 /LPF
KETONES UR STRIP-MCNC: NEGATIVE MG/DL
LEUKOCYTE ESTERASE UR QL STRIP: ABNORMAL
MUCOUS THREADS #/AREA URNS LPF: PRESENT /LPF
NITRATE UR QL: NEGATIVE
PH UR STRIP: 6 [PH] (ref 5–7)
RBC URINE: 1 /HPF
SP GR UR STRIP: 1.02 (ref 1–1.03)
SQUAMOUS EPITHELIAL: 1 /HPF
UROBILINOGEN UR STRIP-MCNC: NORMAL MG/DL
WBC URINE: 2 /HPF

## 2022-07-13 PROCEDURE — 87086 URINE CULTURE/COLONY COUNT: CPT | Mod: ORL | Performed by: NURSE PRACTITIONER

## 2022-07-13 PROCEDURE — 81001 URINALYSIS AUTO W/SCOPE: CPT | Mod: ORL | Performed by: NURSE PRACTITIONER

## 2022-07-15 LAB — BACTERIA UR CULT: NORMAL

## 2022-07-27 ENCOUNTER — HOSPITAL ENCOUNTER (EMERGENCY)
Facility: CLINIC | Age: 87
Discharge: HOME | End: 2022-07-27
Attending: EMERGENCY MEDICINE | Admitting: EMERGENCY MEDICINE
Payer: MEDICARE

## 2022-07-27 ENCOUNTER — APPOINTMENT (OUTPATIENT)
Dept: GENERAL RADIOLOGY | Facility: CLINIC | Age: 87
End: 2022-07-27
Attending: EMERGENCY MEDICINE
Payer: MEDICARE

## 2022-07-27 VITALS
DIASTOLIC BLOOD PRESSURE: 85 MMHG | RESPIRATION RATE: 18 BRPM | HEART RATE: 81 BPM | SYSTOLIC BLOOD PRESSURE: 169 MMHG | TEMPERATURE: 97.7 F | OXYGEN SATURATION: 96 %

## 2022-07-27 DIAGNOSIS — S61.412A LACERATION OF LEFT HAND WITHOUT FOREIGN BODY, INITIAL ENCOUNTER: ICD-10-CM

## 2022-07-27 DIAGNOSIS — S62.308A: ICD-10-CM

## 2022-07-27 LAB
HOLD SPECIMEN: NORMAL

## 2022-07-27 PROCEDURE — 73130 X-RAY EXAM OF HAND: CPT | Mod: LT

## 2022-07-27 PROCEDURE — 73110 X-RAY EXAM OF WRIST: CPT | Mod: LT

## 2022-07-27 PROCEDURE — 99284 EMERGENCY DEPT VISIT MOD MDM: CPT | Mod: 25

## 2022-07-27 PROCEDURE — 26600 TREAT METACARPAL FRACTURE: CPT | Mod: LT

## 2022-07-27 PROCEDURE — 12004 RPR S/N/AX/GEN/TRK7.6-12.5CM: CPT

## 2022-07-27 ASSESSMENT — ENCOUNTER SYMPTOMS
SHORTNESS OF BREATH: 0
WOUND: 1

## 2022-07-28 ENCOUNTER — ASSISTED LIVING VISIT (OUTPATIENT)
Dept: GERIATRICS | Facility: CLINIC | Age: 87
End: 2022-07-28
Payer: MEDICARE

## 2022-07-28 DIAGNOSIS — S62.391A CLOSED NONDISPLACED FRACTURE OF OTHER PART OF SECOND METACARPAL BONE OF LEFT HAND, INITIAL ENCOUNTER: Primary | ICD-10-CM

## 2022-07-28 PROCEDURE — 99207 PR NO CHARGE LOS: CPT | Performed by: PHYSICIAN ASSISTANT

## 2022-07-28 NOTE — ED PROVIDER NOTES
History   Chief Complaint:  Fall       HPI   Maryam Saavedra is a 89 year old female with history of hypertension, dementia and frequent falls who presents via EMS for evaluation after a fall. She has a laceration to her left hand. She states she was using her walker and fell as she was turning. She reports that she knew she was going to fall. She lives in an apartment.  No dizziness no lightheadedness no chest discomfort or shortness of breath prior to the fall.    Review of Systems   Respiratory: Negative for shortness of breath.    Cardiovascular: Negative for chest pain.   Skin: Positive for wound.       Allergies:  Ace Inhibitors  Cyclobenzaprine    Medications:  Miralax  Senexon  Vitamin D3    Past Medical History:     Hypertension  Unsteady gait  Osteoarthritis   Anemia due to blood loss, acute  Cognitive impairment  CKD, stage 3  Falls frequently  Mixed dementia  Primary osteoarthritis  Bilateral lower extremity edema    Past Surgical History:    Arthroplasty hip anterior, right  Arthroplasty knee, left  Mohs micrographic procedure, bilateral  Orthopedic surgery, bilateral wrists    Family History:    Mother - diabetes  Father - diabetes    Social History:  The patient presents to the ED via EMS alone.    Physical Exam     Patient Vitals for the past 24 hrs:   BP Temp Temp src Pulse Resp SpO2   07/27/22 1947 (!) 169/85 97.7  F (36.5  C) Oral 81 18 96 %       Physical Exam  Gen: well appearing, in no acute distress  Oral : Mucous membranes moist,   Nose: No rhinorhea  Ears: External near normal, without drainage  Eyes: periorbital tissues and sclera normal   Neck: supple, no abnormal swelling  Lungs: Clear bilaterally, no tachypnea or distress, speaks full sentences  CV: Regular rate, regular rhythm  Abd: soft, nontender, nondistended, no rebound/guarding  Ext: no lower extremity edema  Skin: warm, dry, well perfused, large laceration to left hand over the distal metacarpals near the knuckles tendon sheath  visible  Neuro: alert, no gross motor or sensory deficits,   Psych: pleasant mood, normal affect      Emergency Department Course     Imaging:  XR Wrist Left G/E 3 Views   Final Result   IMPRESSION:       Acute, mildly comminuted oblique fracture involving the distal metaphysis of the second metacarpal with 3 mm fracture displacement. Fracture is likely intra-articular to the second MCP joint, but the joint remains normally aligned. No additional acute    fracture. No joint malalignment otherwise.      Severe degenerative arthritis involving the base of the thumb at the first CMC and MCP joints. Moderate-severe arthritic changes in the fingers elsewhere.      Generalized demineralization. No erosive arthropathy.      Prior distal radius ORIF with a plate and screws. No hardware complication or loosening.      XR Hand Left G/E 3 Views   Final Result   IMPRESSION:       Acute, mildly comminuted oblique fracture involving the distal metaphysis of the second metacarpal with 3 mm fracture displacement. Fracture is likely intra-articular to the second MCP joint, but the joint remains normally aligned. No additional acute    fracture. No joint malalignment otherwise.      Severe degenerative arthritis involving the base of the thumb at the first CMC and MCP joints. Moderate-severe arthritic changes in the fingers elsewhere.      Generalized demineralization. No erosive arthropathy.      Prior distal radius ORIF with a plate and screws. No hardware complication or loosening.        Report per radiology    Procedures    Laceration Repair      Procedure: Laceration Repair    Indication: Laceration    Consent: Verbal    Location: Left Hand     Length: 10 cm    Preparation: Irrigation with Sterile Saline.    Anesthesia/Sedation: Lidocaine with Epinephrine - 1%      Treatment/Exploration: Wound explored, no foreign bodies found     Closure: The wound was closed with one layer. Skin/superficial layer was closed with 14 x 5-0  Nylon using Interrupted sutures.     Patient Status: The patient tolerated the procedure well: Yes. There were no complications.      Splint Placement     Procedure: Splint Placement     Indication: Fracture    Consent: Verbal     Location: Left Hand    Preparation: Wounds were cleansed and dressed with a non-adherent bandage     Procedure detail:   Splint was applied by Myself  Splint type: Volar forearm   Splint materilal: Fiberglass  After placement I checked and adjusted the fit as needed to ensure proper positioning/fit   Sensation and circulation are intact after splint placement     Patient Status: The patient tolerated the procedure well: Yes. There were no complications.      Emergency Department Course:           Reviewed:  I reviewed nursing notes, vitals, past medical history and Care Everywhere    Assessments:  2000 I obtained history and examined the patient as noted above.   2052 I rechecked the patient and performed procedure noted above.     Disposition:  The patient was discharged to home.     Impression & Plan     Medical Decision Making:  Patient presents after mechanical fall, fairly large laceration over the volar aspect of her metacarpals near the knuckles.  Avulsion type laceration, some tendon sheath visible but no exposed bone or obvious open fracture.  The wound was cleansed with soap and water and I was able to get it repaired.  Placed her in a volar splint for the metacarpal fracture.  She is tolerating pain very well will recommend over-the-counter pain meds.  She reports she can get some extra help to the restroom at her care facility.  She may need that for the next couple of weeks.  Will recommend outpatient follow-up with [facility] return to the ED with increased pain redness or swelling patient and her family are comfortable with management plan.    Diagnosis:    ICD-10-CM    1. Laceration of left hand without foreign body, initial encounter  S61.412A    2. Closed  fracture of second metacarpal bone, unspecified fracture morphology, initial encounter  S62.308A          Scribe Disclosure:  I, Amebr Elmer, am serving as a scribe at 7:49 PM on 7/27/2022 to document services personally performed by Noam Smith MD based on my observations and the provider's statements to me.              Noam Smith MD  07/27/22 2565

## 2022-07-28 NOTE — ED NOTES
Bed: ED13  Expected date:   Expected time:   Means of arrival:   Comments:  dayday 2 89F fall standing height hand fracture eta 6

## 2022-07-28 NOTE — PROGRESS NOTES
"Ortho Nursing home visit    Maryam Saavedra is a 89 year old female who resides at [facility] unit\"    Patient is seen today for fall, ED visit; seen in room today with staff' for laceration and fracture, 2nd metacarpal, :      Past Medical History:   Diagnosis Date     Benign essential hypertension 9/1/2016     Chronic low back pain      Hypertension      Impaired fasting glucose     borderline     Osteoarthritis       Past Surgical History:   Procedure Laterality Date     ARTHROPLASTY HIP ANTERIOR Right 7/24/2018    Procedure: ARTHROPLASTY HIP ANTERIOR;  RIGHT DIRECT ANTERIOR TOTAL HIP ARTHROPLASTY;  Surgeon: Jimbo Phillips MD;  Location:  OR     ARTHROPLASTY KNEE Left 5/12/2020    Procedure: LEFT TOTAL KNEE ARTHROPLASTY;  Surgeon: Jimbo Phillips MD;  Location:  OR     EYE SURGERY  cataracts     MOHS MICROGRAPHIC PROCEDURE      Left lower eyelid      MOHS MICROGRAPHIC PROCEDURE Left 10/27/2016    Procedure: MOHS MICROGRAPHIC PROCEDURE;  Surgeon: Jose Torrez MD;  Location: [facility]     MOHS MICROGRAPHIC PROCEDURE Right 5/24/2018    Procedure: MOHS MICROGRAPHIC PROCEDURE;  RIGHT MEDIAL CANTHUS MOHS CLOSURE;  Surgeon: Jose Torrez MD;  Location: [facility]     ORTHOPEDIC SURGERY      bilateral wrists        Allergies   Allergen Reactions     Ace Inhibitors Angioedema     Cyclobenzaprine Angioedema      There were no vitals taken for this visit.     Exam: Alert cooperative female, allows removal of splint and drsg; patient has multiple sutures along dorsum of left hand; active bleeding at laceration; hand remains swollen, tender, no obvious deformity noted in fingers;      X-rays show : distal 2nd metacarpal fracture at MP joint; left hand:    ASSESSMENT / PLAN:    New drsg applied with adaptic; 4x4/ soft padding and splint; 2nd, 3rd fingers \"reinaldo taped\" : Ace wrap applied'  Will anticipate 2 weeks, suture removal, and removal of splint at that time;     Would discourage ambulating " today. Patient has sever OA both knees and is at risk for falls.   Will review W/B status in 1 week.    [id number]          J.Patrick OPA-C  [phone] Cell

## 2022-08-01 ENCOUNTER — HOSPITAL ENCOUNTER (EMERGENCY)
Facility: CLINIC | Age: 87
Discharge: HOME | End: 2022-08-01
Attending: EMERGENCY MEDICINE | Admitting: EMERGENCY MEDICINE
Payer: MEDICARE

## 2022-08-01 VITALS
DIASTOLIC BLOOD PRESSURE: 76 MMHG | OXYGEN SATURATION: 98 % | TEMPERATURE: 97.6 F | RESPIRATION RATE: 16 BRPM | HEART RATE: 74 BPM | SYSTOLIC BLOOD PRESSURE: 134 MMHG

## 2022-08-01 DIAGNOSIS — S81.811A LACERATION OF RIGHT LOWER EXTREMITY, INITIAL ENCOUNTER: ICD-10-CM

## 2022-08-01 PROCEDURE — 12004 RPR S/N/AX/GEN/TRK7.6-12.5CM: CPT

## 2022-08-01 PROCEDURE — 99283 EMERGENCY DEPT VISIT LOW MDM: CPT

## 2022-08-01 NOTE — ED PROVIDER NOTES
History   Chief Complaint:  Laceration         HPI   Maryam Saavedra is a 89 year old female who was brought to the emergency department by EMS and paramedics from [facility] after she tripped while getting into her wheelchair this morning and lacerated her right lower leg. She arrives in the ER at 8:52 AM.    Allergies:  Ace Inhibitors  Cyclobenzaprine    Medications:   acetaminophen (TYLENOL) 325 MG tablet  bisacodyl (DULCOLAX) 10 MG suppository  Menthol-Methyl Salicylate (ICY HOT EXTRA STRENGTH) 10-30 % CREA  polyethylene glycol (MIRALAX) 17 GM/Dose powder  SENEXON-S 8.6-50 MG tablet  THERATEARS 0.25 % SOLN  VITAMIN D3 50 MCG (2000 UT) tablet        Past Medical History:    Past Medical History:   Diagnosis Date     Benign essential hypertension 9/1/2016     Chronic low back pain      Hypertension      Impaired fasting glucose      Osteoarthritis      Patient Active Problem List   Diagnosis     Benign essential hypertension     Unsteady gait     S/P total knee arthroplasty, left     Osteoarthritis     Physical deconditioning     Anemia due to blood loss, acute     Cognitive impairment     Noninfected skin tear of right lower extremity     Chronic kidney disease, stage 3 (H)     Vascular dementia with behavior disturbance (H)     Bilateral lower extremity edema     Falls frequently     Mixed dementia (H)     Primary osteoarthritis of left knee        Past Surgical History:    Past Surgical History:   Procedure Laterality Date     ARTHROPLASTY HIP ANTERIOR Right 7/24/2018    Procedure: ARTHROPLASTY HIP ANTERIOR;  RIGHT DIRECT ANTERIOR TOTAL HIP ARTHROPLASTY;  Surgeon: Jimbo Phillips MD;  Location:  OR     ARTHROPLASTY KNEE Left 5/12/2020    Procedure: LEFT TOTAL KNEE ARTHROPLASTY;  Surgeon: Jimbo Phillips MD;  Location:  OR     EYE SURGERY  cataracts     MOHS MICROGRAPHIC PROCEDURE      Left lower eyelid      MOHS MICROGRAPHIC PROCEDURE Left 10/27/2016    Procedure: MOHS MICROGRAPHIC PROCEDURE;   Surgeon: Jose Torrez MD;  Location: [facility]     MOHS MICROGRAPHIC PROCEDURE Right 5/24/2018    Procedure: MOHS MICROGRAPHIC PROCEDURE;  RIGHT MEDIAL CANTHUS MOHS CLOSURE;  Surgeon: Jose Torrez MD;  Location: [facility]     ORTHOPEDIC SURGERY      bilateral wrists        Family History:    Family History   Problem Relation Age of Onset     Diabetes Mother      Diabetes Father        Social History:  PCP: Sandro Canseco  Presents to the ED alone  Social History     Tobacco Use     Smoking status: Former Smoker     Packs/day: 0.00     Years: 0.00     Pack years: 0.00     Start date: 7/24/1998     Smokeless tobacco: Former User   Substance Use Topics     Alcohol use: Yes     Alcohol/week: 0.0 standard drinks     Comment: 1-2 drinks per month     Drug use: No       Review of Systems  See the HPI, otherwise the rest of the ROS is negative.      Physical Exam     Patient Vitals for the past 24 hrs:   BP Temp Temp src Pulse Resp SpO2   08/01/22 0852 134/76 -- -- -- -- 98 %   08/01/22 0845 134/76 97.6  F (36.4  C) Oral 74 16 98 %       Physical Exam  General: Alert, No distress. Nontoxic appearance  Head: No signs of trauma.   Mouth/Throat: Oropharynx moist.   Eyes: Conjunctivae are normal. Pupils are equal..   Neck: Normal range of motion.    CV: Appears well perfused.  Resp:No respiratory distress.   MSK: Normal range of motion. No obvious deformity.   Neuro: The patient is alert and interactive. GONZALEZ. Speech normal. GCS 15  Skin: 9cm laceration to the right anterior lower extremity (see picture below)  Psych: normal mood and affect. behavior is normal.            Emergency Department Course       Procedures:    Laceration Repair      Procedure: Laceration Repair    Indication: Laceration    Consent: Verbal    Location: Right Lower extremity     Length: 9 cm    Preparation: Irrigation with Sterile Saline.    Anesthesia/Sedation: Bupivacaine with Epinephrine - 0.5%      Treatment/Exploration: Wound explored, no  foreign bodies found     Closure: The wound was closed with one layer. Skin/superficial layer was closed with 12 x 4-0 Nylon using Interrupted sutures.  and 1 horizontal mattress suture    Patient Status: The patient tolerated the procedure well: Yes. There were no complications.      Emergency Department Course/Medical Decision Making:  The patient presented with a laceration.  The wound was carefully evaluated and explored.  The laceration was closed with suture as noted above.  There is no evidence of muscular, tendon, or bony damage with this laceration.  No signs of foreign body.  Possible complications (infection, scarring) were reviewed with the patient.  Follow up with primary care will be indicated for suture/staple removal as noted in the discharge section.  Her tetanus is up-to-date.      Diagnosis:    ICD-10-CM    1. Laceration of right lower extremity, initial encounter  S81.811A        Disposition:  Discharged to home.  She will return for suture removal in 7 days.         Siddhartha Christopher MD  08/01/22 2015

## 2022-08-01 NOTE — ED TRIAGE NOTES
Tripped while getting into wheelchair and lacerated her right lower leg. Lives at [facility] and was brought in by EMS.

## 2022-08-10 ENCOUNTER — ASSISTED LIVING VISIT (OUTPATIENT)
Dept: GERIATRICS | Facility: CLINIC | Age: 87
End: 2022-08-10
Payer: MEDICARE

## 2022-08-10 DIAGNOSIS — S61.412D LACERATION OF LEFT HAND WITHOUT FOREIGN BODY, SUBSEQUENT ENCOUNTER: ICD-10-CM

## 2022-08-10 DIAGNOSIS — S81.811D LACERATION OF RIGHT LOWER EXTREMITY, SUBSEQUENT ENCOUNTER: ICD-10-CM

## 2022-08-10 DIAGNOSIS — M84.442S: Primary | ICD-10-CM

## 2022-08-10 DIAGNOSIS — S62.391D: Primary | ICD-10-CM

## 2022-08-10 PROCEDURE — 99207 PR NO CHARGE LOS: CPT | Performed by: PHYSICIAN ASSISTANT

## 2022-08-10 NOTE — LETTER
8/10/2022        RE: Maryam Saavedra  C/o Saray Saavedra  [address] [address]        [facility]  [facility] Record Number:  [id number]  Place of Service where encounter took place:  [facility] [[id number]]  Chief Complaint   Patient presents with     RECHECK       HPI:    Maryam Saavedra  is a 89 year old (6/9/1932), who is being seen today for an episodic care visit.  HPI information obtained from: facility chart records, patient report and [facility] chart review. Today's concern is:    Seen today in conjunction with ortho TOY Nguyen for eval and removal of sutures in both her left hand and right leg. Both wounds were s/p fall. She is unsteady at baseline, falls risk with ambulation but continues to attempts  self transfers and walk secondary to dementia. Resides on memory care unit and staff assist as able. She denies pain today and appears to tolerate suture removal by ortho. Also with closed healing fracture of distal 2nd metacarpal bone left hand at MP joint. Ortho PA has been following as well and monitors splint as she will forget and remove it.     Past Medical and Surgical History reviewed in Epic today.    MEDICATIONS:    Current Outpatient Medications   Medication Sig Dispense Refill     acetaminophen (TYLENOL) 325 MG tablet Take 2 tablets (650 mg) by mouth 2 times daily And twice daily as needed 60 tablet 97     bisacodyl (DULCOLAX) 10 MG suppository Place 10 mg rectally daily as needed for constipation       Menthol-Methyl Salicylate (ICY HOT EXTRA STRENGTH) 10-30 % CREA Externally apply topically 2 times daily To right knee 85 g 3     polyethylene glycol (MIRALAX) 17 GM/Dose powder Take 17 g by mouth daily 510 g      SENEXON-S 8.6-50 MG tablet TAKE ONE TABLET BY MOUTH AT BEDTIME THREE TIMES PER WEEK;& TAKE ONE TABLET TWICE DAILY AS NEEDED FOR CONSTIPATION 14 tablet PRN     THERATEARS 0.25 % SOLN INSTILL ONE DROP IN EACH EYE  "TWICE DAILY FOR DRY EYES 15 mL 97     VITAMIN D3 50 MCG (2000 UT) tablet TAKE 1 TABLET BY MOUTH ONCE DAILY 28 tablet 97     REVIEW OF SYSTEMS:  Limited secondary to cognitive impairment but today pt reports ok I guess    Objective:  /62   Pulse 66   Temp 97.6  F (36.4  C)   Resp 16   Ht 1.549 m (5' 1\")   Wt 50.5 kg (111 lb 4.8 oz)   SpO2 94%   BMI 21.03 kg/m    Exam:  GENERAL APPEARANCE:  Alert, cooperative  ENT:  Mouth and posterior oropharynx normal, moist mucous membranes, [place]  RESP:  respiratory effort and palpation of chest normal, lungs clear to auscultation   CV:  Palpation and auscultation of heart done , regular rate and rhythm, no murmur, rub, or gallop, peripheral edema 2+  ABDOMEN:  normal bowel sounds, soft, nontender  M/S:   BLE weakness, ambulating with wc, slight drag of feet, bent knees, stance with transfers, splint to left hand, swelling to left hand and digits, no redness or warmth, 1+ to lower extremities and right knee No compression today.  SKIN: laceration to right anterior leg, no drainage, scant redness surrounding wound, no warmth. Left hand with no redness, drainage, laceration across dorsum hand healing   NEURO:   Cranial nerves 2-12 are normal tested and grossly at patient's baseline  PSYCH:  insight and judgement impaired, memory impaired , affect and mood normal    Labs:   CBC RESULTS: Recent Labs   Lab Test 02/10/22  0545 01/06/21  0000   WBC 5.7 5.1   RBC 4.08 4.17   HGB 11.6* 11.7   HCT 37.4 37.7   MCV 92 90   MCH 28.4 28.1   MCHC 31.0* 31.0*   RDW 14.5 15.9*    239       Last Basic Metabolic Panel:  Recent Labs   Lab Test 02/10/22  0545 01/06/21  0000    139   POTASSIUM 3.5 3.9   CHLORIDE 106 105   NOHEMY 9.1 9.3   CO2 29 30   BUN 28 26   CR 0.81 1.01   GLC 81 131*       Liver Function Studies -   Recent Labs   Lab Test 02/10/22  0545 01/06/21  0000   PROTTOTAL 6.7* 7.2   ALBUMIN 2.8* 2.8*   BILITOTAL 0.3 0.5   ALKPHOS 163* 220*   AST 19 23   ALT 22 31 "       TSH   Date Value Ref Range Status   02/10/2022 2.96 0.40 - 4.00 mU/L Final   01/06/2021 5.73 (A) 0.40 - 4.00 mU/L Final   11/25/2017 4.20 (H) 0.40 - 4.00 mU/L Final       Lab Results   Component Value Date    A1C 5.6 07/13/2018    A1C 5.9 05/14/2018     ASSESSMENT/PLAN:  (S62.157D) Closed nondisplaced fracture of other part of second metacarpal bone of left hand with routine healing. subsequent encounter (primary encounter diagnosis)  Comment: ongoing   Plan:   -will follow up with [facility] ortho   -continues to wear soft pad/splint from [facility] ortho  -non-weightbearing to hand as able   -WC ambulating as able 2/2 increase risk for additional falls   -tylenol for pain     (S61.412D) Laceration of left hand without foreign body, subsequent encounter  Comment: healing  Plan:   -new dressing orders today until healed     (S81.491D) Laceration of right lower extremity, subsequent encounter  Comment: healing   Plan:  -new dressing orders today until healed         Electronically signed by:  JACKIE Zayas CNP                 Sincerely,        JACKIE Zayas CNP

## 2022-08-10 NOTE — PROGRESS NOTES
"Ortho Nursing home visit    Maryam Saavedra is a 89 year old female who resides at [facility];    Patient is seen today for request from NP; To eval, Leg laceration. And hand fracture/ 2nd Metacarpal; laceration; patient has had multple falls and 2 trips to the ED at [facility] in the past 2 weeks.      Past Medical History:   Diagnosis Date     Benign essential hypertension 9/1/2016     Chronic low back pain      Hypertension      Impaired fasting glucose     borderline     Osteoarthritis       Past Surgical History:   Procedure Laterality Date     ARTHROPLASTY HIP ANTERIOR Right 7/24/2018    Procedure: ARTHROPLASTY HIP ANTERIOR;  RIGHT DIRECT ANTERIOR TOTAL HIP ARTHROPLASTY;  Surgeon: Jimbo Phillips MD;  Location:  OR     ARTHROPLASTY KNEE Left 5/12/2020    Procedure: LEFT TOTAL KNEE ARTHROPLASTY;  Surgeon: Jimbo Phillips MD;  Location:  OR     EYE SURGERY  cataracts     MOHS MICROGRAPHIC PROCEDURE      Left lower eyelid      MOHS MICROGRAPHIC PROCEDURE Left 10/27/2016    Procedure: MOHS MICROGRAPHIC PROCEDURE;  Surgeon: Jose Torrez MD;  Location: [facility]     MOHS MICROGRAPHIC PROCEDURE Right 5/24/2018    Procedure: MOHS MICROGRAPHIC PROCEDURE;  RIGHT MEDIAL CANTHUS MOHS CLOSURE;  Surgeon: Jose Torrez MD;  Location: [facility]     ORTHOPEDIC SURGERY      bilateral wrists        Allergies   Allergen Reactions     Ace Inhibitors Angioedema     Cyclobenzaprine Angioedema      There were no vitals taken for this visit.     Exam:Today, seen in room with NP> all sutures removed from anterior tibia,; steri-strips applied; new drsg;  Hand drsg removed, all sutures removed and steri-strips applied, new drsg with 2nd and 3rd fingers buddy taped;  Patient was referred to [facility], discussed with daughter, has upcoming appt;          X-rays show \" Oblique fracture of 2nd metacarpal;     ASSESSMENT / PLAN: Will continue to follow on-site as needed'    [id number]          Kaden " [facility]  [phone] Cell

## 2022-08-10 NOTE — PROGRESS NOTES
[facility]  [facility] Record Number:  [id number]  Place of Service where encounter took place:  [facility] [[id number]]  Chief Complaint   Patient presents with     RECHECK       HPI:    Maryam Saavedra  is a 89 year old (6/9/1932), who is being seen today for an episodic care visit.  HPI information obtained from: facility chart records, patient report and [facility] chart review. Today's concern is:    Seen today in conjunction with ortho TOY Nguyen for eval and removal of sutures in both her left hand and right leg. Both wounds were s/p fall. She is unsteady at baseline, falls risk with ambulation but continues to attempts  self transfers and walk secondary to dementia. Resides on memory care unit and staff assist as able. She denies pain today and appears to tolerate suture removal by ortho. Also with closed healing fracture of distal 2nd metacarpal bone left hand at MP joint. Ortho PA has been following as well and monitors splint as she will forget and remove it.     Past Medical and Surgical History reviewed in Epic today.    MEDICATIONS:    Current Outpatient Medications   Medication Sig Dispense Refill     acetaminophen (TYLENOL) 325 MG tablet Take 2 tablets (650 mg) by mouth 2 times daily And twice daily as needed 60 tablet 97     bisacodyl (DULCOLAX) 10 MG suppository Place 10 mg rectally daily as needed for constipation       Menthol-Methyl Salicylate (ICY HOT EXTRA STRENGTH) 10-30 % CREA Externally apply topically 2 times daily To right knee 85 g 3     polyethylene glycol (MIRALAX) 17 GM/Dose powder Take 17 g by mouth daily 510 g      SENEXON-S 8.6-50 MG tablet TAKE ONE TABLET BY MOUTH AT BEDTIME THREE TIMES PER WEEK;& TAKE ONE TABLET TWICE DAILY AS NEEDED FOR CONSTIPATION 14 tablet PRN     THERATEARS 0.25 % SOLN INSTILL ONE DROP IN EACH EYE TWICE DAILY FOR DRY EYES 15 mL 97     VITAMIN D3 50 MCG (2000 UT) tablet TAKE 1 TABLET BY MOUTH ONCE DAILY  "28 tablet 97     REVIEW OF SYSTEMS:  Limited secondary to cognitive impairment but today pt reports ok I guess    Objective:  /62   Pulse 66   Temp 97.6  F (36.4  C)   Resp 16   Ht 1.549 m (5' 1\")   Wt 50.5 kg (111 lb 4.8 oz)   SpO2 94%   BMI 21.03 kg/m    Exam:  GENERAL APPEARANCE:  Alert, cooperative  ENT:  Mouth and posterior oropharynx normal, moist mucous membranes, [place]  RESP:  respiratory effort and palpation of chest normal, lungs clear to auscultation   CV:  Palpation and auscultation of heart done , regular rate and rhythm, no murmur, rub, or gallop, peripheral edema 2+  ABDOMEN:  normal bowel sounds, soft, nontender  M/S:   BLE weakness, ambulating with wc, slight drag of feet, bent knees, stance with transfers, splint to left hand, swelling to left hand and digits, no redness or warmth, 1+ to lower extremities and right knee No compression today.  SKIN: laceration to right anterior leg, no drainage, scant redness surrounding wound, no warmth. Left hand with no redness, drainage, laceration across dorsum hand healing   NEURO:   Cranial nerves 2-12 are normal tested and grossly at patient's baseline  PSYCH:  insight and judgement impaired, memory impaired , affect and mood normal    Labs:   CBC RESULTS: Recent Labs   Lab Test 02/10/22  0545 01/06/21  0000   WBC 5.7 5.1   RBC 4.08 4.17   HGB 11.6* 11.7   HCT 37.4 37.7   MCV 92 90   MCH 28.4 28.1   MCHC 31.0* 31.0*   RDW 14.5 15.9*    239       Last Basic Metabolic Panel:  Recent Labs   Lab Test 02/10/22  0545 01/06/21  0000    139   POTASSIUM 3.5 3.9   CHLORIDE 106 105   NOHEMY 9.1 9.3   CO2 29 30   BUN 28 26   CR 0.81 1.01   GLC 81 131*       Liver Function Studies -   Recent Labs   Lab Test 02/10/22  0545 01/06/21  0000   PROTTOTAL 6.7* 7.2   ALBUMIN 2.8* 2.8*   BILITOTAL 0.3 0.5   ALKPHOS 163* 220*   AST 19 23   ALT 22 31       TSH   Date Value Ref Range Status   02/10/2022 2.96 0.40 - 4.00 mU/L Final   01/06/2021 5.73 (A) 0.40 - " 4.00 mU/L Final   11/25/2017 4.20 (H) 0.40 - 4.00 mU/L Final       Lab Results   Component Value Date    A1C 5.6 07/13/2018    A1C 5.9 05/14/2018     ASSESSMENT/PLAN:  (A69.720D) Closed nondisplaced fracture of other part of second metacarpal bone of left hand with routine healing. subsequent encounter (primary encounter diagnosis)  Comment: ongoing   Plan:   -will follow up with [facility] ortho   -continues to wear soft pad/splint from [facility] ortho  -non-weightbearing to hand as able   -WC ambulating as able 2/2 increase risk for additional falls   -tylenol for pain     (Z73.976D) Laceration of left hand without foreign body, subsequent encounter  Comment: healing  Plan:   -new dressing orders today until healed     (L83.404G) Laceration of right lower extremity, subsequent encounter  Comment: healing   Plan:  -new dressing orders today until healed         Electronically signed by:  JACKIE Zayas CNP

## 2022-08-10 NOTE — LETTER
New orders for Maryam Saavedra    1. Three times weekly until healed please wash/pat dry, apply nonstick dressing and secure with gauze to right shin wound. Update provider for increased redness, swelling or drainage    2. To hand wound-splint might be changed after her ortho appointment. If no wound orders are given, please cover for protection with gauze or ABD under any splint until completely healed. Steri strips under gauze tape is present. Update provider for increased redness, drainage or swelling.     JACKIE Zayas CNP on 8/10/2022 at 5:18 PM

## 2022-08-11 ENCOUNTER — TRANSFERRED RECORDS (OUTPATIENT)
Dept: HEALTH INFORMATION MANAGEMENT | Facility: CLINIC | Age: 87
End: 2022-08-11

## 2022-08-11 VITALS
DIASTOLIC BLOOD PRESSURE: 62 MMHG | SYSTOLIC BLOOD PRESSURE: 122 MMHG | RESPIRATION RATE: 16 BRPM | HEIGHT: 61 IN | TEMPERATURE: 97.6 F | BODY MASS INDEX: 21.01 KG/M2 | OXYGEN SATURATION: 94 % | WEIGHT: 111.3 LBS | HEART RATE: 66 BPM

## 2022-09-01 ENCOUNTER — LAB REQUISITION (OUTPATIENT)
Dept: LAB | Facility: CLINIC | Age: 87
End: 2022-09-01
Payer: MEDICARE

## 2022-09-01 DIAGNOSIS — U07.1 COVID-19: ICD-10-CM

## 2022-09-06 ENCOUNTER — TRANSFERRED RECORDS (OUTPATIENT)
Dept: HEALTH INFORMATION MANAGEMENT | Facility: CLINIC | Age: 87
End: 2022-09-06

## 2022-09-14 ENCOUNTER — ASSISTED LIVING VISIT (OUTPATIENT)
Dept: GERIATRICS | Facility: CLINIC | Age: 87
End: 2022-09-14
Payer: MEDICARE

## 2022-09-14 VITALS
WEIGHT: 109.6 LBS | TEMPERATURE: 97.1 F | DIASTOLIC BLOOD PRESSURE: 80 MMHG | BODY MASS INDEX: 20.69 KG/M2 | OXYGEN SATURATION: 96 % | HEIGHT: 61 IN | SYSTOLIC BLOOD PRESSURE: 120 MMHG | RESPIRATION RATE: 16 BRPM | HEART RATE: 80 BPM

## 2022-09-14 DIAGNOSIS — R26.81 UNSTEADY GAIT: ICD-10-CM

## 2022-09-14 DIAGNOSIS — R60.0 BILATERAL LOWER EXTREMITY EDEMA: ICD-10-CM

## 2022-09-14 DIAGNOSIS — F01.518 VASCULAR DEMENTIA WITH BEHAVIOR DISTURBANCE (H): ICD-10-CM

## 2022-09-14 DIAGNOSIS — S62.391D: Primary | ICD-10-CM

## 2022-09-14 DIAGNOSIS — M15.0 PRIMARY OSTEOARTHRITIS INVOLVING MULTIPLE JOINTS: ICD-10-CM

## 2022-09-14 DIAGNOSIS — R29.6 FALLS FREQUENTLY: ICD-10-CM

## 2022-09-14 DIAGNOSIS — M62.81 GENERALIZED MUSCLE WEAKNESS: ICD-10-CM

## 2022-09-14 DIAGNOSIS — M54.41 BILATERAL LOW BACK PAIN WITH RIGHT-SIDED SCIATICA, UNSPECIFIED CHRONICITY: ICD-10-CM

## 2022-09-14 DIAGNOSIS — M25.361 RIGHT KNEE BUCKLING: ICD-10-CM

## 2022-09-14 DIAGNOSIS — M25.561 CHRONIC PAIN OF RIGHT KNEE: ICD-10-CM

## 2022-09-14 DIAGNOSIS — G89.29 CHRONIC PAIN OF RIGHT KNEE: ICD-10-CM

## 2022-09-14 NOTE — LETTER
9/14/2022        RE: Maryam Saavedra  C/o Saray Saavedra  [address] [facility] [facility] [address]        [facility]  [facility] Medical Record Number:  [id number]  Place of Service where encounter took place:  [facility] [[id number]]  Chief Complaint   Patient presents with     RECHECK       HPI:    Maryam Saavedra  is a 89 year old (6/9/1932), who is being seen today for an episodic care visit.  HPI information obtained from: facility chart records, facility staff, patient report and [facility] chart review. Today's concern is:    Seen today for follow up to multiple falls including laceration of left hand requiring many sutures, hand brace for fracture of 2nd metacarpal bone. Hand laceration is healed with scar line intact. Followed at [facility] and onsite ortho for hand fracture- ongoing swelling, reduced pain, increased hand/ strength. Unfortunately with multiple falls, ED visits, generalized weakness, unsteady gait, osteoarthritis, will need eval for customized wheelchair for WC ambulation as no longer safe using her walker.     Past Medical and Surgical History reviewed in Epic today.    MEDICATIONS:    Current Outpatient Medications   Medication Sig Dispense Refill     acetaminophen (TYLENOL) 325 MG tablet Take 2 tablets (650 mg) by mouth 2 times daily And twice daily as needed 60 tablet 97     bisacodyl (DULCOLAX) 10 MG suppository Place 10 mg rectally daily as needed for constipation       Menthol-Methyl Salicylate (ICY HOT EXTRA STRENGTH) 10-30 % CREA Externally apply topically 2 times daily To right knee 85 g 3     polyethylene glycol (MIRALAX) 17 GM/Dose powder Take 17 g by mouth daily 510 g      SENEXON-S 8.6-50 MG tablet TAKE ONE TABLET BY MOUTH AT BEDTIME THREE TIMES PER WEEK;& TAKE ONE TABLET TWICE DAILY AS NEEDED FOR CONSTIPATION 14 tablet PRN     THERATEARS 0.25 % SOLN INSTILL ONE DROP IN EACH EYE TWICE DAILY FOR DRY EYES 15 mL 97     VITAMIN D3 50  "MCG (2000 UT) tablet TAKE 1 TABLET BY MOUTH ONCE DAILY 28 tablet 97     REVIEW OF SYSTEMS:  Limited secondary to cognitive impairment but today pt reports ok    Objective:  /80   Pulse 80   Temp 97.1  F (36.2  C)   Resp 16   Ht 1.549 m (5' 1\")   Wt 49.7 kg (109 lb 9.6 oz)   SpO2 96%   BMI 20.71 kg/m    Exam:  GENERAL APPEARANCE:  Alert, cooperative  ENT:  Mouth and posterior oropharynx normal, moist mucous membranes, [place]  RESP:  respiratory effort and palpation of chest normal, lungs clear to auscultation   CV:  Palpation and auscultation of heart done , regular rate and rhythm, no murmur  ABDOMEN:  normal bowel sounds, soft, nontender  M/S:   BLE weakness, ambulating with wc, slight drag of feet, bent knees, stance with transfers, swelling to left hand and digits, no redness or warmth, 1+ to lower extremities and right knee No compression today.  SKIN: intact to visualized areas   NEURO:   Cranial nerves 2-12 are normal tested and grossly at patient's baseline  PSYCH:  insight and judgement impaired, memory impaired , affect and mood normal    Labs:   CBC RESULTS: Recent Labs   Lab Test 02/10/22  0545 01/06/21  0000   WBC 5.7 5.1   RBC 4.08 4.17   HGB 11.6* 11.7   HCT 37.4 37.7   MCV 92 90   MCH 28.4 28.1   MCHC 31.0* 31.0*   RDW 14.5 15.9*    239       Last Basic Metabolic Panel:  Recent Labs   Lab Test 02/10/22  0545 01/06/21  0000    139   POTASSIUM 3.5 3.9   CHLORIDE 106 105   NOHEMY 9.1 9.3   CO2 29 30   BUN 28 26   CR 0.81 1.01   GLC 81 131*       Liver Function Studies -   Recent Labs   Lab Test 02/10/22  0545 01/06/21  0000   PROTTOTAL 6.7* 7.2   ALBUMIN 2.8* 2.8*   BILITOTAL 0.3 0.5   ALKPHOS 163* 220*   AST 19 23   ALT 22 31       TSH   Date Value Ref Range Status   02/10/2022 2.96 0.40 - 4.00 mU/L Final   01/06/2021 5.73 (A) 0.40 - 4.00 mU/L Final   11/25/2017 4.20 (H) 0.40 - 4.00 mU/L Final       Lab Results   Component Value Date    A1C 5.6 07/13/2018    A1C 5.9 05/14/2018 "     ASSESSMENT/PLAN:  (D42.642T) Closed nondisplaced fracture of other part of second metacarpal bone of left hand with routine healing, subsequent encounter  (primary encounter diagnosis)  (M89.49) Primary osteoarthritis involving multiple joints  (M25.561,  G89.29) Chronic pain of right knee  (M62.81) Generalized muscle weakness  (M54.41) Bilateral low back pain with right-sided sciatica, unspecified chronicity  (M25.361) Right knee buckling  (R26.81) Unsteady gait  (R29.6) Falls frequently  (F01.51) Vascular dementia with behavior disturbance (H)  (R60.0) Bilateral lower extremity edema  Comment: ongoing with risk for falls and significant injury-will need eval for customized WC for WC ambulation. No longer safe secondary to above dx for ambulation with walker  Plan:   -PT/OT to eval and treat  -continue current plan of care      Electronically signed by:  JACKIE Zayas CNP                 Sincerely,        JACKIE Zayas CNP

## 2022-09-14 NOTE — LETTER
Maryam Saavedra orders    1. Ok for assessment from [facility] for custom wheelchair     Sandro Canseco, APRN CNP on 9/29/2022 at 1:57 PM

## 2022-09-14 NOTE — PROGRESS NOTES
[facility]  [place] Medical Record Number:  [id number]  Place of Service where encounter took place:  [facility] [[id number]]  Chief Complaint   Patient presents with     RECHECK     Addendum: per OT will need orders faxed for eval of custom WC from [facility].    HPI:    Maryam Saavedra  is a 89 year old (6/9/1932), who is being seen today for an episodic care visit.  HPI information obtained from: facility chart records, facility staff, patient report and [facility] chart review. Today's concern is:    Seen today for follow up to multiple falls including laceration of left hand requiring many sutures, hand brace for fracture of 2nd metacarpal bone. Hand laceration is healed with scar line intact. Followed at [facility] and onsite ortho for hand fracture- ongoing swelling, reduced pain, increased hand/ strength. Unfortunately with multiple falls, ED visits, generalized weakness, unsteady gait, osteoarthritis, will need eval for customized wheelchair for WC ambulation as no longer safe using her walker.     Past Medical and Surgical History reviewed in Epic today.    MEDICATIONS:    Current Outpatient Medications   Medication Sig Dispense Refill     acetaminophen (TYLENOL) 325 MG tablet Take 2 tablets (650 mg) by mouth 2 times daily And twice daily as needed 60 tablet 97     bisacodyl (DULCOLAX) 10 MG suppository Place 10 mg rectally daily as needed for constipation       Menthol-Methyl Salicylate (ICY HOT EXTRA STRENGTH) 10-30 % CREA Externally apply topically 2 times daily To right knee 85 g 3     polyethylene glycol (MIRALAX) 17 GM/Dose powder Take 17 g by mouth daily 510 g      SENEXON-S 8.6-50 MG tablet TAKE ONE TABLET BY MOUTH AT BEDTIME THREE TIMES PER WEEK;& TAKE ONE TABLET TWICE DAILY AS NEEDED FOR CONSTIPATION 14 tablet PRN     THERATEARS 0.25 % SOLN INSTILL ONE DROP IN EACH EYE TWICE DAILY FOR DRY EYES 15 mL 97     VITAMIN D3 50 MCG (2000 UT) tablet TAKE 1 TABLET  "BY MOUTH ONCE DAILY 28 tablet 97     REVIEW OF SYSTEMS:  Limited secondary to cognitive impairment but today pt reports ok    Objective:  /80   Pulse 80   Temp 97.1  F (36.2  C)   Resp 16   Ht 1.549 m (5' 1\")   Wt 49.7 kg (109 lb 9.6 oz)   SpO2 96%   BMI 20.71 kg/m    Exam:  GENERAL APPEARANCE:  Alert, cooperative  ENT:  Mouth and posterior oropharynx normal, moist mucous membranes, [place]  RESP:  respiratory effort and palpation of chest normal, lungs clear to auscultation   CV:  Palpation and auscultation of heart done , regular rate and rhythm, no murmur  ABDOMEN:  normal bowel sounds, soft, nontender  M/S:   BLE weakness, ambulating with wc, slight drag of feet, bent knees, stance with transfers, swelling to left hand and digits, no redness or warmth, 1+ to lower extremities and right knee No compression today.  SKIN: intact to visualized areas   NEURO:   Cranial nerves 2-12 are normal tested and grossly at patient's baseline  PSYCH:  insight and judgement impaired, memory impaired , affect and mood normal    Labs:   CBC RESULTS: Recent Labs   Lab Test 02/10/22  0545 01/06/21  0000   WBC 5.7 5.1   RBC 4.08 4.17   HGB 11.6* 11.7   HCT 37.4 37.7   MCV 92 90   MCH 28.4 28.1   MCHC 31.0* 31.0*   RDW 14.5 15.9*    239       Last Basic Metabolic Panel:  Recent Labs   Lab Test 02/10/22  0545 01/06/21  0000    139   POTASSIUM 3.5 3.9   CHLORIDE 106 105   NOHEMY 9.1 9.3   CO2 29 30   BUN 28 26   CR 0.81 1.01   GLC 81 131*       Liver Function Studies -   Recent Labs   Lab Test 02/10/22  0545 01/06/21  0000   PROTTOTAL 6.7* 7.2   ALBUMIN 2.8* 2.8*   BILITOTAL 0.3 0.5   ALKPHOS 163* 220*   AST 19 23   ALT 22 31       TSH   Date Value Ref Range Status   02/10/2022 2.96 0.40 - 4.00 mU/L Final   01/06/2021 5.73 (A) 0.40 - 4.00 mU/L Final   11/25/2017 4.20 (H) 0.40 - 4.00 mU/L Final       Lab Results   Component Value Date    A1C 5.6 07/13/2018    A1C 5.9 05/14/2018     ASSESSMENT/PLAN:  (S62.391D) Closed " nondisplaced fracture of other part of second metacarpal bone of left hand with routine healing, subsequent encounter  (primary encounter diagnosis)  (M89.49) Primary osteoarthritis involving multiple joints  (M25.561,  G89.29) Chronic pain of right knee  (M62.81) Generalized muscle weakness  (M54.41) Bilateral low back pain with right-sided sciatica, unspecified chronicity  (M25.361) Right knee buckling  (R26.81) Unsteady gait  (R29.6) Falls frequently  (F01.51) Vascular dementia with behavior disturbance (H)  (R60.0) Bilateral lower extremity edema  Comment: ongoing with risk for falls and significant injury-will need eval for customized WC for WC ambulation. No longer safe secondary to above dx for ambulation with walker  Plan:   -PT/OT to eval and treat  -continue current plan of care      Electronically signed by:  JACKIE Zayas CNP

## 2022-09-29 DIAGNOSIS — K59.01 SLOW TRANSIT CONSTIPATION: ICD-10-CM

## 2022-09-29 RX ORDER — DOCUSATE SODIUM 50MG AND SENNOSIDES 8.6MG 8.6; 5 MG/1; MG/1
TABLET, FILM COATED ORAL
Qty: 216 TABLET | Refills: 3 | Status: SHIPPED | OUTPATIENT
Start: 2022-09-29 | End: 2023-05-17

## 2022-10-09 DIAGNOSIS — K59.00 CONSTIPATION: Primary | ICD-10-CM

## 2022-10-10 RX ORDER — POLYETHYLENE GLYCOL 3350 17 G/17G
POWDER, FOR SOLUTION ORAL
Qty: 510 G | Refills: 97 | Status: SHIPPED | OUTPATIENT
Start: 2022-10-10 | End: 2023-05-10

## 2022-10-23 ENCOUNTER — HEALTH MAINTENANCE LETTER (OUTPATIENT)
Age: 87
End: 2022-10-23

## 2022-11-06 DIAGNOSIS — H04.123 DRY EYES: ICD-10-CM

## 2022-11-07 RX ORDER — CARBOXYMETHYLCELLULOSE SODIUM 2.5 MG/ML
SOLUTION/ DROPS OPHTHALMIC
Qty: 15 ML | Refills: 97 | Status: SHIPPED | OUTPATIENT
Start: 2022-11-07 | End: 2023-12-18

## 2022-11-16 ENCOUNTER — ASSISTED LIVING VISIT (OUTPATIENT)
Dept: GERIATRICS | Facility: CLINIC | Age: 87
End: 2022-11-16
Payer: MEDICARE

## 2022-11-16 VITALS
RESPIRATION RATE: 18 BRPM | TEMPERATURE: 97 F | DIASTOLIC BLOOD PRESSURE: 63 MMHG | OXYGEN SATURATION: 96 % | HEIGHT: 61 IN | BODY MASS INDEX: 20.92 KG/M2 | HEART RATE: 74 BPM | SYSTOLIC BLOOD PRESSURE: 125 MMHG | WEIGHT: 110.8 LBS

## 2022-11-16 DIAGNOSIS — R29.6 FALLS FREQUENTLY: ICD-10-CM

## 2022-11-16 DIAGNOSIS — M25.361 RIGHT KNEE BUCKLING: ICD-10-CM

## 2022-11-16 DIAGNOSIS — M25.561 CHRONIC PAIN OF RIGHT KNEE: ICD-10-CM

## 2022-11-16 DIAGNOSIS — M54.41 BILATERAL LOW BACK PAIN WITH RIGHT-SIDED SCIATICA, UNSPECIFIED CHRONICITY: ICD-10-CM

## 2022-11-16 DIAGNOSIS — M62.81 GENERALIZED MUSCLE WEAKNESS: ICD-10-CM

## 2022-11-16 DIAGNOSIS — M15.0 PRIMARY OSTEOARTHRITIS INVOLVING MULTIPLE JOINTS: Primary | ICD-10-CM

## 2022-11-16 DIAGNOSIS — G89.29 CHRONIC PAIN OF RIGHT KNEE: ICD-10-CM

## 2022-11-16 DIAGNOSIS — R26.81 UNSTEADY GAIT: ICD-10-CM

## 2022-11-16 DIAGNOSIS — R60.0 BILATERAL LOWER EXTREMITY EDEMA: ICD-10-CM

## 2022-11-16 DIAGNOSIS — T14.8XXA OPEN WOUND: ICD-10-CM

## 2022-11-16 NOTE — LETTER
"    11/16/2022        RE: Maryam Saavedra  C/o Saray Saavedra  [address] [facility] [address]        [place] [facility]  [place] Medical Record Number:  [id number]  Place of Service where encounter took place:  [facility] [[id number]]  Chief Complaint   Patient presents with     RECHECK       HPI:    Maryam Saavedra  is a 89 year old (6/9/1932), who is being seen today for an episodic care visit.  HPI information obtained from: facility chart records and facility staff. Today's concern is:    Follow up to generalized weakness, frequent falls, pain. Denies pain today, says she is sleeping ok. Staff reporting she is walking only now with SBA or use of WC. She is still waiting for her customized WC. Facility chart reviewed and no recent falls. She has tylenol BID and used a prn dose once on 11/13.     Past Medical and Surgical History reviewed in Epic today.    MEDICATIONS:    Current Outpatient Medications   Medication Sig Dispense Refill     acetaminophen (TYLENOL) 325 MG tablet Take 2 tablets (650 mg) by mouth 2 times daily And twice daily as needed 60 tablet 97     Menthol-Methyl Salicylate (ICY HOT EXTRA STRENGTH) 10-30 % CREA Externally apply topically 2 times daily To right knee 85 g 3     polyethylene glycol (MIRALAX) 17 GM/Dose powder MIX 17GM OF POWDER IN 8OZ OF WATER UNTIL COMPLETELY DISSOLVED. DRINK SOLUTION BY MOUTH ONCE DAILY 510 g 97     SENEXON-S 8.6-50 MG tablet TAKE ONE TABLET BY MOUTH AT BEDTIME THREE TIMES PER WEEK;& TAKE ONE TABLET TWICE DAILY AS NEEDED FOR CONSTIPATION 216 tablet 3     THERATEARS 0.25 % SOLN INSTILL ONE DROP IN EACH EYE TWICE DAILY FOR DRY EYES 15 mL 97     VITAMIN D3 50 MCG (2000 UT) tablet TAKE 1 TABLET BY MOUTH ONCE DAILY 28 tablet 97     REVIEW OF SYSTEMS:  Limited secondary to cognitive impairment but today pt reports ok    Objective:  /63   Pulse 74   Temp 97  F (36.1  C)   Resp 18   Ht 1.549 m (5' 1\")   Wt 50.3 kg " (110 lb 12.8 oz)   SpO2 96%   BMI 20.94 kg/m    Exam:  GENERAL APPEARANCE:  Alert, cooperative  ENT:  Mouth and posterior oropharynx normal, moist mucous membranes, [place]  RESP:  respiratory effort and palpation of chest normal, lungs clear to auscultation   CV:  Palpation and auscultation of heart done , regular rate and rhythm, no murmur  ABDOMEN:  normal bowel sounds, soft, nontender  M/S:   BLE weakness, ambulating with wc, walker and SBA for short distance, slight drag of feet, bent knees, stance with transfers, 1+ to lower extremities and right knee No compression today.  SKIN: 2 wounds right shin, covered with band-aid, slight drainage, no redness or warmth   NEURO:   Cranial nerves 2-12 are normal tested and grossly at patient's baseline  PSYCH:  insight and judgement impaired, memory impaired , affect and mood normal    Labs:   CBC RESULTS: Recent Labs   Lab Test 02/10/22  0545 01/06/21  0000   WBC 5.7 5.1   RBC 4.08 4.17   HGB 11.6* 11.7   HCT 37.4 37.7   MCV 92 90   MCH 28.4 28.1   MCHC 31.0* 31.0*   RDW 14.5 15.9*    239       Last Basic Metabolic Panel:  Recent Labs   Lab Test 02/10/22  0545 01/06/21  0000    139   POTASSIUM 3.5 3.9   CHLORIDE 106 105   NOHEMY 9.1 9.3   CO2 29 30   BUN 28 26   CR 0.81 1.01   GLC 81 131*       Liver Function Studies -   Recent Labs   Lab Test 02/10/22  0545 01/06/21  0000   PROTTOTAL 6.7* 7.2   ALBUMIN 2.8* 2.8*   BILITOTAL 0.3 0.5   ALKPHOS 163* 220*   AST 19 23   ALT 22 31       TSH   Date Value Ref Range Status   02/10/2022 2.96 0.40 - 4.00 mU/L Final   01/06/2021 5.73 (A) 0.40 - 4.00 mU/L Final   11/25/2017 4.20 (H) 0.40 - 4.00 mU/L Final       Lab Results   Component Value Date    A1C 5.6 07/13/2018    A1C 5.9 05/14/2018     ASSESSMENT/PLAN:  (M89.49) Primary osteoarthritis involving multiple joints  (M25.561,  G89.29) Chronic pain of right knee  (M62.81) Generalized muscle weakness  (M54.41) Bilateral low back pain with right-sided sciatica, unspecified  chronicity  (M25.361) Right knee buckling  (R26.81) Unsteady gait  (R29.6) Falls frequently  Comment: no recent falls. PT discharged. OT continues  Plan:  -customized WC on order with loaner in use  -continue current plan of care     (R60.0) Bilateral lower extremity edema  Comment: needs reminders to use compression  Plan:  -compression on am and off HS     (T14.8XXA) Open Wound  Comment: skin wound x2 on right shin 2/2 WC  Plan:  -OT padded WC area where leg drags against   -daily wound care and monitoring by staff. Notify NP if worsening presentation         Electronically signed by:  JACKIE Zayas CNP                 Sincerely,        JACKIE Zayas CNP

## 2022-11-16 NOTE — PROGRESS NOTES
"[place] [facility]  [place] Medical Record Number:  [id number]  Place of Service where encounter took place:  [facility] [[id number]]  Chief Complaint   Patient presents with     RECHECK       HPI:    Maryam Saavedra  is a 89 year old (6/9/1932), who is being seen today for an episodic care visit.  HPI information obtained from: facility chart records and facility staff. Today's concern is:    Follow up to generalized weakness, frequent falls, pain. Denies pain today, says she is sleeping ok. Staff reporting she is walking only now with SBA or use of WC. She is still waiting for her customized WC. Facility chart reviewed and no recent falls. She has tylenol BID and used a prn dose once on 11/13.     Past Medical and Surgical History reviewed in Epic today.    MEDICATIONS:    Current Outpatient Medications   Medication Sig Dispense Refill     acetaminophen (TYLENOL) 325 MG tablet Take 2 tablets (650 mg) by mouth 2 times daily And twice daily as needed 60 tablet 97     Menthol-Methyl Salicylate (ICY HOT EXTRA STRENGTH) 10-30 % CREA Externally apply topically 2 times daily To right knee 85 g 3     polyethylene glycol (MIRALAX) 17 GM/Dose powder MIX 17GM OF POWDER IN 8OZ OF WATER UNTIL COMPLETELY DISSOLVED. DRINK SOLUTION BY MOUTH ONCE DAILY 510 g 97     SENEXON-S 8.6-50 MG tablet TAKE ONE TABLET BY MOUTH AT BEDTIME THREE TIMES PER WEEK;& TAKE ONE TABLET TWICE DAILY AS NEEDED FOR CONSTIPATION 216 tablet 3     THERATEARS 0.25 % SOLN INSTILL ONE DROP IN EACH EYE TWICE DAILY FOR DRY EYES 15 mL 97     VITAMIN D3 50 MCG (2000 UT) tablet TAKE 1 TABLET BY MOUTH ONCE DAILY 28 tablet 97     REVIEW OF SYSTEMS:  Limited secondary to cognitive impairment but today pt reports ok    Objective:  /63   Pulse 74   Temp 97  F (36.1  C)   Resp 18   Ht 1.549 m (5' 1\")   Wt 50.3 kg (110 lb 12.8 oz)   SpO2 96%   BMI 20.94 kg/m    Exam:  GENERAL APPEARANCE:  Alert, " cooperative  ENT:  Mouth and posterior oropharynx normal, moist mucous membranes, [place]  RESP:  respiratory effort and palpation of chest normal, lungs clear to auscultation   CV:  Palpation and auscultation of heart done , regular rate and rhythm, no murmur  ABDOMEN:  normal bowel sounds, soft, nontender  M/S:   BLE weakness, ambulating with wc, walker and SBA for short distance, slight drag of feet, bent knees, stance with transfers, 1+ to lower extremities and right knee No compression today.  SKIN: 2 wounds right shin, covered with band-aid, slight drainage, no redness or warmth   NEURO:   Cranial nerves 2-12 are normal tested and grossly at patient's baseline  PSYCH:  insight and judgement impaired, memory impaired , affect and mood normal    Labs:   CBC RESULTS: Recent Labs   Lab Test 02/10/22  0545 01/06/21  0000   WBC 5.7 5.1   RBC 4.08 4.17   HGB 11.6* 11.7   HCT 37.4 37.7   MCV 92 90   MCH 28.4 28.1   MCHC 31.0* 31.0*   RDW 14.5 15.9*    239       Last Basic Metabolic Panel:  Recent Labs   Lab Test 02/10/22  0545 01/06/21  0000    139   POTASSIUM 3.5 3.9   CHLORIDE 106 105   NOHEMY 9.1 9.3   CO2 29 30   BUN 28 26   CR 0.81 1.01   GLC 81 131*       Liver Function Studies -   Recent Labs   Lab Test 02/10/22  0545 01/06/21  0000   PROTTOTAL 6.7* 7.2   ALBUMIN 2.8* 2.8*   BILITOTAL 0.3 0.5   ALKPHOS 163* 220*   AST 19 23   ALT 22 31       TSH   Date Value Ref Range Status   02/10/2022 2.96 0.40 - 4.00 mU/L Final   01/06/2021 5.73 (A) 0.40 - 4.00 mU/L Final   11/25/2017 4.20 (H) 0.40 - 4.00 mU/L Final       Lab Results   Component Value Date    A1C 5.6 07/13/2018    A1C 5.9 05/14/2018     ASSESSMENT/PLAN:  (M89.49) Primary osteoarthritis involving multiple joints  (M25.561,  G89.29) Chronic pain of right knee  (M62.81) Generalized muscle weakness  (M54.41) Bilateral low back pain with right-sided sciatica, unspecified chronicity  (M25.361) Right knee buckling  (R26.81) Unsteady gait  (R29.6) Falls  frequently  Comment: no recent falls. PT discharged. OT continues  Plan:  -customized WC on order with loaner in use  -continue current plan of care     (R60.0) Bilateral lower extremity edema  Comment: needs reminders to use compression  Plan:  -compression on am and off HS     (T14.8XXA) Open Wound  Comment: skin wound x2 on right shin 2/2 WC  Plan:  -OT padded WC area where leg drags against   -daily wound care and monitoring by staff. Notify NP if worsening presentation         Electronically signed by:  JACKIE Zayas CNP

## 2022-11-25 DIAGNOSIS — E55.9 VITAMIN D DEFICIENCY: ICD-10-CM

## 2022-11-25 RX ORDER — CHOLECALCIFEROL (VITAMIN D3) 50 MCG
TABLET ORAL
Qty: 90 TABLET | Refills: 97 | Status: SHIPPED | OUTPATIENT
Start: 2022-11-25 | End: 2023-04-13

## 2023-01-04 DIAGNOSIS — S81.802A OPEN WOUND OF LOWER LIMB, LEFT, INITIAL ENCOUNTER: Primary | ICD-10-CM

## 2023-01-04 RX ORDER — SILVER SULFADIAZINE 10 MG/G
CREAM TOPICAL DAILY
Qty: 25 G | Refills: 3 | Status: SHIPPED | OUTPATIENT
Start: 2023-01-04 | End: 2023-02-01

## 2023-01-04 NOTE — PROGRESS NOTES
Slow healing wound to LLE. Habit of hitting leg on side of bed. Nursing is working with family to add pad protection to bed frame.    Plan:  1. Discontinue use of bacitracin and begin   silver sulfADIAZINE (SILVADENE) 1 % external cream Apply topically daily Apply to affected area with current wound care orders. Use until healed.     JACKIE Zayas CNP on 1/4/2023 at 12:44 PM

## 2023-01-11 ENCOUNTER — ASSISTED LIVING VISIT (OUTPATIENT)
Dept: GERIATRICS | Facility: CLINIC | Age: 88
End: 2023-01-11
Payer: MEDICARE

## 2023-01-11 DIAGNOSIS — F01.53 VASCULAR DEMENTIA WITH DEPRESSED MOOD (H): ICD-10-CM

## 2023-01-11 DIAGNOSIS — S81.802A OPEN WOUND OF LOWER LIMB, LEFT, INITIAL ENCOUNTER: Primary | ICD-10-CM

## 2023-01-11 PROCEDURE — 99349 HOME/RES VST EST MOD MDM 40: CPT | Performed by: NURSE PRACTITIONER

## 2023-01-11 NOTE — LETTER
1/11/2023        RE: Maryam Saavedra  C/o Saray Saavedra  [address] [facility] [address]        [place] [facility]  [place] Medical Record Number:  [id number]  Place of Service where encounter took place:  [facility] [[id number]]  Chief Complaint   Patient presents with     RECHECK     Slow healing leg wound       HPI:    Maryam Saavedra  is a 89 year old (6/9/1932), who is being seen today for an episodic care visit.  HPI information obtained from: facility chart records. Today's concern is:    Resident with left shin wound 12/20/22 and continues with slow delayed healing. Doesn't appear infected today but some slight redness surrounding area, no warmth, this has about 0.2cm depth to it and 4 x 5 cm oval shaped. Dried blood in wound bed and old skin flap folded over into the wound bed as was not able to be realigned.     Past Medical and Surgical History reviewed in Epic today.    MEDICATIONS:    Current Outpatient Medications   Medication Sig Dispense Refill     acetaminophen (TYLENOL) 325 MG tablet Take 2 tablets (650 mg) by mouth 2 times daily And twice daily as needed 60 tablet 97     Menthol-Methyl Salicylate (ICY HOT EXTRA STRENGTH) 10-30 % CREA Externally apply topically 2 times daily To right knee 85 g 3     polyethylene glycol (MIRALAX) 17 GM/Dose powder MIX 17GM OF POWDER IN 8OZ OF WATER UNTIL COMPLETELY DISSOLVED. DRINK SOLUTION BY MOUTH ONCE DAILY 510 g 97     SENEXON-S 8.6-50 MG tablet TAKE ONE TABLET BY MOUTH AT BEDTIME THREE TIMES PER WEEK;& TAKE ONE TABLET TWICE DAILY AS NEEDED FOR CONSTIPATION 216 tablet 3     silver sulfADIAZINE (SILVADENE) 1 % external cream Apply topically daily Apply to affected area with current wound care orders. Use until healed. 25 g 3     THERATEARS 0.25 % SOLN INSTILL ONE DROP IN EACH EYE TWICE DAILY FOR DRY EYES 15 mL 97     VITAMIN D3 50 MCG (2000 UT) tablet TAKE 1 TABLET BY MOUTH ONCE DAILY 90 tablet 97     REVIEW OF  "SYSTEMS:  4 point ROS including Respiratory, CV, GI and , other than that noted in the HPI,  is negative    Objective:  /80   Pulse 60   Temp 97.5  F (36.4  C)   Resp 16   Ht 1.549 m (5' 1\")   Wt 49.9 kg (110 lb)   SpO2 100%   BMI 20.78 kg/m    Exam:  GENERAL APPEARANCE:  Alert, cooperative  ENT:  Mouth and posterior oropharynx normal, moist mucous membranes, [place]  RESP:  respiratory effort and palpation of chest normal, lungs clear to auscultation   CV:  Palpation and auscultation of heart done , regular rate and rhythm, no murmur  ABDOMEN:  normal bowel sounds, soft, nontender  M/S:   BLE weakness baseline ambulating with wc, walker and SBA for short distance, slight drag of feet, bent knees, stance with transfers, 1+ to lower extremities and right knee No compression today.  SKIN: wound left shin-see hpi  NEURO:   Cranial nerves 2-12 are normal tested and grossly at patient's baseline  PSYCH:  insight and judgement impaired, memory impaired , affect and mood normal    Labs:   CBC RESULTS: Recent Labs   Lab Test 02/10/22  0545 01/06/21  0000   WBC 5.7 5.1   RBC 4.08 4.17   HGB 11.6* 11.7   HCT 37.4 37.7   MCV 92 90   MCH 28.4 28.1   MCHC 31.0* 31.0*   RDW 14.5 15.9*    239       Last Basic Metabolic Panel:  Recent Labs   Lab Test 02/10/22  0545 01/06/21  0000    139   POTASSIUM 3.5 3.9   CHLORIDE 106 105   NOHEMY 9.1 9.3   CO2 29 30   BUN 28 26   CR 0.81 1.01   GLC 81 131*       Liver Function Studies -   Recent Labs   Lab Test 02/10/22  0545 01/06/21  0000   PROTTOTAL 6.7* 7.2   ALBUMIN 2.8* 2.8*   BILITOTAL 0.3 0.5   ALKPHOS 163* 220*   AST 19 23   ALT 22 31       TSH   Date Value Ref Range Status   02/10/2022 2.96 0.40 - 4.00 mU/L Final   01/06/2021 5.73 (A) 0.40 - 4.00 mU/L Final   11/25/2017 4.20 (H) 0.40 - 4.00 mU/L Final       Lab Results   Component Value Date    A1C 5.6 07/13/2018    A1C 5.9 05/14/2018       ASSESSMENT/PLAN:  (S86.872O) Open wound of lower limb, left, initial " encounter  (primary encounter diagnosis)  Comment: slow healing wouind  Plan:   -wound care orders updated 1/4 with input/suggestions from [facility] nursing and appreciate staff assist    -wound care reduced to 3x weekly with nursing monitoring for improving or worsening condition     (F01.53) Vascular dementia with depressed mood  Comment: stable   Plan:   -seems settled in [facility]    Total time spent with patient visit was >40 minutes including patient visit and reviewed wound with nursing, MD. Greater than 50% of total time spent with counseling and coordinating care due to slow healing wound, discussed bumper on bed as recurring injury and possible 2/2 WC versus metal bed frame. At this time bed rail with no bumper, padding on WC.           Electronically signed by:  JACKIE Zayas CNP                 Sincerely,        JACKIE Zayas CNP

## 2023-01-11 NOTE — PROGRESS NOTES
[place] [facility]  [place] Medical Record Number:  [id number]  Place of Service where encounter took place:  [facility] [[id number]]  Chief Complaint   Patient presents with     RECHECK     Slow healing leg wound       HPI:    Maryam Saavedra  is a 89 year old (6/9/1932), who is being seen today for an episodic care visit.  HPI information obtained from: facility chart records. Today's concern is:    Resident with left shin wound 12/20/22 and continues with slow delayed healing. Doesn't appear infected today but some slight redness surrounding area, no warmth, this has about 0.2cm depth to it and 4 x 5 cm oval shaped. Dried blood in wound bed and old skin flap folded over into the wound bed as was not able to be realigned.     Past Medical and Surgical History reviewed in Epic today.    MEDICATIONS:    Current Outpatient Medications   Medication Sig Dispense Refill     acetaminophen (TYLENOL) 325 MG tablet Take 2 tablets (650 mg) by mouth 2 times daily And twice daily as needed 60 tablet 97     Menthol-Methyl Salicylate (ICY HOT EXTRA STRENGTH) 10-30 % CREA Externally apply topically 2 times daily To right knee 85 g 3     polyethylene glycol (MIRALAX) 17 GM/Dose powder MIX 17GM OF POWDER IN 8OZ OF WATER UNTIL COMPLETELY DISSOLVED. DRINK SOLUTION BY MOUTH ONCE DAILY 510 g 97     SENEXON-S 8.6-50 MG tablet TAKE ONE TABLET BY MOUTH AT BEDTIME THREE TIMES PER WEEK;& TAKE ONE TABLET TWICE DAILY AS NEEDED FOR CONSTIPATION 216 tablet 3     silver sulfADIAZINE (SILVADENE) 1 % external cream Apply topically daily Apply to affected area with current wound care orders. Use until healed. 25 g 3     THERATEARS 0.25 % SOLN INSTILL ONE DROP IN EACH EYE TWICE DAILY FOR DRY EYES 15 mL 97     VITAMIN D3 50 MCG (2000 UT) tablet TAKE 1 TABLET BY MOUTH ONCE DAILY 90 tablet 97     REVIEW OF SYSTEMS:  4 point ROS including Respiratory, CV, GI and , other than that noted in the HPI,  is  "negative    Objective:  /80   Pulse 60   Temp 97.5  F (36.4  C)   Resp 16   Ht 1.549 m (5' 1\")   Wt 49.9 kg (110 lb)   SpO2 100%   BMI 20.78 kg/m    Exam:  GENERAL APPEARANCE:  Alert, cooperative  ENT:  Mouth and posterior oropharynx normal, moist mucous membranes, [place]  RESP:  respiratory effort and palpation of chest normal, lungs clear to auscultation   CV:  Palpation and auscultation of heart done , regular rate and rhythm, no murmur  ABDOMEN:  normal bowel sounds, soft, nontender  M/S:   BLE weakness baseline ambulating with wc, walker and SBA for short distance, slight drag of feet, bent knees, stance with transfers, 1+ to lower extremities and right knee No compression today.  SKIN: wound left shin-see hpi  NEURO:   Cranial nerves 2-12 are normal tested and grossly at patient's baseline  PSYCH:  insight and judgement impaired, memory impaired , affect and mood normal    Labs:   CBC RESULTS: Recent Labs   Lab Test 02/10/22  0545 01/06/21  0000   WBC 5.7 5.1   RBC 4.08 4.17   HGB 11.6* 11.7   HCT 37.4 37.7   MCV 92 90   MCH 28.4 28.1   MCHC 31.0* 31.0*   RDW 14.5 15.9*    239       Last Basic Metabolic Panel:  Recent Labs   Lab Test 02/10/22  0545 01/06/21  0000    139   POTASSIUM 3.5 3.9   CHLORIDE 106 105   NOHEMY 9.1 9.3   CO2 29 30   BUN 28 26   CR 0.81 1.01   GLC 81 131*       Liver Function Studies -   Recent Labs   Lab Test 02/10/22  0545 01/06/21  0000   PROTTOTAL 6.7* 7.2   ALBUMIN 2.8* 2.8*   BILITOTAL 0.3 0.5   ALKPHOS 163* 220*   AST 19 23   ALT 22 31       TSH   Date Value Ref Range Status   02/10/2022 2.96 0.40 - 4.00 mU/L Final   01/06/2021 5.73 (A) 0.40 - 4.00 mU/L Final   11/25/2017 4.20 (H) 0.40 - 4.00 mU/L Final       Lab Results   Component Value Date    A1C 5.6 07/13/2018    A1C 5.9 05/14/2018       ASSESSMENT/PLAN:  (S81.802A) Open wound of lower limb, left, initial encounter  (primary encounter diagnosis)  Comment: slow healing wouind  Plan:   -wound care orders " updated 1/4 with input/suggestions from [facility] nursing and appreciate staff assist    -wound care reduced to 3x weekly with nursing monitoring for improving or worsening condition     (F01.53) Vascular dementia with depressed mood  Comment: stable   Plan:   -seems settled in [facility]    Total time spent with patient visit was >40 minutes including patient visit and reviewed wound with nursing, MD. Greater than 50% of total time spent with counseling and coordinating care due to slow healing wound, discussed bumper on bed as recurring injury and possible 2/2 WC versus metal bed frame. At this time bed rail with no bumper, padding on WC.           Electronically signed by:  JACKIE Zayas CNP

## 2023-01-11 NOTE — LETTER
Maryam Saavedra orders    1. Updated wound care orders: Wash/pat dry, apply adaptic to wound bed and cover with primapore dressing. Do daily and prn. Hold on silver cream for now.    JACKIE Zayas CNP on 1/27/2023 at 4:33 PM

## 2023-01-12 VITALS
WEIGHT: 110 LBS | BODY MASS INDEX: 20.77 KG/M2 | OXYGEN SATURATION: 100 % | DIASTOLIC BLOOD PRESSURE: 80 MMHG | HEART RATE: 60 BPM | RESPIRATION RATE: 16 BRPM | SYSTOLIC BLOOD PRESSURE: 125 MMHG | TEMPERATURE: 97.5 F | HEIGHT: 61 IN

## 2023-01-12 PROBLEM — F01.53 VASCULAR DEMENTIA WITH DEPRESSED MOOD (H): Status: ACTIVE | Noted: 2023-01-12

## 2023-01-12 PROBLEM — N18.30 CHRONIC KIDNEY DISEASE, STAGE 3 (H): Status: RESOLVED | Noted: 2021-01-13 | Resolved: 2023-01-12

## 2023-02-01 ENCOUNTER — ASSISTED LIVING VISIT (OUTPATIENT)
Dept: GERIATRICS | Facility: CLINIC | Age: 88
End: 2023-02-01
Payer: MEDICARE

## 2023-02-01 VITALS
OXYGEN SATURATION: 97 % | RESPIRATION RATE: 16 BRPM | HEART RATE: 64 BPM | SYSTOLIC BLOOD PRESSURE: 120 MMHG | DIASTOLIC BLOOD PRESSURE: 70 MMHG | TEMPERATURE: 97.6 F | BODY MASS INDEX: 20.77 KG/M2 | WEIGHT: 110 LBS | HEIGHT: 61 IN

## 2023-02-01 DIAGNOSIS — F02.80 MIXED DEMENTIA (H): ICD-10-CM

## 2023-02-01 DIAGNOSIS — B99.9 INFECTION: Primary | ICD-10-CM

## 2023-02-01 DIAGNOSIS — F01.50 MIXED DEMENTIA (H): ICD-10-CM

## 2023-02-01 DIAGNOSIS — G30.9 MIXED DEMENTIA (H): ICD-10-CM

## 2023-02-01 DIAGNOSIS — S81.802A OPEN WOUND OF LOWER LIMB, LEFT, INITIAL ENCOUNTER: ICD-10-CM

## 2023-02-01 PROCEDURE — 99349 HOME/RES VST EST MOD MDM 40: CPT | Performed by: NURSE PRACTITIONER

## 2023-02-01 RX ORDER — DOXYCYCLINE 100 MG/1
100 CAPSULE ORAL 2 TIMES DAILY
Qty: 14 CAPSULE | Refills: 0 | Status: SHIPPED | OUTPATIENT
Start: 2023-02-01 | End: 2023-02-08

## 2023-02-01 NOTE — LETTER
2/1/2023        RE: Maryam Saavedra  C/o Saray Saavedra  [address] [facility] [address]        [place] [facility]  [place] Medical Record Number:  [id number]  Place of Service where encounter took place:  [facility] [[id number]]  Chief Complaint   Patient presents with     RECHECK     Slow healing wound       HPI:    Maryam Saavedra  is a 89 year old (6/9/1932), who is being seen today for an episodic care visit.  HPI information obtained from: facility chart records, facility staff, patient report, [facility] chart review and family/first contact daughter- Saray report. Today's concern is:    Resident with left shin wound/ulcer 12/20/22 and is not healing. About 4 x 5 cm oval shaped with 0.2 cm depth. Chronic edema, reddened leg surrounding the ulcer, no warmth, some slough in the wound bed. She denies pain, Does have some arterial insufficiency in LE.      Past Medical and Surgical History reviewed in Epic today.    MEDICATIONS:    Current Outpatient Medications   Medication Sig Dispense Refill     doxycycline hyclate (VIBRAMYCIN) 100 MG capsule Take 1 capsule (100 mg) by mouth 2 times daily for 7 days 14 capsule 0     acetaminophen (TYLENOL) 325 MG tablet Take 2 tablets (650 mg) by mouth 2 times daily And twice daily as needed 60 tablet 97     Menthol-Methyl Salicylate (ICY HOT EXTRA STRENGTH) 10-30 % CREA Externally apply topically 2 times daily To right knee 85 g 3     polyethylene glycol (MIRALAX) 17 GM/Dose powder MIX 17GM OF POWDER IN 8OZ OF WATER UNTIL COMPLETELY DISSOLVED. DRINK SOLUTION BY MOUTH ONCE DAILY 510 g 97     SENEXON-S 8.6-50 MG tablet TAKE ONE TABLET BY MOUTH AT BEDTIME THREE TIMES PER WEEK;& TAKE ONE TABLET TWICE DAILY AS NEEDED FOR CONSTIPATION 216 tablet 3     silver sulfADIAZINE (SILVADENE) 1 % external cream Apply topically daily Apply to affected area with current wound care orders. Use until healed. 25 g 3     THERATEARS 0.25 % SOLN  "INSTILL ONE DROP IN EACH EYE TWICE DAILY FOR DRY EYES 15 mL 97     VITAMIN D3 50 MCG (2000 UT) tablet TAKE 1 TABLET BY MOUTH ONCE DAILY 90 tablet 97     REVIEW OF SYSTEMS:  Limited secondary to cognitive impairment but today pt reports ok    Objective:  /70   Pulse 64   Temp 97.6  F (36.4  C)   Resp 16   Ht 1.549 m (5' 1\")   Wt 49.9 kg (110 lb)   SpO2 97%   BMI 20.78 kg/m    Exam:  GENERAL APPEARANCE:  Alert, cooperative  ENT:  Mouth and posterior oropharynx normal, moist mucous membranes, [place]  RESP:  respiratory effort and palpation of chest normal, lungs clear to auscultation   CV:  Palpation and auscultation of heart done , regular rate and rhythm, no murmur  ABDOMEN:  normal bowel sounds, soft, nontender  M/S:   BLE weakness baseline ambulating with wc, walker and SBA for short distance, slight drag of feet, bent knees, stance with transfers, 1+ to lower extremities and right knee No compression today.  SKIN: wound left shin (see hpi)   NEURO:   Cranial nerves 2-12 are normal tested and grossly at patient's baseline  PSYCH:  insight and judgement impaired, memory impaired , affect and mood normal    Labs:   CBC RESULTS: Recent Labs   Lab Test 02/10/22  0545 01/06/21  0000   WBC 5.7 5.1   RBC 4.08 4.17   HGB 11.6* 11.7   HCT 37.4 37.7   MCV 92 90   MCH 28.4 28.1   MCHC 31.0* 31.0*   RDW 14.5 15.9*    239       Last Basic Metabolic Panel:  Recent Labs   Lab Test 02/10/22  0545 01/06/21  0000    139   POTASSIUM 3.5 3.9   CHLORIDE 106 105   NOHEMY 9.1 9.3   CO2 29 30   BUN 28 26   CR 0.81 1.01   GLC 81 131*       Liver Function Studies -   Recent Labs   Lab Test 02/10/22  0545 01/06/21  0000   PROTTOTAL 6.7* 7.2   ALBUMIN 2.8* 2.8*   BILITOTAL 0.3 0.5   ALKPHOS 163* 220*   AST 19 23   ALT 22 31       TSH   Date Value Ref Range Status   02/10/2022 2.96 0.40 - 4.00 mU/L Final   01/06/2021 5.73 (A) 0.40 - 4.00 mU/L Final   11/25/2017 4.20 (H) 0.40 - 4.00 mU/L Final       Lab Results   Component " Value Date    A1C 5.6 07/13/2018    A1C 5.9 05/14/2018     ASSESSMENT/PLAN:  (B99.9) Infection  (primary encounter diagnosis)  (L17.201B) Open wound of lower limb, left, initial encounter  Comment: concern with inability to heal  Plan:   -doxycycline hyclate (VIBRAMYCIN) 100 MG capsule po BID x 7 days  -Daily wound care to left lower extremity wound ulcer:  wash/pat dry, apply 4x4, secure with gauze and then apply tubi  to assist with reducing swelling. Discontinue previous wound care orders. Daily and prn for saturated dressing.  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

## 2023-02-01 NOTE — PROGRESS NOTES
[facility]  [place] Medical Record Number:  [id number]  Place of Service where encounter took place:  [facility] [[id number]]  Chief Complaint   Patient presents with     RECHECK     Slow healing wound       HPI:    Maryam Saavedra  is a 89 year old (6/9/1932), who is being seen today for an episodic care visit.  HPI information obtained from: facility chart records, facility staff, patient report, [facility] chart review and family/first contact daughter- Saray report. Today's concern is:    Resident with left shin wound/ulcer 12/20/22 and is not healing. About 4 x 5 cm oval shaped with 0.2 cm depth. Chronic edema, reddened leg surrounding the ulcer, no warmth, some slough in the wound bed. She denies pain, Does have some arterial insufficiency in LE.      Past Medical and Surgical History reviewed in Epic today.    MEDICATIONS:    Current Outpatient Medications   Medication Sig Dispense Refill     doxycycline hyclate (VIBRAMYCIN) 100 MG capsule Take 1 capsule (100 mg) by mouth 2 times daily for 7 days 14 capsule 0     acetaminophen (TYLENOL) 325 MG tablet Take 2 tablets (650 mg) by mouth 2 times daily And twice daily as needed 60 tablet 97     Menthol-Methyl Salicylate (ICY HOT EXTRA STRENGTH) 10-30 % CREA Externally apply topically 2 times daily To right knee 85 g 3     polyethylene glycol (MIRALAX) 17 GM/Dose powder MIX 17GM OF POWDER IN 8OZ OF WATER UNTIL COMPLETELY DISSOLVED. DRINK SOLUTION BY MOUTH ONCE DAILY 510 g 97     SENEXON-S 8.6-50 MG tablet TAKE ONE TABLET BY MOUTH AT BEDTIME THREE TIMES PER WEEK;& TAKE ONE TABLET TWICE DAILY AS NEEDED FOR CONSTIPATION 216 tablet 3     silver sulfADIAZINE (SILVADENE) 1 % external cream Apply topically daily Apply to affected area with current wound care orders. Use until healed. 25 g 3     THERATEARS 0.25 % SOLN INSTILL ONE DROP IN EACH EYE TWICE DAILY FOR DRY EYES 15 mL 97     VITAMIN D3 50 MCG (2000 UT) tablet TAKE  "1 TABLET BY MOUTH ONCE DAILY 90 tablet 97     REVIEW OF SYSTEMS:  Limited secondary to cognitive impairment but today pt reports ok    Objective:  /70   Pulse 64   Temp 97.6  F (36.4  C)   Resp 16   Ht 1.549 m (5' 1\")   Wt 49.9 kg (110 lb)   SpO2 97%   BMI 20.78 kg/m    Exam:  GENERAL APPEARANCE:  Alert, cooperative  ENT:  Mouth and posterior oropharynx normal, moist mucous membranes, [place]  RESP:  respiratory effort and palpation of chest normal, lungs clear to auscultation   CV:  Palpation and auscultation of heart done , regular rate and rhythm, no murmur  ABDOMEN:  normal bowel sounds, soft, nontender  M/S:   BLE weakness baseline ambulating with wc, walker and SBA for short distance, slight drag of feet, bent knees, stance with transfers, 1+ to lower extremities and right knee No compression today.  SKIN: wound left shin (see hpi)   NEURO:   Cranial nerves 2-12 are normal tested and grossly at patient's baseline  PSYCH:  insight and judgement impaired, memory impaired , affect and mood normal    Labs:   CBC RESULTS: Recent Labs   Lab Test 02/10/22  0545 01/06/21  0000   WBC 5.7 5.1   RBC 4.08 4.17   HGB 11.6* 11.7   HCT 37.4 37.7   MCV 92 90   MCH 28.4 28.1   MCHC 31.0* 31.0*   RDW 14.5 15.9*    239       Last Basic Metabolic Panel:  Recent Labs   Lab Test 02/10/22  0545 01/06/21  0000    139   POTASSIUM 3.5 3.9   CHLORIDE 106 105   NOHEMY 9.1 9.3   CO2 29 30   BUN 28 26   CR 0.81 1.01   GLC 81 131*       Liver Function Studies -   Recent Labs   Lab Test 02/10/22  0545 01/06/21  0000   PROTTOTAL 6.7* 7.2   ALBUMIN 2.8* 2.8*   BILITOTAL 0.3 0.5   ALKPHOS 163* 220*   AST 19 23   ALT 22 31       TSH   Date Value Ref Range Status   02/10/2022 2.96 0.40 - 4.00 mU/L Final   01/06/2021 5.73 (A) 0.40 - 4.00 mU/L Final   11/25/2017 4.20 (H) 0.40 - 4.00 mU/L Final       Lab Results   Component Value Date    A1C 5.6 07/13/2018    A1C 5.9 05/14/2018     ASSESSMENT/PLAN:  (B99.9) Infection  (primary " encounter diagnosis)  (O85.334A) Open wound of lower limb, left, initial encounter  Comment: concern with inability to heal  Plan:   -doxycycline hyclate (VIBRAMYCIN) 100 MG capsule po BID x 7 days  -Daily wound care to left lower extremity wound ulcer:  wash/pat dry, apply 4x4, secure with gauze and then apply tubi  to assist with reducing swelling. Discontinue previous wound care orders. Daily and prn for saturated dressing.  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

## 2023-02-01 NOTE — LETTER
Maryam Saavedra    1. Daily wound care to left lower extremity wound ulcer:  wash/pat dry, apply 4x4, secure with gauze and then apply tubi  to assist with reducing swelling. Discontinue previous wound care orders. Daily and prn for saturated dressing.    2.   doxycycline hyclate (VIBRAMYCIN) 100 MG capsule Take 1 capsule (100 mg) by mouth 2 times daily for 7 days       JACKIE Zayas CNP on 2/1/2023 at 12:50 PM

## 2023-02-01 NOTE — LETTER
Maryam Saavedra    1. BMP, CBC, LFTs next week on lab day for HTN  2. TSH for thyroid screen next week on lab day    JACKIE Zayas CNP on 2/1/2023 at 10:08 PM

## 2023-02-08 ENCOUNTER — LAB REQUISITION (OUTPATIENT)
Dept: LAB | Facility: CLINIC | Age: 88
End: 2023-02-08
Payer: MEDICARE

## 2023-02-08 DIAGNOSIS — I10 ESSENTIAL (PRIMARY) HYPERTENSION: ICD-10-CM

## 2023-02-09 LAB
ANION GAP SERPL CALCULATED.3IONS-SCNC: 10 MMOL/L (ref 7–15)
BUN SERPL-MCNC: 36.5 MG/DL (ref 8–23)
CALCIUM SERPL-MCNC: 9.5 MG/DL (ref 8.2–9.6)
CHLORIDE SERPL-SCNC: 104 MMOL/L (ref 98–107)
CHOLEST SERPL-MCNC: 194 MG/DL
CREAT SERPL-MCNC: 0.88 MG/DL (ref 0.51–0.95)
DEPRECATED HCO3 PLAS-SCNC: 28 MMOL/L (ref 22–29)
ERYTHROCYTE [DISTWIDTH] IN BLOOD BY AUTOMATED COUNT: 13.8 % (ref 10–15)
GFR SERPL CREATININE-BSD FRML MDRD: 62 ML/MIN/1.73M2
GLUCOSE SERPL-MCNC: 73 MG/DL (ref 70–99)
HCT VFR BLD AUTO: 34.2 % (ref 35–47)
HDLC SERPL-MCNC: 100 MG/DL
HGB BLD-MCNC: 11 G/DL (ref 11.7–15.7)
LDLC SERPL CALC-MCNC: 82 MG/DL
MCH RBC QN AUTO: 29.1 PG (ref 26.5–33)
MCHC RBC AUTO-ENTMCNC: 32.2 G/DL (ref 31.5–36.5)
MCV RBC AUTO: 91 FL (ref 78–100)
NONHDLC SERPL-MCNC: 94 MG/DL
PLATELET # BLD AUTO: 269 10E3/UL (ref 150–450)
POTASSIUM SERPL-SCNC: 3.9 MMOL/L (ref 3.4–5.3)
RBC # BLD AUTO: 3.78 10E6/UL (ref 3.8–5.2)
SODIUM SERPL-SCNC: 142 MMOL/L (ref 136–145)
TRIGL SERPL-MCNC: 61 MG/DL
TSH SERPL DL<=0.005 MIU/L-ACNC: 3.68 UIU/ML (ref 0.3–4.2)
WBC # BLD AUTO: 5.2 10E3/UL (ref 4–11)

## 2023-02-09 PROCEDURE — 36415 COLL VENOUS BLD VENIPUNCTURE: CPT | Mod: ORL | Performed by: NURSE PRACTITIONER

## 2023-02-09 PROCEDURE — 80061 LIPID PANEL: CPT | Mod: ORL | Performed by: NURSE PRACTITIONER

## 2023-02-09 PROCEDURE — P9604 ONE-WAY ALLOW PRORATED TRIP: HCPCS | Mod: ORL | Performed by: NURSE PRACTITIONER

## 2023-02-09 PROCEDURE — 85027 COMPLETE CBC AUTOMATED: CPT | Mod: ORL | Performed by: NURSE PRACTITIONER

## 2023-02-09 PROCEDURE — 84443 ASSAY THYROID STIM HORMONE: CPT | Mod: ORL | Performed by: NURSE PRACTITIONER

## 2023-02-09 PROCEDURE — 80048 BASIC METABOLIC PNL TOTAL CA: CPT | Mod: ORL | Performed by: NURSE PRACTITIONER

## 2023-02-10 ENCOUNTER — TELEPHONE (OUTPATIENT)
Dept: GERIATRICS | Facility: CLINIC | Age: 88
End: 2023-02-10
Payer: MEDICARE

## 2023-02-10 NOTE — TELEPHONE ENCOUNTER
[facility] Triage Nurse Telephone Encounter    Provider: JACKIE Lousie CNP  Facility: [facility] Facility Type:  AL    Caller: Chinyere(home RN)  Call Back Number: [phone]    Allergies:    Allergies   Allergen Reactions     Ace Inhibitors Angioedema     Cyclobenzaprine Angioedema        Reason for call: Nurse is calling to update about the left shin wound.  Nurse is noticing redness around the wound with new 1-2+ pitting edema.  There is also serosanguinous drainage noted.  Of note, patient ended Doxycycline on 2/8/23.  No fever noted.  Patient does wear Tubigrips to LE's for compression, however she will be assessed by OT next week for lymphedema wraps.  The current wound treatment is to cleanse the wound with wound cleanser, apply a 4 x 4 dressing and wrap with kerlix.  The nurse is recommending that the wound is cleansed with wound cleanser, apply medihoney to the wound and cover with a silicone border dressing----change daily.      Verbal Order/Direction given by Provider: Ok to the recommend wound treatment as above.  NP to follow up with patient next week.  If patient develops any worsened signs of infection, call NP directly.      Provider giving Order:  JACKIE Louise CNP    Verbal Order given to: left message on Chinyere's [employer].      Terrell Smith RN

## 2023-02-11 ENCOUNTER — LAB REQUISITION (OUTPATIENT)
Dept: LAB | Facility: CLINIC | Age: 88
End: 2023-02-11
Payer: MEDICARE

## 2023-02-11 DIAGNOSIS — N39.0 URINARY TRACT INFECTION, SITE NOT SPECIFIED: ICD-10-CM

## 2023-02-11 LAB
ALBUMIN UR-MCNC: NEGATIVE MG/DL
APPEARANCE UR: CLEAR
BILIRUB UR QL STRIP: NEGATIVE
COLOR UR AUTO: ABNORMAL
GLUCOSE UR STRIP-MCNC: NEGATIVE MG/DL
HGB UR QL STRIP: NEGATIVE
HYALINE CASTS: 2 /LPF
KETONES UR STRIP-MCNC: NEGATIVE MG/DL
LEUKOCYTE ESTERASE UR QL STRIP: NEGATIVE
MUCOUS THREADS #/AREA URNS LPF: PRESENT /LPF
NITRATE UR QL: NEGATIVE
PH UR STRIP: 7 [PH] (ref 5–7)
RBC URINE: 0 /HPF
SP GR UR STRIP: 1.02 (ref 1–1.03)
SQUAMOUS EPITHELIAL: 1 /HPF
UROBILINOGEN UR STRIP-MCNC: NORMAL MG/DL
WBC URINE: 2 /HPF

## 2023-02-11 PROCEDURE — 81001 URINALYSIS AUTO W/SCOPE: CPT | Mod: ORL | Performed by: NURSE PRACTITIONER

## 2023-02-15 ENCOUNTER — ASSISTED LIVING VISIT (OUTPATIENT)
Dept: GERIATRICS | Facility: CLINIC | Age: 88
End: 2023-02-15
Payer: MEDICARE

## 2023-02-15 ENCOUNTER — LAB REQUISITION (OUTPATIENT)
Dept: LAB | Facility: CLINIC | Age: 88
End: 2023-02-15
Payer: MEDICARE

## 2023-02-15 DIAGNOSIS — N39.0 URINARY TRACT INFECTION, SITE NOT SPECIFIED: ICD-10-CM

## 2023-02-15 DIAGNOSIS — L08.9 LOCAL INFECTION OF SKIN AND SUBCUTANEOUS TISSUE: Primary | ICD-10-CM

## 2023-02-15 PROCEDURE — 99349 HOME/RES VST EST MOD MDM 40: CPT | Performed by: NURSE PRACTITIONER

## 2023-02-15 RX ORDER — CEPHALEXIN 500 MG/1
500 CAPSULE ORAL EVERY 12 HOURS
Qty: 20 CAPSULE | Refills: 0 | Status: SHIPPED | OUTPATIENT
Start: 2023-02-15 | End: 2023-02-25

## 2023-02-15 NOTE — LETTER
Maryam Saavedra    1. Begin   cephALEXin (KEFLEX) 500 MG capsule Take 1 capsule (500 mg) by mouth every 12 hours for 10 days       JACKIE Zayas CNP on 2/15/2023 at 4:53 PM

## 2023-02-15 NOTE — LETTER
2/15/2023        RE: Maryam Saavedra  C/o Saray Saavedra  [address] [facility] [address]        [facility]  [place] Medical Record Number:  [id number]  Place of Service where encounter took place:  [facility] [[id number]]  Chief Complaint   Patient presents with     RECHECK     Acute confusion       HPI:    Maryam Saavedra  is a 89 year old (6/9/1932), who is being seen today for an episodic care visit.  HPI information obtained from: facility chart records, facility staff and [facility] chart review. Today's concern is:    Resident with increased confusion from baseline. No recent falls. UA negative. Recent labs with no significant abnormalities. She has ongoing non-healing  left shin wound/ulcer 12/20/22. About 4 x 5 cm oval shaped with 0.2 cm depth. Chronic edema, reddened leg surrounding the ulcer, no warmth, some slough in the moist wound bed. She denies pain, Does have some arterial insufficiency in LE.  Home care now following for wound care. Finished a course of doxycycline 2/8. Could have some ongoing constipation.      Past Medical and Surgical History reviewed in Epic today.    MEDICATIONS:    Current Outpatient Medications   Medication Sig Dispense Refill     cephALEXin (KEFLEX) 500 MG capsule Take 1 capsule (500 mg) by mouth every 12 hours for 10 days 20 capsule 0     acetaminophen (TYLENOL) 325 MG tablet Take 2 tablets (650 mg) by mouth 2 times daily And twice daily as needed 60 tablet 97     Menthol-Methyl Salicylate (ICY HOT EXTRA STRENGTH) 10-30 % CREA Externally apply topically 2 times daily To right knee 85 g 3     polyethylene glycol (MIRALAX) 17 GM/Dose powder MIX 17GM OF POWDER IN 8OZ OF WATER UNTIL COMPLETELY DISSOLVED. DRINK SOLUTION BY MOUTH ONCE DAILY 510 g 97     SENEXON-S 8.6-50 MG tablet TAKE ONE TABLET BY MOUTH AT BEDTIME THREE TIMES PER WEEK;& TAKE ONE TABLET TWICE DAILY AS NEEDED FOR CONSTIPATION 216 tablet 3      "THERATEARS 0.25 % SOLN INSTILL ONE DROP IN EACH EYE TWICE DAILY FOR DRY EYES 15 mL 97     VITAMIN D3 50 MCG (2000 UT) tablet TAKE 1 TABLET BY MOUTH ONCE DAILY 90 tablet 97     REVIEW OF SYSTEMS:  Limited secondary to cognitive impairment but today pt reports ok    Objective:  /65   Pulse 51   Temp 98.9  F (37.2  C)   Resp 16   Ht 1.549 m (5' 1\")   Wt 53.1 kg (117 lb)   SpO2 98%   BMI 22.11 kg/m    Exam:  GENERAL APPEARANCE:  Alert, cooperative  ENT:  Mouth and posterior oropharynx normal, moist mucous membranes, [place]  RESP:  respiratory effort and palpation of chest normal, lungs clear to auscultation   CV:  Palpation and auscultation of heart done , regular rate and rhythm, no murmur  ABDOMEN:  normal bowel sounds, soft, nontender  M/S:   BLE weakness baseline ambulating with wc, walker and SBA for short distance, slight drag of feet, bent knees, stance with transfers, 1+ to lower extremities and right knee. Tubi  on  SKIN: wound left shin (see hpi)   NEURO:   Cranial nerves 2-12 are normal tested and grossly at patient's baseline  PSYCH:  insight and judgement impaired, memory impaired , affect and mood normal    Labs:   CBC RESULTS: Recent Labs   Lab Test 02/09/23  0706 02/10/22  0545   WBC 5.2 5.7   RBC 3.78* 4.08   HGB 11.0* 11.6*   HCT 34.2* 37.4   MCV 91 92   MCH 29.1 28.4   MCHC 32.2 31.0*   RDW 13.8 14.5    291       Last Basic Metabolic Panel:  Recent Labs   Lab Test 02/09/23  0706 02/10/22  0545    140   POTASSIUM 3.9 3.5   CHLORIDE 104 106   NOHEMY 9.5 9.1   CO2 28 29   BUN 36.5* 28   CR 0.88 0.81   GLC 73 81       Liver Function Studies -   Recent Labs   Lab Test 02/10/22  0545 01/06/21  0000   PROTTOTAL 6.7* 7.2   ALBUMIN 2.8* 2.8*   BILITOTAL 0.3 0.5   ALKPHOS 163* 220*   AST 19 23   ALT 22 31       TSH   Date Value Ref Range Status   02/09/2023 3.68 0.30 - 4.20 uIU/mL Final   02/10/2022 2.96 0.40 - 4.00 mU/L Final   01/06/2021 5.73 (A) 0.40 - 4.00 mU/L Final   11/25/2017 " 4.20 (H) 0.40 - 4.00 mU/L Final       Lab Results   Component Value Date    A1C 5.6 07/13/2018    A1C 5.9 05/14/2018     Estimated Creatinine Clearance: 35.6 mL/min (based on SCr of 0.88 mg/dL).      ASSESSMENT/PLAN:  (L08.9) Local infection of skin and subcutaneous tissue  (primary encounter diagnosis)  Comment: non-healing. Reviewed with Dr. Bae. Unclear if related to increased confusion?   Plan:   -cephALEXin ordered today- renal dosed   -continue with wound care from home care  -left message for daughter could need eval and treat from wound care clinic                   Electronically signed by:  JACKIE Zayas CNP                 Sincerely,        JACKIE Zayas CNP

## 2023-02-15 NOTE — PROGRESS NOTES
[facility]  [place] Medical Record Number:  [id number]  Place of Service where encounter took place:  [facility] [[id number]]  Chief Complaint   Patient presents with     RECHECK     Acute confusion       HPI:    Maryam Saavedra  is a 89 year old (6/9/1932), who is being seen today for an episodic care visit.  HPI information obtained from: facility chart records, facility staff and [facility] chart review. Today's concern is:    Resident with increased confusion from baseline. No recent falls. UA negative. Recent labs with no significant abnormalities. She has ongoing non-healing  left shin wound/ulcer 12/20/22. About 4 x 5 cm oval shaped with 0.2 cm depth. Chronic edema, reddened leg surrounding the ulcer, no warmth, some slough in the moist wound bed. She denies pain, Does have some arterial insufficiency in LE.  Home care now following for wound care. Finished a course of doxycycline 2/8. Could have some ongoing constipation.      Past Medical and Surgical History reviewed in Epic today.    MEDICATIONS:    Current Outpatient Medications   Medication Sig Dispense Refill     cephALEXin (KEFLEX) 500 MG capsule Take 1 capsule (500 mg) by mouth every 12 hours for 10 days 20 capsule 0     acetaminophen (TYLENOL) 325 MG tablet Take 2 tablets (650 mg) by mouth 2 times daily And twice daily as needed 60 tablet 97     Menthol-Methyl Salicylate (ICY HOT EXTRA STRENGTH) 10-30 % CREA Externally apply topically 2 times daily To right knee 85 g 3     polyethylene glycol (MIRALAX) 17 GM/Dose powder MIX 17GM OF POWDER IN 8OZ OF WATER UNTIL COMPLETELY DISSOLVED. DRINK SOLUTION BY MOUTH ONCE DAILY 510 g 97     SENEXON-S 8.6-50 MG tablet TAKE ONE TABLET BY MOUTH AT BEDTIME THREE TIMES PER WEEK;& TAKE ONE TABLET TWICE DAILY AS NEEDED FOR CONSTIPATION 216 tablet 3     THERATEARS 0.25 % SOLN INSTILL ONE DROP IN EACH EYE TWICE DAILY FOR DRY EYES 15 mL 97     VITAMIN D3 50 MCG (2000  "UT) tablet TAKE 1 TABLET BY MOUTH ONCE DAILY 90 tablet 97     REVIEW OF SYSTEMS:  Limited secondary to cognitive impairment but today pt reports ok    Objective:  /65   Pulse 51   Temp 98.9  F (37.2  C)   Resp 16   Ht 1.549 m (5' 1\")   Wt 53.1 kg (117 lb)   SpO2 98%   BMI 22.11 kg/m    Exam:  GENERAL APPEARANCE:  Alert, cooperative  ENT:  Mouth and posterior oropharynx normal, moist mucous membranes, [place]  RESP:  respiratory effort and palpation of chest normal, lungs clear to auscultation   CV:  Palpation and auscultation of heart done , regular rate and rhythm, no murmur  ABDOMEN:  normal bowel sounds, soft, nontender  M/S:   BLE weakness baseline ambulating with wc, walker and SBA for short distance, slight drag of feet, bent knees, stance with transfers, 1+ to lower extremities and right knee. Tubi  on  SKIN: wound left shin (see hpi)   NEURO:   Cranial nerves 2-12 are normal tested and grossly at patient's baseline  PSYCH:  insight and judgement impaired, memory impaired , affect and mood normal    Labs:   CBC RESULTS: Recent Labs   Lab Test 02/09/23  0706 02/10/22  0545   WBC 5.2 5.7   RBC 3.78* 4.08   HGB 11.0* 11.6*   HCT 34.2* 37.4   MCV 91 92   MCH 29.1 28.4   MCHC 32.2 31.0*   RDW 13.8 14.5    291       Last Basic Metabolic Panel:  Recent Labs   Lab Test 02/09/23  0706 02/10/22  0545    140   POTASSIUM 3.9 3.5   CHLORIDE 104 106   NOHEMY 9.5 9.1   CO2 28 29   BUN 36.5* 28   CR 0.88 0.81   GLC 73 81       Liver Function Studies -   Recent Labs   Lab Test 02/10/22  0545 01/06/21  0000   PROTTOTAL 6.7* 7.2   ALBUMIN 2.8* 2.8*   BILITOTAL 0.3 0.5   ALKPHOS 163* 220*   AST 19 23   ALT 22 31       TSH   Date Value Ref Range Status   02/09/2023 3.68 0.30 - 4.20 uIU/mL Final   02/10/2022 2.96 0.40 - 4.00 mU/L Final   01/06/2021 5.73 (A) 0.40 - 4.00 mU/L Final   11/25/2017 4.20 (H) 0.40 - 4.00 mU/L Final       Lab Results   Component Value Date    A1C 5.6 07/13/2018    A1C 5.9 05/14/2018 "     Estimated Creatinine Clearance: 35.6 mL/min (based on SCr of 0.88 mg/dL).      ASSESSMENT/PLAN:  (L08.9) Local infection of skin and subcutaneous tissue  (primary encounter diagnosis)  Comment: non-healing. Reviewed with Dr. Bae. Unclear if related to increased confusion?   Plan:   -cephALEXin ordered today- renal dosed   -continue with wound care from home care  -left message for daughter could need eval and treat from wound care clinic                   Electronically signed by:  JACKIE Zayas CNP

## 2023-02-16 VITALS
WEIGHT: 117 LBS | TEMPERATURE: 98.9 F | DIASTOLIC BLOOD PRESSURE: 65 MMHG | HEIGHT: 61 IN | OXYGEN SATURATION: 98 % | BODY MASS INDEX: 22.09 KG/M2 | RESPIRATION RATE: 16 BRPM | HEART RATE: 51 BPM | SYSTOLIC BLOOD PRESSURE: 119 MMHG

## 2023-02-16 DIAGNOSIS — M15.8 OTHER OSTEOARTHRITIS INVOLVING MULTIPLE JOINTS: ICD-10-CM

## 2023-02-17 RX ORDER — ACETAMINOPHEN 325 MG/1
TABLET ORAL
Qty: 720 TABLET | Refills: 3 | Status: SHIPPED | OUTPATIENT
Start: 2023-02-17 | End: 2023-05-17

## 2023-02-20 DIAGNOSIS — Z53.9 DIAGNOSIS NOT YET DEFINED: Primary | ICD-10-CM

## 2023-02-20 PROCEDURE — G0180 MD CERTIFICATION HHA PATIENT: HCPCS | Performed by: NURSE PRACTITIONER

## 2023-03-14 ENCOUNTER — ASSISTED LIVING VISIT (OUTPATIENT)
Dept: GERIATRICS | Facility: CLINIC | Age: 88
End: 2023-03-14
Payer: MEDICARE

## 2023-03-14 VITALS
HEART RATE: 55 BPM | WEIGHT: 117 LBS | HEIGHT: 61 IN | SYSTOLIC BLOOD PRESSURE: 100 MMHG | RESPIRATION RATE: 16 BRPM | TEMPERATURE: 97.2 F | OXYGEN SATURATION: 96 % | BODY MASS INDEX: 22.09 KG/M2 | DIASTOLIC BLOOD PRESSURE: 60 MMHG

## 2023-03-14 DIAGNOSIS — F01.53 VASCULAR DEMENTIA WITH DEPRESSED MOOD (H): ICD-10-CM

## 2023-03-14 DIAGNOSIS — L08.9 LOCAL INFECTION OF SKIN AND SUBCUTANEOUS TISSUE: Primary | ICD-10-CM

## 2023-03-14 PROCEDURE — 99349 HOME/RES VST EST MOD MDM 40: CPT | Performed by: NURSE PRACTITIONER

## 2023-03-14 NOTE — PROGRESS NOTES
[place] [facility]  [place] Medical Record Number:  [id number]  Place of Service where encounter took place:  [facility] [[id number]]  Chief Complaint   Patient presents with     RECHECK       HPI:    Maryam Saavedra  is a 89 year old (6/9/1932), who is being seen today for an episodic care visit.  HPI information obtained from: facility chart records, patient report and [facility] chart review. Today's concern is:    Follow up to slow to non-healing wound and ongoing confusion above baseline. No new changes. She denies pain to the LLE where wound is present. There is no warmth, slight redness. Two courses of different antibiotics have been given. Wound care from homecare following . In terms of confusion recent labs with no significant findings. UA last month was negative. The wound was fist noticed 12/20/22. Does have some arterial insufficiency in LE.     Past Medical and Surgical History reviewed in Epic today.    MEDICATIONS:    Current Outpatient Medications   Medication Sig Dispense Refill     acetaminophen (TYLENOL) 325 MG tablet TAKE TWO TABLETS (650MG) BY MOUTH TWICE DAILY;AND MAY TAKE TWO TABLETS (650MG) BY MOUTH TWICE DAILY AS NEEDED 720 tablet 3     Menthol-Methyl Salicylate (ICY HOT EXTRA STRENGTH) 10-30 % CREA Externally apply topically 2 times daily To right knee 85 g 3     polyethylene glycol (MIRALAX) 17 GM/Dose powder MIX 17GM OF POWDER IN 8OZ OF WATER UNTIL COMPLETELY DISSOLVED. DRINK SOLUTION BY MOUTH ONCE DAILY 510 g 97     SENEXON-S 8.6-50 MG tablet TAKE ONE TABLET BY MOUTH AT BEDTIME THREE TIMES PER WEEK;& TAKE ONE TABLET TWICE DAILY AS NEEDED FOR CONSTIPATION 216 tablet 3     THERATEARS 0.25 % SOLN INSTILL ONE DROP IN EACH EYE TWICE DAILY FOR DRY EYES 15 mL 97     VITAMIN D3 50 MCG (2000 UT) tablet TAKE 1 TABLET BY MOUTH ONCE DAILY 90 tablet 97     REVIEW OF SYSTEMS:  Limited secondary to cognitive impairment but today pt reports  "ok    Objective:  /60   Pulse 55   Temp 97.2  F (36.2  C)   Resp 16   Ht 1.549 m (5' 1\")   Wt 53.1 kg (117 lb)   SpO2 96%   BMI 22.11 kg/m    Exam:  GENERAL APPEARANCE:  Alert, cooperative, more tired looking   ENT:  Mouth and posterior oropharynx normal, moist mucous membranes, [place]  RESP:  respiratory effort and palpation of chest normal, lungs clear to auscultation   CV:  Palpation and auscultation of heart done , regular rate and rhythm, no murmur  ABDOMEN:  normal bowel sounds, soft, nontender  M/S:   BLE weakness baseline ambulating with wc, walker and SBA for short distance, slight drag of feet, bent knees, stance with transfers, 1+ to lower extremities and right knee. Tubi  off  SKIN: wound left shin (see hpi)   NEURO:   Cranial nerves 2-12 are normal tested and grossly at patient's baseline  PSYCH:  insight and judgement impaired, memory impaired , affect and mood normal    Labs:   CBC RESULTS: Recent Labs   Lab Test 02/09/23  0706 02/10/22  0545   WBC 5.2 5.7   RBC 3.78* 4.08   HGB 11.0* 11.6*   HCT 34.2* 37.4   MCV 91 92   MCH 29.1 28.4   MCHC 32.2 31.0*   RDW 13.8 14.5    291       Last Basic Metabolic Panel:  Recent Labs   Lab Test 02/09/23  0706 02/10/22  0545    140   POTASSIUM 3.9 3.5   CHLORIDE 104 106   NOHEMY 9.5 9.1   CO2 28 29   BUN 36.5* 28   CR 0.88 0.81   GLC 73 81       Liver Function Studies -   Recent Labs   Lab Test 02/10/22  0545 01/06/21  0000   PROTTOTAL 6.7* 7.2   ALBUMIN 2.8* 2.8*   BILITOTAL 0.3 0.5   ALKPHOS 163* 220*   AST 19 23   ALT 22 31       TSH   Date Value Ref Range Status   02/09/2023 3.68 0.30 - 4.20 uIU/mL Final   02/10/2022 2.96 0.40 - 4.00 mU/L Final   01/06/2021 5.73 (A) 0.40 - 4.00 mU/L Final   11/25/2017 4.20 (H) 0.40 - 4.00 mU/L Final       Lab Results   Component Value Date    A1C 5.6 07/13/2018    A1C 5.9 05/14/2018     ASSESSMENT/PLAN:  (L08.9) Local infection of skin and subcutaneous tissue  (primary encounter diagnosis)  Comment: " ongoing   Plan:   -continue with current plan of care  -if family willing seek eval and treat at wound care clinic     (F01.53) Vascular dementia with depressed mood  Comment: increased confusion-acute v advanced dementia? Not agitated, forgetful but no hallucinations or delusional thinking   Plan:   -resides in       Electronically signed by:  JACKIE Zayas CNP

## 2023-03-14 NOTE — LETTER
3/14/2023        RE: Maryam Saavedra  C/o Saray Saavedra  [address] [facility] [facility] [address]        [facility]  [place] [facility] Record Number:  [id number]  Place of Service where encounter took place:  [facility] [[id number]]  Chief Complaint   Patient presents with     RECHECK       HPI:    Maryam Saavedra  is a 89 year old (6/9/1932), who is being seen today for an episodic care visit.  HPI information obtained from: facility chart records, patient report and [facility] chart review. Today's concern is:    Follow up to slow to non-healing wound and ongoing confusion above baseline. No new changes. She denies pain to the LLE where wound is present. There is no warmth, slight redness. Two courses of different antibiotics have been given. Wound care from homecare following . In terms of confusion recent labs with no significant findings. UA last month was negative. The wound was fist noticed 12/20/22. Does have some arterial insufficiency in LE.     Past Medical and Surgical History reviewed in Epic today.    MEDICATIONS:    Current Outpatient Medications   Medication Sig Dispense Refill     acetaminophen (TYLENOL) 325 MG tablet TAKE TWO TABLETS (650MG) BY MOUTH TWICE DAILY;AND MAY TAKE TWO TABLETS (650MG) BY MOUTH TWICE DAILY AS NEEDED 720 tablet 3     Menthol-Methyl Salicylate (ICY HOT EXTRA STRENGTH) 10-30 % CREA Externally apply topically 2 times daily To right knee 85 g 3     polyethylene glycol (MIRALAX) 17 GM/Dose powder MIX 17GM OF POWDER IN 8OZ OF WATER UNTIL COMPLETELY DISSOLVED. DRINK SOLUTION BY MOUTH ONCE DAILY 510 g 97     SENEXON-S 8.6-50 MG tablet TAKE ONE TABLET BY MOUTH AT BEDTIME THREE TIMES PER WEEK;& TAKE ONE TABLET TWICE DAILY AS NEEDED FOR CONSTIPATION 216 tablet 3     THERATEARS 0.25 % SOLN INSTILL ONE DROP IN EACH EYE TWICE DAILY FOR DRY EYES 15 mL 97     VITAMIN D3 50 MCG (2000 UT) tablet TAKE 1 TABLET BY MOUTH ONCE DAILY 90 tablet  "97     REVIEW OF SYSTEMS:  Limited secondary to cognitive impairment but today pt reports ok    Objective:  /60   Pulse 55   Temp 97.2  F (36.2  C)   Resp 16   Ht 1.549 m (5' 1\")   Wt 53.1 kg (117 lb)   SpO2 96%   BMI 22.11 kg/m    Exam:  GENERAL APPEARANCE:  Alert, cooperative, more tired looking   ENT:  Mouth and posterior oropharynx normal, moist mucous membranes, [place]  RESP:  respiratory effort and palpation of chest normal, lungs clear to auscultation   CV:  Palpation and auscultation of heart done , regular rate and rhythm, no murmur  ABDOMEN:  normal bowel sounds, soft, nontender  M/S:   BLE weakness baseline ambulating with wc, walker and SBA for short distance, slight drag of feet, bent knees, stance with transfers, 1+ to lower extremities and right knee. Tubi  off  SKIN: wound left shin (see hpi)   NEURO:   Cranial nerves 2-12 are normal tested and grossly at patient's baseline  PSYCH:  insight and judgement impaired, memory impaired , affect and mood normal    Labs:   CBC RESULTS: Recent Labs   Lab Test 02/09/23  0706 02/10/22  0545   WBC 5.2 5.7   RBC 3.78* 4.08   HGB 11.0* 11.6*   HCT 34.2* 37.4   MCV 91 92   MCH 29.1 28.4   MCHC 32.2 31.0*   RDW 13.8 14.5    291       Last Basic Metabolic Panel:  Recent Labs   Lab Test 02/09/23  0706 02/10/22  0545    140   POTASSIUM 3.9 3.5   CHLORIDE 104 106   NOHEMY 9.5 9.1   CO2 28 29   BUN 36.5* 28   CR 0.88 0.81   GLC 73 81       Liver Function Studies -   Recent Labs   Lab Test 02/10/22  0545 01/06/21  0000   PROTTOTAL 6.7* 7.2   ALBUMIN 2.8* 2.8*   BILITOTAL 0.3 0.5   ALKPHOS 163* 220*   AST 19 23   ALT 22 31       TSH   Date Value Ref Range Status   02/09/2023 3.68 0.30 - 4.20 uIU/mL Final   02/10/2022 2.96 0.40 - 4.00 mU/L Final   01/06/2021 5.73 (A) 0.40 - 4.00 mU/L Final   11/25/2017 4.20 (H) 0.40 - 4.00 mU/L Final       Lab Results   Component Value Date    A1C 5.6 07/13/2018    A1C 5.9 05/14/2018     ASSESSMENT/PLAN:  (L08.9) " Local infection of skin and subcutaneous tissue  (primary encounter diagnosis)  Comment: ongoing   Plan:   -continue with current plan of care  -if family willing seek eval and treat at wound care clinic     (F01.53) Vascular dementia with depressed mood  Comment: increased confusion-acute v advanced dementia? Not agitated, forgetful but no hallucinations or delusional thinking   Plan:   -resides in       Electronically signed by:  JACKIE Zayas CNP                 Sincerely,        JACKIE Zayas CNP

## 2023-03-22 ENCOUNTER — TELEPHONE (OUTPATIENT)
Dept: WOUND CARE | Facility: CLINIC | Age: 88
End: 2023-03-22
Payer: MEDICARE

## 2023-03-22 NOTE — TELEPHONE ENCOUNTER
Patient's daughter, Saray, is requesting an appointment for new patient who has a left shin wound. Patient is currently in memory care at [facility]. Please call Saray at [phone].

## 2023-03-22 NOTE — TELEPHONE ENCOUNTER
Please schedule with Dr. Lomax, Dr. Mcbride, or Jennifer AGEE at [facility] [place] for next available appointment.    **If scheduling with Jennifer AGEE please schedule a follow up 2-3 weeks after initial appointment.    Is the patient able to make their own medical decisions? No. There is a directive on file.    Is patient a JENNIFER lift? PLEASE INQUIRE WHEN MAKING THE APPOINTMENT AND PUT IN APPOINTMENT NOTES    Routing to  Wound Healing Scheduling.

## 2023-03-29 ENCOUNTER — HOSPITAL ENCOUNTER (OUTPATIENT)
Dept: WOUND CARE | Facility: CLINIC | Age: 88
Discharge: HOME | End: 2023-03-29
Attending: PHYSICIAN ASSISTANT | Admitting: PHYSICIAN ASSISTANT
Payer: MEDICARE

## 2023-03-29 ENCOUNTER — ASSISTED LIVING VISIT (OUTPATIENT)
Dept: GERIATRICS | Facility: CLINIC | Age: 88
End: 2023-03-29
Payer: MEDICARE

## 2023-03-29 VITALS
HEIGHT: 61 IN | RESPIRATION RATE: 18 BRPM | OXYGEN SATURATION: 96 % | WEIGHT: 117 LBS | DIASTOLIC BLOOD PRESSURE: 80 MMHG | TEMPERATURE: 97.2 F | HEART RATE: 72 BPM | BODY MASS INDEX: 22.09 KG/M2 | SYSTOLIC BLOOD PRESSURE: 150 MMHG

## 2023-03-29 VITALS — HEART RATE: 54 BPM | DIASTOLIC BLOOD PRESSURE: 75 MMHG | SYSTOLIC BLOOD PRESSURE: 149 MMHG

## 2023-03-29 DIAGNOSIS — R60.0 BILATERAL LEG EDEMA: ICD-10-CM

## 2023-03-29 DIAGNOSIS — F01.50 MIXED DEMENTIA (H): ICD-10-CM

## 2023-03-29 DIAGNOSIS — S81.802D OPEN WOUND OF LEFT LOWER EXTREMITY, SUBSEQUENT ENCOUNTER: Primary | ICD-10-CM

## 2023-03-29 DIAGNOSIS — L97.922 ULCER OF LEFT LOWER LEG, WITH FAT LAYER EXPOSED (H): ICD-10-CM

## 2023-03-29 DIAGNOSIS — I10 BENIGN ESSENTIAL HYPERTENSION: ICD-10-CM

## 2023-03-29 DIAGNOSIS — F02.80 MIXED DEMENTIA (H): ICD-10-CM

## 2023-03-29 DIAGNOSIS — G30.9 MIXED DEMENTIA (H): ICD-10-CM

## 2023-03-29 PROBLEM — S81.802A OPEN WOUND OF LOWER LIMB, LEFT, INITIAL ENCOUNTER: Status: ACTIVE | Noted: 2023-03-29

## 2023-03-29 PROCEDURE — 99203 OFFICE O/P NEW LOW 30 MIN: CPT | Mod: 25 | Performed by: PHYSICIAN ASSISTANT

## 2023-03-29 PROCEDURE — 11042 DBRDMT SUBQ TIS 1ST 20SQCM/<: CPT | Performed by: PHYSICIAN ASSISTANT

## 2023-03-29 PROCEDURE — G0463 HOSPITAL OUTPT CLINIC VISIT: HCPCS | Mod: 25

## 2023-03-29 PROCEDURE — 99348 HOME/RES VST EST LOW MDM 30: CPT | Performed by: INTERNAL MEDICINE

## 2023-03-29 NOTE — LETTER
3/29/2023        RE: Maryam Saavedra  C/o Saray Saavedra  [address] [facility] [address]        Maryam Saavedra is a 89 year old female seen March 29, 2023 at [facility] care [facility] where she has resided for 2 years (admit 12/2020).   Pt is seen in her room up to  with her daughter Saray present.  They are getting ready to go to a Wound Clinic appointment.   Pt suffered a traumatic LLE wound on 12/20/2022, has been very slow to heal.   Otherwise pt reports she is feeling okay, eats /sleeps well.      By chart review, patient had [facility] hospitalization and [facility] stay in June 2020 for subacute fracture deformity of lateral left knee and underwent left TKA.  She rehabbed fairly well in TCU and discharged to live with her family.  In October she was seen in the ED for visual changes, hallucinations and confusion.   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   Pt was seen by Neurology and no further workup planned by family.    Pt's care needs increased and family unable to safely manage her at home, so moved to [facility] Care unit for permanent placement   Biggest concern has continued to be frequent falls and injuries.    Pt had a prior slow-to-heal RLE wound in Jan 2022 after a fall; in February Doppler revealed a non-occlusive thrombus in the right distal superficial femoral vein  She was treated with apixaban for 3 months     Past Medical History:   Diagnosis Date     Benign essential hypertension 9/1/2016     Chronic low back pain      Hypertension      Impaired fasting glucose     borderline     Osteoarthritis    Late onset dementia  Hallucinations  Frequent falls  RLE DVT, 2022    Past Surgical History:   Procedure Laterality Date     ARTHROPLASTY HIP ANTERIOR Right 7/24/2018    Procedure: ARTHROPLASTY HIP ANTERIOR;  RIGHT DIRECT ANTERIOR TOTAL HIP ARTHROPLASTY;  Surgeon: Jimbo Phillips MD;  Location: [facility] OR     ARTHROPLASTY KNEE Left 5/12/2020    Procedure: LEFT TOTAL KNEE  "ARTHROPLASTY;  Surgeon: Jimbo Phillips MD;  Location:  OR     EYE SURGERY  cataracts     MOHS MICROGRAPHIC PROCEDURE      Left lower eyelid      MOHS MICROGRAPHIC PROCEDURE Left 10/27/2016    Procedure: MOHS MICROGRAPHIC PROCEDURE;  Surgeon: Jose Torrez MD;  Location:  [facility]     MOHS MICROGRAPHIC PROCEDURE Right 5/24/2018    Procedure: MOHS MICROGRAPHIC PROCEDURE;  RIGHT MEDIAL CANTHUS MOHS CLOSURE;  Surgeon: Jose Torrez MD;  Location: [facility]     ORTHOPEDIC SURGERY      bilateral wrists     SH:  , she has 2 daughters Saray and Dena and a son Terrell who all live locally.   Previously lived with her son before move to [place]     ROS:  Ambulatory with FWW and assist, WC for most destinations    SLUMS 12/30   Weight in 2020 was 94 lbs >>>>was 115 lbs in August 2021   Wt Readings from Last 5 Encounters:   03/29/23 53.1 kg (117 lb)   03/14/23 53.1 kg (117 lb)   02/15/23 53.1 kg (117 lb)   02/01/23 49.9 kg (110 lb)   01/11/23 49.9 kg (110 lb)      EXAM:  NAD, up to WC   BP (!) 150/80   Pulse 72   Temp 97.2  F (36.2  C)   Resp 18   Ht 1.549 m (5' 1\")   Wt 53.1 kg (117 lb)   SpO2 96%   BMI 22.11 kg/m     Neck supple without adenopathy  Lungs clear bilaterally  Heart RRR s1s2  Ext with 1+ edema.  Open areas on left shin, some drainage, no erythema  Neuro: STML, needs directional cuing  Psych: affect okay, pleasant.       Last Comprehensive Metabolic Panel:  Lab Results   Component Value Date     02/09/2023    POTASSIUM 3.9 02/09/2023    CHLORIDE 104 02/09/2023    CO2 28 02/09/2023    ANIONGAP 10 02/09/2023    GLC 73 02/09/2023    BUN 36.5 (H) 02/09/2023    CR 0.88 02/09/2023    GFRESTIMATED 62 02/09/2023    NOHEMY 9.5 02/09/2023     Lab Results   Component Value Date    WBC 5.2 02/09/2023      HGB 11.0 02/09/2023      MCV 91 02/09/2023       02/09/2023     TSH   Date Value Ref Range Status   02/09/2023 3.68 0.30 - 4.20 uIU/mL Final   02/10/2022 2.96 0.40 - 4.00 mU/L Final   01/06/2021 5.73 " (A) 0.40 - 4.00 mU/L Final      10/29/2020   MRI Brain w/o contrast:  1. Interval increase in ventriculomegaly of the lateral and third  ventricles since the 2017 brain MRI raising the question of either  developing or worsening normal pressure hydrocephalus versus  ventriculomegaly due to age-related cerebral volume loss. Recommend  clinical correlation for history of normal pressure hydrocephalus.  2. Diffuse cerebral volume loss and cerebral white matter changes  consistent with chronic small vessel ischemic disease      IMP/PLAN:     (S81.262D) Open wound of left lower extremity, subsequent encounter    Comment: slow to heal despite treatment   Plan: Wound Clinic appointment today     (R60.0) Bilateral leg edema  Comment: contributes to wounds and slow healing   Plan: elevation several times/day   Daughter reports she is able to do wraps and will start these after appointment today        (I10) Benign essential hypertension  (N18.31) Stage 3a chronic kidney disease  Comment: SBPs 140-150s    Plan: with edema and higher BPs would start spironolactone 12.5 mg/day     (E55.9) Vitamin D deficiency  Comment: per Neurology workup    Plan: vit D3 50 mcg/day       (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         Georgie Bae MD         Sincerely,        Georgie Bae MD

## 2023-03-29 NOTE — PROGRESS NOTES
Patient arrived for wound care visit. Certified Wound Care Nurse time spent evaluating patient record, completed a full evaluation and documented wound(s) & vern-wound skin; provided recommendation based on treatment plan. Applied dressing, reviewed discharge instructions, patient education, and discussed plan of care with appropriate medical team staff members and patient and/or family members.

## 2023-03-29 NOTE — DISCHARGE INSTRUCTIONS
Maryam Saavedra      6/9/1932    A DME order was not completed because the patient is having dressing changes done at [facility] was involved in patient care at present time    Wound Dressing Change:   LEFT ANTERIOR LOWER LEG  Wash your hands with soap and water before you begin your dressing change and prepare a clean surface for dressings.  After cleansing with mild unscented soap (such as Cetaphil, Cerave or Dove) and water,   Apply small amount of VASHE on gauze, lay into wound bed, let sit for 5-10 minutes,   remove gauze (do not rinse) then apply dressing:  Polymem ag   Spandage 7  2 layer wrap     Compression:   Your compression is Coflex 2 layer wrap and can not be removed at night    Please remove compression dressing if toes turn blue and/or tingle and can not be relieved by raising the leg for one hour. If unable to reapply in the morning, keep compression on until next dressing change.    Walk as much as you can, as you are able. Whenever you sit raise your ankle above your hips to promote wound healing.      Please raise your legs above your hips for 30 mins 2 times a day to promote wound healing.  Walk as much as you can. When you sit raise your ankle above your hips to promote wound healing.          Rissa Hooker PA-C March 29, 2023    Call us at [phone] if you have any questions about your wounds, have redness or swelling around your wound, have a fever of 101 or greater or if you have any other problems or concerns. We answer the phone Monday through Friday 8 am to 4 pm, please leave a message as we check the voicemail frequently throughout the day.     If you had a positive experience please indicate that on your patient satisfaction survey form that [facility] will be sending you.    It was a pleasure meeting with you today.  Thank you for allowing me and my team the privilege of caring for you today.  YOU are the reason we are here, and I truly hope we  provided you with the excellent service you deserve.  Please let us know if there is anything else we can do for you so that we can be sure you are leaving completely satisfied with your care experience.      If you have any billing related questions please call the [facility] Business office at [phone]. The clinic staff does not handle billing related matters.    If you are scheduled to have a follow up appointment, you will receive a reminder call the day before your visit. On the appointment day please arrive 15 minutes prior to your appointment time. If you are unable to keep that appointment, please call the clinic to cancel or reschedule. If you are more than 10 minutes late or greater for your appointment, the clinic policy is that you may be asked to reschedule.

## 2023-03-31 PROBLEM — L97.922 ULCER OF LEFT LOWER LEG, WITH FAT LAYER EXPOSED (H): Status: ACTIVE | Noted: 2023-03-29

## 2023-03-31 NOTE — PROGRESS NOTES
[facility]    ASSESSMENT:   1. (L97.922) Ulcer of left lower leg, with fat layer exposed (H)  2. lymphedema    PLAN/DISCUSSION:   1. Wound care plan: start two-layer wraps performed weekly in our clinic, start with PolyMem Ag as primary, staff to adjust as indicated  2. Dietary recommendations discussed, see AVS   3. See bottom of note for detailed wound care and patient instructions    HISTORY OF PRESENT ILLNESS: Maryam Saavedra is a 89 year old female with HTN, vascular dementia, sp LTKA, and lymphedema who presents today with a non-healing left leg wound. This has been present since summer of 2022 and started traumatically. It has opened and closed. She has known lymphedema and has not been regularly wearing any kind of compression garment. Resides in [facility] and has home care agency changing bandages MW.   Daughter is skilled at applying SSB if necessary.     TREATMENT COURSE:  3/29/2023 : Presents for initial visit at our clinic.     NUTRITION: good appetite and PO intake, takes D3    VITALS: BP (!) 149/75   Pulse 54      PHYSICAL EXAM:  GENERAL: Patient is alert and oriented and in no acute distress  CV: DP palpable, PT absent  INTEGUMENTARY:   Wound (used by OP [facility] only) 03/29/23 1130 Left lower;anterior leg  (Active)   Thickness/Stage full thickness 03/29/23 1100   Base granulating;yellow 03/29/23 1100   Periwound edematous 03/29/23 1100   Periwound Skin Turgor firm 03/29/23 1100   Length (cm) 3 03/29/23 1100   Width (cm) 2.8 03/29/23 1100   Depth (cm) 0.4 03/29/23 1100   Wound (cm^2) 8.4 cm^2 03/29/23 1100   Wound Volume (cm^3) 3.36 cm^3 03/29/23 1100   Drainage Characteristics/Odor serosanguineous 03/29/23 1100   Drainage Amount moderate 03/29/23 1100   Care, Wound non-select wound debridement performed;debrided 03/29/23 1100       Incision/Surgical Site 05/12/20 Left;Anterior Knee (Active)             PROCEDURES: 4% topical lidocaine was applied to the wound bed. Patient was  determined to be capable of making their own medical decisions and informed consent was obtained. Using a curette a surgical debridement was performed down to and including subcutaneous tissue of <20cm2. Hemostasis was achieved with pressure. The patient tolerated the procedure well.      MDM: 30 minutes were spent on the date of the visit reviewing previous chart notes, evaluating patient and developing the treatment plan, this excludes any time spent on procedures    PATIENT INSTRUCTIONS      Further instructions from your care team       Maryam Saavedra      6/9/1932    A DME order was not completed because the patient is having dressing changes done at [facility] was involved in patient care at present time    Wound Dressing Change:   LEFT ANTERIOR LOWER LEG  Wash your hands with soap and water before you begin your dressing change and prepare a clean surface for dressings.  After cleansing with mild unscented soap (such as Cetaphil, Cerave or Dove) and water,   Apply small amount of VASHE on gauze, lay into wound bed, let sit for 5-10 minutes,   remove gauze (do not rinse) then apply dressing:  Polymem ag   Spandage 7  2 layer wrap     Compression:   Your compression is Coflex 2 layer wrap and can not be removed at night    Please remove compression dressing if toes turn blue and/or tingle and can not be relieved by raising the leg for one hour. If unable to reapply in the morning, keep compression on until next dressing change.    Walk as much as you can, as you are able. Whenever you sit raise your ankle above your hips to promote wound healing.      Please raise your legs above your hips for 30 mins 2 times a day to promote wound healing.  Walk as much as you can. When you sit raise your ankle above your hips to promote wound healing.          Rissa Hooker PA-C March 29, 2023    Call us at [phone] if you have any questions about your wounds, have redness or swelling around  your wound, have a fever of 101 or greater or if you have any other problems or concerns. We answer the phone Monday through Friday 8 am to 4 pm, please leave a message as we check the voicemail frequently throughout the day.     If you had a positive experience please indicate that on your patient satisfaction survey form that [facility] will be sending you.    It was a pleasure meeting with you today.  Thank you for allowing me and my team the privilege of caring for you today.  YOU are the reason we are here, and I truly hope we provided you with the excellent service you deserve.  Please let us know if there is anything else we can do for you so that we can be sure you are leaving completely satisfied with your care experience.      If you have any billing related questions please call the [facility] Business office at [phone]. The clinic staff does not handle billing related matters.    If you are scheduled to have a follow up appointment, you will receive a reminder call the day before your visit. On the appointment day please arrive 15 minutes prior to your appointment time. If you are unable to keep that appointment, please call the clinic to cancel or reschedule. If you are more than 10 minutes late or greater for your appointment, the clinic policy is that you may be asked to reschedule.              Electronically signed by Rissa Hooker PA-C on March 31, 2023

## 2023-04-07 ENCOUNTER — HOSPITAL ENCOUNTER (OUTPATIENT)
Dept: WOUND CARE | Facility: CLINIC | Age: 88
Discharge: HOME | End: 2023-04-07
Attending: PHYSICIAN ASSISTANT | Admitting: PHYSICIAN ASSISTANT
Payer: MEDICARE

## 2023-04-07 VITALS — DIASTOLIC BLOOD PRESSURE: 72 MMHG | TEMPERATURE: 96.3 F | SYSTOLIC BLOOD PRESSURE: 154 MMHG | HEART RATE: 60 BPM

## 2023-04-07 DIAGNOSIS — L97.922 ULCER OF LEFT LOWER LEG, WITH FAT LAYER EXPOSED (H): Primary | ICD-10-CM

## 2023-04-07 PROCEDURE — 97597 DBRDMT OPN WND 1ST 20 CM/<: CPT | Performed by: PHYSICIAN ASSISTANT

## 2023-04-07 NOTE — PROGRESS NOTES
[facility]    ASSESSMENT:   1. (L97.922) Ulcer of left lower leg, with fat layer exposed (H)  2. lymphedema    PLAN/DISCUSSION:   1. Wound care plan: start two-layer wraps performed weekly in our clinic, Endoform Am as primary, staff to adjust as indicated  2. Dietary recommendations discussed, see AVS   3. See bottom of note for detailed wound care and patient instructions    HISTORY OF PRESENT ILLNESS: Maryam Saavedra is a 89 year old female with HTN, vascular dementia, sp LTKA, and lymphedema who presents today with a non-healing left leg wound. This has been present since summer of 2022 and started traumatically. It has opened and closed. She has known lymphedema and has not been regularly wearing any kind of compression garment. Resides in [facility] and has home care agency changing bandages MW.   Daughter is skilled at applying SSB if necessary.     TREATMENT COURSE:  3/29/2023 : Presents for initial visit at our clinic. Started on two layer wrap with PolyMem.   04/07/23: Returns for first dressing change. Much more healthy granulation tissue. Wound has decreased in size. Tolerated wrap well.     NUTRITION: good appetite and PO intake, takes D3    VITALS: BP (!) 154/72   Pulse 60   Temp (!) 96.3  F (35.7  C) (Temporal)      PHYSICAL EXAM:  GENERAL: Patient is alert and oriented and in no acute distress  CV: DP palpable, PT absent  INTEGUMENTARY:   Wound (used by OP [facility] only) 03/29/23 1130 Left lower;anterior leg  (Active)   Thickness/Stage full thickness 04/07/23 1400   Base granulating;yellow 04/07/23 1400   Periwound edematous 04/07/23 1400   Periwound Skin Turgor firm 04/07/23 1400   Length (cm) 2.5 04/07/23 1400   Width (cm) 1 04/07/23 1400   Depth (cm) 0.1 04/07/23 1400   Wound (cm^2) 2.5 cm^2 04/07/23 1400   Wound Volume (cm^3) 0.25 cm^3 04/07/23 1400   Wound healing % 70.24 04/07/23 1400   Drainage Characteristics/Odor serosanguineous 04/07/23 1400   Drainage Amount moderate 04/07/23  1400   Care, Wound chemical cautery applied 04/07/23 1400       Incision/Surgical Site 05/12/20 Left;Anterior Knee (Active)       PROCEDURE: 4% topical lidocaine was applied to the wound by the nursing staff. Patient was determined to be capable of making their own medical decisions and informed consent was obtained. Using a curette a selective debridement of non-viable tissue was performed of <20cm2. Hemostasis was achieved with pressure. Hypergranulation tissue treated with silver nitrate. The patient tolerated the procedure well.      PATIENT INSTRUCTIONS      Further instructions from your care team       Maryam Saavedra      6/9/1932    A DME order was not completed because the patient is having dressing changes done at [facility]    Wound Dressing Change:  LEFT ANTERIOR LOWER LEG  Wash your hands with soap and water before you begin your dressing change and  prepare a clean surface for dressings.  Prophage protocol for cleansing or  cleansing with mild unscented soap (such as Cetaphil, Cerave or Dove) and water,  Apply small amount of VASHE on gauze, lay into wound bed, let sit for 5-10 minutes,  remove gauze (do not rinse) then apply dressing:  Endoform AM  Spandage 7  Mepilex no border  2 layer wrap    Compression:  Your compression is Coflex 2 layer wrap and can not be removed at night  Please remove compression dressing if toes turn blue and/or tingle and can not be  relieved by raising the leg for one hour. If unable to reapply in the morning, keep  compression on until next dressing change.  Walk as much as you can, as you are able. Whenever you sit raise your ankle  above your hips to promote wound healing.  Please raise your legs above your hips for 30 mins 2 times a day to promote wound  healing.  Walk as much as you can. When you sit raise your ankle above your hips to promote  wound healing.     Rissa Hooker PA-C April 7, 2023    Call us at [phone] if you have any questions about your wounds,  have redness or swelling around your wound, have a fever of 101 or greater or if you have any other problems or concerns. We answer the phone Monday through Friday 8 am to 4 pm, please leave a message as we check the voicemail frequently throughout the day.     If you had a positive experience please indicate that on your patient satisfaction survey form that [facility] will be sending you.    It was a pleasure meeting with you today.  Thank you for allowing me and my team the privilege of caring for you today.  YOU are the reason we are here, and I truly hope we provided you with the excellent service you deserve.  Please let us know if there is anything else we can do for you so that we can be sure you are leaving completely satisfied with your care experience.      If you have any billing related questions please call the [facility] Business office at [phone]. The clinic staff does not handle billing related matters.    If you are scheduled to have a follow up appointment, you will receive a reminder call the day before your visit. On the appointment day please arrive 15 minutes prior to your appointment time. If you are unable to keep that appointment, please call the clinic to cancel or reschedule. If you are more than 10 minutes late or greater for your appointment, the clinic policy is that you may be asked to reschedule.

## 2023-04-07 NOTE — DISCHARGE INSTRUCTIONS
Maryam Saavedra      6/9/1932    A DME order was not completed because the patient is having dressing changes done at [facility]    Wound Dressing Change:  LEFT ANTERIOR LOWER LEG  Wash your hands with soap and water before you begin your dressing change and  prepare a clean surface for dressings.  Prophage protocol for cleansing or  cleansing with mild unscented soap (such as Cetaphil, Cerave or Dove) and water,  Apply small amount of VASHE on gauze, lay into wound bed, let sit for 5-10 minutes,  remove gauze (do not rinse) then apply dressing:  Endoform AM  Spandage 7  Mepilex no border  2 layer wrap    Compression:  Your compression is Coflex 2 layer wrap and can not be removed at night  Please remove compression dressing if toes turn blue and/or tingle and can not be  relieved by raising the leg for one hour. If unable to reapply in the morning, keep  compression on until next dressing change.  Walk as much as you can, as you are able. Whenever you sit raise your ankle  above your hips to promote wound healing.  Please raise your legs above your hips for 30 mins 2 times a day to promote wound  healing.  Walk as much as you can. When you sit raise your ankle above your hips to promote  wound healing.     Rissa Hooker PA-C April 7, 2023    Call us at [phone] if you have any questions about your wounds, have redness or swelling around your wound, have a fever of 101 or greater or if you have any other problems or concerns. We answer the phone Monday through Friday 8 am to 4 pm, please leave a message as we check the voicemail frequently throughout the day.     If you had a positive experience please indicate that on your patient satisfaction survey form that [facility] will be sending you.    It was a pleasure meeting with you today.  Thank you for allowing me and my team the privilege of caring for you today.  YOU are the reason we are here, and I truly hope we provided you with the excellent service  you deserve.  Please let us know if there is anything else we can do for you so that we can be sure you are leaving completely satisfied with your care experience.      If you have any billing related questions please call the [facility] Business office at [phone]. The clinic staff does not handle billing related matters.    If you are scheduled to have a follow up appointment, you will receive a reminder call the day before your visit. On the appointment day please arrive 15 minutes prior to your appointment time. If you are unable to keep that appointment, please call the clinic to cancel or reschedule. If you are more than 10 minutes late or greater for your appointment, the clinic policy is that you may be asked to reschedule.

## 2023-04-10 PROBLEM — L97.922 ULCER OF LEFT LOWER LEG, WITH FAT LAYER EXPOSED (H): Status: RESOLVED | Noted: 2023-03-29 | Resolved: 2023-04-10

## 2023-04-11 DIAGNOSIS — R60.0 BILATERAL LOWER EXTREMITY EDEMA: Primary | ICD-10-CM

## 2023-04-11 RX ORDER — SPIRONOLACTONE 25 MG/1
12.5 TABLET ORAL DAILY
Qty: 30 TABLET | Refills: 11 | Status: SHIPPED | OUTPATIENT
Start: 2023-04-11 | End: 2023-05-10

## 2023-04-11 NOTE — PROGRESS NOTES
Maryam Saavedra is a 89 year old female seen March 29, 2023 at [facility] care [facility] where she has resided for 2 years (admit 12/2020).   Pt is seen in her room up to  with her daughter Saray present.  They are getting ready to go to a Wound Clinic appointment.   Pt suffered a traumatic LLE wound on 12/20/2022, has been very slow to heal.   Otherwise pt reports she is feeling okay, eats /sleeps well.      By chart review, patient had [facility] hospitalization and [facility] stay in June 2020 for subacute fracture deformity of lateral left knee and underwent left TKA.  She rehabbed fairly well in TCU and discharged to live with her family.  In October she was seen in the ED for visual changes, hallucinations and confusion.   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   Pt was seen by Neurology and no further workup planned by family.    Pt's care needs increased and family unable to safely manage her at home, so moved to [facility] unit for permanent placement   Biggest concern has continued to be frequent falls and injuries.    Pt had a prior slow-to-heal RLE wound in Jan 2022 after a fall; in February Doppler revealed a non-occlusive thrombus in the right distal superficial femoral vein  She was treated with apixaban for 3 months     Past Medical History:   Diagnosis Date     Benign essential hypertension 9/1/2016     Chronic low back pain      Hypertension      Impaired fasting glucose     borderline     Osteoarthritis    Late onset dementia  Hallucinations  Frequent falls  RLE DVT, 2022    Past Surgical History:   Procedure Laterality Date     ARTHROPLASTY HIP ANTERIOR Right 7/24/2018    Procedure: ARTHROPLASTY HIP ANTERIOR;  RIGHT DIRECT ANTERIOR TOTAL HIP ARTHROPLASTY;  Surgeon: Jimbo Phillips MD;  Location:  OR     ARTHROPLASTY KNEE Left 5/12/2020    Procedure: LEFT TOTAL KNEE ARTHROPLASTY;  Surgeon: Jimbo Phillips MD;  Location:  OR     EYE SURGERY  cataracts     MOHS  "MICROGRAPHIC PROCEDURE      Left lower eyelid      MOHS MICROGRAPHIC PROCEDURE Left 10/27/2016    Procedure: MOHS MICROGRAPHIC PROCEDURE;  Surgeon: Jose Torrez MD;  Location: [facility]     MOHS MICROGRAPHIC PROCEDURE Right 5/24/2018    Procedure: MOHS MICROGRAPHIC PROCEDURE;  RIGHT MEDIAL CANTHUS MOHS CLOSURE;  Surgeon: Jose Torrez MD;  Location: [facility]     ORTHOPEDIC SURGERY      bilateral wrists     SH:  , she has 2 daughters Saray and Dena and a son Terrell who all live locally.   Previously lived with her son before move to [place]     ROS:  Ambulatory with FWW and assist, WC for most destinations    SLUMS 12/30   Weight in 2020 was 94 lbs >>>>was 115 lbs in August 2021   Wt Readings from Last 5 Encounters:   03/29/23 53.1 kg (117 lb)   03/14/23 53.1 kg (117 lb)   02/15/23 53.1 kg (117 lb)   02/01/23 49.9 kg (110 lb)   01/11/23 49.9 kg (110 lb)      EXAM:  NAD, up to WC   BP (!) 150/80   Pulse 72   Temp 97.2  F (36.2  C)   Resp 18   Ht 1.549 m (5' 1\")   Wt 53.1 kg (117 lb)   SpO2 96%   BMI 22.11 kg/m     Neck supple without adenopathy  Lungs clear bilaterally  Heart RRR s1s2  Ext with 1+ edema.  Open areas on left shin, some drainage, no erythema  Neuro: STML, needs directional cuing  Psych: affect okay, pleasant.       Last Comprehensive Metabolic Panel:  Lab Results   Component Value Date     02/09/2023    POTASSIUM 3.9 02/09/2023    CHLORIDE 104 02/09/2023    CO2 28 02/09/2023    ANIONGAP 10 02/09/2023    GLC 73 02/09/2023    BUN 36.5 (H) 02/09/2023    CR 0.88 02/09/2023    GFRESTIMATED 62 02/09/2023    NOHEMY 9.5 02/09/2023     Lab Results   Component Value Date    WBC 5.2 02/09/2023      HGB 11.0 02/09/2023      MCV 91 02/09/2023       02/09/2023     TSH   Date Value Ref Range Status   02/09/2023 3.68 0.30 - 4.20 uIU/mL Final   02/10/2022 2.96 0.40 - 4.00 mU/L Final   01/06/2021 5.73 (A) 0.40 - 4.00 mU/L Final      10/29/2020   MRI Brain w/o contrast:  1. Interval increase in " ventriculomegaly of the lateral and third  ventricles since the 2017 brain MRI raising the question of either  developing or worsening normal pressure hydrocephalus versus  ventriculomegaly due to age-related cerebral volume loss. Recommend  clinical correlation for history of normal pressure hydrocephalus.  2. Diffuse cerebral volume loss and cerebral white matter changes  consistent with chronic small vessel ischemic disease      IMP/PLAN:     (S81.032D) Open wound of left lower extremity, subsequent encounter    Comment: slow to heal despite treatment   Plan: Wound Clinic appointment today     (R60.0) Bilateral leg edema  Comment: contributes to wounds and slow healing   Plan: elevation several times/day   Daughter reports she is able to do wraps and will start these after appointment today        (I10) Benign essential hypertension  (N18.31) Stage 3a chronic kidney disease  Comment: SBPs 140-150s    Plan: with edema and higher BPs would start spironolactone 12.5 mg/day     (E55.9) Vitamin D deficiency  Comment: per Neurology workup    Plan: vit D3 50 mcg/day       (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         Georgie Bae MD

## 2023-04-11 NOTE — PROGRESS NOTES
Resident with chronic LE edema and per MD will try low dose diuretic along with compression to assist with control since hx of slow wound healing. Family with some concerns over increased urination s/s.    Plan:    1. Begin    spironolactone (ALDACTONE) 25 MG tablet Take 0.5 tablets (12.5 mg) by mouth daily     2. BMP next week on lab day for HTN       JACKIE Zayas CNP on 4/11/2023 at 2:38 PM

## 2023-04-12 ENCOUNTER — LAB REQUISITION (OUTPATIENT)
Dept: LAB | Facility: CLINIC | Age: 88
End: 2023-04-12
Payer: MEDICARE

## 2023-04-12 DIAGNOSIS — I10 ESSENTIAL (PRIMARY) HYPERTENSION: ICD-10-CM

## 2023-04-13 DIAGNOSIS — E55.9 VITAMIN D DEFICIENCY: ICD-10-CM

## 2023-04-13 LAB
ANION GAP SERPL CALCULATED.3IONS-SCNC: 10 MMOL/L (ref 7–15)
BUN SERPL-MCNC: 25.3 MG/DL (ref 8–23)
CALCIUM SERPL-MCNC: 10 MG/DL (ref 8.2–9.6)
CHLORIDE SERPL-SCNC: 105 MMOL/L (ref 98–107)
CREAT SERPL-MCNC: 0.91 MG/DL (ref 0.51–0.95)
DEPRECATED HCO3 PLAS-SCNC: 30 MMOL/L (ref 22–29)
GFR SERPL CREATININE-BSD FRML MDRD: 60 ML/MIN/1.73M2
GLUCOSE SERPL-MCNC: 92 MG/DL (ref 70–99)
POTASSIUM SERPL-SCNC: 4.9 MMOL/L (ref 3.4–5.3)
SODIUM SERPL-SCNC: 145 MMOL/L (ref 136–145)

## 2023-04-13 PROCEDURE — P9604 ONE-WAY ALLOW PRORATED TRIP: HCPCS | Mod: ORL | Performed by: NURSE PRACTITIONER

## 2023-04-13 PROCEDURE — 36415 COLL VENOUS BLD VENIPUNCTURE: CPT | Mod: ORL | Performed by: NURSE PRACTITIONER

## 2023-04-13 PROCEDURE — 80048 BASIC METABOLIC PNL TOTAL CA: CPT | Mod: ORL | Performed by: NURSE PRACTITIONER

## 2023-04-14 ENCOUNTER — HOSPITAL ENCOUNTER (OUTPATIENT)
Dept: WOUND CARE | Facility: CLINIC | Age: 88
Discharge: HOME | End: 2023-04-14
Attending: PHYSICIAN ASSISTANT | Admitting: PHYSICIAN ASSISTANT
Payer: MEDICARE

## 2023-04-14 VITALS — TEMPERATURE: 98.5 F | DIASTOLIC BLOOD PRESSURE: 82 MMHG | SYSTOLIC BLOOD PRESSURE: 159 MMHG | HEART RATE: 61 BPM

## 2023-04-14 DIAGNOSIS — S81.811D NONINFECTED SKIN TEAR OF RIGHT LOWER EXTREMITY, SUBSEQUENT ENCOUNTER: Primary | ICD-10-CM

## 2023-04-14 PROCEDURE — 17250 CHEM CAUT OF GRANLTJ TISSUE: CPT | Performed by: PHYSICIAN ASSISTANT

## 2023-04-14 NOTE — PROGRESS NOTES
Calcium slightly elevated. Not on calcium supplement but 2,000 units daily pf vitamin D    Plan:  1. Discontinue Vitamin D  2. Recheck BMP in 3 weeks        JACKIE Zayas CNP on 4/13/2023 at 7:27 PM

## 2023-04-14 NOTE — PROGRESS NOTES
[facility]    ASSESSMENT:   1. (L97.922) Ulcer of left lower leg, with fat layer exposed (H)  2. lymphedema    PLAN/DISCUSSION:   1. Wound care plan: cont two-layer wraps performed weekly in our clinic, Endoform Am as primary, staff to adjust as indicated  2. Dietary recommendations discussed, see AVS   3. See bottom of note for detailed wound care and patient instructions    HISTORY OF PRESENT ILLNESS: Maryam Saavedra is a 89 year old female with HTN, vascular dementia, sp LTKA, and lymphedema who presents today with a non-healing left leg wound. This has been present since summer of 2022 and started traumatically. It has opened and closed. She has known lymphedema and has not been regularly wearing any kind of compression garment. Resides in [facility] and has home care agency changing bandages MW.   Daughter is skilled at applying SSB if necessary.     TREATMENT COURSE:  3/29/2023 : Presents for initial visit at our clinic. Started on two layer wrap with PolyMem.   04/07/23: Returns for first dressing change. Much more healthy granulation tissue. Wound has decreased in size. Tolerated wrap well.   04/14/23: Returns to clinic. Continues to come weekly for wrap changes. Wound significantly improved and 100% granular.    NUTRITION: good appetite and PO intake, takes D3    VITALS: BP (!) 159/82 (BP Location: Left arm, Patient Position: Sitting)   Pulse 61   Temp 98.5  F (36.9  C) (Temporal)      PHYSICAL EXAM:  GENERAL: Patient is alert and oriented and in no acute distress  CV: DP palpable, PT absent  INTEGUMENTARY:   Wound (used by OP [facility] only) 03/29/23 1130 Left lower;anterior leg  (Active)   Thickness/Stage full thickness 04/14/23 1446   Base granulating 04/14/23 1446   Periwound intact 04/14/23 1446   Periwound Temperature warm 04/14/23 1446   Periwound Skin Turgor soft 04/14/23 1446   Edges open 04/14/23 1446   Length (cm) 1.5 04/14/23 1446   Width (cm) 0.7 04/14/23 1446   Depth (cm) 0.1  04/14/23 1446   Wound (cm^2) 1.05 cm^2 04/14/23 1446   Wound Volume (cm^3) 0.11 cm^3 04/14/23 1446   Wound healing % 87.5 04/14/23 1446   Drainage Characteristics/Odor serosanguineous 04/14/23 1446   Drainage Amount moderate 04/14/23 1446   Care, Wound chemical cautery applied 04/14/23 1446       Incision/Surgical Site 05/12/20 Left;Anterior Knee (Active)       PROCEDURE: After verbal consent was obtained, hypergranulation tissue was treated with silver nitrate. Patient tolerated this well.     PATIENT INSTRUCTIONS      Further instructions from your care team       Maryam Saavedra      6/9/1932  A DME order was not completed because the patient is having dressing changes done at [facility]    OPTIONS for Compression: leg and foot pieces  Coolflex- Bra hook straps to provide variety compression levels  Velcro Wrap  20-30mmHg Compression hose at [facility]'s    Wound Dressing Change: Left anterior lower leg   Wash your hands with soap and water before you begin your dressing change and prepare a clean surface for dressings.  Prophage protocol for cleansing or cleansing with mild unscented soap (such as Cetaphil, Cerave or Dove) and water,  Apply small amount of VASHE on gauze, lay into wound bed, let sit for 5-10 minutes, remove gauze (do not rinse) then apply dressing:  Endoform AM to open area; pull up Spandage 3& from toes to knee  Mepilex no border to pad the anterior shin red area  Apply 2 layer wrap  Compression:  Your compression is Coflex 2 layer wrap and can not be removed at night  Please remove compression dressing if toes turn blue and/or tingle and can not be relieved by raising the leg for one hour. If unable to reapply in the morning, keep compression on until next dressing change.  Walk as much as you can, as you are able. Whenever you sit raise your ankle above your hips to promote wound healing.  Please raise your legs above your hips for 30 mins 2 times a day to promote wound healing.  Walk as much as you  can. When you sit raise your ankle above your hips to promote wound healing.     Rissa Hooker PA-C April 14, 2023    Call us at [phone] if you have any questions about your wounds, have redness or swelling around your wound, have a fever of 101 or greater or if you have any other problems or concerns. We answer the phone Monday through Friday 8 am to 4 pm, please leave a message as we check the voicemail frequently throughout the day.     If you had a positive experience please indicate that on your patient satisfaction survey form that [facility] will be sending you.  t was a pleasure meeting with you today.  Thank you for allowing me and my team the privilege of caring for you today.  YOU are the reason we are here, and I truly hope we provided you with the excellent service you deserve.  Please let us know if there is anything else we can do for you so that we can be sure you are leaving completely satisfied with your care experience.      If you have any billing related questions please call the [facility] Business office at [phone]. The clinic staff does not handle billing related matters.  If you are scheduled to have a follow up appointment, you will receive a reminder call the day before your visit. On the appointment day please arrive 15 minutes prior to your appointment time. If you are unable to keep that appointment, please call the clinic to cancel or reschedule. If you are more than 10 minutes late or greater for your appointment, the clinic policy is that you may be asked to reschedule.

## 2023-04-14 NOTE — DISCHARGE INSTRUCTIONS
Maryam Saavedra      6/9/1932  A DME order was not completed because the patient is having dressing changes done at [facility]    OPTIONS for Compression: leg and foot pieces  Coolflex- Bra hook straps to provide variety compression levels  Velcro Wrap  20-30mmHg Compression hose at [facility]'s    Wound Dressing Change: Left anterior lower leg   Wash your hands with soap and water before you begin your dressing change and prepare a clean surface for dressings.  Prophage protocol for cleansing or cleansing with mild unscented soap (such as Cetaphil, Cerave or Dove) and water,  Apply small amount of VASHE on gauze, lay into wound bed, let sit for 5-10 minutes, remove gauze (do not rinse) then apply dressing:  Endoform AM to open area; pull up Spandage 3& from toes to knee  Mepilex no border to pad the anterior shin red area  Apply 2 layer wrap  Compression:  Your compression is Coflex 2 layer wrap and can not be removed at night  Please remove compression dressing if toes turn blue and/or tingle and can not be relieved by raising the leg for one hour. If unable to reapply in the morning, keep compression on until next dressing change.  Walk as much as you can, as you are able. Whenever you sit raise your ankle above your hips to promote wound healing.  Please raise your legs above your hips for 30 mins 2 times a day to promote wound healing.  Walk as much as you can. When you sit raise your ankle above your hips to promote wound healing.     Rissa Hooker PA-C April 14, 2023    Call us at [phone] if you have any questions about your wounds, have redness or swelling around your wound, have a fever of 101 or greater or if you have any other problems or concerns. We answer the phone Monday through Friday 8 am to 4 pm, please leave a message as we check the voicemail frequently throughout the day.     If you had a positive experience please indicate that on your patient satisfaction survey form that [facility] will  be sending you.  t was a pleasure meeting with you today.  Thank you for allowing me and my team the privilege of caring for you today.  YOU are the reason we are here, and I truly hope we provided you with the excellent service you deserve.  Please let us know if there is anything else we can do for you so that we can be sure you are leaving completely satisfied with your care experience.      If you have any billing related questions please call the [facility] Business office at [phone]. The clinic staff does not handle billing related matters.  If you are scheduled to have a follow up appointment, you will receive a reminder call the day before your visit. On the appointment day please arrive 15 minutes prior to your appointment time. If you are unable to keep that appointment, please call the clinic to cancel or reschedule. If you are more than 10 minutes late or greater for your appointment, the clinic policy is that you may be asked to reschedule.

## 2023-04-21 ENCOUNTER — HOSPITAL ENCOUNTER (OUTPATIENT)
Dept: WOUND CARE | Facility: CLINIC | Age: 88
Discharge: HOME | End: 2023-04-21
Attending: PHYSICIAN ASSISTANT | Admitting: PHYSICIAN ASSISTANT
Payer: MEDICARE

## 2023-04-21 VITALS — TEMPERATURE: 98 F | SYSTOLIC BLOOD PRESSURE: 157 MMHG | HEART RATE: 56 BPM | DIASTOLIC BLOOD PRESSURE: 67 MMHG

## 2023-04-21 DIAGNOSIS — S81.811D NONINFECTED SKIN TEAR OF RIGHT LOWER EXTREMITY, SUBSEQUENT ENCOUNTER: Primary | ICD-10-CM

## 2023-04-21 PROCEDURE — 97602 WOUND(S) CARE NON-SELECTIVE: CPT

## 2023-04-21 PROCEDURE — 99213 OFFICE O/P EST LOW 20 MIN: CPT | Performed by: PHYSICIAN ASSISTANT

## 2023-04-21 NOTE — DISCHARGE INSTRUCTIONS
Maryam Saavedra      6/9/1932    A DME order was not completed because the patient is having dressing changes done at [facility]    OPTIONS for Compression: leg and foot pieces  Coolflex- Bra hook straps to provide variety compression levels  Velcro Wrap  20-30mmHg Compression hose at [facility]'s    Wound Dressing Change: Left anterior lower leg  Wash your hands with soap and water before you begin your dressing change and prepare a clean surface for dressings.  Prophage protocol for cleansing or cleansing with mild unscented soap (such as Cetaphil, Cerave or Dove) and water,  Apply small amount of VASHE on gauze, lay into wound bed, let sit for 5-10 minutes, remove gauze (do not rinse) then apply dressing:  Small amount of Hydrogel to wound; Endoform AM over; pull up Spandage size 6 from toes to knee  Mepilex no border to pad the anterior shin red area  Apply 2 layer wrap    Compression:  Your compression is Coflex 2 layer wrap and can not be removed at night Please remove compression dressing if toes turn blue and/or tingle and can not be relieved by raising the leg for one hour. If unable to reapply in the morning, keep compression on until next dressing change.  Walk as much as you can, as you are able. Whenever you sit raise your ankle above your hips to promote wound healing.  Please raise your legs above your hips for 30 mins 2 times a day to promote wound healing.  Walk as much as you can. When you sit raise your ankle above your hips to promote wound healing.     Rissa Hooker PA-C April 21, 2023    Call us at [phone] if you have any questions about your wounds, have redness or swelling around your wound, have a fever of 101 or greater or if you have any other problems or concerns. We answer the phone Monday through Friday 8 am to 4 pm, please leave a message as we check the voicemail frequently throughout the day.     If you had a positive experience please indicate that on your patient satisfaction survey  form that [facility] will be sending you.    It was a pleasure meeting with you today.  Thank you for allowing me and my team the privilege of caring for you today.  YOU are the reason we are here, and I truly hope we provided you with the excellent service you deserve.  Please let us know if there is anything else we can do for you so that we can be sure you are leaving completely satisfied with your care experience.      If you have any billing related questions please call the [facility] Business office at [phone]. The clinic staff does not handle billing related matters.    If you are scheduled to have a follow up appointment, you will receive a reminder call the day before your visit. On the appointment day please arrive 15 minutes prior to your appointment time. If you are unable to keep that appointment, please call the clinic to cancel or reschedule. If you are more than 10 minutes late or greater for your appointment, the clinic policy is that you may be asked to reschedule.

## 2023-04-21 NOTE — PROGRESS NOTES
[facility]    ASSESSMENT:   1. (L97.922) Ulcer of left lower leg, with fat layer exposed (H)  2. lymphedema    PLAN/DISCUSSION:   1. Wound care plan: cont two-layer wraps performed weekly in our clinic, Endoform Am as primary, staff to adjust as indicated  2. Plan for compression stockings or Tubigrip at discharge  3. See bottom of note for detailed wound care and patient instructions    HISTORY OF PRESENT ILLNESS: Maryam Saavedra is a 89 year old female with HTN, vascular dementia, sp LTKA, and lymphedema who presents today with a non-healing left leg wound. This has been present since summer of 2022 and started traumatically. It has opened and closed. She has known lymphedema and has not been regularly wearing any kind of compression garment. Resides in [facility] and has home care agency changing bandages MW.   Daughter is skilled at applying SSB if necessary.     TREATMENT COURSE:  3/29/2023 : Presents for initial visit at our clinic. Started on two layer wrap with PolyMem.   04/07/23: Returns for first dressing change. Much more healthy granulation tissue. Wound has decreased in size. Tolerated wrap well.   04/14/23: Returns to clinic. Continues to come weekly for wrap changes. Wound significantly improved and 100% granular.  04/21/23: Returns to clinic. Wound continues to improve.     NUTRITION: good appetite and PO intake, takes D3    VITALS: BP (!) 157/67 (BP Location: Right arm, Patient Position: Chair, Cuff Size: Adult Regular)   Pulse 56   Temp 98  F (36.7  C) (Temporal)      PHYSICAL EXAM:  GENERAL: Patient is alert and oriented and in no acute distress  CV: DP palpable, PT absent  INTEGUMENTARY:   Wound (used by OP [facility] only) 03/29/23 1130 Left lower;anterior leg  (Active)   Thickness/Stage full thickness 04/21/23 1327   Base granulating;pink 04/21/23 1327   Periwound intact 04/21/23 1327   Periwound Temperature warm 04/21/23 1327   Periwound Skin Turgor soft 04/21/23 1327   Edges open  04/21/23 1327   Length (cm) 0.7 04/21/23 1327   Width (cm) 0.6 04/21/23 1327   Depth (cm) 0.2 04/21/23 1327   Wound (cm^2) 0.42 cm^2 04/21/23 1327   Wound Volume (cm^3) 0.08 cm^3 04/21/23 1327   Wound healing % 95 04/21/23 1327   Drainage Characteristics/Odor serosanguineous 04/21/23 1327   Drainage Amount small 04/21/23 1327   Care, Wound non-select wound debridement performed 04/21/23 1327       Incision/Surgical Site 05/12/20 Left;Anterior Knee (Active)             MDM: 20 minutes was spent on the day of visit reviewing previous chart notes, assessing the patient and developing the plan of care, this excludes time spent on any procedures.       PATIENT INSTRUCTIONS        Further instructions from your care team       Maryam Saavedra      6/9/1932    A DME order was not completed because the patient is having dressing changes done at [facility]    OPTIONS for Compression: leg and foot pieces  Coolflex- Bra hook straps to provide variety compression levels  Velcro Wrap  20-30mmHg Compression hose at [facility]    Wound Dressing Change: Left anterior lower leg  Wash your hands with soap and water before you begin your dressing change and prepare a clean surface for dressings.  Prophage protocol for cleansing or cleansing with mild unscented soap (such as Cetaphil, Cerave or Dove) and water,  Apply small amount of VASHE on gauze, lay into wound bed, let sit for 5-10 minutes, remove gauze (do not rinse) then apply dressing:  Small amount of Hydrogel to wound; Endoform AM over; pull up Spandage size 6 from toes to knee  Mepilex no border to pad the anterior shin red area  Apply 2 layer wrap    Compression:  Your compression is Coflex 2 layer wrap and can not be removed at night Please remove compression dressing if toes turn blue and/or tingle and can not be relieved by raising the leg for one hour. If unable to reapply in the morning, keep compression on until next dressing change.  Walk as much as you can, as you are able.  Whenever you sit raise your ankle above your hips to promote wound healing.  Please raise your legs above your hips for 30 mins 2 times a day to promote wound healing.  Walk as much as you can. When you sit raise your ankle above your hips to promote wound healing.     Rissa Hooker PA-C April 21, 2023    Call us at [phone] if you have any questions about your wounds, have redness or swelling around your wound, have a fever of 101 or greater or if you have any other problems or concerns. We answer the phone Monday through Friday 8 am to 4 pm, please leave a message as we check the voicemail frequently throughout the day.     If you had a positive experience please indicate that on your patient satisfaction survey form that [facility] will be sending you.    It was a pleasure meeting with you today.  Thank you for allowing me and my team the privilege of caring for you today.  YOU are the reason we are here, and I truly hope we provided you with the excellent service you deserve.  Please let us know if there is anything else we can do for you so that we can be sure you are leaving completely satisfied with your care experience.      If you have any billing related questions please call the [facility] Business office at [phone]. The clinic staff does not handle billing related matters.    If you are scheduled to have a follow up appointment, you will receive a reminder call the day before your visit. On the appointment day please arrive 15 minutes prior to your appointment time. If you are unable to keep that appointment, please call the clinic to cancel or reschedule. If you are more than 10 minutes late or greater for your appointment, the clinic policy is that you may be asked to reschedule.

## 2023-04-28 ENCOUNTER — HOSPITAL ENCOUNTER (OUTPATIENT)
Dept: WOUND CARE | Facility: CLINIC | Age: 88
Discharge: HOME | End: 2023-04-28
Attending: PHYSICIAN ASSISTANT | Admitting: PHYSICIAN ASSISTANT
Payer: MEDICARE

## 2023-04-28 VITALS — HEART RATE: 69 BPM | SYSTOLIC BLOOD PRESSURE: 135 MMHG | TEMPERATURE: 97.3 F | DIASTOLIC BLOOD PRESSURE: 73 MMHG

## 2023-04-28 DIAGNOSIS — L97.922 SKIN ULCER OF LEFT LOWER LEG WITH FAT LAYER EXPOSED (H): ICD-10-CM

## 2023-04-28 PROCEDURE — G0463 HOSPITAL OUTPT CLINIC VISIT: HCPCS

## 2023-04-28 PROCEDURE — 99213 OFFICE O/P EST LOW 20 MIN: CPT | Performed by: PHYSICIAN ASSISTANT

## 2023-04-28 NOTE — PROGRESS NOTES
[facility]    ASSESSMENT:   1. Resolved Ulcer of left lower leg, with fat layer exposed (H)  2. lymphedema    PLAN/DISCUSSION:   1. Discontinue dressings   2. Plan for compression stockings or Tubigrip at discharge  3. See bottom of note for detailed wound care and patient instructions    HISTORY OF PRESENT ILLNESS: Maryam Saavedra is a 89 year old female with HTN, vascular dementia, sp LTKA, and lymphedema who presents today with a non-healing left leg wound. This has been present since summer of 2022 and started traumatically. It has opened and closed. She has known lymphedema and has not been regularly wearing any kind of compression garment. Resides in [facility] and has home care agency changing bandages MWF.   Daughter is skilled at applying SSB if necessary.     TREATMENT COURSE:  3/29/2023 : Presents for initial visit at our clinic. Started on two layer wrap with PolyMem.   04/07/23: Returns for first dressing change. Much more healthy granulation tissue. Wound has decreased in size. Tolerated wrap well.   04/14/23: Returns to clinic. Continues to come weekly for wrap changes. Wound significantly improved and 100% granular.  04/21/23: Returns to clinic. Wound continues to improve.   04/28/23: Returns to clinic. Wound epithelialized.     NUTRITION: good appetite and PO intake, takes D3    VITALS: /73 (BP Location: Left arm, Patient Position: Chair, Cuff Size: Adult Regular)   Pulse 69   Temp 97.3  F (36.3  C) (Temporal)      PHYSICAL EXAM:  GENERAL: Patient is alert and oriented and in no acute distress  CV: DP palpable, PT absent  INTEGUMENTARY:   Wound (used by OP [facility] only) 03/29/23 1130 Left lower;anterior leg  (Active)   Thickness/Stage full thickness 04/28/23 1057   Base epithelialization;dry;closed/resurfaced 04/28/23 1057   Periwound intact 04/21/23 1327   Periwound Temperature warm 04/21/23 1327   Periwound Skin Turgor soft 04/21/23 1327   Edges open 04/21/23 1327   Length (cm)  0 04/28/23 1057   Width (cm) 0 04/28/23 1057   Depth (cm) 0 04/28/23 1057   Wound (cm^2) 0 cm^2 04/28/23 1057   Wound Volume (cm^3) 0 cm^3 04/28/23 1057   Wound healing % 100 04/28/23 1057   Drainage Characteristics/Odor other (see comments) 04/28/23 1057   Drainage Amount none 04/28/23 1057   Care, Wound other (see comments) 04/28/23 1057       Incision/Surgical Site 05/12/20 Left;Anterior Knee (Active)           MDM: 20 minutes was spent on the day of visit reviewing previous chart notes, assessing the patient and developing the plan of care, this excludes time spent on any procedures.       PATIENT INSTRUCTIONS      Further instructions from your care team       Maryam Saavedra      6/9/1932    A DME order was not completed because supplies were not needed    Your wound is healed!! Dressings are no longer required.     Wound Clinic follow up in the future if there are any wound concerns.    OPTIONS for Compression: leg pieces  Coolflex- with Bra-like hook straps to provide variety of compression levels   Or, Velcro Compreflex Wrap  Or, 20-30mmHg Compression hose at [facility]'s    Daily Skin Care: Left and Right lower leg  cleansing with mild unscented soap (such as Cetaphil, Cerave or Dove) and water, dry, then:  -moisturize legs with Lotion (Eucerin or Cera Ve)  -apply compression of choice (tubigrip/spandagrip, compression stockings, Compreflex Velcro calf wraps)    Compression:   Your compression is  tubigrip/spandagrip, or velcro compression wraps, or compression stockings  and can be removed at night and put back on first thing in the morning.   Please remove compression stocking/wrap/tubigrip if toes turn blue and/or tingle and can not be relieved by raising the leg for one hour.   If unable to reapply in the morning, keep compression on until next assistance available.    Walk as much as you can, as you are able. Whenever you sit raise your ankle above your hips to promote wound healing.       Rissa  CYNDIE Hooker April 28, 2023    Call us at [phone] if you have any questions about your wounds, have redness or swelling around your wound, have a fever of 101 or greater or if you have any other problems or concerns. We answer the phone Monday through Friday 8 am to 4 pm, please leave a message as we check the voicemail frequently throughout the day.     If you had a positive experience please indicate that on your patient satisfaction survey form that [facility] will be sending you.    It was a pleasure meeting with you today.  Thank you for allowing me and my team the privilege of caring for you today.  YOU are the reason we are here, and I truly hope we provided you with the excellent service you deserve.  Please let us know if there is anything else we can do for you so that we can be sure you are leaving completely satisfied with your care experience.      If you have any billing related questions please call the [facility] Business office at [phone]. The clinic staff does not handle billing related matters.    If you are scheduled to have a follow up appointment, you will receive a reminder call the day before your visit. On the appointment day please arrive 15 minutes prior to your appointment time. If you are unable to keep that appointment, please call the clinic to cancel or reschedule. If you are more than 10 minutes late or greater for your appointment, the clinic policy is that you may be asked to reschedule.

## 2023-04-28 NOTE — DISCHARGE INSTRUCTIONS
Maryam Saavedra      6/9/1932    A DME order was not completed because supplies were not needed    Your wound is healed!! Dressings are no longer required.     Wound Clinic follow up in the future if there are any wound concerns.    OPTIONS for Compression: leg pieces  Coolflex- with Bra-like hook straps to provide variety of compression levels   Or, Velcro Compreflex Wrap  Or, 20-30mmHg Compression hose at [facility]    Daily Skin Care: Left and Right lower leg  cleansing with mild unscented soap (such as Cetaphil, Cerave or Dove) and water, dry, then:  -moisturize legs with Lotion (Eucerin or Cera Ve)  -apply compression of choice (tubigrip/spandagrip, compression stockings, Compreflex Velcro calf wraps)    Compression:   Your compression is  tubigrip/spandagrip, or velcro compression wraps, or compression stockings  and can be removed at night and put back on first thing in the morning.   Please remove compression stocking/wrap/tubigrip if toes turn blue and/or tingle and can not be relieved by raising the leg for one hour.   If unable to reapply in the morning, keep compression on until next assistance available.    Walk as much as you can, as you are able. Whenever you sit raise your ankle above your hips to promote wound healing.       Rissa Hooker PA-C April 28, 2023    Call us at [phone] if you have any questions about your wounds, have redness or swelling around your wound, have a fever of 101 or greater or if you have any other problems or concerns. We answer the phone Monday through Friday 8 am to 4 pm, please leave a message as we check the voicemail frequently throughout the day.     If you had a positive experience please indicate that on your patient satisfaction survey form that [facility] will be sending you.    It was a pleasure meeting with you today.  Thank you for allowing me and my team the privilege of caring for you today.  YOU are the reason we are here, and I truly hope we  provided you with the excellent service you deserve.  Please let us know if there is anything else we can do for you so that we can be sure you are leaving completely satisfied with your care experience.      If you have any billing related questions please call the [facility] Business office at [phone]. The clinic staff does not handle billing related matters.    If you are scheduled to have a follow up appointment, you will receive a reminder call the day before your visit. On the appointment day please arrive 15 minutes prior to your appointment time. If you are unable to keep that appointment, please call the clinic to cancel or reschedule. If you are more than 10 minutes late or greater for your appointment, the clinic policy is that you may be asked to reschedule.

## 2023-04-28 NOTE — PROGRESS NOTES
Patient and daughter arrived for wound care visit.   Wound Care Nurse time spent evaluating patient record, and completed a full evaluation; provided recommendation based on treatment plan.   Reviewed discharge instructions, patient education, and discussed plan of care with appropriate medical team staff members and patient and/or family members.

## 2023-05-08 DIAGNOSIS — M25.561 ACUTE PAIN OF RIGHT KNEE: ICD-10-CM

## 2023-05-08 RX ORDER — MENTHOL AND METHYL SALICYLATE 10; 30 G/100G; G/100G
CREAM TOPICAL
Qty: 85 G | Status: SHIPPED | OUTPATIENT
Start: 2023-05-08 | End: 2023-05-17

## 2023-05-09 NOTE — PROGRESS NOTES
"[facility]  [place] Medical Record Number:  [id number]  Place of Service where encounter took place:  [facility] [[id number]]  Chief Complaint   Patient presents with     RECHECK     [facility] enrollee       HPI:    Maryam Saavedra  is a 89 year old (6/9/1932), who is being seen today for an episodic care visit.  HPI information obtained from: facility chart records, facility staff, patient report and [facility] chart review. Today's concern is:    Seen today for follow up to chronic conditions. Her left leg wound is healed after visits at wound care clinic. Has chronic LE edema. Was started on spironolactone but family wanted it stropped. Hx of urinary frequency and it did increase this. She is wearing tubi  now but family would like eval for new farrow wraps with history of lymphedema. Maryam denies pain today, says \"legs are irving\" puffy. Does not like taking medication for constipation but says \"prunes work fine\". Also on Senna S 3x weekly.     The health plan new enrollment has happened. I have reviewed the  MDS, the preventative needs,  and facility care plan. The level of care is appropriate. I have reviewed the code status/advanced directives.     Past Medical and Surgical History reviewed in Epic today.    MEDICATIONS:    Current Outpatient Medications   Medication Sig Dispense Refill     acetaminophen (TYLENOL) 325 MG tablet TAKE TWO TABLETS (650MG) BY MOUTH TWICE DAILY;AND MAY TAKE TWO TABLETS (650MG) BY MOUTH TWICE DAILY AS NEEDED 720 tablet 3     Menthol-Methyl Salicylate (ICY HOT EXTRA STRENGTH) 10-30 % CREA APPLY TOPICALLY TO RIGHT KNEE TWICE DAILY 85 g PRN     SENEXON-S 8.6-50 MG tablet TAKE ONE TABLET BY MOUTH AT BEDTIME THREE TIMES PER WEEK;& TAKE ONE TABLET TWICE DAILY AS NEEDED FOR CONSTIPATION 216 tablet 3     THERATEARS 0.25 % SOLN INSTILL ONE DROP IN EACH EYE TWICE DAILY FOR DRY EYES 15 mL 97     REVIEW OF SYSTEMS:  Limited secondary to " "cognitive impairment but today pt reports ok    Objective:  /70   Pulse 54   Temp 97.5  F (36.4  C)   Resp 16   Ht 1.549 m (5' 1\")   Wt 55.5 kg (122 lb 6.4 oz)   SpO2 97%   BMI 23.13 kg/m    Exam:  GENERAL APPEARANCE:  Alert, cooperative  ENT:  Mouth and posterior oropharynx normal, moist mucous membranes, Rampart  RESP:  respiratory effort and palpation of chest normal, lungs clear to auscultation   CV:  Palpation and auscultation of heart done , regular rate and rhythm, no murmur  ABDOMEN:  normal bowel sounds, soft, nontender  M/S:   BLE weakness baseline ambulating with wc, walker and SBA for short distance, slight drag of feet, bent knees, stance with transfers, 1+ to lower extremities and right knee. Tubi    SKIN: wound left shin with dry scab, no redness or drainage  NEURO:   Cranial nerves 2-12 are normal tested and grossly at patient's baseline  PSYCH:  insight and judgement impaired, memory impaired , affect and mood normal    Labs:   CBC RESULTS: Recent Labs   Lab Test 02/09/23  0706 02/10/22  0545   WBC 5.2 5.7   RBC 3.78* 4.08   HGB 11.0* 11.6*   HCT 34.2* 37.4   MCV 91 92   MCH 29.1 28.4   MCHC 32.2 31.0*   RDW 13.8 14.5    291       Last Basic Metabolic Panel:  Recent Labs   Lab Test 04/13/23  0800 02/09/23  0706    142   POTASSIUM 4.9 3.9   CHLORIDE 105 104   NOHEMY 10.0* 9.5   CO2 30* 28   BUN 25.3* 36.5*   CR 0.91 0.88   GLC 92 73       Liver Function Studies -   Recent Labs   Lab Test 02/10/22  0545 01/06/21  0000   PROTTOTAL 6.7* 7.2   ALBUMIN 2.8* 2.8*   BILITOTAL 0.3 0.5   ALKPHOS 163* 220*   AST 19 23   ALT 22 31       TSH   Date Value Ref Range Status   02/09/2023 3.68 0.30 - 4.20 uIU/mL Final   02/10/2022 2.96 0.40 - 4.00 mU/L Final   01/06/2021 5.73 (A) 0.40 - 4.00 mU/L Final   11/25/2017 4.20 (H) 0.40 - 4.00 mU/L Final       Lab Results   Component Value Date    A1C 5.6 07/13/2018    A1C 5.9 05/14/2018     ASSESSMENT/PLAN:  (R60.0) Bilateral lower extremity edema  " (primary encounter diagnosis)  Comment: ongoing  Plan:   -Tubi  for now  -OT for lymphedema wraps    (I10) Benign essential hypertension  BP Readings from Last 3 Encounters:   05/10/23 130/70   04/28/23 135/73   04/21/23 (!) 157/67     Pulse Readings from Last 4 Encounters:   05/10/23 54   04/28/23 69   04/21/23 56   04/14/23 61     Comment: with CKD stage 1 if BP elevates would start lisinopril. Had SE with diuretic    Plan:   -BMP/CBC periodically  -monitor BP/HR and add medication as needed      (F01.53) Vascular dementia with depressed mood  Comment: increased confusion-acute v advanced dementia? Not agitated, forgetful but no hallucinations or delusional thinking   Plan:   -resides in     (M15.9) Primary osteoarthritis involving multiple joints  Comment: ambulates small distance and WC for longer distance   Plan:   -Tylenol scheduled and prn     (K59.01) Slow transit constipation  Comment: stable  Plan:  -continue with current plan of care      Electronically signed by:  JACKIE Zayas CNP

## 2023-05-10 ENCOUNTER — PATIENT OUTREACH (OUTPATIENT)
Dept: GERIATRIC MEDICINE | Facility: CLINIC | Age: 88
End: 2023-05-10

## 2023-05-10 ENCOUNTER — ASSISTED LIVING VISIT (OUTPATIENT)
Dept: GERIATRICS | Facility: CLINIC | Age: 88
End: 2023-05-10
Payer: MEDICARE

## 2023-05-10 VITALS
TEMPERATURE: 97.5 F | SYSTOLIC BLOOD PRESSURE: 130 MMHG | OXYGEN SATURATION: 97 % | BODY MASS INDEX: 23.11 KG/M2 | HEART RATE: 54 BPM | DIASTOLIC BLOOD PRESSURE: 70 MMHG | HEIGHT: 61 IN | RESPIRATION RATE: 16 BRPM | WEIGHT: 122.4 LBS

## 2023-05-10 DIAGNOSIS — F01.53 VASCULAR DEMENTIA WITH DEPRESSED MOOD (H): ICD-10-CM

## 2023-05-10 DIAGNOSIS — K59.01 SLOW TRANSIT CONSTIPATION: ICD-10-CM

## 2023-05-10 DIAGNOSIS — R60.0 BILATERAL LOWER EXTREMITY EDEMA: Primary | ICD-10-CM

## 2023-05-10 DIAGNOSIS — I10 BENIGN ESSENTIAL HYPERTENSION: ICD-10-CM

## 2023-05-10 DIAGNOSIS — M15.0 PRIMARY OSTEOARTHRITIS INVOLVING MULTIPLE JOINTS: ICD-10-CM

## 2023-05-10 PROCEDURE — 99349 HOME/RES VST EST MOD MDM 40: CPT | Performed by: NURSE PRACTITIONER

## 2023-05-10 NOTE — PROGRESS NOTES
[facility] Care Coordination Contact    Member became effective with  [facility] on 5/1/2023 with [facility]  Previous Health Plan: [place]/Fee For Service  Previous Care System: [facility]  Previous care coordinators name and number: Karin Estrada  [phone]  Waiver Type: EW  Last MMIS Entry: Date 4/14/23 and Type ADMIN ACT (EW open)  MMIS visit date (and type) if different from above: n/a  Services Listed in MMIS:   A3 F MNCHSE-CSP 60 C MH SUPERVN 35 F CASE MGMT  05 C ABDIRAHMAN DINE 06 C HOMEMAKER 62 C LAUNDRY  57 C PRSNL ASST 25 F SUPPLIES 47 O WVRAC OSPINA  03 F MA TRANS  UTF received: No: Requested on 5/10/23.  per Karin Estrada/Lucas Sibley.  Transfer paperwork was sent to [facility] on 5/3/23.  Email sent to [facility] to request.  Address/Phone discrepancy: n/a    Keily Duque  Case Management Specialist  [facility]  [phone]

## 2023-05-10 NOTE — LETTER
Maryam Saavedra    PT to [facility] for lymphedema therapy including need for new farrow wraps      Sandro Canseco, APRN CNP on 5/10/2023 at 2:27 PM

## 2023-05-10 NOTE — LETTER
"    5/10/2023        RE: Maryam Saavedra  C/o Saray Saavedra  [address] [address]        [place] [facility]  [facility] Medical Record Number:  [id number]  Place of Service where encounter took place:  [facility] [[id number]]  Chief Complaint   Patient presents with     RECHECK     New P enrollee       HPI:    Maryam Saavedra  is a 89 year old (6/9/1932), who is being seen today for an episodic care visit.  HPI information obtained from: facility chart records, facility staff, patient report and [facility] chart review. Today's concern is:    Seen today for follow up to chronic conditions. Her left leg wound is healed after visits at wound care clinic. Has chronic LE edema. Was started on spironolactone but family wanted it stropped. Hx of urinary frequency and it did increase this. She is wearing tubi  now but family would like eval for new farrow wraps with history of lymphedema. Maryam denies pain today, says \"legs are irving\" puffy. Does not like taking medication for constipation but says \"prunes work fine\". Also on Senna S 3x weekly.     The health plan new enrollment has happened. I have reviewed the  MDS, the preventative needs,  and facility care plan. The level of care is appropriate. I have reviewed the code status/advanced directives.     Past Medical and Surgical History reviewed in Epic today.    MEDICATIONS:    Current Outpatient Medications   Medication Sig Dispense Refill     acetaminophen (TYLENOL) 325 MG tablet TAKE TWO TABLETS (650MG) BY MOUTH TWICE DAILY;AND MAY TAKE TWO TABLETS (650MG) BY MOUTH TWICE DAILY AS NEEDED 720 tablet 3     Menthol-Methyl Salicylate (ICY HOT EXTRA STRENGTH) 10-30 % CREA APPLY TOPICALLY TO RIGHT KNEE TWICE DAILY 85 g PRN     SENEXON-S 8.6-50 MG tablet TAKE ONE TABLET BY MOUTH AT BEDTIME THREE TIMES PER WEEK;& TAKE ONE TABLET TWICE DAILY AS NEEDED FOR CONSTIPATION 216 tablet 3     THERATEARS 0.25 % SOLN " "INSTILL ONE DROP IN EACH EYE TWICE DAILY FOR DRY EYES 15 mL 97     REVIEW OF SYSTEMS:  Limited secondary to cognitive impairment but today pt reports ok    Objective:  /70   Pulse 54   Temp 97.5  F (36.4  C)   Resp 16   Ht 1.549 m (5' 1\")   Wt 55.5 kg (122 lb 6.4 oz)   SpO2 97%   BMI 23.13 kg/m    Exam:  GENERAL APPEARANCE:  Alert, cooperative  ENT:  Mouth and posterior oropharynx normal, moist mucous membranes, [place]  RESP:  respiratory effort and palpation of chest normal, lungs clear to auscultation   CV:  Palpation and auscultation of heart done , regular rate and rhythm, no murmur  ABDOMEN:  normal bowel sounds, soft, nontender  M/S:   BLE weakness baseline ambulating with wc, walker and SBA for short distance, slight drag of feet, bent knees, stance with transfers, 1+ to lower extremities and right knee. Tubi    SKIN: wound left shin with dry scab, no redness or drainage  NEURO:   Cranial nerves 2-12 are normal tested and grossly at patient's baseline  PSYCH:  insight and judgement impaired, memory impaired , affect and mood normal    Labs:   CBC RESULTS: Recent Labs   Lab Test 02/09/23  0706 02/10/22  0545   WBC 5.2 5.7   RBC 3.78* 4.08   HGB 11.0* 11.6*   HCT 34.2* 37.4   MCV 91 92   MCH 29.1 28.4   MCHC 32.2 31.0*   RDW 13.8 14.5    291       Last Basic Metabolic Panel:  Recent Labs   Lab Test 04/13/23  0800 02/09/23  0706    142   POTASSIUM 4.9 3.9   CHLORIDE 105 104   NOHEMY 10.0* 9.5   CO2 30* 28   BUN 25.3* 36.5*   CR 0.91 0.88   GLC 92 73       Liver Function Studies -   Recent Labs   Lab Test 02/10/22  0545 01/06/21  0000   PROTTOTAL 6.7* 7.2   ALBUMIN 2.8* 2.8*   BILITOTAL 0.3 0.5   ALKPHOS 163* 220*   AST 19 23   ALT 22 31       TSH   Date Value Ref Range Status   02/09/2023 3.68 0.30 - 4.20 uIU/mL Final   02/10/2022 2.96 0.40 - 4.00 mU/L Final   01/06/2021 5.73 (A) 0.40 - 4.00 mU/L Final   11/25/2017 4.20 (H) 0.40 - 4.00 mU/L Final       Lab Results   Component Value Date "    A1C 5.6 07/13/2018    A1C 5.9 05/14/2018     ASSESSMENT/PLAN:  (R60.0) Bilateral lower extremity edema  (primary encounter diagnosis)  Comment: ongoing  Plan:   -Tubi  for now  -OT for lymphedema wraps    (I10) Benign essential hypertension  BP Readings from Last 3 Encounters:   05/10/23 130/70   04/28/23 135/73   04/21/23 (!) 157/67     Pulse Readings from Last 4 Encounters:   05/10/23 54   04/28/23 69   04/21/23 56   04/14/23 61     Comment: with CKD stage 1 if BP elevates would start lisinopril. Had SE with diuretic    Plan:   -BMP/CBC periodically  -monitor BP/HR and add medication as needed      (F01.53) Vascular dementia with depressed mood  Comment: increased confusion-acute v advanced dementia? Not agitated, forgetful but no hallucinations or delusional thinking   Plan:   -resides in     (M15.9) Primary osteoarthritis involving multiple joints  Comment: ambulates small distance and WC for longer distance   Plan:   -Tylenol scheduled and prn     (K59.01) Slow transit constipation  Comment: stable  Plan:  -continue with current plan of care      Electronically signed by:  JACKIE Zayas CNP               Sincerely,        JACKIE Zayas CNP

## 2023-05-10 NOTE — LETTER
May 10, 2023    MARYAM CARMONA  C/O PAULETTE MATHEW  [address] [address]    Dear  Maryam,    Welcome to [facility] s MSC+ health program. My name is Thuy Reeder RN. I am your MSC+ care coordinator. You are eligible for Care Coordination through [facility] MSC+ plan.    As your care coordinator, we ll:  Meet to go over your care coordination benefits  Talk about your physical and mental health care needs   Review your preventative care needs  Create a plan that meets your needs with the services you choose    What happens next?  I ll call you soon to introduce myself and tell you more about my role. We ll then plan time to go over your health and safety needs. Our goal is to keep you as healthy and independent as possible.    Soon, you will receive a new MSC+ member identification (ID) card from [facility]. When you receive it, please use this card along with your [place] [facility] card and Prescription Drug Coverage Program card. When you receive, it please use this card where you get your health services. If you have Medicare, you will need to show your Medicare card when you get health services.    The MSC+ care coordination program is voluntary and offered to you at no cost. If you wish to stop being in the care coordination program or have questions, call me at [phone]. If you reach my voicemail, leave a message and your phone number. TTY users, call the [place] [employer] at 248 or [phone] (tfzrhu-xq-iakkpi relay service).    Sincerely,      Thuy Reeder RN    E-mail: [email or web address]  Phone: [phone]      [facility]    [id number] accepted   [id number]       [id number] (07/2022)

## 2023-05-17 DIAGNOSIS — K59.01 SLOW TRANSIT CONSTIPATION: ICD-10-CM

## 2023-05-17 DIAGNOSIS — M15.8 OTHER OSTEOARTHRITIS INVOLVING MULTIPLE JOINTS: ICD-10-CM

## 2023-05-17 RX ORDER — AMOXICILLIN 250 MG
CAPSULE ORAL
Qty: 216 TABLET | Refills: 3 | COMMUNITY
Start: 2023-05-17 | End: 2023-07-17

## 2023-05-17 RX ORDER — ACETAMINOPHEN 325 MG/1
TABLET ORAL
Qty: 720 TABLET | Refills: 3 | COMMUNITY
Start: 2023-05-17 | End: 2023-06-12

## 2023-05-23 ENCOUNTER — PATIENT OUTREACH (OUTPATIENT)
Dept: GERIATRIC MEDICINE | Facility: CLINIC | Age: 88
End: 2023-05-23
Payer: COMMERCIAL

## 2023-05-23 NOTE — PROGRESS NOTES
[facility] Care Coordination Contact      [facility] Health Plan or Product Change    CC received notification that member's health plan or health plan product changed from MA/Fee For Service to [employer] [facility] effective 5/1/2023.  CC contacted member's daughter Sarya and discussed change.  Saray declined need for home visit. CC reviewed previous Health Risk Assessment/LTCC and POC with member and no changes noted.    Confirmed with Saray that the welcome letter with writer's name and contact information has been received.  Transitional HRA completed. Care Plan Summary updated and reflects current services.  Required referral authorization information communicated to CMS: Yes, 24 HR CL services  Writer reviewed the following with member:  ER visits: Yes -  [facility]  Hospitalizations: No  TCU stays: No  Significant health status changes: None reported by Saray. Saray shared that the wound on her mother's leg is now healed.  Saray reported a referral to home care for PT/OT for lymphedema wraps. [facility] [phone]. CC will reach out to homecare to introduce myself  Falls/Injuries: Yes: hx of falls, Saray reports one fall recently no injury  ADL/IADL changes: No  Changes in services: Yes: CC will place referral for [facility] Pull Ups Med Size.    CC sent e-mail to Yessi OLIVEIRA and Maile OLIVEIRA at [place] on 5/22/23 to introduce myself as member's CC. Inquired if there are any changes/ concerns with current POC.  Thuy Reeder RN, BC  Manager [facility] Coordinator   [phone]  (Fax)      Follow-Up Plan: Member informed of future contact, plan to f/u with member with at next regularly scheduled contact.  Contact information shared with member and family, encouraged member to call with any questions or concerns.    5/24/2023 Call placed to [facility] [phone] to introduce myself as member's CC.  Thuy Reeder RN, BC  Manager [facility]  Coordinator   [phone]  (Fax)

## 2023-05-24 ENCOUNTER — PATIENT OUTREACH (OUTPATIENT)
Dept: GERIATRIC MEDICINE | Facility: CLINIC | Age: 88
End: 2023-05-24
Payer: COMMERCIAL

## 2023-05-24 DIAGNOSIS — R39.81 FUNCTIONAL URINARY INCONTINENCE: Primary | ICD-10-CM

## 2023-05-24 NOTE — PROGRESS NOTES
DME supply(s) have been requested by the patient's family member, Saray Saavedra. DME orders(s) have been queued up and pended for the provider to review. Patient reports the need for DME supplies due to urinary incontinence. Once completed, please route the encounter to care coordinator to fax completed documentation and order requisition to [facility].  Thuy Reeder RN, BC  Manager [facility] Care Coordinator   [phone]  (Fax)

## 2023-05-26 NOTE — PROGRESS NOTES
Order placed with [facility] (p: [phone]; f: [phone]) for pull ups.  Order placed on 5/26/23. Database updated.  As required, authorization submitted to health plan.    Keily Duque  Case Management Specialist  [facility]  [phone]

## 2023-06-02 ENCOUNTER — PATIENT OUTREACH (OUTPATIENT)
Dept: GERIATRIC MEDICINE | Facility: CLINIC | Age: 88
End: 2023-06-02
Payer: COMMERCIAL

## 2023-06-02 NOTE — PROGRESS NOTES
[facility] Care Coordination Contact    5/26/2023 Out of office. Rec'd vm from Magan at [facility] stating that she rec'd this CC message. Magan stated that she has placed a referral for lymphedema supplies but has not heard back from the company. Magan requested a return call or an e-mail [phone] or [email or web address].  6/1/2023 left vm with Magan that CC did send a secure e-mail, request a call back if CC assistance is needed in obtaining lymphedema supplies.  Thuy Reeder RN, BC  Manager [facility] Care Coordinator   [phone]  (Fax)

## 2023-06-11 DIAGNOSIS — M15.8 OTHER OSTEOARTHRITIS INVOLVING MULTIPLE JOINTS: ICD-10-CM

## 2023-06-12 RX ORDER — ACETAMINOPHEN 325 MG/1
TABLET ORAL
Qty: 112 TABLET | Refills: 97 | Status: SHIPPED | OUTPATIENT
Start: 2023-06-12 | End: 2024-06-10

## 2023-06-15 NOTE — PROGRESS NOTES
Per [facility], CC notified.  Maryam Saavedra 6/9/1932 pull ups 5/26/2023 Thuy Lassiter DELIVERED 6/6/2023     Keily Duque  Case Management Specialist  [facility]  [phone]

## 2023-07-10 ENCOUNTER — PATIENT OUTREACH (OUTPATIENT)
Dept: GERIATRIC MEDICINE | Facility: CLINIC | Age: 88
End: 2023-07-10
Payer: COMMERCIAL

## 2023-07-10 NOTE — PROGRESS NOTES
[facility] Care Coordination Contact    Rec'd vm from Amna, business office at [facility] stating she checked [facility] Portal but there is no auth in the system for 5/1/203  Call placed to Amna, explained that support staff will f/u with [facility]  Rec'd auth from [facility], left vm with Amna with auth number, request a return call if she has questions.  Thuy Reeder RN, BC  Manager [facility] Care Coordinator   [phone]  (Fax)

## 2023-07-15 DIAGNOSIS — K59.01 SLOW TRANSIT CONSTIPATION: ICD-10-CM

## 2023-07-17 RX ORDER — AMOXICILLIN 250 MG
CAPSULE ORAL
Qty: 12 TABLET | Refills: 97 | Status: SHIPPED | OUTPATIENT
Start: 2023-07-17 | End: 2024-08-05

## 2023-08-08 VITALS
SYSTOLIC BLOOD PRESSURE: 137 MMHG | TEMPERATURE: 97.3 F | DIASTOLIC BLOOD PRESSURE: 76 MMHG | WEIGHT: 112 LBS | HEART RATE: 80 BPM | OXYGEN SATURATION: 97 % | BODY MASS INDEX: 21.16 KG/M2 | RESPIRATION RATE: 16 BRPM

## 2023-08-08 RX ORDER — OLANZAPINE 2.5 MG/1
2.5 TABLET, FILM COATED ORAL DAILY
COMMUNITY
Start: 2023-08-08 | End: 2023-11-08

## 2023-08-08 RX ORDER — VITAMIN B COMPLEX
2 TABLET ORAL DAILY
COMMUNITY
Start: 2023-08-08 | End: 2023-08-08

## 2023-08-09 ENCOUNTER — ASSISTED LIVING VISIT (OUTPATIENT)
Dept: GERIATRICS | Facility: CLINIC | Age: 88
End: 2023-08-09
Payer: MEDICARE

## 2023-08-09 DIAGNOSIS — I10 BENIGN ESSENTIAL HYPERTENSION: ICD-10-CM

## 2023-08-09 DIAGNOSIS — R60.0 BILATERAL LOWER EXTREMITY EDEMA: ICD-10-CM

## 2023-08-09 DIAGNOSIS — F01.53 VASCULAR DEMENTIA WITH DEPRESSED MOOD (H): ICD-10-CM

## 2023-08-09 DIAGNOSIS — M15.0 PRIMARY OSTEOARTHRITIS INVOLVING MULTIPLE JOINTS: ICD-10-CM

## 2023-08-09 DIAGNOSIS — E83.52 HYPERCALCEMIA: ICD-10-CM

## 2023-08-09 DIAGNOSIS — S41.112S SKIN TEAR OF LEFT UPPER ARM WITHOUT COMPLICATION, SEQUELA: Primary | ICD-10-CM

## 2023-08-09 PROCEDURE — G0439 PPPS, SUBSEQ VISIT: HCPCS | Performed by: NURSE PRACTITIONER

## 2023-08-09 PROCEDURE — 99349 HOME/RES VST EST MOD MDM 40: CPT | Mod: 25 | Performed by: NURSE PRACTITIONER

## 2023-08-09 NOTE — PROGRESS NOTES
"[facility]  [place] Medical Record Number:  [id number]  Place of Service where encounter took place:  [facility] [[id number]]  Chief Complaint   Patient presents with    RECHECK     Routine    Wellness Visit       HPI:    Maryam Saavedra  is a 89 year old (6/9/1932), who is being seen today for an episodic care visit.  HPI information obtained from: facility chart records, facility staff, patient report, and [facility] chart review. Today's concern is:    Seen today for follow up to chronic conditions. She WC ambulates and sustained a skin tear to left arm a few days ago (hit the door). She was started on daily dose of zyprexa for agitation, sundowning behaviors. Prior to start she had a fall with attempts to self transfer. Appears back to baseline. Does have some chronic shoulder pain (WC ambulation could be contributor). Was to start icy-hot, one hold waiting for supply? VSS, some small weight loss. Calcium elevated, will stop D for now and recheck labs. Seen in community room and she is frustrated with needing more assistance for ADLs. Calm and pleasant but \"hates asking for more help\". No acute concerns reported.      Past Medical and Surgical History reviewed in Epic today.    MEDICATIONS:    Current Outpatient Medications   Medication Sig Dispense Refill    OLANZapine (ZYPREXA) 2.5 MG tablet Take 2.5 mg by mouth 2 times daily At 1:45pm      acetaminophen (TYLENOL) 325 MG tablet TAKE 2 TABLETS (650MG) BY MOUTH TWICE DAILY 112 tablet 97    senna-docusate (SENEXON-S) 8.6-50 MG tablet TAKE ONE TABLET BY MOUTH THREE TIMES PER WEEK AT BEDTIME 12 tablet 97    THERATEARS 0.25 % SOLN INSTILL ONE DROP IN EACH EYE TWICE DAILY FOR DRY EYES 15 mL 97     REVIEW OF SYSTEMS:  Limited secondary to cognitive impairment but today pt reports ok    Objective:  /76   Pulse 80   Temp 97.3  F (36.3  C)   Resp 16   Wt 50.8 kg (112 lb)   SpO2 97%   BMI 21.16 kg/m  "   Exam:  GENERAL APPEARANCE:  Alert, cooperative  ENT:  Mouth and posterior oropharynx normal, moist mucous membranes, [place]  RESP:  respiratory effort and palpation of chest normal, lungs clear to auscultation   CV:  Palpation and auscultation of heart done , regular rate and rhythm, no murmur  ABDOMEN:  normal bowel sounds, soft, nontender  M/S:   BLE weakness baseline ambulating with wc, walker and SBA for short distance, slight drag of feet, bent knees, stance with transfers, 1+ to lower extremities and right knee. Tubi    SKIN: limited exam. Intact to visualized. Unable to view skin tear  NEURO:   Cranial nerves 2-12 are normal tested and grossly at patient's baseline  PSYCH:  insight and judgement impaired, memory impaired , affect and mood normal    Labs:   CBC RESULTS:   Recent Labs   Lab Test 02/09/23  0706 02/10/22  0545   WBC 5.2 5.7   RBC 3.78* 4.08   HGB 11.0* 11.6*   HCT 34.2* 37.4   MCV 91 92   MCH 29.1 28.4   MCHC 32.2 31.0*   RDW 13.8 14.5    291       Last Basic Metabolic Panel:  Recent Labs   Lab Test 04/13/23  0800 02/09/23  0706    142   POTASSIUM 4.9 3.9   CHLORIDE 105 104   NOHEMY 10.0* 9.5   CO2 30* 28   BUN 25.3* 36.5*   CR 0.91 0.88   GLC 92 73       Liver Function Studies -   Recent Labs   Lab Test 02/10/22  0545 01/06/21  0000   PROTTOTAL 6.7* 7.2   ALBUMIN 2.8* 2.8*   BILITOTAL 0.3 0.5   ALKPHOS 163* 220*   AST 19 23   ALT 22 31       TSH   Date Value Ref Range Status   02/09/2023 3.68 0.30 - 4.20 uIU/mL Final   02/10/2022 2.96 0.40 - 4.00 mU/L Final   01/06/2021 5.73 (A) 0.40 - 4.00 mU/L Final   11/25/2017 4.20 (H) 0.40 - 4.00 mU/L Final       Lab Results   Component Value Date    A1C 5.6 07/13/2018    A1C 5.9 05/14/2018       ASSESSMENT/PLAN:  (S41.112S) Skin tear of left upper arm without complication, sequela  (primary encounter diagnosis)  Comment: no s/s of infection  Plan:   -wound care per protocol until healed    (F01.53) Vascular dementia with depressed mood  "(H)  Comment: some hallucination and increased confusion noted with yelling and swearing at staff  Plan:   -zyprexa increased to BID  -staff assist as needed for ADLs    (M15.9) Primary osteoarthritis involving multiple joints  Comment: chronic   Plan:   -Tylenol   -Icy Hot for shoulder on hold. May not use according to staff  -WC ambulation for distance     (R60.0) Bilateral lower extremity edema  Comment: chronic   Plan:   -should have compression on daily  -encouraged to elevated while in recliner    (I10) Benign essential hypertension  Comment: stable   Plan:   -mostly WNL for geriatrics   -monitor per facility protocol, during visits and acute changes    (E83.52) Hypercalcemia  Comment: per labs  Plan:   -discontinuing vitamin D for now  -follow up labs in 5 weeks      Electronically signed by:  JACKIE Zayas CNP     Annual Wellness Visit    Are you in the first 12 months of your Medicare Part B coverage?  No    Physical Health:  In general, how would you rate your overall physical health? fair  Outside of work, how many days during the week do you exercise? Doesn't work  Outside of work, approximately how many minutes a day do you exercise?30-45 minutes  If you drink alcohol do you typically have >3 drinks per day or >7 drinks per week? Not Applicable  Do you usually eat at least 4 servings of fruit and vegetables a day, include whole grains & fiber and avoid regularly eating high fat or \"junk\" foods? Yes  Do you have any problems taking medications regularly? No  Do you have any side effects from medications? none  Needs assistance for the following daily activities: transportation, shopping, preparing meals, housework, laundry, money management, and taking medicine  Which of the following safety concerns are present in your home?  none identified   Hearing impairment: Yes, Need to ask people to speak up or repeat themselves.  In the past 6 months, have you been bothered by leaking of urine? Needs " assist of 1 for safe toileting   Checklist for the next 5 to 10 years, as appropriate -none        Risk factors and conditions for which interventions are recommended or underway : fall risk        Personalized health advice and a referral, as appropriate, to health education or preventive counseling services or programs -none      Current Opioid prescriptions: N/A      Screen for potential substance use disorder: N/A     Mental Health:  In general, how would you rate your overall mental or emotional health? fair    PHQ-2 Score:         8/11/2023     7:50 AM 8/26/2021    11:30 AM   PHQ-2 ( 1999 [employer])   Q1: Little interest or pleasure in doing things 0 0   Q2: Feeling down, depressed or hopeless 1 0   PHQ-2 Score 1 0   PHQ-2 Total Score (12-17 Years)- Positive if 3 or more points; Administer PHQ-A if positive  0        Do you feel safe in your environment? Yes    Have you ever done Advance Care Planning? (For example, a Health Directive, POLST, or a discussion with a medical provider or your loved ones about your wishes)? Yes, advance care planning is on file.    Fall risk:  Fall risk with attempts for self transfers    Cognitive Screening: JUNE 12/30  Current providers sharing in care for this patient include:   Patient Care Team:  Sandro Canseco APRN CNP as PCP - General (Internal Medicine)  Georgie Bae MD as MD (Internal Medicine)  Jennifer Gooden as Nursing Assistant (Geriatric Medicine)  Sandro Canseco APRN CNP as Assigned PCP  ([facility]), Sindy [facility] Danii Montaño, RN as Lead Care Coordinator (Primary Care - CC)  Amber Olivera as Case Management Specialist    JACKIE Zayas CNP

## 2023-08-09 NOTE — LETTER
Maryam Saavedra orders:    Discontinue Vitamin D  BMP, CBC, LFTS in 5 weeks on lab day for HTN/Hypercalcemia

## 2023-08-09 NOTE — LETTER
"    8/9/2023        RE: Maryam Saavedra  C/o Saray Saavedra  [address]   [facility] [address]        [facility]  [facility] Medical Record Number:  [id number]  Place of Service where encounter took place:  [facility] [[id number]]  Chief Complaint   Patient presents with     RECHECK     Routine     Wellness Visit       HPI:    Maryam Saavedra  is a 89 year old (6/9/1932), who is being seen today for an episodic care visit.  HPI information obtained from: facility chart records, facility staff, patient report, and [facility] chart review. Today's concern is:    Seen today for follow up to chronic conditions. She WC ambulates and sustained a skin tear to left arm a few days ago (hit the door). She was started on daily dose of zyprexa for agitation, sundowning behaviors. Prior to start she had a fall with attempts to self transfer. Appears back to baseline. Does have some chronic shoulder pain (WC ambulation could be contributor). Was to start icy-hot, one hold waiting for supply? VSS, some small weight loss. Calcium elevated, will stop D for now and recheck labs. Seen in community room and she is frustrated with needing more assistance for ADLs. Calm and pleasant but \"hates asking for more help\". No acute concerns reported.      Past Medical and Surgical History reviewed in Epic today.    MEDICATIONS:    Current Outpatient Medications   Medication Sig Dispense Refill     OLANZapine (ZYPREXA) 2.5 MG tablet Take 2.5 mg by mouth 2 times daily At 1:45pm       acetaminophen (TYLENOL) 325 MG tablet TAKE 2 TABLETS (650MG) BY MOUTH TWICE DAILY 112 tablet 97     senna-docusate (SENEXON-S) 8.6-50 MG tablet TAKE ONE TABLET BY MOUTH THREE TIMES PER WEEK AT BEDTIME 12 tablet 97     THERATEARS 0.25 % SOLN INSTILL ONE DROP IN EACH EYE TWICE DAILY FOR DRY EYES 15 mL 97     REVIEW OF SYSTEMS:  Limited secondary to cognitive impairment but today pt reports ok    Objective:  /76   " Pulse 80   Temp 97.3  F (36.3  C)   Resp 16   Wt 50.8 kg (112 lb)   SpO2 97%   BMI 21.16 kg/m    Exam:  GENERAL APPEARANCE:  Alert, cooperative  ENT:  Mouth and posterior oropharynx normal, moist mucous membranes, [place]  RESP:  respiratory effort and palpation of chest normal, lungs clear to auscultation   CV:  Palpation and auscultation of heart done , regular rate and rhythm, no murmur  ABDOMEN:  normal bowel sounds, soft, nontender  M/S:   BLE weakness baseline ambulating with wc, walker and SBA for short distance, slight drag of feet, bent knees, stance with transfers, 1+ to lower extremities and right knee. Tubi    SKIN: limited exam. Intact to visualized. Unable to view skin tear  NEURO:   Cranial nerves 2-12 are normal tested and grossly at patient's baseline  PSYCH:  insight and judgement impaired, memory impaired , affect and mood normal    Labs:   CBC RESULTS:   Recent Labs   Lab Test 02/09/23  0706 02/10/22  0545   WBC 5.2 5.7   RBC 3.78* 4.08   HGB 11.0* 11.6*   HCT 34.2* 37.4   MCV 91 92   MCH 29.1 28.4   MCHC 32.2 31.0*   RDW 13.8 14.5    291       Last Basic Metabolic Panel:  Recent Labs   Lab Test 04/13/23  0800 02/09/23  0706    142   POTASSIUM 4.9 3.9   CHLORIDE 105 104   NOHEMY 10.0* 9.5   CO2 30* 28   BUN 25.3* 36.5*   CR 0.91 0.88   GLC 92 73       Liver Function Studies -   Recent Labs   Lab Test 02/10/22  0545 01/06/21  0000   PROTTOTAL 6.7* 7.2   ALBUMIN 2.8* 2.8*   BILITOTAL 0.3 0.5   ALKPHOS 163* 220*   AST 19 23   ALT 22 31       TSH   Date Value Ref Range Status   02/09/2023 3.68 0.30 - 4.20 uIU/mL Final   02/10/2022 2.96 0.40 - 4.00 mU/L Final   01/06/2021 5.73 (A) 0.40 - 4.00 mU/L Final   11/25/2017 4.20 (H) 0.40 - 4.00 mU/L Final       Lab Results   Component Value Date    A1C 5.6 07/13/2018    A1C 5.9 05/14/2018       ASSESSMENT/PLAN:  (S41.112S) Skin tear of left upper arm without complication, sequela  (primary encounter diagnosis)  Comment: no s/s of  "infection  Plan:   -wound care per protocol until healed    (F01.53) Vascular dementia with depressed mood (H)  Comment: some hallucination and increased confusion noted with yelling and swearing at staff  Plan:   -zyprexa increased to BID  -staff assist as needed for ADLs    (M15.9) Primary osteoarthritis involving multiple joints  Comment: chronic   Plan:   -Tylenol   -Icy Hot for shoulder on hold. May not use according to staff  -WC ambulation for distance     (R60.0) Bilateral lower extremity edema  Comment: chronic   Plan:   -should have compression on daily  -encouraged to elevated while in recliner    (I10) Benign essential hypertension  Comment: stable   Plan:   -mostly WNL for geriatrics   -monitor per facility protocol, during visits and acute changes    (E83.52) Hypercalcemia  Comment: per labs  Plan:   -discontinuing vitamin D for now  -follow up labs in 5 weeks      Electronically signed by:  JACKIE Zayas CNP     Annual Wellness Visit    Are you in the first 12 months of your Medicare Part B coverage?  No    Physical Health:  In general, how would you rate your overall physical health? fair  Outside of work, how many days during the week do you exercise? Doesn't work  Outside of work, approximately how many minutes a day do you exercise?30-45 minutes  If you drink alcohol do you typically have >3 drinks per day or >7 drinks per week? Not Applicable  Do you usually eat at least 4 servings of fruit and vegetables a day, include whole grains & fiber and avoid regularly eating high fat or \"junk\" foods? Yes  Do you have any problems taking medications regularly? No  Do you have any side effects from medications? none  Needs assistance for the following daily activities: transportation, shopping, preparing meals, housework, laundry, money management, and taking medicine  Which of the following safety concerns are present in your home?  none identified   Hearing impairment: Yes, Need to ask people to " speak up or repeat themselves.  In the past 6 months, have you been bothered by leaking of urine? Needs assist of 1 for safe toileting     Mental Health:  In general, how would you rate your overall mental or emotional health? fair    PHQ-2 Score:         8/11/2023     7:50 AM 8/26/2021    11:30 AM   PHQ-2 ( 1999 [employer])   Q1: Little interest or pleasure in doing things 0 0   Q2: Feeling down, depressed or hopeless 1 0   PHQ-2 Score 1 0   PHQ-2 Total Score (12-17 Years)- Positive if 3 or more points; Administer PHQ-A if positive  0        Do you feel safe in your environment? Yes    Have you ever done Advance Care Planning? (For example, a Health Directive, POLST, or a discussion with a medical provider or your loved ones about your wishes)? Yes, advance care planning is on file.    Fall risk:  Fall risk with attempts for self transfers    Cognitive Screening: JUNE 12/30  Current providers sharing in care for this patient include:   Patient Care Team:  Sandro Canseco APRN CNP as PCP - General (Internal Medicine)  Georgie Bae MD as MD (Internal Medicine)  Jennifer Gooden as Nursing Assistant (Geriatric Medicine)  Sandro Canseco APRN CNP as Assigned PCP  ([facility]), Sindy Danii Buckley, RN as Lead Care Coordinator (Primary Care - CC)  Amber Olivera as Case Management Specialist    JACKIE Zayas CNP                            Sincerely,        JACKIE Zayas CNP

## 2023-08-14 ENCOUNTER — PATIENT OUTREACH (OUTPATIENT)
Dept: GERIATRIC MEDICINE | Facility: CLINIC | Age: 88
End: 2023-08-14
Payer: COMMERCIAL

## 2023-08-14 NOTE — PROGRESS NOTES
[facility] Care Coordination Contact      [facility] Six-Month Telephone Assessment    6 month telephone assessment completed on 8/14/2023.  Epic notes reviewed. E-mail sent to Maile OLIVEIRA and Yessi OLIVEIRA at [place] [address] to inquire on how member is doing and if there are any changes.     Rec'd vm from member's daughter Saray on 8/11/2023 to report that she rec'd this CC message and wanted to CC to know that her mother is out of briefs and would like them to be ordered. Rec'd vm from Saray on 8/13/23 to report that her mother rec'd 6 packages of briefs over the weekend.    Rec'd e-mail from Maile to report members confusion and agitation had increased, started on Zyprexa.  Maile reports member's agitation needs more time to redirect and reassure. Maile shared that member's posture in her w/c has changes, stating that member can no longer sit upright, propelling herself in the w/c is also getting difficult. Maile shared that member is afraid of falling which has made her one transfer difficult, but no falls. Per Maile no changes have been made to the plan of care as of now.     CC reviewed POC and RS tool. E-mail sent to Maile with a copy of the current RS POC and inquired on ADL dependencies, stating that CC is available to schedule an early assessment due to changes.   Rec'd follow up e-mail from Maile reporting that member is a set up/reminders with dentures, oral care and glasses. Physical assist of 1 for toileting, grooming, bed mobility, and showers. Cues stand by for transfers, per Maile this is difficult because member does not trust anyone to give her hands on help. Maile reports member receives help with cutting up her food.   Per Maile, the changes can be added with her next assessment.  Note RS tool has time for dressing, grooming, bathing, toileting, bathing, transfers and behaviors.    Call placed to member's daughter Saray. Explained that CC did receive her message,  "explained that she should call this CC if she has concerns regarding supplies. Reviewed above information with Saray, she shared that she does not believe that there has been a significant change, she is aware of the Zyprexa being added and feels the changes in behavior are related to her being moved to a new room, \"but  not major care changes.\"   Explained that CC is available to complete a new assessment, request a follow up call as needed.       ER visits: No  Hospitalizations: No  TCU stays: No  Significant health status changes: Note behavior changes above  Falls/Injuries: No  ADL/IADL changes: No  Changes in services: No    Caregiver Assessment follow up:  NA    Goals: See POC in chart for goal progress documentation.      Will see member in 6 months for an annual health risk assessment.   Encouraged member to call CC with any questions or concerns in the meantime.   Thuy Reeder RN, BC  Manager [facility] Care Coordinator   [phone]  (Fax)        "

## 2023-08-21 ASSESSMENT — ACTIVITIES OF DAILY LIVING (ADL): CURRENT_FUNCTION: NEEDS ASSISTANCE

## 2023-08-22 NOTE — PROGRESS NOTES
"[facility]  [facility] Medical Record Number:  [id number]  Place of Service where encounter took place:  [facility] [[id number]]  Chief Complaint   Patient presents with    RECHECK     Increased behaviors       HPI:    Maryam Saavedra  is a 89 year old (6/9/1932), who is being seen today for an episodic care visit.  HPI information obtained from: facility staff. Today's concern is:    Seen today for increased behaviors per staff. She appears more confused and slightly sedated. She is leaning to the right in her WC and is unable to sit straight. Daughter agrees that with increased Zyprexa on 8/9/23 s/s have increased. Maryam denies any acute issues today. She does not like her new room and having to share a bathroom. Urine was negative last month but does have hx of UTI.      Past Medical and Surgical History reviewed in Epic today.    MEDICATIONS:    Current Outpatient Medications   Medication Sig Dispense Refill    acetaminophen (TYLENOL) 325 MG tablet TAKE 2 TABLETS (650MG) BY MOUTH TWICE DAILY 112 tablet 97    OLANZapine (ZYPREXA) 2.5 MG tablet Take 2.5 mg by mouth daily      senna-docusate (SENEXON-S) 8.6-50 MG tablet TAKE ONE TABLET BY MOUTH THREE TIMES PER WEEK AT BEDTIME 12 tablet 97    THERATEARS 0.25 % SOLN INSTILL ONE DROP IN EACH EYE TWICE DAILY FOR DRY EYES 15 mL 97     REVIEW OF SYSTEMS:  Limited secondary to cognitive impairment but today pt reports ok, I guess     Objective:  /80   Pulse 58   Temp 97.3  F (36.3  C)   Resp 15   Ht 1.549 m (5' 1\")   Wt 51 kg (112 lb 6.4 oz)   SpO2 97%   BMI 21.24 kg/m    Exam:  GENERAL APPEARANCE:  Alert, cooperative, sedated  ENT:  Mouth and posterior oropharynx normal, moist mucous membranes, [place]  RESP:  respiratory effort and palpation of chest normal, lungs clear to auscultation   CV:  Palpation and auscultation of heart done , regular rate and rhythm, no murmur  ABDOMEN:  normal bowel sounds, soft, " nontender  M/S:   BLE weakness baseline ambulating with wc, walker and SBA for short distance, slight drag of feet, bent knees, stance with transfers, 1+ to lower extremities and right knee. Tubi  , leaning right in WC  SKIN: limited exam. Intact to visualized. skin tear on arm healing   NEURO:   Cranial nerves 2-12 are normal tested and grossly at patient's baseline  PSYCH:  insight and judgement impaired, memory impaired , affect and mood normal     Labs:   CBC RESULTS:   Recent Labs   Lab Test 02/09/23  0706 02/10/22  0545   WBC 5.2 5.7   RBC 3.78* 4.08   HGB 11.0* 11.6*   HCT 34.2* 37.4   MCV 91 92   MCH 29.1 28.4   MCHC 32.2 31.0*   RDW 13.8 14.5    291       Last Basic Metabolic Panel:  Recent Labs   Lab Test 04/13/23  0800 02/09/23  0706    142   POTASSIUM 4.9 3.9   CHLORIDE 105 104   NOHEMY 10.0* 9.5   CO2 30* 28   BUN 25.3* 36.5*   CR 0.91 0.88   GLC 92 73       Liver Function Studies -   Recent Labs   Lab Test 02/10/22  0545 01/06/21  0000   PROTTOTAL 6.7* 7.2   ALBUMIN 2.8* 2.8*   BILITOTAL 0.3 0.5   ALKPHOS 163* 220*   AST 19 23   ALT 22 31       TSH   Date Value Ref Range Status   02/09/2023 3.68 0.30 - 4.20 uIU/mL Final   02/10/2022 2.96 0.40 - 4.00 mU/L Final   01/06/2021 5.73 (A) 0.40 - 4.00 mU/L Final   11/25/2017 4.20 (H) 0.40 - 4.00 mU/L Final       Lab Results   Component Value Date    A1C 5.6 07/13/2018    A1C 5.9 05/14/2018     ASSESSMENT/PLAN:  (F01.53) Vascular dementia with depressed mood (H)  Comment: some hallucination and increased confusion noted with yelling and swearing at staff  Plan:   -zyprexa increased to BID reduced to once daily with increased sedation  -staff assist as needed for ADLs  -consider depakote or prn gabapentin if behaviors continue  -check UA/UC          Electronically signed by:  JACKIE Zayas CNP

## 2023-08-23 ENCOUNTER — ASSISTED LIVING VISIT (OUTPATIENT)
Dept: GERIATRICS | Facility: CLINIC | Age: 88
End: 2023-08-23
Payer: MEDICARE

## 2023-08-23 DIAGNOSIS — F01.53 VASCULAR DEMENTIA WITH DEPRESSED MOOD (H): Primary | ICD-10-CM

## 2023-08-23 PROCEDURE — 99349 HOME/RES VST EST MOD MDM 40: CPT | Performed by: NURSE PRACTITIONER

## 2023-08-23 NOTE — LETTER
"    8/23/2023        RE: Maryam Saavedra  C/o Saray Saavedra  [address]   [facility] [address]        [facility]  [place] Medical Record Number:  [id number]  Place of Service where encounter took place:  [facility] [[id number]]  Chief Complaint   Patient presents with     RECHECK     Increased behaviors       HPI:    Maryam Saavedra  is a 89 year old (6/9/1932), who is being seen today for an episodic care visit.  HPI information obtained from: facility staff. Today's concern is:    Seen today for increased behaviors per staff. She appears more confused and slightly sedated. She is leaning to the right in her WC and is unable to sit straight. Daughter agrees that with increased Zyprexa on 8/9/23 s/s have increased. Maryam denies any acute issues today. She does not like her new room and having to share a bathroom. Urine was negative last month but does have hx of UTI.      Past Medical and Surgical History reviewed in Epic today.    MEDICATIONS:    Current Outpatient Medications   Medication Sig Dispense Refill     acetaminophen (TYLENOL) 325 MG tablet TAKE 2 TABLETS (650MG) BY MOUTH TWICE DAILY 112 tablet 97     OLANZapine (ZYPREXA) 2.5 MG tablet Take 2.5 mg by mouth daily       senna-docusate (SENEXON-S) 8.6-50 MG tablet TAKE ONE TABLET BY MOUTH THREE TIMES PER WEEK AT BEDTIME 12 tablet 97     THERATEARS 0.25 % SOLN INSTILL ONE DROP IN EACH EYE TWICE DAILY FOR DRY EYES 15 mL 97     REVIEW OF SYSTEMS:  Limited secondary to cognitive impairment but today pt reports ok, I guess     Objective:  /80   Pulse 58   Temp 97.3  F (36.3  C)   Resp 15   Ht 1.549 m (5' 1\")   Wt 51 kg (112 lb 6.4 oz)   SpO2 97%   BMI 21.24 kg/m    Exam:  GENERAL APPEARANCE:  Alert, cooperative, sedated  ENT:  Mouth and posterior oropharynx normal, moist mucous membranes, [place]  RESP:  respiratory effort and palpation of chest normal, lungs clear to auscultation   CV:  Palpation and " auscultation of heart done , regular rate and rhythm, no murmur  ABDOMEN:  normal bowel sounds, soft, nontender  M/S:   BLE weakness baseline ambulating with wc, walker and SBA for short distance, slight drag of feet, bent knees, stance with transfers, 1+ to lower extremities and right knee. Tubi  , leaning right in WC  SKIN: limited exam. Intact to visualized. skin tear on arm healing   NEURO:   Cranial nerves 2-12 are normal tested and grossly at patient's baseline  PSYCH:  insight and judgement impaired, memory impaired , affect and mood normal     Labs:   CBC RESULTS:   Recent Labs   Lab Test 02/09/23  0706 02/10/22  0545   WBC 5.2 5.7   RBC 3.78* 4.08   HGB 11.0* 11.6*   HCT 34.2* 37.4   MCV 91 92   MCH 29.1 28.4   MCHC 32.2 31.0*   RDW 13.8 14.5    291       Last Basic Metabolic Panel:  Recent Labs   Lab Test 04/13/23  0800 02/09/23  0706    142   POTASSIUM 4.9 3.9   CHLORIDE 105 104   NOHEMY 10.0* 9.5   CO2 30* 28   BUN 25.3* 36.5*   CR 0.91 0.88   GLC 92 73       Liver Function Studies -   Recent Labs   Lab Test 02/10/22  0545 01/06/21  0000   PROTTOTAL 6.7* 7.2   ALBUMIN 2.8* 2.8*   BILITOTAL 0.3 0.5   ALKPHOS 163* 220*   AST 19 23   ALT 22 31       TSH   Date Value Ref Range Status   02/09/2023 3.68 0.30 - 4.20 uIU/mL Final   02/10/2022 2.96 0.40 - 4.00 mU/L Final   01/06/2021 5.73 (A) 0.40 - 4.00 mU/L Final   11/25/2017 4.20 (H) 0.40 - 4.00 mU/L Final       Lab Results   Component Value Date    A1C 5.6 07/13/2018    A1C 5.9 05/14/2018     ASSESSMENT/PLAN:  (F01.53) Vascular dementia with depressed mood (H)  Comment: some hallucination and increased confusion noted with yelling and swearing at staff  Plan:   -zyprexa increased to BID reduced to once daily with increased sedation  -staff assist as needed for ADLs  -consider depakote or prn gabapentin if behaviors continue  -check UA/UC          Electronically signed by:  JACKIE Zayas CNP             Sincerely,        Sandro MILLER  JACKIE Canseco CNP

## 2023-08-26 ENCOUNTER — LAB REQUISITION (OUTPATIENT)
Dept: LAB | Facility: CLINIC | Age: 88
End: 2023-08-26
Payer: MEDICARE

## 2023-08-26 DIAGNOSIS — N39.0 URINARY TRACT INFECTION, SITE NOT SPECIFIED: ICD-10-CM

## 2023-08-26 LAB
ALBUMIN UR-MCNC: 10 MG/DL
APPEARANCE UR: CLEAR
BILIRUB UR QL STRIP: NEGATIVE
COLOR UR AUTO: YELLOW
GLUCOSE UR STRIP-MCNC: NEGATIVE MG/DL
HGB UR QL STRIP: NEGATIVE
HYALINE CASTS: 1 /LPF
KETONES UR STRIP-MCNC: NEGATIVE MG/DL
LEUKOCYTE ESTERASE UR QL STRIP: ABNORMAL
MUCOUS THREADS #/AREA URNS LPF: PRESENT /LPF
NITRATE UR QL: NEGATIVE
PH UR STRIP: 6 [PH] (ref 5–7)
RBC URINE: 1 /HPF
SP GR UR STRIP: 1.03 (ref 1–1.03)
SQUAMOUS EPITHELIAL: 2 /HPF
UROBILINOGEN UR STRIP-MCNC: NORMAL MG/DL
WBC URINE: 28 /HPF

## 2023-08-26 PROCEDURE — 87086 URINE CULTURE/COLONY COUNT: CPT | Mod: ORL | Performed by: NURSE PRACTITIONER

## 2023-08-26 PROCEDURE — 81001 URINALYSIS AUTO W/SCOPE: CPT | Mod: ORL | Performed by: NURSE PRACTITIONER

## 2023-08-27 VITALS
WEIGHT: 112.4 LBS | RESPIRATION RATE: 15 BRPM | BODY MASS INDEX: 21.22 KG/M2 | SYSTOLIC BLOOD PRESSURE: 130 MMHG | TEMPERATURE: 97.3 F | HEART RATE: 58 BPM | OXYGEN SATURATION: 97 % | HEIGHT: 61 IN | DIASTOLIC BLOOD PRESSURE: 80 MMHG

## 2023-08-28 ENCOUNTER — PATIENT OUTREACH (OUTPATIENT)
Dept: GERIATRIC MEDICINE | Facility: CLINIC | Age: 88
End: 2023-08-28
Payer: COMMERCIAL

## 2023-08-28 LAB — BACTERIA UR CULT: NORMAL

## 2023-08-28 NOTE — PROGRESS NOTES
[facility] Care Coordination Contact    Rec'd vm from member's daughter Saray requesting a return call.  Call placed to Saray, she stated that [facility] mailed a packet of information for her mother to Sindy. Saray stated she called [facility] to request that all mail be sent to her home and she was told that she would need to be in contact with her mother's financial worker.  Call placed to Saray, shared her mother's financial team, tele number and case number. Saray believes that she is her mother's auth rep because she has rec'd mail from [employer], but will call [facility] to confirm.  Saray stated that the packed from [facility] was about changing her [facility]'s [facility] and she plans to do this.   Thuy Reeder RN, BC  Manager [facility] Care Coordinator   [phone]  (Fax)

## 2023-09-12 ENCOUNTER — PATIENT OUTREACH (OUTPATIENT)
Dept: GERIATRIC MEDICINE | Facility: CLINIC | Age: 88
End: 2023-09-12
Payer: COMMERCIAL

## 2023-09-12 NOTE — PROGRESS NOTES
[facility] Care Coordination Contact    Rec'd tele call from member's daughter Saray inquiring if the monthly supply of incont supplies can be increase. Currently receiving 5 packages per month.  Explained that CC will send email to JOSE DE JESUS to increase to 7 packages per month    Secure email sent to JOSE DE JESUS Reeder RN, BC  Manager [facility] Care Coordinator   [phone]  (Fax)

## 2023-09-13 ENCOUNTER — LAB REQUISITION (OUTPATIENT)
Dept: LAB | Facility: CLINIC | Age: 88
End: 2023-09-13
Payer: MEDICARE

## 2023-09-13 DIAGNOSIS — E83.52 HYPERCALCEMIA: ICD-10-CM

## 2023-09-13 DIAGNOSIS — I10 ESSENTIAL (PRIMARY) HYPERTENSION: ICD-10-CM

## 2023-09-14 LAB
ALBUMIN SERPL BCG-MCNC: 4 G/DL (ref 3.5–5.2)
ALP SERPL-CCNC: 117 U/L (ref 35–104)
ALT SERPL W P-5'-P-CCNC: 14 U/L (ref 0–50)
ANION GAP SERPL CALCULATED.3IONS-SCNC: 15 MMOL/L (ref 7–15)
AST SERPL W P-5'-P-CCNC: 23 U/L (ref 0–45)
BILIRUB DIRECT SERPL-MCNC: <0.2 MG/DL (ref 0–0.3)
BILIRUB SERPL-MCNC: 0.2 MG/DL
BUN SERPL-MCNC: 27.1 MG/DL (ref 8–23)
CALCIUM SERPL-MCNC: 9.8 MG/DL (ref 8.2–9.6)
CHLORIDE SERPL-SCNC: 102 MMOL/L (ref 98–107)
CREAT SERPL-MCNC: 0.81 MG/DL (ref 0.51–0.95)
DEPRECATED HCO3 PLAS-SCNC: 25 MMOL/L (ref 22–29)
EGFRCR SERPLBLD CKD-EPI 2021: 68 ML/MIN/1.73M2
ERYTHROCYTE [DISTWIDTH] IN BLOOD BY AUTOMATED COUNT: 13.9 % (ref 10–15)
GLUCOSE SERPL-MCNC: 149 MG/DL (ref 70–99)
HCT VFR BLD AUTO: 39.2 % (ref 35–47)
HGB BLD-MCNC: 12.2 G/DL (ref 11.7–15.7)
MCH RBC QN AUTO: 29 PG (ref 26.5–33)
MCHC RBC AUTO-ENTMCNC: 31.1 G/DL (ref 31.5–36.5)
MCV RBC AUTO: 93 FL (ref 78–100)
PLATELET # BLD AUTO: 252 10E3/UL (ref 150–450)
POTASSIUM SERPL-SCNC: 4.1 MMOL/L (ref 3.4–5.3)
PROT SERPL-MCNC: 7.4 G/DL (ref 6.4–8.3)
RBC # BLD AUTO: 4.2 10E6/UL (ref 3.8–5.2)
SODIUM SERPL-SCNC: 142 MMOL/L (ref 136–145)
WBC # BLD AUTO: 5.4 10E3/UL (ref 4–11)

## 2023-09-14 PROCEDURE — 82248 BILIRUBIN DIRECT: CPT | Mod: ORL | Performed by: NURSE PRACTITIONER

## 2023-09-14 PROCEDURE — P9604 ONE-WAY ALLOW PRORATED TRIP: HCPCS | Mod: ORL | Performed by: NURSE PRACTITIONER

## 2023-09-14 PROCEDURE — 85027 COMPLETE CBC AUTOMATED: CPT | Mod: ORL | Performed by: NURSE PRACTITIONER

## 2023-09-14 PROCEDURE — 36415 COLL VENOUS BLD VENIPUNCTURE: CPT | Mod: ORL | Performed by: NURSE PRACTITIONER

## 2023-09-19 NOTE — PROGRESS NOTES
[facility] Care Coordination Contact    Rec'd email from Angely at [facility] to report that they have changed her monthly order to 7 packs per month. Angely shared that the order for this month has already went out, so she placed an order for 2 additional packs to go out next week.    Call placed to member's daughter Saray left vm to update.  Thuy Reeder RN, BC  Manager [facility] Care Coordinator   [phone]  (Fax)

## 2023-10-06 ENCOUNTER — PATIENT OUTREACH (OUTPATIENT)
Dept: GERIATRIC MEDICINE | Facility: CLINIC | Age: 88
End: 2023-10-06
Payer: COMMERCIAL

## 2023-10-06 NOTE — LETTER
October 6, 2023    MARYAM CARMONA  C/O PAULETTE CARMONA  [address]  [facility] [address]      Dear Maryam,    Welcome to [facility] health program. My name is Thuy Reeder RN. I am your [facility] care coordinator. You're eligible for care coordination through your [facility] Product.    As your care coordinator, we ll:  Meet to go over your care coordination benefits  Talk about your physical and mental health care needs   Review your preventative care needs  Create a plan that meets your needs with the services you choose    What happens next?  I ll call you soon to introduce myself and tell you more about my role. We ll then plan time to go over your health and safety needs. Our goal is to keep you as healthy and independent as possible.    [facility] combines the benefits you may already have receive from Medical Assistance, Medicare and the Prescription Drug Coverage Program. Soon you'll receive a new member identification (ID) card from [facility]. Use this card whenever you get health services.    The [facility] care coordination program is voluntary and offered to you at no cost. If you wish to stop being in the care coordination program or have questions, call me at [phone]. If you reach my voicemail, leave a message and your phone number. TTY users, call the [place] Rel[employer] at 009 or 1-899.118.8524 (rsdbip-ez-gbonfe relay service).    Sincerely,    Thuy Reeder RN    E-mail: [email or web address]  Phone: [phone]      [facility] ([facility]) is a health plan that contracts with both Medicare and the [place] Medical Assistance (Medicaid) program to provide benefits of both programs to enrollees. Enrollment in [facility] depends on contract renewal.    [id number] accepted   [id number]         [id number] (07/2022)

## 2023-10-06 NOTE — PROGRESS NOTES
[facility] Care Coordination Contact    Member changed health plan products from [facility] MSC[facility] to [facility] effective 10/01/2023. CC to complete items on Product Change/ Health Plan Change check list including MMIS entry. CMS [place] and other required tasks.    Keily Duque  Case Management Specialist  [facility]  [phone]

## 2023-10-25 ENCOUNTER — PATIENT OUTREACH (OUTPATIENT)
Dept: GERIATRIC MEDICINE | Facility: CLINIC | Age: 88
End: 2023-10-25
Payer: COMMERCIAL

## 2023-10-25 NOTE — PROGRESS NOTES
[facility] Care Coordination Contact      [facility] Health Plan or Product Change    CC received notification that member's health plan or health plan product changed from [employer] MSC+ to [employer] MSHO effective 10/1/2023.  10/23/23 CC contacted member's daughter Saray and discussed change, she stated that this was the plan and she is aware.  CC reviewed previous Health Risk Assessment/LTCC and POC with Saray and no changes noted.    Transitional HRA completed. Care Plan Summary updated and reflects current services.  Required referral authorization information communicated to CMS: No  Writer reviewed the following with member:  ER visits: No  Hospitalizations: No  TCU stays: No  Significant health status changes: None reported.   Falls/Injuries: No  ADL/IADL changes: No  Changes in services: No    Explained to Saray that CC will be reaching out to her end of November to schedule annual EW assessment in December.     Follow-Up Plan: Member informed of future contact, plan to f/u with member with at next regularly scheduled contact.  Contact information shared with member and family, encouraged member to call with any questions or concerns.  Thuy Reeder RN, BC  Manager [facility] Care Coordinator   [phone]  (Fax)

## 2023-11-07 DIAGNOSIS — F01.53 VASCULAR DEMENTIA WITH DEPRESSED MOOD (H): Primary | ICD-10-CM

## 2023-11-07 RX ORDER — OLANZAPINE 5 MG/1
TABLET ORAL
Qty: 90 TABLET | Refills: 97 | Status: SHIPPED | OUTPATIENT
Start: 2023-11-07

## 2023-11-08 ENCOUNTER — ASSISTED LIVING VISIT (OUTPATIENT)
Dept: GERIATRICS | Facility: CLINIC | Age: 88
End: 2023-11-08
Payer: COMMERCIAL

## 2023-11-08 VITALS
HEIGHT: 61 IN | WEIGHT: 118.2 LBS | TEMPERATURE: 97.8 F | OXYGEN SATURATION: 95 % | SYSTOLIC BLOOD PRESSURE: 140 MMHG | BODY MASS INDEX: 22.31 KG/M2 | DIASTOLIC BLOOD PRESSURE: 76 MMHG | RESPIRATION RATE: 20 BRPM | HEART RATE: 67 BPM

## 2023-11-08 DIAGNOSIS — F01.53 VASCULAR DEMENTIA WITH DEPRESSED MOOD (H): Primary | ICD-10-CM

## 2023-11-08 DIAGNOSIS — N18.32 STAGE 3B CHRONIC KIDNEY DISEASE (H): ICD-10-CM

## 2023-11-08 DIAGNOSIS — I10 BENIGN ESSENTIAL HYPERTENSION: ICD-10-CM

## 2023-11-08 DIAGNOSIS — Z71.85 VACCINE COUNSELING: ICD-10-CM

## 2023-11-08 PROCEDURE — 99349 HOME/RES VST EST MOD MDM 40: CPT | Performed by: NURSE PRACTITIONER

## 2023-11-08 NOTE — LETTER
"    11/8/2023        RE: Maryam Saavedra  C/o Saray Saavedra  [address]   [facility] [address]        [facility]  Chief Complaint   Patient presents with     Annual Comprehensive Exam Assisted Living     [facility] Record Number:  [id number]  Place of Service where encounter took place:  [facility] [[id number]]    HPI:    Maryam Saavedra  is a 89 year old  (6/9/1932), who is being seen today for an annual comprehensive visit. HPI information obtained from: facility chart records, facility staff, and patient report.  Today's concerns are:    Seen today for annual with daughter present. Behaviors improved per staff with Zyprexa, daughter Saray says \"good days and bad days\". Weight mostly stable. VSS. Using a WC for ambulation, no longer walking.     ALLERGIES: Ace inhibitors and Cyclobenzaprine  PAST MEDICAL HISTORY:  has a past medical history of Benign essential hypertension (9/1/2016), Chronic low back pain, Hypertension, Impaired fasting glucose, Osteoarthritis, and Vascular dementia with depressed mood (H) (1/12/2023).    She has no past medical history of Breast cancer (H), Breast mass, Cancer (H), Congestive heart failure (H), COPD (chronic obstructive pulmonary disease) (H), Cyst of breast, Depressive disorder, Diabetes (H), Heart disease, History of blood transfusion, History of gestational diabetes, Inverted nipple, Malignant neoplasm of colon, unspecified site, Malignant neoplasm of corpus uteri, except isthmus (H), Malignant neoplasm of ovary (H), Need for prophylactic hormone replacement therapy (postmenopausal), Other injury of other sites of trunk, Personal history of other disorders of nervous system and sense organs, Personal history of unspecified circulatory disease, Thyroid disease, or Uncomplicated asthma.  PAST SURGICAL HISTORY:  has a past surgical history that includes orthopedic surgery; Eye surgery (cataracts); Mohs micrographic " procedure; Mohs micrographic procedure (Left, 10/27/2016); Mohs micrographic procedure (Right, 5/24/2018); Arthroplasty Hip Anterior (Right, 7/24/2018); and Arthroplasty knee (Left, 5/12/2020).  IMMUNIZATIONS:  Immunization History   Administered Date(s) Administered     COVID-19 Bivalent 12+ (Pfizer) 11/17/2022     COVID-19 MONOVALENT 12+ (Pfizer) 06/22/2022     COVID-19 Monovalent 18+ (Moderna) 01/15/2021, 02/12/2021, 11/23/2021, 06/22/2022     Flu 65+ Years 09/23/2019     Influenza (High Dose) 3 valent vaccine 10/07/2016, 09/28/2017, 10/29/2018     Influenza Vaccine 65+ (Fluzone HD) 10/13/2020, 09/23/2021, 10/05/2022     Pneumo Conj 13-V (2010&after) 10/07/2016     Pneumococcal 23 valent 09/28/2017     TDAP Vaccine (Adacel) 06/26/2013     Above immunizations pulled from [facility]. MIIC and facility records also reconciled. Outstanding information sent to  to update [facility].  Future immunizations needed:  TDAP    Current Outpatient Medications   Medication Sig Dispense Refill     acetaminophen (TYLENOL) 325 MG tablet TAKE 2 TABLETS (650MG) BY MOUTH TWICE DAILY 112 tablet 97     OLANZapine (ZYPREXA) 5 MG tablet TAKE ONE-HALF TABLET (2.5MG) BY MOUTH ONCE DAILY;& MAY TAKE ONE-HALF TABLET (2.5MG) DAILY AS NEEDED 90 tablet 97     senna-docusate (SENEXON-S) 8.6-50 MG tablet TAKE ONE TABLET BY MOUTH THREE TIMES PER WEEK AT BEDTIME 12 tablet 97     Tdap, tetanus-diptheria-acell pertussis, (BOOSTRIX) 5-2.5-18.5 LF-MCG/0.5 GOLD injection Inject 0.5 mLs into the muscle once for 1 dose 0.5 mL 0     THERATEARS 0.25 % SOLN INSTILL ONE DROP IN EACH EYE TWICE DAILY FOR DRY EYES 15 mL 97     OLANZapine (ZYPREXA) 2.5 MG tablet Take 2.5 mg by mouth daily       Case Management:  I have reviewed the Assisted Living care plan, current immunizations and preventive care/cancer screening. .Future cancer screening is not clinically indicated secondary to age/goals of care Patient's desire to return to the  "community is not assessible due to cognitive impairment. Current Level of Care is appropriate.    Advance Directive Discussion:    I reviewed the current advanced directives as reflected in EPIC, the POLST and the facility chart, and verified the congruency of orders. I contacted the first party Saray and discussed the plan of Care.  I did not due to cognitive impairment review the advance directives with the resident.     Team Discussion:  I communicated with the appropriate disciplines involved with the Plan of Care:   Nursing    Patient's goal is pain control and comfort.  Information reviewed:  Medications, vital signs, orders, and nursing notes.    ROS:  Limited secondary to cognitive impairment but today pt reports ok    Vitals:  BP (!) 140/76   Pulse 67   Temp 97.8  F (36.6  C)   Resp 20   Ht 1.549 m (5' 1\")   Wt 53.6 kg (118 lb 3.2 oz)   SpO2 95%   BMI 22.33 kg/m   Body mass index is 22.33 kg/m .  Exam:  GENERAL APPEARANCE:  Alert, cooperative  ENT:  Mouth and posterior oropharynx normal, moist mucous membranes, [place] (ears clear of wax)  RESP:  respiratory effort and palpation of chest normal, lungs clear to auscultation   CV:  Palpation and auscultation of heart done , regular rate and rhythm, no murmur  ABDOMEN:  normal bowel sounds, soft, nontender  M/S:   BLE weakness baseline ambulating with wc,  bent knees, Tubi  , leaning right in WC  SKIN: limited exam. Intact to visualized areas  NEURO:   Cranial nerves 2-12 are normal tested and grossly at patient's baseline  PSYCH:  insight and judgement impaired, memory impaired , affect and mood normal    Lab/Diagnostic data:   CBC RESULTS:   Recent Labs   Lab Test 09/14/23  0929 02/09/23  0706   WBC 5.4 5.2   RBC 4.20 3.78*   HGB 12.2 11.0*   HCT 39.2 34.2*   MCV 93 91   MCH 29.0 29.1   MCHC 31.1* 32.2   RDW 13.9 13.8    269       Last Basic Metabolic Panel:  Recent Labs   Lab Test 09/14/23  0929 04/13/23  0800    145   POTASSIUM 4.1 4.9 "   CHLORIDE 102 105   NOHEMY 9.8* 10.0*   CO2 25 30*   BUN 27.1* 25.3*   CR 0.81 0.91   * 92       Liver Function Studies -   Recent Labs   Lab Test 09/14/23  0929 02/10/22  0545   PROTTOTAL 7.4 6.7*   ALBUMIN 4.0 2.8*   BILITOTAL 0.2 0.3   ALKPHOS 117* 163*   AST 23 19   ALT 14 22       TSH   Date Value Ref Range Status   02/09/2023 3.68 0.30 - 4.20 uIU/mL Final   02/10/2022 2.96 0.40 - 4.00 mU/L Final   01/06/2021 5.73 (A) 0.40 - 4.00 mU/L Final   11/25/2017 4.20 (H) 0.40 - 4.00 mU/L Final       Lab Results   Component Value Date    A1C 5.6 07/13/2018    A1C 5.9 05/14/2018     ASSESSMENT/PLAN  (F01.53) Vascular dementia with depressed mood (H)  (primary encounter diagnosis)  Comment: improved  Plan:   -zyprexa 2.5 mg po daily and prn (used twice in September)  -resides in     (N18.32) Stage 3b chronic kidney disease (H)  Comment: stable   Plan:   -renal dose and avoid nephrotoxins     (Z71.85) Vaccine counseling  Comment: due  Plan:   -Tdap, tetanus-diptheria-acell pertussis,         (BOOSTRIX) 5-2.5-18.5 LF-MCG/0.5 GOLD injection          (I10) Benign essential hypertension  Comment: stable off medication  Plan:   -stable off antihypertensives           Electronically signed by:  JACKIE Zayas CNP         Sincerely,        JACKIE Zayas CNP

## 2023-11-08 NOTE — PROGRESS NOTES
"[facility]  Chief Complaint   Patient presents with    Annual Comprehensive Exam Assisted Living     [facility] Medical Record Number:  [id number]  Place of Service where encounter took place:  [facility] [[id number]]    HPI:    Maryam Saavedra  is a 89 year old  (6/9/1932), who is being seen today for an annual comprehensive visit. HPI information obtained from: facility chart records, facility staff, and patient report.  Today's concerns are:    Seen today for annual with daughter present. Behaviors improved per staff with Zyprexa, daughter Saray says \"good days and bad days\". Weight mostly stable. VSS. Using a WC for ambulation, no longer walking.     ALLERGIES: Ace inhibitors and Cyclobenzaprine  PAST MEDICAL HISTORY:  has a past medical history of Benign essential hypertension (9/1/2016), Chronic low back pain, Hypertension, Impaired fasting glucose, Osteoarthritis, and Vascular dementia with depressed mood (H) (1/12/2023).    She has no past medical history of Breast cancer (H), Breast mass, Cancer (H), Congestive heart failure (H), COPD (chronic obstructive pulmonary disease) (H), Cyst of breast, Depressive disorder, Diabetes (H), Heart disease, History of blood transfusion, History of gestational diabetes, Inverted nipple, Malignant neoplasm of colon, unspecified site, Malignant neoplasm of corpus uteri, except isthmus (H), Malignant neoplasm of ovary (H), Need for prophylactic hormone replacement therapy (postmenopausal), Other injury of other sites of trunk, Personal history of other disorders of nervous system and sense organs, Personal history of unspecified circulatory disease, Thyroid disease, or Uncomplicated asthma.  PAST SURGICAL HISTORY:  has a past surgical history that includes orthopedic surgery; Eye surgery (cataracts); Mohs micrographic procedure; Mohs micrographic procedure (Left, 10/27/2016); Mohs micrographic procedure (Right, 5/24/2018); " Arthroplasty Hip Anterior (Right, 7/24/2018); and Arthroplasty knee (Left, 5/12/2020).  IMMUNIZATIONS:  Immunization History   Administered Date(s) Administered    COVID-19 Bivalent 12+ (Pfizer) 11/17/2022    COVID-19 MONOVALENT 12+ (Pfizer) 06/22/2022    COVID-19 Monovalent 18+ (Moderna) 01/15/2021, 02/12/2021, 11/23/2021, 06/22/2022    Flu 65+ Years 09/23/2019    Influenza (High Dose) 3 valent vaccine 10/07/2016, 09/28/2017, 10/29/2018    Influenza Vaccine 65+ (Fluzone HD) 10/13/2020, 09/23/2021, 10/05/2022    Pneumo Conj 13-V (2010&after) 10/07/2016    Pneumococcal 23 valent 09/28/2017    TDAP Vaccine (Adacel) 06/26/2013     Above immunizations pulled from [facility]. MIIC and facility records also reconciled. Outstanding information sent to  to update [facility].  Future immunizations needed:  TDAP    Current Outpatient Medications   Medication Sig Dispense Refill    acetaminophen (TYLENOL) 325 MG tablet TAKE 2 TABLETS (650MG) BY MOUTH TWICE DAILY 112 tablet 97    OLANZapine (ZYPREXA) 5 MG tablet TAKE ONE-HALF TABLET (2.5MG) BY MOUTH ONCE DAILY;& MAY TAKE ONE-HALF TABLET (2.5MG) DAILY AS NEEDED 90 tablet 97    senna-docusate (SENEXON-S) 8.6-50 MG tablet TAKE ONE TABLET BY MOUTH THREE TIMES PER WEEK AT BEDTIME 12 tablet 97    Tdap, tetanus-diptheria-acell pertussis, (BOOSTRIX) 5-2.5-18.5 LF-MCG/0.5 GOLD injection Inject 0.5 mLs into the muscle once for 1 dose 0.5 mL 0    THERATEARS 0.25 % SOLN INSTILL ONE DROP IN EACH EYE TWICE DAILY FOR DRY EYES 15 mL 97    OLANZapine (ZYPREXA) 2.5 MG tablet Take 2.5 mg by mouth daily       Case Management:  I have reviewed the Assisted Living care plan, current immunizations and preventive care/cancer screening. .Future cancer screening is not clinically indicated secondary to age/goals of care Patient's desire to return to the community is not assessible due to cognitive impairment. Current Level of Care is appropriate.    Advance Directive Discussion:   "  I reviewed the current advanced directives as reflected in EPIC, the POLST and the facility chart, and verified the congruency of orders. I contacted the first party Saray and discussed the plan of Care.  I did not due to cognitive impairment review the advance directives with the resident.     Team Discussion:  I communicated with the appropriate disciplines involved with the Plan of Care:   Nursing    Patient's goal is pain control and comfort.  Information reviewed:  Medications, vital signs, orders, and nursing notes.    ROS:  Limited secondary to cognitive impairment but today pt reports ok    Vitals:  BP (!) 140/76   Pulse 67   Temp 97.8  F (36.6  C)   Resp 20   Ht 1.549 m (5' 1\")   Wt 53.6 kg (118 lb 3.2 oz)   SpO2 95%   BMI 22.33 kg/m   Body mass index is 22.33 kg/m .  Exam:  GENERAL APPEARANCE:  Alert, cooperative  ENT:  Mouth and posterior oropharynx normal, moist mucous membranes, [place] (ears clear of wax)  RESP:  respiratory effort and palpation of chest normal, lungs clear to auscultation   CV:  Palpation and auscultation of heart done , regular rate and rhythm, no murmur  ABDOMEN:  normal bowel sounds, soft, nontender  M/S:   BLE weakness baseline ambulating with wc,  bent knees, Tubi  , leaning right in WC  SKIN: limited exam. Intact to visualized areas  NEURO:   Cranial nerves 2-12 are normal tested and grossly at patient's baseline  PSYCH:  insight and judgement impaired, memory impaired , affect and mood normal    Lab/Diagnostic data:   CBC RESULTS:   Recent Labs   Lab Test 09/14/23  0929 02/09/23  0706   WBC 5.4 5.2   RBC 4.20 3.78*   HGB 12.2 11.0*   HCT 39.2 34.2*   MCV 93 91   MCH 29.0 29.1   MCHC 31.1* 32.2   RDW 13.9 13.8    269       Last Basic Metabolic Panel:  Recent Labs   Lab Test 09/14/23  0929 04/13/23  0800    145   POTASSIUM 4.1 4.9   CHLORIDE 102 105   NOHEMY 9.8* 10.0*   CO2 25 30*   BUN 27.1* 25.3*   CR 0.81 0.91   * 92       Liver Function Studies - "   Recent Labs   Lab Test 09/14/23  0929 02/10/22  0545   PROTTOTAL 7.4 6.7*   ALBUMIN 4.0 2.8*   BILITOTAL 0.2 0.3   ALKPHOS 117* 163*   AST 23 19   ALT 14 22       TSH   Date Value Ref Range Status   02/09/2023 3.68 0.30 - 4.20 uIU/mL Final   02/10/2022 2.96 0.40 - 4.00 mU/L Final   01/06/2021 5.73 (A) 0.40 - 4.00 mU/L Final   11/25/2017 4.20 (H) 0.40 - 4.00 mU/L Final       Lab Results   Component Value Date    A1C 5.6 07/13/2018    A1C 5.9 05/14/2018     ASSESSMENT/PLAN  (F01.53) Vascular dementia with depressed mood (H)  (primary encounter diagnosis)  Comment: improved  Plan:   -zyprexa 2.5 mg po daily and prn (used twice in September)  -resides in     (8.) Stage 3b chronic kidney disease (H)  Comment: stable   Plan:   -renal dose and avoid nephrotoxins     (Z71.85) Vaccine counseling  Comment: due  Plan:   -Tdap, tetanus-diptheria-acell pertussis,         (BOOSTRIX) 5-2.5-18.5 LF-MCG/0.5 GOLD injection          (I10) Benign essential hypertension  Comment: stable off medication  Plan:   -stable off antihypertensives           Electronically signed by:  JACKIE Zayas CNP

## 2023-12-01 ENCOUNTER — PATIENT OUTREACH (OUTPATIENT)
Dept: GERIATRIC MEDICINE | Facility: CLINIC | Age: 88
End: 2023-12-01
Payer: COMMERCIAL

## 2023-12-01 NOTE — PROGRESS NOTES
[facility] Care Coordination Contact    Called adult daughter Saray  to schedule annual HRA home visit. HRA has been scheduled for 12/7/2023 @ 10 AM.  Email sent to Yessi OLIVEIRA at [facility] to inform and request a copy of POC and med list.  Thuy Reeder RN, BC  Manager [facility] Care Coordinator   [phone]  (Fax)

## 2023-12-07 NOTE — PROGRESS NOTES
[facility] Care Coordination Contact    12/6/2023 Rec'd vm from Saraychan dover's daughter to report that she will be unable to keep the appt for 12/7 and inquired if the appt can be changed.  12/6/23 Call placed to Saray, rescheduled for 12/8/23 @ 2 PM. Saray stated that she will check her calendar and call CC back to confirm.  12/7/23 Rec'd vm from Saray that she is available 12/8 @ 2 PM. Saray stated to return call only if tomorrow's appt needs to be rescheduled.  Thuy Reeder RN, BC  Manager [facility] Care Coordinator   [phone]  (Fax)

## 2023-12-08 ENCOUNTER — PATIENT OUTREACH (OUTPATIENT)
Dept: GERIATRIC MEDICINE | Facility: CLINIC | Age: 88
End: 2023-12-08
Payer: COMMERCIAL

## 2023-12-08 ASSESSMENT — LIFESTYLE VARIABLES
HOW OFTEN DO YOU HAVE A DRINK CONTAINING ALCOHOL: NEVER
AUDIT-C TOTAL SCORE: 0
SKIP TO QUESTIONS 9-10: 1
HOW MANY STANDARD DRINKS CONTAINING ALCOHOL DO YOU HAVE ON A TYPICAL DAY: PATIENT DOES NOT DRINK
HOW OFTEN DO YOU HAVE SIX OR MORE DRINKS ON ONE OCCASION: NEVER

## 2023-12-08 ASSESSMENT — ACTIVITIES OF DAILY LIVING (ADL)
DEPENDENT_IADLS:: CLEANING;COOKING;LAUNDRY;SHOPPING;MEAL PREPARATION;MEDICATION MANAGEMENT;MONEY MANAGEMENT;TRANSPORTATION;INCONTINENCE

## 2023-12-08 NOTE — Clinical Note
Sandro,  I am a care coordinator with [facility] working with Maryam Saavedra. We completed her annual Elderly Waiver assessment and are required to share assessment notes with their PCP. Feel free to contact me if I can be of assistance.Thank you, Thuy Reeder RN, BC Manager [facility] Care Coordinator  [phone]  (Fax)

## 2023-12-08 NOTE — PROGRESS NOTES
[facility] Care Coordination Contact    [facility] Home Visit Assessment     Home visit for Health Risk Assessment with Maryam Saavedra completed on December 8, 2023    Type of residence:: Assisted living, memory care, shared shower/toilet area.   Current living arrangement:: I live in assisted living at [facility] [address].     Assessment completed with:: Member, daughter Saray, information also obtained from Samantha OLIVEIRA and Yessi RN    Met with member in her room, she was alert to self/family, but not date/time. Member shared she enjoys attending the social activities, especially the music events. Member and Saray report that they are satisfied with cares, no concerns reported. Saray stated that it is difficult for her mother to navigate getting herself in/out of her room because of raised lip area across the entry way. Saray stated that she has spoken with staff and there is nothing that can be done. Saray does receive routine dental care at [facility] in [place], Dr. Murphy Barba. No eye appt's scheduled, per Saray there are no concerns.     Samantha shared that left outer ankle is an area that staff are monitoring, she questions if member is rubbing her ankle on the w/c when she is self propelling.     Current Care Plan  Member currently receiving the following home care services:   NA  Member currently receiving the following community resources: County Programs, incontinent supplies, 24 [facility] Customized AL services, Elderly Waiver      Medication Review  Medication reconciliation completed in Epic: If no, please explain AL med list compared to Epic and there were no discrepancies.   Medication set-up & administration: RN set up .  Assisting Living staff administers medications.  Medication Risk Assessment Medication (1 or more, place referral to MTM): N/A: No risk factors identified  MTM Referral Placed: NA    Mental/Behavioral Health   Depression Screening: PHQ not completed. Saray shared that the  current dosing of Olanzapine is effective.     Mental health DX:: Yes   Mental health DX how managed:: Medication    Falls Assessment:   Fallen 2 or more times in the past year?: No, recent fall 1 month ago.   Any fall with injury in the past year?: No    ADL/IADL Dependencies:   Dependent ADLs:: Bathing, Dressing, Grooming, Toileting, Wheelchair-with assist, Transfers, Positioning, Incontinence  Dependent IADLs:: Cleaning, Cooking, Laundry, Shopping, Meal Preparation, Medication Management, Money Management, Transportation, Incontinence    PCA Assessment completed at visit: No     Elderly Waiver Eligibility: Yes-will continue on EW    Care Plan & Recommendations:   -Continue [facility] Customized Assisted Living services at [facility]  -Continue monthly order of incontinent supplies through [facility].    See [facility] for detailed assessment information.    Follow-Up Plan: Member informed of future contact, plan to f/u with member with a 6 month telephone assessment.  Contact information shared with member and family, encouraged member to call with any questions or concerns at any time.    [place] care continuum providers: Please see Snapshot and Care Management Flowsheets for Specific details of care plan.    This CC note routed to PCP, Sandro Canseco.  Thuy Reeder RN, BC  Manager [facility] Care Coordinator   [phone]  (Fax)    12/28/2023 [facility] Rate tool updated per recommendations from Yessi OLIVEIRA     She is a pivot transfer with 1 person assist. Usually it is min-mod assist, but some evenings if she is tired she does require more mod-max assist. Transfers to/from bed; to/from wheelchair, to/from toilet. Soometimes naps on her bed in the afternoon, which are additional transfers.   2.   Therapeutic exercise--she does participate in exercise pretty much daily, but I don't know if you'd classify this as exercise or an     activity?   3.   Behavior support--If she is  sundowning she will knock repeatedly on the door of the nurse's office demanding to call her daughter or go home. She can become angry and agitated needing reassurance and redirection, and sometimes she will refuse care.     Explained to Yessi that Therapeutic exercise program would be specific to person's needs and directed by a provider. This will be included in her socialization plan.     Thuy Reeder RN, BC  Manager [facility] Care Coordinator   [phone]  (Fax)    12/29/23 Rec'd email from Yessi OLIVEIRA, approving the RS tool.  Thuy Reeder RN, BC  Manager [facility] Care Coordinator   [phone]  (Fax)

## 2023-12-18 DIAGNOSIS — H04.123 DRY EYES: ICD-10-CM

## 2023-12-18 RX ORDER — CARBOXYMETHYLCELLULOSE SODIUM 2.5 MG/ML
SOLUTION/ DROPS OPHTHALMIC
Qty: 15 ML | Status: SHIPPED | OUTPATIENT
Start: 2023-12-18

## 2024-01-02 ENCOUNTER — PATIENT OUTREACH (OUTPATIENT)
Dept: GERIATRIC MEDICINE | Facility: CLINIC | Age: 89
End: 2024-01-02
Payer: COMMERCIAL

## 2024-01-02 NOTE — LETTER
January 2, 2024      MARYAM CARMONA  C/NICOLE CARMONA  [address]  [facility] [address]      Dear Maryam:    At [facility], we re dedicated to improving your health and wellness. Enclosed is the Care Plan developed with you on 12/08/2023. Please review the Care Plan carefully.    As a reminder, during your visit we talked about:  Ways to manage your physical and mental health  Using health care to maintain and improve your health   Your preventive care needs     Remember to contact your care coordinator if you:  Are hospitalized, or plan to be hospitalized   Have a fall    Have a change in your physical or mental health  Need help finding support or services    If you have questions, or don t agree with your Care Plan, call me at [phone]. You can also call me if your needs change. TTY users, call the [place] [employer] at (839) or 1-998.428.2452 (bdajgr-oz-snwicr relay service).    Sincerely,    Thuy Reeder RN    E-mail: [email or web address]  Phone: [phone]      [facility] is a health plan that contracts with both Medicare and the [place] Medical Assistance (Medicaid) program to provide benefits of both programs to enrollees. Enrollment in [facility] depends on contract renewal.    [id number] accepted    [id number] (07/2022)

## 2024-01-02 NOTE — TELEPHONE ENCOUNTER
[facility] Care Coordination Contact    Received after visit chart from care coordinator.  Completed following tasks: Mailed copy of care plan/support plan to member, Mailed [place] Cho[place] signature sheet pages 3-4, Mailed Safe Medication Disposal , RS tool pending per CC. Will completed EW CL tool process & provider signature requirement once approved, and Updated services in Database    [place] Choices: Support plan remains open.      Viji Cottrell  Care Management Specialist  [facility]  [phone]

## 2024-01-09 ENCOUNTER — PATIENT OUTREACH (OUTPATIENT)
Dept: GERIATRIC MEDICINE | Facility: CLINIC | Age: 89
End: 2024-01-09
Payer: COMMERCIAL

## 2024-01-09 NOTE — PROGRESS NOTES
[facility] Care Coordination Contact    1/8/2024 Rec'd vm from member's daughter Saray to report that she was informed that the AL is not able to provide the wipes. Saray is requesting a return call   1/9/2024 Call placed to Saray, shared that CC did discuss with Yessi OLIVEIRA at [facility] that wipes are not covered under EW if the member is receiving care with toileting in the RS POC.  Explained to Saray that CC was informed that [facility] does not supply wipes and if they are needed they would need to be purchased privately.  Thuy Reeder RN, BC  Manager [facility] Care Coordinator   [phone]  (Fax)

## 2024-01-11 ENCOUNTER — DOCUMENTATION ONLY (OUTPATIENT)
Dept: GERIATRICS | Facility: CLINIC | Age: 89
End: 2024-01-11
Payer: COMMERCIAL

## 2024-01-11 NOTE — TELEPHONE ENCOUNTER
[facility] Care Coordination Contact    Per CC, Assisted Living site approved rate tool  Mailed copy of CL tool to member, faxed copy to AL facility ([facility] [address]) including Provider Signature Summary letter, and submitted authorization to health plan.    Viji Cottrell  Care Management Specialist  [facility]  [phone]

## 2024-01-15 DIAGNOSIS — B37.2 YEAST INFECTION OF THE SKIN: Primary | ICD-10-CM

## 2024-01-15 RX ORDER — NYSTATIN 100000 [USP'U]/G
POWDER TOPICAL
Qty: 60 G | Status: SHIPPED | OUTPATIENT
Start: 2024-01-15

## 2024-02-06 VITALS
HEART RATE: 67 BPM | DIASTOLIC BLOOD PRESSURE: 60 MMHG | SYSTOLIC BLOOD PRESSURE: 180 MMHG | OXYGEN SATURATION: 94 % | WEIGHT: 118.2 LBS | BODY MASS INDEX: 22.31 KG/M2 | RESPIRATION RATE: 16 BRPM | HEIGHT: 61 IN | TEMPERATURE: 97.7 F

## 2024-02-06 NOTE — PROGRESS NOTES
"[place] [facility]  [place] Medical Record Number:  [id number]  Place of Service where encounter took place:  [facility] [[id number]]  Chief Complaint   Patient presents with    RECHECK     Routine       HPI:    Maryam Saavedra  is a 89 year old (6/9/1932), who is being seen today for an episodic care visit.  HPI information obtained from: facility chart records, facility staff, patient report, and [facility] chart review. Today's concern is:    Seen today for follow up to chronic conditions. No acute concerns from staff. BP elevated on last check. Weight stable. With advanced dementia not a good historian. Adjusting to her new room, sharing a bathroom as this was difficult. Daughter present for visit. She was planning on a dental cleaning today but had to reschedule.     Past Medical and Surgical History reviewed in Epic today.    MEDICATIONS:    Current Outpatient Medications   Medication Sig Dispense Refill    acetaminophen (TYLENOL) 325 MG tablet TAKE 2 TABLETS (650MG) BY MOUTH TWICE DAILY 112 tablet 97    NYSTOP 159651 UNIT/GM powder APPLY TOPICALLY TO AFFECTED AREA(S) TWICE DAILY AFTER WASH/PAT DRY FOR 14 DAYS;THEN AS NEEDED 60 g PRN    OLANZapine (ZYPREXA) 5 MG tablet TAKE ONE-HALF TABLET (2.5MG) BY MOUTH ONCE DAILY;& MAY TAKE ONE-HALF TABLET (2.5MG) DAILY AS NEEDED 90 tablet 97    senna-docusate (SENEXON-S) 8.6-50 MG tablet TAKE ONE TABLET BY MOUTH THREE TIMES PER WEEK AT BEDTIME 12 tablet 97    THERATEARS 0.25 % SOLN INSTILL ONE DROP IN EACH EYE TWICE DAILY 15 mL PRN     REVIEW OF SYSTEMS:  Limited secondary to cognitive impairment but today pt reports ok    Objective:  BP (!) 180/60   Pulse 67   Temp 97.7  F (36.5  C)   Resp 16   Ht 1.549 m (5' 1\")   Wt 53.6 kg (118 lb 3.2 oz)   SpO2 94%   BMI 22.33 kg/m    Exam:  GENERAL APPEARANCE:  Alert, cooperative, anxious   ENT:  Mouth and posterior oropharynx normal, moist mucous membranes  RESP:  respiratory " effort and palpation of chest normal, lungs clear to auscultation   CV:  Palpation and auscultation of heart done , regular rate and rhythm, no murmur  ABDOMEN:  normal bowel sounds, soft, nontender  M/S:   BLE weakness baseline ambulating with wc,  bent knees, Tubi  , leaning right in WC  SKIN: limited exam. Intact to visualized areas  NEURO:   Cranial nerves 2-12 are normal tested and grossly at patient's baseline  PSYCH:  insight and judgement impaired, memory impaired , affect and mood normal    Labs:   CBC RESULTS:   Recent Labs   Lab Test 09/14/23  0929 02/09/23  0706   WBC 5.4 5.2   RBC 4.20 3.78*   HGB 12.2 11.0*   HCT 39.2 34.2*   MCV 93 91   MCH 29.0 29.1   MCHC 31.1* 32.2   RDW 13.9 13.8    269       Last Basic Metabolic Panel:  Recent Labs   Lab Test 09/14/23  0929 04/13/23  0800    145   POTASSIUM 4.1 4.9   CHLORIDE 102 105   NOHEMY 9.8* 10.0*   CO2 25 30*   BUN 27.1* 25.3*   CR 0.81 0.91   * 92       Liver Function Studies -   Recent Labs   Lab Test 09/14/23  0929 02/10/22  0545   PROTTOTAL 7.4 6.7*   ALBUMIN 4.0 2.8*   BILITOTAL 0.2 0.3   ALKPHOS 117* 163*   AST 23 19   ALT 14 22       TSH   Date Value Ref Range Status   02/09/2023 3.68 0.30 - 4.20 uIU/mL Final   02/10/2022 2.96 0.40 - 4.00 mU/L Final   01/06/2021 5.73 (A) 0.40 - 4.00 mU/L Final   11/25/2017 4.20 (H) 0.40 - 4.00 mU/L Final       Lab Results   Component Value Date    A1C 5.6 07/13/2018    A1C 5.9 05/14/2018     ASSESSMENT/PLAN:  (F01.53) Vascular dementia with depressed mood (H)  (primary encounter diagnosis)  Comment: improved  Plan:   -zyprexa 2.5 mg po daily and prn (used twice in January)  -resides in      (N18.32) Stage 3b chronic kidney disease (H)  Comment: stable   Plan:   -renal dose and avoid nephrotoxins     (I10) Benign essential hypertension  Comment: Prior use of Norvasc. Bps variable in facility chart review  Plan:     (M15.9) Primary osteoarthritis involving multiple joints  Comment: WC  ambulating   Plan:   -Tylenol twice daily      Electronically signed by:  JACKIE Zayas CNP

## 2024-02-07 ENCOUNTER — ASSISTED LIVING VISIT (OUTPATIENT)
Dept: GERIATRICS | Facility: CLINIC | Age: 89
End: 2024-02-07
Payer: COMMERCIAL

## 2024-02-07 DIAGNOSIS — M15.0 PRIMARY OSTEOARTHRITIS INVOLVING MULTIPLE JOINTS: ICD-10-CM

## 2024-02-07 DIAGNOSIS — I10 BENIGN ESSENTIAL HYPERTENSION: ICD-10-CM

## 2024-02-07 DIAGNOSIS — N18.32 STAGE 3B CHRONIC KIDNEY DISEASE (H): ICD-10-CM

## 2024-02-07 DIAGNOSIS — F01.53 VASCULAR DEMENTIA WITH DEPRESSED MOOD (H): Primary | ICD-10-CM

## 2024-02-07 PROCEDURE — 99349 HOME/RES VST EST MOD MDM 40: CPT | Performed by: NURSE PRACTITIONER

## 2024-02-07 RX ORDER — AMLODIPINE BESYLATE 2.5 MG/1
2.5 TABLET ORAL AT BEDTIME
Qty: 30 TABLET | Refills: 11 | Status: SHIPPED | OUTPATIENT
Start: 2024-02-07

## 2024-02-07 NOTE — LETTER
Maryam Saavedra orders:    Begin   amLODIPine (NORVASC) 2.5 MG tablet Take 1 tablet (2.5 mg) by mouth at bedtime       JACKIE Zayas CNP on 2/7/2024 at 4:29 PM

## 2024-02-07 NOTE — LETTER
"    2/7/2024        RE: Maryam Saavedra  C/o Saray Saavedra  [address] [place] [address]        [facility] [facility]  [place] [facility] Record Number:  [id number]  Place of Service where encounter took place:  [facility] [[id number]]  Chief Complaint   Patient presents with     RECHECK     Routine       HPI:    Maryam Saavedra  is a 89 year old (6/9/1932), who is being seen today for an episodic care visit.  HPI information obtained from: facility chart records, facility staff, patient report, and [facility] chart review. Today's concern is:    Seen today for follow up to chronic conditions. No acute concerns from staff. BP elevated on last check. Weight stable. With advanced dementia not a good historian. Adjusting to her new room, sharing a bathroom as this was difficult. Daughter present for visit. She was planning on a dental cleaning today but had to reschedule.     Past Medical and Surgical History reviewed in Epic today.    MEDICATIONS:    Current Outpatient Medications   Medication Sig Dispense Refill     acetaminophen (TYLENOL) 325 MG tablet TAKE 2 TABLETS (650MG) BY MOUTH TWICE DAILY 112 tablet 97     NYSTOP 573229 UNIT/GM powder APPLY TOPICALLY TO AFFECTED AREA(S) TWICE DAILY AFTER WASH/PAT DRY FOR 14 DAYS;THEN AS NEEDED 60 g PRN     OLANZapine (ZYPREXA) 5 MG tablet TAKE ONE-HALF TABLET (2.5MG) BY MOUTH ONCE DAILY;& MAY TAKE ONE-HALF TABLET (2.5MG) DAILY AS NEEDED 90 tablet 97     senna-docusate (SENEXON-S) 8.6-50 MG tablet TAKE ONE TABLET BY MOUTH THREE TIMES PER WEEK AT BEDTIME 12 tablet 97     THERATEARS 0.25 % SOLN INSTILL ONE DROP IN EACH EYE TWICE DAILY 15 mL PRN     REVIEW OF SYSTEMS:  Limited secondary to cognitive impairment but today pt reports ok    Objective:  BP (!) 180/60   Pulse 67   Temp 97.7  F (36.5  C)   Resp 16   Ht 1.549 m (5' 1\")   Wt 53.6 kg (118 lb 3.2 oz)   SpO2 94%   BMI 22.33 kg/m    Exam:  GENERAL APPEARANCE:  Alert, " cooperative, anxious   ENT:  Mouth and posterior oropharynx normal, moist mucous membranes  RESP:  respiratory effort and palpation of chest normal, lungs clear to auscultation   CV:  Palpation and auscultation of heart done , regular rate and rhythm, no murmur  ABDOMEN:  normal bowel sounds, soft, nontender  M/S:   BLE weakness baseline ambulating with wc,  bent knees, Tubi  , leaning right in WC  SKIN: limited exam. Intact to visualized areas  NEURO:   Cranial nerves 2-12 are normal tested and grossly at patient's baseline  PSYCH:  insight and judgement impaired, memory impaired , affect and mood normal    Labs:   CBC RESULTS:   Recent Labs   Lab Test 09/14/23  0929 02/09/23  0706   WBC 5.4 5.2   RBC 4.20 3.78*   HGB 12.2 11.0*   HCT 39.2 34.2*   MCV 93 91   MCH 29.0 29.1   MCHC 31.1* 32.2   RDW 13.9 13.8    269       Last Basic Metabolic Panel:  Recent Labs   Lab Test 09/14/23  0929 04/13/23  0800    145   POTASSIUM 4.1 4.9   CHLORIDE 102 105   NOHEMY 9.8* 10.0*   CO2 25 30*   BUN 27.1* 25.3*   CR 0.81 0.91   * 92       Liver Function Studies -   Recent Labs   Lab Test 09/14/23  0929 02/10/22  0545   PROTTOTAL 7.4 6.7*   ALBUMIN 4.0 2.8*   BILITOTAL 0.2 0.3   ALKPHOS 117* 163*   AST 23 19   ALT 14 22       TSH   Date Value Ref Range Status   02/09/2023 3.68 0.30 - 4.20 uIU/mL Final   02/10/2022 2.96 0.40 - 4.00 mU/L Final   01/06/2021 5.73 (A) 0.40 - 4.00 mU/L Final   11/25/2017 4.20 (H) 0.40 - 4.00 mU/L Final       Lab Results   Component Value Date    A1C 5.6 07/13/2018    A1C 5.9 05/14/2018     ASSESSMENT/PLAN:  (F01.53) Vascular dementia with depressed mood (H)  (primary encounter diagnosis)  Comment: improved  Plan:   -zyprexa 2.5 mg po daily and prn (used twice in January)  -resides in      (N18.32) Stage 3b chronic kidney disease (H)  Comment: stable   Plan:   -renal dose and avoid nephrotoxins     (I10) Benign essential hypertension  Comment: Prior use of Norvasc. Bps variable in  facility chart review  Plan:     (M15.9) Primary osteoarthritis involving multiple joints  Comment: CEDRIC ambulating   Plan:   -Tylenol twice daily      Electronically signed by:  JACKIE Zayas CNP             Sincerely,        JACKIE Zayas CNP

## 2024-02-13 NOTE — PROGRESS NOTES
2nd Attempt: Signed Letter not received from Sindy mando Ware, resent per process.    Angely Marsh  Case Management Specialist   [facility]  [phone]

## 2024-03-20 NOTE — PROGRESS NOTES
[facility] Care Coordination Contact    No Letter Received: 60 day tracking of letter complete, no letter received from [facility] [place]. Tracking discontinued.    Lisset Cesar  Care Management Specialist  [facility]  [phone]

## 2024-05-14 ENCOUNTER — APPOINTMENT (OUTPATIENT)
Dept: URBAN - METROPOLITAN AREA CLINIC 256 | Age: 89
Setting detail: DERMATOLOGY
End: 2024-05-15

## 2024-05-14 VITALS — WEIGHT: 120 LBS | HEIGHT: 63 IN

## 2024-05-14 DIAGNOSIS — L57.8 OTHER SKIN CHANGES DUE TO CHRONIC EXPOSURE TO NONIONIZING RADIATION: ICD-10-CM

## 2024-05-14 DIAGNOSIS — D485 NEOPLASM OF UNCERTAIN BEHAVIOR OF SKIN: ICD-10-CM

## 2024-05-14 PROBLEM — D48.5 NEOPLASM OF UNCERTAIN BEHAVIOR OF SKIN: Status: ACTIVE | Noted: 2024-05-14

## 2024-05-14 PROCEDURE — OTHER COUNSELING: OTHER

## 2024-05-14 PROCEDURE — OTHER SEPARATE AND IDENTIFIABLE DOCUMENTATION: OTHER

## 2024-05-14 PROCEDURE — OTHER BIOPSY BY SHAVE METHOD: OTHER

## 2024-05-14 PROCEDURE — 99202 OFFICE O/P NEW SF 15 MIN: CPT | Mod: 25

## 2024-05-14 PROCEDURE — OTHER PHOTO-DOCUMENTATION: OTHER

## 2024-05-14 PROCEDURE — OTHER MIPS QUALITY: OTHER

## 2024-05-14 PROCEDURE — 11102 TANGNTL BX SKIN SINGLE LES: CPT

## 2024-05-14 ASSESSMENT — LOCATION DETAILED DESCRIPTION DERM
LOCATION DETAILED: LEFT INFERIOR LATERAL MALAR CHEEK
LOCATION DETAILED: LEFT CENTRAL MALAR CHEEK

## 2024-05-14 ASSESSMENT — LOCATION SIMPLE DESCRIPTION DERM: LOCATION SIMPLE: LEFT CHEEK

## 2024-05-14 ASSESSMENT — LOCATION ZONE DERM: LOCATION ZONE: FACE

## 2024-05-14 NOTE — PROCEDURE: BIOPSY BY SHAVE METHOD
Detail Level: Detailed
Depth Of Biopsy: dermis
Was A Bandage Applied: Yes
Size Of Lesion In Cm: 0.5
X Size Of Lesion In Cm: 0
Biopsy Type: H and E
Biopsy Method: Personna blade
Anesthesia Type: 1% lidocaine with epinephrine
Hemostasis: Drysol
Wound Care: Petrolatum
Dressing: bandage
Destruction After The Procedure: No
Type Of Destruction Used: Curettage
Curettage Text: The wound bed was treated with curettage after the biopsy was performed.
Cryotherapy Text: The wound bed was treated with cryotherapy after the biopsy was performed.
Electrodesiccation Text: The wound bed was treated with electrodesiccation after the biopsy was performed.
Electrodesiccation And Curettage Text: The wound bed was treated with electrodesiccation and curettage after the biopsy was performed.
Silver Nitrate Text: The wound bed was treated with silver nitrate after the biopsy was performed.
Lab: -[id number]
Path Notes (To The Dermatopathologist): Please check margins
Consent: Written consent was obtained and risks were reviewed including but not limited to scarring, infection, bleeding, scabbing, incomplete removal, nerve damage and allergy to anesthesia.
Post-Care Instructions: I reviewed with the patient in detail post-care instructions. Patient is to keep the biopsy site dry overnight, and then apply bacitracin twice daily until healed. Patient may apply hydrogen peroxide soaks to remove any crusting.
Notification Instructions: Patient will be notified of biopsy results. However, patient instructed to call the office if not contacted within 2 weeks.
Billing Type: Third-Party Bill
Information: Selecting Yes will display possible errors in your note based on the variables you have selected. This validation is only offered as a suggestion for you. PLEASE NOTE THAT THE VALIDATION TEXT WILL BE REMOVED WHEN YOU FINALIZE YOUR NOTE. IF YOU WANT TO FAX A PRELIMINARY NOTE YOU WILL NEED TO TOGGLE THIS TO 'NO' IF YOU DO NOT WANT IT IN YOUR FAXED NOTE.

## 2024-05-29 ENCOUNTER — ASSISTED LIVING VISIT (OUTPATIENT)
Dept: GERIATRICS | Facility: CLINIC | Age: 89
End: 2024-05-29
Payer: COMMERCIAL

## 2024-05-29 VITALS
SYSTOLIC BLOOD PRESSURE: 132 MMHG | TEMPERATURE: 97.6 F | DIASTOLIC BLOOD PRESSURE: 65 MMHG | WEIGHT: 123.2 LBS | HEART RATE: 65 BPM | BODY MASS INDEX: 23.26 KG/M2 | HEIGHT: 61 IN | RESPIRATION RATE: 20 BRPM | OXYGEN SATURATION: 98 %

## 2024-05-29 DIAGNOSIS — I10 BENIGN ESSENTIAL HYPERTENSION: Primary | ICD-10-CM

## 2024-05-29 DIAGNOSIS — G30.9 MIXED DEMENTIA (H): ICD-10-CM

## 2024-05-29 DIAGNOSIS — F01.518 VASCULAR DEMENTIA WITH BEHAVIOR DISTURBANCE (H): ICD-10-CM

## 2024-05-29 DIAGNOSIS — F02.80 MIXED DEMENTIA (H): ICD-10-CM

## 2024-05-29 DIAGNOSIS — F01.50 MIXED DEMENTIA (H): ICD-10-CM

## 2024-05-29 DIAGNOSIS — M15.0 PRIMARY OSTEOARTHRITIS INVOLVING MULTIPLE JOINTS: ICD-10-CM

## 2024-05-29 PROCEDURE — 99349 HOME/RES VST EST MOD MDM 40: CPT | Performed by: INTERNAL MEDICINE

## 2024-05-29 NOTE — LETTER
5/29/2024        RE: Maryam Saavedra  C/o Saray Saavedra  [address]   [facility] [id number]        No notes on file      Sincerely,        Georgie Bae MD

## 2024-06-07 ENCOUNTER — APPOINTMENT (OUTPATIENT)
Dept: URBAN - METROPOLITAN AREA CLINIC 256 | Age: 89
Setting detail: DERMATOLOGY
End: 2024-06-08

## 2024-06-07 PROBLEM — C44.329 SQUAMOUS CELL CARCINOMA OF SKIN OF OTHER PARTS OF FACE: Status: ACTIVE | Noted: 2024-06-07

## 2024-06-07 PROCEDURE — OTHER MIPS QUALITY: OTHER

## 2024-06-07 PROCEDURE — 17281 DSTR MAL LS F/E/E/N/L/M .6-1: CPT

## 2024-06-07 PROCEDURE — OTHER COUNSELING: OTHER

## 2024-06-07 PROCEDURE — OTHER CURETTAGE AND DESTRUCTION: OTHER

## 2024-06-07 PROCEDURE — OTHER PHOTO-DOCUMENTATION: OTHER

## 2024-06-07 PROCEDURE — OTHER EDUCATIONAL RESOURCES PROVIDED: OTHER

## 2024-06-07 NOTE — PROCEDURE: CURETTAGE AND DESTRUCTION
Total Volume (Ccs): 1
Detail Level: Detailed
Add Ability To Document Additional Intralesional Injection: No
Size Of Lesion After Curettage: 0.7
Cautery Type: electrodesiccation
Anesthesia Type: 1% lidocaine with epinephrine
Post-Care Instructions: I reviewed with the patient in detail post-care instructions. Patient is to keep the area dry for 48 hours, and not to engage in any swimming until the area is healed. Should the patient develop any fevers, chills, bleeding, severe pain patient will contact the office immediately.
Final Size Statement: The size of the lesion after curettage was
Concentration (Mg/Ml Or Millions Of Plaque Forming Units/Cc): 0.01
Consent was obtained from the patient. The risks, benefits and alternatives to therapy were discussed in detail. Specifically, the risks of infection, scarring, bleeding, prolonged wound healing, nerve injury, incomplete removal, allergy to anesthesia and recurrence were addressed. Alternatives to ED&C, such as: surgical removal and XRT were also discussed.  Prior to the procedure, the treatment site was clearly identified and confirmed by the patient. All components of Universal Protocol/PAUSE Rule completed.
Number Of Curettages: 2
What Was Performed First?: Curettage
Bill As A Line Item Or As Units: Line Item
Size Of Lesion In Cm: 0.5
Additional Information: (Optional): The wound was cleaned, and a pressure dressing was applied.  The patient received detailed post-op instructions.

## 2024-06-10 DIAGNOSIS — M15.8 OTHER OSTEOARTHRITIS INVOLVING MULTIPLE JOINTS: ICD-10-CM

## 2024-06-10 RX ORDER — ACETAMINOPHEN 325 MG/1
TABLET ORAL
Qty: 360 TABLET | Refills: 97 | Status: SHIPPED | OUTPATIENT
Start: 2024-06-10

## 2024-06-13 ENCOUNTER — PATIENT OUTREACH (OUTPATIENT)
Dept: GERIATRIC MEDICINE | Facility: CLINIC | Age: 89
End: 2024-06-13
Payer: COMMERCIAL

## 2024-06-13 NOTE — PROGRESS NOTES
[facility] Care Coordination Contact      [facility] Six-Month Telephone Assessment    6 month telephone assessment completed on 6/13/2024.  6/10/24 Epic notes reviewed, email sent to Soraya OLIVEIRA at [place] [facility] [place] to inquire on how member is doing, any falls/pain or changes.  6/10/24 Call placed to member's daughter Saray, left vm requesting a return call.   6/10/24 Rec'd vm from Saray, she shared that her mother is doing ok, some decline but nothing major. Saray shared that they brought member to her home yesterday to celebrate her 89 birthday.   6/13/24 follow up email sent to Soraya to inquire on member. Rec'd email, that there are no concerns, currently we are providing wound care to her left cheek where she had a biopsy done.      ER visits: No  Hospitalizations: No  TCU stays: No  Significant health status changes: None reported  Falls/Injuries: No  ADL/IADL changes: No  Changes in services: No    Caregiver Assessment follow up:  NA    Goals: See POC in chart for goal progress documentation.      Will see member in 6 months for an annual health risk assessment.   Encouraged member to call CC with any questions or concerns in the meantime.   Thuy Reeder RN, BC  Manager [facility] Care Coordinator   [phone]  (Fax)

## 2024-06-18 NOTE — PROGRESS NOTES
Maryam Saavedra is a 89 year old female seen May 29, 2024 at [facility] Memory care unit where she has resided for 3 and a half years (admit 12/2020) seen to follow up progressive dementia.   Pt is seen on the unit up to , no longer ambulatory   She is engaged and happy to have a visitor, has been participating in a flower arranging activity.   Smiles, and reports feeling okay.  Denies pain or other symptoms        By chart review, patient had [facility] hospitalization and [place] [facility] stay in June 2020 for subacute fracture deformity of lateral left knee and underwent left TKA.  She rehabbed fairly well in TCU and discharged to live with her family.  In October she was seen in the ED for visual changes, hallucinations and confusion.   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   Pt was seen by Neurology and no further workup planned by family.    Pt's care needs increased and family unable to safely manage her at home, so moved to [place] [facility] unit for permanent placement   Biggest concern has been frequent falls and injuries as her mobility has slowly decreased.    Pt had a prior slow-to-heal RLE wound in Jan 2022 after a fall; in February Doppler revealed a non-occlusive thrombus in the right distal superficial femoral vein  She was treated with apixaban for 3 months   Pt had a traumatic LLE wound in Dec 2022, very slow to heal but finally did in May 2023   Had lymphedema at that time, seen by Occupational Therapy for lymphedema wraps     In August 2023 pt developed worsened confusion, hallucinations and yelling /swearing at staff.   Started on olanzapine with dose limited due to sedation     Past Medical History:   Diagnosis Date    Benign essential hypertension 9/1/2016    Chronic low back pain     Hypertension     Impaired fasting glucose     borderline    Osteoarthritis    Late onset dementia  Hallucinations  Frequent falls  RLE DVT, 2022    Past Surgical History:   Procedure Laterality Date     "ARTHROPLASTY HIP ANTERIOR Right 7/24/2018    Procedure: ARTHROPLASTY HIP ANTERIOR;  RIGHT DIRECT ANTERIOR TOTAL HIP ARTHROPLASTY;  Surgeon: Jimbo Phillips MD;  Location:  OR    ARTHROPLASTY KNEE Left 5/12/2020    Procedure: LEFT TOTAL KNEE ARTHROPLASTY;  Surgeon: Jimbo Phillips MD;  Location:  OR    EYE SURGERY  cataracts    MOHS MICROGRAPHIC PROCEDURE      Left lower eyelid     MOHS MICROGRAPHIC PROCEDURE Left 10/27/2016    Procedure: MOHS MICROGRAPHIC PROCEDURE;  Surgeon: Jose Torrez MD;  Location:  [facility]    MOHS MICROGRAPHIC PROCEDURE Right 5/24/2018    Procedure: MOHS MICROGRAPHIC PROCEDURE;  RIGHT MEDIAL CANTHUS MOHS CLOSURE;  Surgeon: Jose Torrez MD;  Location: [facility]    ORTHOPEDIC SURGERY      bilateral wrists     SH:  , she has 2 daughters Saray and Dena and a son Terrell who all live locally.   Previously lived with her son before move to [place]     ROS:    SLUMS 12/30   WC bound with assist for transfers     Weight in 2020 was 94 lbs >>>>was 115 lbs in August 2021   Wt Readings from Last 5 Encounters:   05/29/24 55.9 kg (123 lb 3.2 oz)   02/06/24 53.6 kg (118 lb 3.2 oz)   11/08/23 53.6 kg (118 lb 3.2 oz)   10/31/23 51.3 kg (113 lb)   08/22/23 51 kg (112 lb 6.4 oz)      EXAM:  NAD, up to WC   /65   Pulse 65   Temp 97.6  F (36.4  C)   Resp 20   Ht 1.549 m (5' 1\")   Wt 55.9 kg (123 lb 3.2 oz)   SpO2 98%   BMI 23.28 kg/m     Neck supple without adenopathy  Lungs clear bilaterally  Heart RRR s1s2  Ext with 1+ edema.  Neuro: limits to verbal skills, WC bound and leans rightward   Psych: affect okay, pleasant and smiling.       Lab Results   Component Value Date     09/14/2023    POTASSIUM 4.1 09/14/2023    CHLORIDE 102 09/14/2023    CO2 25 09/14/2023    ANIONGAP 15 09/14/2023     (H) 09/14/2023    BUN 27.1 (H) 09/14/2023    CR 0.81 09/14/2023    GFRESTIMATED 68 09/14/2023    NOHEMY 9.8 (H) 09/14/2023     Lab Results   Component Value Date    WBC 5.4 09/14/2023      " HGB 12.2 09/14/2023      MCV 93 09/14/2023       09/14/2023     10/29/2020   MRI Brain w/o contrast:  1. Interval increase in ventriculomegaly of the lateral and third  ventricles since the 2017 brain MRI raising the question of either  developing or worsening normal pressure hydrocephalus versus  ventriculomegaly due to age-related cerebral volume loss. Recommend  clinical correlation for history of normal pressure hydrocephalus.  2. 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        (F01.518) Vascular dementia with behavior disturbance (H)  Comment: as above   Noted anxiety   Plan: remains on olanzapine 2.5 mg/HS and prn      (M15.9) Primary osteoarthritis involving multiple joints  Comment: WC bound   Plan: acetaminophen 650 mg bid and PRN       Advance directive:  DNR/DNI per signed DREW Bae MD

## 2024-06-26 ENCOUNTER — ASSISTED LIVING VISIT (OUTPATIENT)
Dept: GERIATRICS | Facility: CLINIC | Age: 89
End: 2024-06-26
Payer: COMMERCIAL

## 2024-06-26 VITALS
HEIGHT: 61 IN | DIASTOLIC BLOOD PRESSURE: 76 MMHG | HEART RATE: 73 BPM | SYSTOLIC BLOOD PRESSURE: 171 MMHG | RESPIRATION RATE: 18 BRPM | BODY MASS INDEX: 23.49 KG/M2 | OXYGEN SATURATION: 97 % | WEIGHT: 124.4 LBS | TEMPERATURE: 97.7 F

## 2024-06-26 DIAGNOSIS — S51.811S SKIN TEAR OF RIGHT FOREARM WITHOUT COMPLICATION, SEQUELA: Primary | ICD-10-CM

## 2024-06-26 PROCEDURE — 99348 HOME/RES VST EST LOW MDM 30: CPT | Performed by: NURSE PRACTITIONER

## 2024-06-26 NOTE — LETTER
" 6/26/2024      Maryam Saavedra  C/o Saray Quentin  [address] [place] [address]        [facility]    Chief Complaint   Patient presents with     Assisted Living Acute     Fall with skin tear     HPI:  Maryam Saavedra is a 89 year old  (6/9/1932), who is being seen today for an episodic care visit at: [facility] [[id number]]. Today's concern is:     Follow up to skin tear on right forearm post unwitnessed fall 6/23.      Allergies, and PMH/PSH reviewed in [facility] today.  REVIEW OF SYSTEMS:  Limited secondary to cognitive impairment but today pt reports ok    Objective:   BP (!) 171/76   Pulse 73   Temp 97.7  F (36.5  C)   Resp 18   Ht 1.549 m (5' 1\")   Wt 56.4 kg (124 lb 6.4 oz)   SpO2 97%   BMI 23.51 kg/m    GENERAL APPEARANCE:  Alert, cooperative, leaning forward in WC   ENT:  Mouth and posterior oropharynx normal, moist mucous membranes  RESP:  respiratory effort and palpation of chest normal, lungs clear to auscultation   CV:  Palpation and auscultation of heart done , regular rate and rhythm, no murmur  ABDOMEN:  normal bowel sounds, soft, nontender  M/S:   BLE weakness baseline ambulating with wc,  bent knees, Tubi  , leaning right in WC  SKIN: steri-strips to right forearm, no redness, swelling, drainage. Band-aid over biopsy site on cheek Left   NEURO:   Cranial nerves 2-12 are normal tested and grossly at patient's baseline  PSYCH:  insight and judgement impaired, memory impaired , affect and mood normal    CBC RESULTS:   Recent Labs   Lab Test 09/14/23  0929 02/09/23  0706   WBC 5.4 5.2   RBC 4.20 3.78*   HGB 12.2 11.0*   HCT 39.2 34.2*   MCV 93 91   MCH 29.0 29.1   MCHC 31.1* 32.2   RDW 13.9 13.8    269       Last Basic Metabolic Panel:  Recent Labs   Lab Test 09/14/23  0929 04/13/23  0800    145   POTASSIUM 4.1 4.9   CHLORIDE 102 105   NOHEMY 9.8* 10.0*   CO2 25 30*   BUN 27.1* 25.3*   CR 0.81 0.91   * 92       Liver " Function Studies -   Recent Labs   Lab Test 09/14/23  0929 02/10/22  0545   PROTTOTAL 7.4 6.7*   ALBUMIN 4.0 2.8*   BILITOTAL 0.2 0.3   ALKPHOS 117* 163*   AST 23 19   ALT 14 22       TSH   Date Value Ref Range Status   02/09/2023 3.68 0.30 - 4.20 uIU/mL Final   02/10/2022 2.96 0.40 - 4.00 mU/L Final   01/06/2021 5.73 (A) 0.40 - 4.00 mU/L Final   11/25/2017 4.20 (H) 0.40 - 4.00 mU/L Final       Lab Results   Component Value Date    A1C 5.6 07/13/2018    A1C 5.9 05/14/2018     Assessment/Plan:  (S51.811S) Skin tear of right forearm without complication, sequela  (primary encounter diagnosis)  Comment: no s/s of infection  Plan:   -wound care per facility protocol   -monitor for s/s until resolved             Electronically signed by: JACKIE Zayas CNP         Sincerely,        JACKIE Zayas CNP

## 2024-06-26 NOTE — PROGRESS NOTES
"[facility] GERIATRICS    Chief Complaint   Patient presents with    Assisted Living Acute     Fall with skin tear     HPI:  Maryam Saavedra is a 89 year old  (6/9/1932), who is being seen today for an episodic care visit at: [facility] [[id number]]. Today's concern is:     Follow up to skin tear on right forearm post unwitnessed fall 6/23.      Allergies, and PMH/PSH reviewed in [facility] today.  REVIEW OF SYSTEMS:  Limited secondary to cognitive impairment but today pt reports ok    Objective:   BP (!) 171/76   Pulse 73   Temp 97.7  F (36.5  C)   Resp 18   Ht 1.549 m (5' 1\")   Wt 56.4 kg (124 lb 6.4 oz)   SpO2 97%   BMI 23.51 kg/m    GENERAL APPEARANCE:  Alert, cooperative, leaning forward in WC   ENT:  Mouth and posterior oropharynx normal, moist mucous membranes  RESP:  respiratory effort and palpation of chest normal, lungs clear to auscultation   CV:  Palpation and auscultation of heart done , regular rate and rhythm, no murmur  ABDOMEN:  normal bowel sounds, soft, nontender  M/S:   BLE weakness baseline ambulating with wc,  bent knees, Tubi  , leaning right in WC  SKIN: steri-strips to right forearm, no redness, swelling, drainage. Band-aid over biopsy site on cheek Left   NEURO:   Cranial nerves 2-12 are normal tested and grossly at patient's baseline  PSYCH:  insight and judgement impaired, memory impaired , affect and mood normal    CBC RESULTS:   Recent Labs   Lab Test 09/14/23 0929 02/09/23  0706   WBC 5.4 5.2   RBC 4.20 3.78*   HGB 12.2 11.0*   HCT 39.2 34.2*   MCV 93 91   MCH 29.0 29.1   MCHC 31.1* 32.2   RDW 13.9 13.8    269       Last Basic Metabolic Panel:  Recent Labs   Lab Test 09/14/23 0929 04/13/23  0800    145   POTASSIUM 4.1 4.9   CHLORIDE 102 105   NOHEMY 9.8* 10.0*   CO2 25 30*   BUN 27.1* 25.3*   CR 0.81 0.91   * 92       Liver Function Studies -   Recent Labs   Lab Test 09/14/23  0929 02/10/22  0545   PROTTOTAL 7.4 6.7* "   ALBUMIN 4.0 2.8*   BILITOTAL 0.2 0.3   ALKPHOS 117* 163*   AST 23 19   ALT 14 22       TSH   Date Value Ref Range Status   02/09/2023 3.68 0.30 - 4.20 uIU/mL Final   02/10/2022 2.96 0.40 - 4.00 mU/L Final   01/06/2021 5.73 (A) 0.40 - 4.00 mU/L Final   11/25/2017 4.20 (H) 0.40 - 4.00 mU/L Final       Lab Results   Component Value Date    A1C 5.6 07/13/2018    A1C 5.9 05/14/2018     Assessment/Plan:  (S51.811S) Skin tear of right forearm without complication, sequela  (primary encounter diagnosis)  Comment: no s/s of infection  Plan:   -wound care per facility protocol   -monitor for s/s until resolved             Electronically signed by: JACKIE Zayas CNP

## 2024-07-10 ENCOUNTER — PATIENT OUTREACH (OUTPATIENT)
Dept: CARE COORDINATION | Facility: CLINIC | Age: 89
End: 2024-07-10
Payer: COMMERCIAL

## 2024-07-12 ENCOUNTER — PATIENT OUTREACH (OUTPATIENT)
Dept: GERIATRIC MEDICINE | Facility: CLINIC | Age: 89
End: 2024-07-12
Payer: COMMERCIAL

## 2024-07-12 NOTE — PROGRESS NOTES
[facility] Care Coordination Contact    Rec'd vm from member's daughter Saray to request that the next delivery for incont supplies be held. Saray shared that her mother has inherited supplies and has many.  Secure email sent to [facility].  Thuy Reeder RN, BC  Manager [facility] Care Coordinator   [phone]  (Fax)

## 2024-07-24 ENCOUNTER — PATIENT OUTREACH (OUTPATIENT)
Dept: CARE COORDINATION | Facility: CLINIC | Age: 89
End: 2024-07-24
Payer: COMMERCIAL

## 2024-08-03 DIAGNOSIS — K59.01 SLOW TRANSIT CONSTIPATION: ICD-10-CM

## 2024-08-05 RX ORDER — AMOXICILLIN 250 MG
CAPSULE ORAL
Qty: 36 TABLET | Refills: 97 | Status: SHIPPED | OUTPATIENT
Start: 2024-08-05

## 2024-09-04 ENCOUNTER — ASSISTED LIVING VISIT (OUTPATIENT)
Dept: GERIATRICS | Facility: CLINIC | Age: 89
End: 2024-09-04
Payer: COMMERCIAL

## 2024-09-04 ENCOUNTER — LAB REQUISITION (OUTPATIENT)
Dept: LAB | Facility: CLINIC | Age: 89
End: 2024-09-04
Payer: COMMERCIAL

## 2024-09-04 VITALS
OXYGEN SATURATION: 98 % | WEIGHT: 124.6 LBS | BODY MASS INDEX: 23.53 KG/M2 | DIASTOLIC BLOOD PRESSURE: 70 MMHG | HEART RATE: 78 BPM | SYSTOLIC BLOOD PRESSURE: 137 MMHG | TEMPERATURE: 98.1 F | HEIGHT: 61 IN | RESPIRATION RATE: 17 BRPM

## 2024-09-04 DIAGNOSIS — I10 ESSENTIAL (PRIMARY) HYPERTENSION: ICD-10-CM

## 2024-09-04 DIAGNOSIS — R60.0 BILATERAL LOWER EXTREMITY EDEMA: ICD-10-CM

## 2024-09-04 DIAGNOSIS — F01.518 VASCULAR DEMENTIA WITH BEHAVIOR DISTURBANCE (H): Primary | ICD-10-CM

## 2024-09-04 DIAGNOSIS — I10 BENIGN ESSENTIAL HYPERTENSION: ICD-10-CM

## 2024-09-04 DIAGNOSIS — E03.9 HYPOTHYROIDISM, UNSPECIFIED: ICD-10-CM

## 2024-09-04 PROCEDURE — 99349 HOME/RES VST EST MOD MDM 40: CPT | Performed by: NURSE PRACTITIONER

## 2024-09-04 NOTE — PROGRESS NOTES
"[place] [facility]IATRIC [facility]  [place] [facility] Record Number:  [id number]  Place of Service where encounter took place:  [facility] [[id number]]  Chief Complaint   Patient presents with    RECHECK       HPI:    Maryam Saavedra  is a 89 year old (6/9/1932), who is being seen today for an episodic care visit.  HPI information obtained from: facility chart records, facility staff, patient report, [facility] chart review, and family/first contact daughter report. Today's concern is:    Seen today for follow up to skin tear and swelling of right LE. Sustains skin tears with attempts to self tranfers both areas have resolved. She has used 2x prn zyprexa this month. Use of tubi  for compression. Weight stable. Calm and pleasantly confused today. Denies pain.     Past Medical and Surgical History reviewed in Epic today.    MEDICATIONS:    Current Outpatient Medications   Medication Sig Dispense Refill    acetaminophen (TYLENOL) 325 MG tablet TAKE 2 TABLETS (650MG) BY MOUTH TWICE DAILY 360 tablet 97    amLODIPine (NORVASC) 2.5 MG tablet Take 1 tablet (2.5 mg) by mouth at bedtime 30 tablet 11    NYSTOP 109306 UNIT/GM powder APPLY TOPICALLY TO AFFECTED AREA(S) TWICE DAILY AFTER WASH/PAT DRY FOR 14 DAYS;THEN AS NEEDED 60 g PRN    OLANZapine (ZYPREXA) 5 MG tablet TAKE ONE-HALF TABLET (2.5MG) BY MOUTH ONCE DAILY;& MAY TAKE ONE-HALF TABLET (2.5MG) DAILY AS NEEDED 90 tablet 97    senna-docusate (SENEXON-S) 8.6-50 MG tablet TAKE ONE TABLET BY MOUTH THREE TIMES A WEEK AT BEDTIME 36 tablet 97    THERATEARS 0.25 % SOLN INSTILL ONE DROP IN EACH EYE TWICE DAILY 15 mL PRN     REVIEW OF SYSTEMS:  Limited secondary to cognitive impairment but today pt reports ok    Objective:  /70   Pulse 78   Temp 98.1  F (36.7  C)   Resp 17   Ht 1.549 m (5' 1\")   Wt 56.5 kg (124 lb 9.6 oz)   SpO2 98%   BMI 23.54 kg/m    Exam:  GENERAL APPEARANCE:  Alert, cooperative, leaning forward in WC   ENT:  Mouth " and posterior oropharynx normal, moist mucous membranes  RESP:  respiratory effort and palpation of chest normal, lungs clear to auscultation   CV:  Palpation and auscultation of heart done , regular rate and rhythm, no murmur  ABDOMEN:  normal bowel sounds, soft, nontender  M/S:   BLE weakness baseline ambulating with wc,  bent knees, Tubi  , leaning right in WC  SKIN: clean, dry intact   NEURO:   Cranial nerves 2-12 are normal tested and grossly at patient's baseline  PSYCH:  insight and judgement impaired, memory impaired , affect and mood normal    Labs:   CBC RESULTS:   Recent Labs   Lab Test 09/14/23  0929 02/09/23  0706   WBC 5.4 5.2   RBC 4.20 3.78*   HGB 12.2 11.0*   HCT 39.2 34.2*   MCV 93 91   MCH 29.0 29.1   MCHC 31.1* 32.2   RDW 13.9 13.8    269       Last Basic Metabolic Panel:  Recent Labs   Lab Test 09/14/23  0929 04/13/23  0800    145   POTASSIUM 4.1 4.9   CHLORIDE 102 105   NOHEMY 9.8* 10.0*   CO2 25 30*   BUN 27.1* 25.3*   CR 0.81 0.91   * 92       Liver Function Studies -   Recent Labs   Lab Test 09/14/23  0929 02/10/22  0545   PROTTOTAL 7.4 6.7*   ALBUMIN 4.0 2.8*   BILITOTAL 0.2 0.3   ALKPHOS 117* 163*   AST 23 19   ALT 14 22       TSH   Date Value Ref Range Status   09/05/2024 4.84 (H) 0.30 - 4.20 uIU/mL Final   02/09/2023 3.68 0.30 - 4.20 uIU/mL Final   02/10/2022 2.96 0.40 - 4.00 mU/L Final   01/06/2021 5.73 (A) 0.40 - 4.00 mU/L Final   11/25/2017 4.20 (H) 0.40 - 4.00 mU/L Final       Lab Results   Component Value Date    A1C 5.6 07/13/2018    A1C 5.9 05/14/2018     ASSESSMENT/PLAN:  (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

## 2024-09-04 NOTE — LETTER
" 9/4/2024      Maryam Saavedra  C/o Saray Saavedra  [address] [place] [address]        [facility]  [place] [facility] Record Number:  [id number]  Place of Service where encounter took place:  [facility] [[id number]]  Chief Complaint   Patient presents with     RECHECK       HPI:    Maryam Saavedra  is a 89 year old (6/9/1932), who is being seen today for an episodic care visit.  HPI information obtained from: facility chart records, facility staff, patient report, [facility] chart review, and family/first contact daughter report. Today's concern is:    Seen today for follow up to skin tear and swelling of right LE. Sustains skin tears with attempts to self tranfers both areas have resolved. She has used 2x prn zyprexa this month. Use of tubi  for compression. Weight stable. Calm and pleasantly confused today. Denies pain.     Past Medical and Surgical History reviewed in Epic today.    MEDICATIONS:    Current Outpatient Medications   Medication Sig Dispense Refill     acetaminophen (TYLENOL) 325 MG tablet TAKE 2 TABLETS (650MG) BY MOUTH TWICE DAILY 360 tablet 97     amLODIPine (NORVASC) 2.5 MG tablet Take 1 tablet (2.5 mg) by mouth at bedtime 30 tablet 11     NYSTOP 341863 UNIT/GM powder APPLY TOPICALLY TO AFFECTED AREA(S) TWICE DAILY AFTER WASH/PAT DRY FOR 14 DAYS;THEN AS NEEDED 60 g PRN     OLANZapine (ZYPREXA) 5 MG tablet TAKE ONE-HALF TABLET (2.5MG) BY MOUTH ONCE DAILY;& MAY TAKE ONE-HALF TABLET (2.5MG) DAILY AS NEEDED 90 tablet 97     senna-docusate (SENEXON-S) 8.6-50 MG tablet TAKE ONE TABLET BY MOUTH THREE TIMES A WEEK AT BEDTIME 36 tablet 97     THERATEARS 0.25 % SOLN INSTILL ONE DROP IN EACH EYE TWICE DAILY 15 mL PRN     REVIEW OF SYSTEMS:  Limited secondary to cognitive impairment but today pt reports ok    Objective:  /70   Pulse 78   Temp 98.1  F (36.7  C)   Resp 17   Ht 1.549 m (5' 1\")   Wt 56.5 kg (124 lb 9.6 oz)   SpO2 98%   BMI " 23.54 kg/m    Exam:  GENERAL APPEARANCE:  Alert, cooperative, leaning forward in WC   ENT:  Mouth and posterior oropharynx normal, moist mucous membranes  RESP:  respiratory effort and palpation of chest normal, lungs clear to auscultation   CV:  Palpation and auscultation of heart done , regular rate and rhythm, no murmur  ABDOMEN:  normal bowel sounds, soft, nontender  M/S:   BLE weakness baseline ambulating with wc,  bent knees, Tubi  , leaning right in WC  SKIN: clean, dry intact   NEURO:   Cranial nerves 2-12 are normal tested and grossly at patient's baseline  PSYCH:  insight and judgement impaired, memory impaired , affect and mood normal    Labs:   CBC RESULTS:   Recent Labs   Lab Test 09/14/23  0929 02/09/23  0706   WBC 5.4 5.2   RBC 4.20 3.78*   HGB 12.2 11.0*   HCT 39.2 34.2*   MCV 93 91   MCH 29.0 29.1   MCHC 31.1* 32.2   RDW 13.9 13.8    269       Last Basic Metabolic Panel:  Recent Labs   Lab Test 09/14/23  0929 04/13/23  0800    145   POTASSIUM 4.1 4.9   CHLORIDE 102 105   NOHEMY 9.8* 10.0*   CO2 25 30*   BUN 27.1* 25.3*   CR 0.81 0.91   * 92       Liver Function Studies -   Recent Labs   Lab Test 09/14/23  0929 02/10/22  0545   PROTTOTAL 7.4 6.7*   ALBUMIN 4.0 2.8*   BILITOTAL 0.2 0.3   ALKPHOS 117* 163*   AST 23 19   ALT 14 22       TSH   Date Value Ref Range Status   09/05/2024 4.84 (H) 0.30 - 4.20 uIU/mL Final   02/09/2023 3.68 0.30 - 4.20 uIU/mL Final   02/10/2022 2.96 0.40 - 4.00 mU/L Final   01/06/2021 5.73 (A) 0.40 - 4.00 mU/L Final   11/25/2017 4.20 (H) 0.40 - 4.00 mU/L Final       Lab Results   Component Value Date    A1C 5.6 07/13/2018    A1C 5.9 05/14/2018     ASSESSMENT/PLAN:  (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

## 2024-09-04 NOTE — LETTER
Maryam Saavedra orders:     -US and Doppler to right lower extremity for increase swelling      -BMP, CBC, Liver function tests for HTN     -TSH for thyroid screen      Electronically signed by:  JACKIE Zayas CNP

## 2024-09-05 LAB
ALBUMIN SERPL BCG-MCNC: 4.1 G/DL (ref 3.5–5.2)
ALP SERPL-CCNC: 115 U/L (ref 40–150)
ALT SERPL W P-5'-P-CCNC: 11 U/L (ref 0–50)
ANION GAP SERPL CALCULATED.3IONS-SCNC: 14 MMOL/L (ref 7–15)
AST SERPL W P-5'-P-CCNC: 20 U/L (ref 0–45)
BILIRUB DIRECT SERPL-MCNC: <0.2 MG/DL (ref 0–0.3)
BILIRUB SERPL-MCNC: 0.2 MG/DL
BUN SERPL-MCNC: 23.2 MG/DL (ref 8–23)
CALCIUM SERPL-MCNC: 9.7 MG/DL (ref 8.8–10.4)
CHLORIDE SERPL-SCNC: 105 MMOL/L (ref 98–107)
CREAT SERPL-MCNC: 0.85 MG/DL (ref 0.51–0.95)
EGFRCR SERPLBLD CKD-EPI 2021: 64 ML/MIN/1.73M2
ERYTHROCYTE [DISTWIDTH] IN BLOOD BY AUTOMATED COUNT: 14.1 % (ref 10–15)
GLUCOSE SERPL-MCNC: 106 MG/DL (ref 70–99)
HCO3 SERPL-SCNC: 24 MMOL/L (ref 22–29)
HCT VFR BLD AUTO: 36.4 % (ref 35–47)
HGB BLD-MCNC: 11.4 G/DL (ref 11.7–15.7)
MCH RBC QN AUTO: 29.3 PG (ref 26.5–33)
MCHC RBC AUTO-ENTMCNC: 31.3 G/DL (ref 31.5–36.5)
MCV RBC AUTO: 94 FL (ref 78–100)
PLATELET # BLD AUTO: 217 10E3/UL (ref 150–450)
POTASSIUM SERPL-SCNC: 4.1 MMOL/L (ref 3.4–5.3)
PROT SERPL-MCNC: 7.2 G/DL (ref 6.4–8.3)
RBC # BLD AUTO: 3.89 10E6/UL (ref 3.8–5.2)
SODIUM SERPL-SCNC: 143 MMOL/L (ref 135–145)
TSH SERPL DL<=0.005 MIU/L-ACNC: 4.84 UIU/ML (ref 0.3–4.2)
WBC # BLD AUTO: 5.7 10E3/UL (ref 4–11)

## 2024-09-05 PROCEDURE — 85027 COMPLETE CBC AUTOMATED: CPT | Mod: ORL | Performed by: NURSE PRACTITIONER

## 2024-09-05 PROCEDURE — P9604 ONE-WAY ALLOW PRORATED TRIP: HCPCS | Mod: ORL | Performed by: NURSE PRACTITIONER

## 2024-09-05 PROCEDURE — 84443 ASSAY THYROID STIM HORMONE: CPT | Mod: ORL | Performed by: NURSE PRACTITIONER

## 2024-09-05 PROCEDURE — 82248 BILIRUBIN DIRECT: CPT | Mod: ORL | Performed by: NURSE PRACTITIONER

## 2024-09-05 PROCEDURE — 36415 COLL VENOUS BLD VENIPUNCTURE: CPT | Mod: ORL | Performed by: NURSE PRACTITIONER

## 2024-09-05 PROCEDURE — 80053 COMPREHEN METABOLIC PANEL: CPT | Mod: ORL | Performed by: NURSE PRACTITIONER

## 2024-09-16 ENCOUNTER — PATIENT OUTREACH (OUTPATIENT)
Dept: GERIATRIC MEDICINE | Facility: CLINIC | Age: 89
End: 2024-09-16
Payer: COMMERCIAL

## 2024-09-16 NOTE — PROGRESS NOTES
[facility] Care Coordination Contact    Rec'd tele call from member's daughter Saray stating that she had incont supplies put on hold for August, but they have not rec'd supplies for September. Explained that CC may have misunderstood and will contact [facility] to request a delivery of supplies for this month.  Secure email to [facility] requesting to deliver incont supplies this month.  Thuy Reeder RN, BC  Manager [facility] Care Coordinator   [phone]  (Fax)

## 2024-09-18 NOTE — PROGRESS NOTES
[facility] Care Coordination Contact    Rec'd email from Angely at [facility] to report the order was stopped for August, and an order was delivered on 9/12.     Call placed to member's daughter Saray, left vm with information above, request a call back as needed.  Thuy Reeder RN, BC  Manager [facility] Care Coordinator   [phone]  (Fax)

## 2024-10-20 ENCOUNTER — HEALTH MAINTENANCE LETTER (OUTPATIENT)
Age: 89
End: 2024-10-20

## 2024-10-28 ENCOUNTER — PATIENT OUTREACH (OUTPATIENT)
Dept: GERIATRIC MEDICINE | Facility: CLINIC | Age: 89
End: 2024-10-28
Payer: COMMERCIAL

## 2024-10-28 NOTE — PROGRESS NOTES
[facility] Care Coordination Contact    Spoke with member's daughter Saray to schedule annual assessment for 11/13/24 @ 2 PM.  Email sent to Yessi OLIVEIRA at [facility] to inform of the assessment date/time, request a copy of med list and POC.  Thuy Reeder RN, BC  Manager [facility] Care Coordinator   [phone]  (Fax)

## 2024-11-04 NOTE — PROGRESS NOTES
[facility] Care Coordination Contact    11/1/24 Recd vm from member's daughter Saray inquiring if the time of the assessment on 11/13/24 could be changed to 3 PM  11/4/245 Left vm with Saray that 3 PM will work, request a call back as needed.  Thuy Reeder RN, BC  Manager [facility] Care Coordinator   [phone]  (Fax)

## 2024-11-12 VITALS
WEIGHT: 136.6 LBS | TEMPERATURE: 97.5 F | RESPIRATION RATE: 24 BRPM | SYSTOLIC BLOOD PRESSURE: 184 MMHG | BODY MASS INDEX: 25.79 KG/M2 | HEIGHT: 61 IN | OXYGEN SATURATION: 97 % | HEART RATE: 60 BPM | DIASTOLIC BLOOD PRESSURE: 90 MMHG

## 2024-11-12 NOTE — PROGRESS NOTES
"[facility]  [facility] Record Number:  [id number]  Place of Service where encounter took place:  [facility] ([facility] [[id number]]  Chief Complaint   Patient presents with    RECHECK       HPI:    Maryam Saavedra  is a 89 year old (6/9/1932), who is being seen today for an episodic care visit.  HPI information obtained from: facility chart records, facility staff, patient report, and [facility] chart review. Today's concern is:    Seen today for follow up to chronic disease management. Staff saying she is not sleeping at night and then sleeping during the day so possible with day/night reversal. Her room is very warm today but staff say it was cold earlier. She is sleeping but awakes easily. More confused with advancing dementia. Increase to weight but does not seem in fluid overload. PRN zyprexa last used 9/2024.     Past Medical and Surgical History reviewed in Epic today.    MEDICATIONS:    Current Outpatient Medications   Medication Sig Dispense Refill    acetaminophen (TYLENOL) 325 MG tablet TAKE 2 TABLETS (650MG) BY MOUTH TWICE DAILY 360 tablet 97    amLODIPine (NORVASC) 2.5 MG tablet Take 1 tablet (2.5 mg) by mouth at bedtime 30 tablet 11    NYSTOP 244462 UNIT/GM powder APPLY TOPICALLY TO AFFECTED AREA(S) TWICE DAILY AFTER WASH/PAT DRY FOR 14 DAYS;THEN AS NEEDED 60 g PRN    OLANZapine (ZYPREXA) 5 MG tablet TAKE ONE-HALF TABLET (2.5MG) BY MOUTH ONCE DAILY;& MAY TAKE ONE-HALF TABLET (2.5MG) DAILY AS NEEDED 90 tablet 97    senna-docusate (SENEXON-S) 8.6-50 MG tablet TAKE ONE TABLET BY MOUTH THREE TIMES A WEEK AT BEDTIME 36 tablet 97    THERATEARS 0.25 % SOLN INSTILL ONE DROP IN EACH EYE TWICE DAILY 15 mL PRN     REVIEW OF SYSTEMS:  Limited secondary to cognitive impairment but today pt reports ok    Objective:  BP (!) 184/90   Pulse 60   Temp 97.5  F (36.4  C)   Resp 24   Ht 1.549 m (5' 1\")   Wt 62 kg (136 lb 9.6 oz)   SpO2 97%   BMI 25.81 kg/m  "   Exam:  GENERAL APPEARANCE:  Alert, cooperative, leaning forward in WC   ENT:  Mouth and posterior oropharynx normal, moist mucous membranes  RESP:  respiratory effort and palpation of chest normal, lungs clear to auscultation   CV:  Palpation and auscultation of heart done , regular rate and rhythm, no murmur  ABDOMEN:  normal bowel sounds, soft, nontender  M/S:   BLE weakness baseline ambulating with wc,  bent knees, farrow wraps , leaning right in WC  SKIN: clean, dry intact   NEURO:   Cranial nerves 2-12 are normal tested and grossly at patient's baseline  PSYCH:  insight and judgement impaired, memory impaired , affect and mood normal    Labs:   CBC RESULTS:   Recent Labs   Lab Test 09/05/24  0857 09/14/23  0929   WBC 5.7 5.4   RBC 3.89 4.20   HGB 11.4* 12.2   HCT 36.4 39.2   MCV 94 93   MCH 29.3 29.0   MCHC 31.3* 31.1*   RDW 14.1 13.9    252       Last Basic Metabolic Panel:  Recent Labs   Lab Test 09/05/24  0857 09/14/23  0929    142   POTASSIUM 4.1 4.1   CHLORIDE 105 102   NOHEMY 9.7 9.8*   CO2 24 25   BUN 23.2* 27.1*   CR 0.85 0.81   * 149*       Liver Function Studies -   Recent Labs   Lab Test 09/05/24  0857 09/14/23  0929   PROTTOTAL 7.2 7.4   ALBUMIN 4.1 4.0   BILITOTAL 0.2 0.2   ALKPHOS 115 117*   AST 20 23   ALT 11 14       TSH   Date Value Ref Range Status   09/05/2024 4.84 (H) 0.30 - 4.20 uIU/mL Final   02/09/2023 3.68 0.30 - 4.20 uIU/mL Final   02/10/2022 2.96 0.40 - 4.00 mU/L Final   01/06/2021 5.73 (A) 0.40 - 4.00 mU/L Final   11/25/2017 4.20 (H) 0.40 - 4.00 mU/L Final       Lab Results   Component Value Date    A1C 5.6 07/13/2018    A1C 5.9 05/14/2018     ASSESSMENT/PLAN:  (G47.00) Insomnia, unspecified type  (primary encounter diagnosis)  (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 Will hold on ordering trazodone or melatonin until  reviewed with her  -continue current meds, orders and treatments       Electronically signed by:  JACKIE Zayas CNP

## 2024-11-13 ENCOUNTER — ASSISTED LIVING VISIT (OUTPATIENT)
Dept: GERIATRICS | Facility: CLINIC | Age: 89
End: 2024-11-13
Payer: COMMERCIAL

## 2024-11-13 ENCOUNTER — PATIENT OUTREACH (OUTPATIENT)
Dept: GERIATRIC MEDICINE | Facility: CLINIC | Age: 89
End: 2024-11-13

## 2024-11-13 DIAGNOSIS — G47.00 INSOMNIA, UNSPECIFIED TYPE: Primary | ICD-10-CM

## 2024-11-13 DIAGNOSIS — F01.518 VASCULAR DEMENTIA WITH BEHAVIOR DISTURBANCE (H): ICD-10-CM

## 2024-11-13 DIAGNOSIS — R60.0 BILATERAL LOWER EXTREMITY EDEMA: ICD-10-CM

## 2024-11-13 DIAGNOSIS — I10 BENIGN ESSENTIAL HYPERTENSION: ICD-10-CM

## 2024-11-13 SDOH — HEALTH STABILITY: PHYSICAL HEALTH: ON AVERAGE, HOW MANY MINUTES DO YOU ENGAGE IN EXERCISE AT THIS LEVEL?: 0 MIN

## 2024-11-13 SDOH — HEALTH STABILITY: PHYSICAL HEALTH: ON AVERAGE, HOW MANY DAYS PER WEEK DO YOU ENGAGE IN MODERATE TO STRENUOUS EXERCISE (LIKE A BRISK WALK)?: 0 DAYS

## 2024-11-13 ASSESSMENT — LIFESTYLE VARIABLES
HOW MANY STANDARD DRINKS CONTAINING ALCOHOL DO YOU HAVE ON A TYPICAL DAY: PATIENT DOES NOT DRINK
HOW OFTEN DO YOU HAVE A DRINK CONTAINING ALCOHOL: NEVER
AUDIT-C TOTAL SCORE: 0
HOW OFTEN DO YOU HAVE SIX OR MORE DRINKS ON ONE OCCASION: NEVER
SKIP TO QUESTIONS 9-10: 1

## 2024-11-13 NOTE — PROGRESS NOTES
"[facility] Care Coordination Contact    [facility] Home Visit Assessment     Home visit for Health Risk Assessment with Maryam Saavedra completed on November 13, 2024    Type of residence:: Assisted living, [facility].   Current living arrangement:: I live in assisted living     Assessment completed with:: Member, daughter Saray, staff interviews and this CC.    Met with nursing staff to review plan of care; member requires assistance with dressing, grooming, bathing, toileting, transfers. Staff report disrupted sleep, confusion and occasional combativeness. Noted weight gain, nursing staff believes the current weight is not accurate.   Met with member's daughter Saray prior to meeting with member, she shared she has noticed a decline, but feels her mother is stable. Saray shared she has noticed that her mother has \"difficulty getting her words out.\" Observed word finding difficulty, compared to a year ago member was more talkative, sharing her favorite TV shows and conversing with CC.  Member had no complaints, initially stated no pain, but then mentioned her right shoulder. Saray visits 6 days/wk, shared that her siblings visit on the weekend.     Current Care Plan  Member currently receiving the following home care services:  NA   Member currently receiving the following community resources: [facility], 24 HR Customized AL.     Medication Review  Medication reconciliation completed in Epic: Yes  Medication set-up & administration: RN set up daily.  Assisting Living staff administers medications.  Medication Risk Assessment Medication (1 or more, place referral to MTM): N/A: No risk factors identified  MTM Referral Placed: No:      Mental/Behavioral Health   Depression Screening:  Unable to complete PHQ due to dementia.       Mental health DX:: Yes   Mental health DX how managed:: Medication    Falls Assessment:   Fallen 2 or more times in the past year?: No Per nursing staff, fall in June " while attempting to get out of bed.   Any fall with injury in the past year?: No    ADL/IADL Dependencies:   Dependent ADLs:: Bathing, Dressing, Grooming, Toileting, Wheelchair-with assist, Transfers, Positioning, Incontinence  Dependent IADLs:: Cleaning, Cooking, Laundry, Shopping, Meal Preparation, Medication Management, Money Management, Transportation, Incontinence    [facility]: Shared information regarding [employer]. Reviewed preventative health screening and health plan supplemental benefits/incentives. Reviewed medication disposal form.    PCA Assessment completed at visit: Yes, reviewed that member will be eligible for PCA/CFSS, per Saray she defers as her mother is receiving cares at [place]     Elderly Waiver Eligibility: Yes-will continue on EW    Care Plan & Recommendations:  Continue 24 HR Customized AL  Continue incontinent supplies through [facility]     See [place] Assessment for detailed assessment information.    Follow-Up Plan: Member informed of future contact, plan to f/u with member with a 6 month telephone assessment.  Contact information shared with member and family, encouraged member to call with any questions or concerns at any time.    Mobile care continuum providers: Please see Snapshot and Care Management Flowsheets for Specific details of care plan.    This CC note routed to PCP, Sandro Canseco.  Thuy Reeder RN, BC  Manager [facility] Care Coordinator   [phone]  (Fax)    11/22/24 Rec'd provider signature page from Yessi Callahan on [facility], she approved the RS tool.  Thuy Reeder RN, BC  Manager [facility] Care Coordinator   [phone]  (Fax)

## 2024-11-13 NOTE — Clinical Note
Sandro, I am a care coordinator with [facility] working with Maryam Saavedra. We completed her annual Elderly Waiver assessment today and are required to share notes of the assessment with their PCP. Feel free to reach out to me if you have questions. Thank you, Thuy Reeder RN, BC Manager [facility] Care Coordinator  [phone]  (Fax)

## 2024-11-13 NOTE — LETTER
11/13/2024      Maryam Saavedra  C/o Saray Saavedra  [address] [place] [address]        [facility]  [place] Medical Record Number:  [id number]  Place of Service where encounter took place:  [facility] [[id number]]  Chief Complaint   Patient presents with     RECHECK       HPI:    Maryam Saavedra  is a 89 year old (6/9/1932), who is being seen today for an episodic care visit.  HPI information obtained from: facility chart records, facility staff, patient report, and [facility] chart review. Today's concern is:    Seen today for follow up to chronic disease management. Staff saying she is not sleeping at night and then sleeping during the day so possible with day/night reversal. Her room is very warm today but staff say it was cold earlier. She is sleeping but awakes easily. More confused with advancing dementia. Increase to weight but does not seem in fluid overload. PRN zyprexa last used 9/2024.     Past Medical and Surgical History reviewed in Epic today.    MEDICATIONS:    Current Outpatient Medications   Medication Sig Dispense Refill     acetaminophen (TYLENOL) 325 MG tablet TAKE 2 TABLETS (650MG) BY MOUTH TWICE DAILY 360 tablet 97     amLODIPine (NORVASC) 2.5 MG tablet Take 1 tablet (2.5 mg) by mouth at bedtime 30 tablet 11     NYSTOP 090155 UNIT/GM powder APPLY TOPICALLY TO AFFECTED AREA(S) TWICE DAILY AFTER WASH/PAT DRY FOR 14 DAYS;THEN AS NEEDED 60 g PRN     OLANZapine (ZYPREXA) 5 MG tablet TAKE ONE-HALF TABLET (2.5MG) BY MOUTH ONCE DAILY;& MAY TAKE ONE-HALF TABLET (2.5MG) DAILY AS NEEDED 90 tablet 97     senna-docusate (SENEXON-S) 8.6-50 MG tablet TAKE ONE TABLET BY MOUTH THREE TIMES A WEEK AT BEDTIME 36 tablet 97     THERATEARS 0.25 % SOLN INSTILL ONE DROP IN EACH EYE TWICE DAILY 15 mL PRN     REVIEW OF SYSTEMS:  Limited secondary to cognitive impairment but today pt reports ok    Objective:  BP (!) 184/90   Pulse 60   Temp 97.5  F (36.4  C)    "Resp 24   Ht 1.549 m (5' 1\")   Wt 62 kg (136 lb 9.6 oz)   SpO2 97%   BMI 25.81 kg/m    Exam:  GENERAL APPEARANCE:  Alert, cooperative, leaning forward in WC   ENT:  Mouth and posterior oropharynx normal, moist mucous membranes  RESP:  respiratory effort and palpation of chest normal, lungs clear to auscultation   CV:  Palpation and auscultation of heart done , regular rate and rhythm, no murmur  ABDOMEN:  normal bowel sounds, soft, nontender  M/S:   BLE weakness baseline ambulating with wc,  bent knees, farrow wraps , leaning right in WC  SKIN: clean, dry intact   NEURO:   Cranial nerves 2-12 are normal tested and grossly at patient's baseline  PSYCH:  insight and judgement impaired, memory impaired , affect and mood normal    Labs:   CBC RESULTS:   Recent Labs   Lab Test 09/05/24  0857 09/14/23  0929   WBC 5.7 5.4   RBC 3.89 4.20   HGB 11.4* 12.2   HCT 36.4 39.2   MCV 94 93   MCH 29.3 29.0   MCHC 31.3* 31.1*   RDW 14.1 13.9    252       Last Basic Metabolic Panel:  Recent Labs   Lab Test 09/05/24  0857 09/14/23  0929    142   POTASSIUM 4.1 4.1   CHLORIDE 105 102   NOHEMY 9.7 9.8*   CO2 24 25   BUN 23.2* 27.1*   CR 0.85 0.81   * 149*       Liver Function Studies -   Recent Labs   Lab Test 09/05/24  0857 09/14/23  0929   PROTTOTAL 7.2 7.4   ALBUMIN 4.1 4.0   BILITOTAL 0.2 0.2   ALKPHOS 115 117*   AST 20 23   ALT 11 14       TSH   Date Value Ref Range Status   09/05/2024 4.84 (H) 0.30 - 4.20 uIU/mL Final   02/09/2023 3.68 0.30 - 4.20 uIU/mL Final   02/10/2022 2.96 0.40 - 4.00 mU/L Final   01/06/2021 5.73 (A) 0.40 - 4.00 mU/L Final   11/25/2017 4.20 (H) 0.40 - 4.00 mU/L Final       Lab Results   Component Value Date    A1C 5.6 07/13/2018    A1C 5.9 05/14/2018     ASSESSMENT/PLAN:  (G47.00) Insomnia, unspecified type  (primary encounter diagnosis)  (F01.518) Vascular dementia with behavior disturbance (H)  (R60.0) Bilateral lower extremity edema  (I10) Benign essential hypertension  Comment:   Plan: "   -left message for daughter. Will hold on ordering trazodone or melatonin until reviewed with her  -continue current meds, orders and treatments       Electronically signed by:  JACKIE Zayas CNP             Sincerely,        JACKIE Zayas CNP

## 2024-11-26 ENCOUNTER — PATIENT OUTREACH (OUTPATIENT)
Dept: GERIATRIC MEDICINE | Facility: CLINIC | Age: 89
End: 2024-11-26
Payer: COMMERCIAL

## 2024-11-26 NOTE — LETTER
November 26, 2024       MARYAM CARMONA  C/O PAULETTE CARMONA  [address]  [facility] [address]      Dear Maryam,    At [facility], we re dedicated to improving your health and wellness. Enclosed is the Support Plan developed with you on 11/13/2024. Please review the Support Plan carefully.    As a reminder, during your visit we talked about:   Ways to manage your physical and mental health   Using health care to maintain and improve your health    Your preventive care needs      Remember to contact your care coordinator if you:   Are hospitalized or plan to be hospitalized    Have a fall     Have a change in your physical or mental health   Need help finding support or services    If you have questions or don t agree with your Support Plan, call me at [phone]. You can also call me if your needs change. TTY users call the [place] Relay at 944 or 1-983.666.2394 (wnwgcm-ma-uszotw relay service).    Sincerely,       Thuy Reeder RN    E-mail: [email or web address]  Phone: [phone]      [facility]                [id number] accepted     (06/2024)                [address], [place] [address]  [phone]  fax [phone]  [facility]

## 2024-11-26 NOTE — PROGRESS NOTES
[facility] Care Coordination Contact    Received after visit chart from care coordinator.  Completed following tasks: Mailed copy of support plan to member, Mailed [place] Choices signature sheet pages 3-4, Mailed Safe Medication Disposal , and Updated services in Database   and Mailed copy of CL tool to member and submitted authorization to health plan.  Care Coordinator shared copy with facility ([facility]) and obtained signed provider signature summary letter - no tracking required. Uploaded to [employer] and [place] Choices as applicable.     Keily Duque  Case Management Specialist  [facility]  [phone]

## 2024-12-07 DIAGNOSIS — F01.53 VASCULAR DEMENTIA WITH DEPRESSED MOOD (H): ICD-10-CM

## 2024-12-09 RX ORDER — OLANZAPINE 5 MG/1
TABLET ORAL
Qty: 90 TABLET | Refills: 97 | Status: SHIPPED | OUTPATIENT
Start: 2024-12-09

## 2024-12-10 VITALS
WEIGHT: 125.4 LBS | TEMPERATURE: 97.6 F | HEIGHT: 61 IN | RESPIRATION RATE: 12 BRPM | HEART RATE: 62 BPM | DIASTOLIC BLOOD PRESSURE: 68 MMHG | OXYGEN SATURATION: 96 % | BODY MASS INDEX: 23.68 KG/M2 | SYSTOLIC BLOOD PRESSURE: 132 MMHG

## 2024-12-10 NOTE — PROGRESS NOTES
[facility]  Chief Complaint   Patient presents with    Wellness Visit     [place] Medical Record Number:  [id number]  Place of Service where encounter took place:  [facility] [[id number]]    SUBJECTIVE:   Maryam is a 89 year old who presents for Preventive Visit.    Are you in the first 12 months of your Medicare coverage?  No    HPI    Seen today for follow up to chronic disease management. Cognitive decline continues with advancing dementia. Some reports of not sleeping overnight but is varied among staff. She has 2 skin tears of shins. They do not appear infected. Minimal use of prn zyprexa, 3 falls this past year. Vaccine updated from facility chart review. [place] to LE    Have you ever done Advance Care Planning? (For example, a Health Directive, POLST, or a discussion with a medical provider or your loved ones about your wishes): Yes, advance care planning is on file.     Fall risk  Fallen 2 or more times in the past year?: (!) Yes    Cognitive Screening Not appropriate due to known dementia    Do you have sleep apnea, excessive snoring or daytime drowsiness? : no    Reviewed and updated as needed this visit by clinical staff   Tobacco  Allergies  Meds   Med Hx  Surg Hx  Fam Hx          Reviewed and updated as needed this visit by Provider                  Social History     Tobacco Use    Smoking status: Former     Types: Cigarettes     Start date: 7/24/1998    Smokeless tobacco: Former   Substance Use Topics    Alcohol use: Not Currently     Comment: 1-2 drinks per month             11/20/2019     9:31 AM   Alcohol Use   Prescreen: >3 drinks/day or >7 drinks/week? No        Patient-reported     Do you have a current opioid prescription? No  Do you use any other controlled substances or medications that are not prescribed by a provider? None    -------------------------------------    Current providers sharing in care for this patient include:  "  Patient Care Team:  Sandro Canseco APRN CNP as PCP - General (Internal Medicine)  Georgie Bae MD as MD (Internal Medicine)  Jennifer Gooden as Nursing Assistant (Geriatric Medicine)  Sandro Canseco APRN CNP as Assigned PCP  ([facility]), [facility] - Daini Montaño, RN as Lead Care Coordinator (Primary Care - CC)  Amber Olivera as Case Management Specialist    The following health maintenance items are reviewed in Epic and correct as of today:  Health Maintenance   Topic Date Due    MICROALBUMIN  Never done    PARATHYROID  Never done    PHOSPHORUS  Never done    ZOSTER IMMUNIZATION (1 of 2) Never done    PHQ-2 (once per calendar year)  01/01/2024    LIPID  02/09/2024    MEDICARE ANNUAL WELLNESS VISIT  08/09/2024    BMP  09/14/2024    HEMOGLOBIN  09/05/2025    FALL RISK ASSESSMENT  12/11/2025    ADVANCE CARE PLANNING  08/21/2028    DTAP/TDAP/TD IMMUNIZATION (3 - Td or Tdap) 11/11/2033    INFLUENZA VACCINE  Completed    Pneumococcal Vaccine: 65+ Years  Completed    URINALYSIS  Completed    ALK PHOS  Completed    RSV VACCINE  Completed    COVID-19 Vaccine  Completed    HPV IMMUNIZATION  Aged Out    MENINGITIS IMMUNIZATION  Aged Out    RSV MONOCLONAL ANTIBODY  Aged Out     Labs reviewed in EPIC  Up to date. Could have Shingrex series if family agreeable     Declined with goals of care  Pertinent mammograms are reviewed under the imaging tab.    Review of Systems  CONSTITUTIONAL: NEGATIVE for fever, chills, change in weight  ENT/MOUTH: NEGATIVE for ear, mouth and throat problems  RESP: NEGATIVE for significant cough or SOB  CV: NEGATIVE for chest pain, palpitations or peripheral edema    OBJECTIVE:   /68   Pulse 62   Temp 97.6  F (36.4  C)   Resp 12   Ht 1.549 m (5' 1\")   Wt 56.9 kg (125 lb 6.4 oz)   SpO2 96%   BMI 23.69 kg/m   Estimated body mass index is 23.69 kg/m  as calculated from the following:    Height as of this encounter: 1.549 m (5' 1\").    Weight as of " this encounter: 56.9 kg (125 lb 6.4 oz).  Physical Exam  GENERAL APPEARANCE:  Alert, cooperative but confused at baseline  ENT:  Mouth and posterior oropharynx normal, dry mucous membranes  RESP:  respiratory effort and palpation of chest normal, lungs clear to auscultation   CV:  Palpation and auscultation of heart done , regular rate and rhythm, no murmur  ABDOMEN:  normal bowel sounds, soft, nontender  M/S:   BLE weakness baseline ambulating with wc,  bent knees, farrow wraps  (off), leaning right in WC  SKIN: skin tears bilateral to LE with dusky flaps not intact. Bloody wound bed, no warmth   NEURO:   Cranial nerves 2-12 are normal tested and grossly at patient's baseline  PSYCH:  insight and judgement impaired, memory impaired , affect and mood normal    Diagnostic Test Results:  Labs reviewed in Epic    CBC RESULTS:   Recent Labs   Lab Test 09/05/24  0857 09/14/23  0929   WBC 5.7 5.4   RBC 3.89 4.20   HGB 11.4* 12.2   HCT 36.4 39.2   MCV 94 93   MCH 29.3 29.0   MCHC 31.3* 31.1*   RDW 14.1 13.9    252       Last Basic Metabolic Panel:  Recent Labs   Lab Test 09/05/24  0857 09/14/23  0929    142   POTASSIUM 4.1 4.1   CHLORIDE 105 102   NOHEMY 9.7 9.8*   CO2 24 25   BUN 23.2* 27.1*   CR 0.85 0.81   * 149*       Liver Function Studies -   Recent Labs   Lab Test 09/05/24  0857 09/14/23  0929   PROTTOTAL 7.2 7.4   ALBUMIN 4.1 4.0   BILITOTAL 0.2 0.2   ALKPHOS 115 117*   AST 20 23   ALT 11 14       TSH   Date Value Ref Range Status   09/05/2024 4.84 (H) 0.30 - 4.20 uIU/mL Final   02/09/2023 3.68 0.30 - 4.20 uIU/mL Final   02/10/2022 2.96 0.40 - 4.00 mU/L Final   01/06/2021 5.73 (A) 0.40 - 4.00 mU/L Final   11/25/2017 4.20 (H) 0.40 - 4.00 mU/L Final       Lab Results   Component Value Date    A1C 5.6 07/13/2018    A1C 5.9 05/14/2018       ASSESSMENT / PLAN:       ICD-10-CM    1. Vascular dementia with behavior disturbance (H)  F01.518       2. Insomnia, unspecified type  G47.00       3. Bilateral  lower extremity edema  R60.0       4. Benign essential hypertension  I10       5. Skin tear of lower leg without complication, left, sequela  S81.812S       6. Noninfected skin tear of right lower extremity, initial encounter  S81.811A           New orders today:   -house protocol wound care to both LE wounds until resolved. Notify provider for increased redness, drainage, pain     Patient has been advised of split billing requirements and indicates understanding: No      COUNSELING:  Reviewed preventive health counseling, as reflected in patient instructions       Healthy diet/nutrition       Fall risk prevention        She reports that she has quit smoking. Her smoking use included cigarettes. She started smoking about 26 years ago. She has quit using smokeless tobacco.      Appropriate preventive services were discussed with this patient, including applicable screening as appropriate for cardiovascular disease, diabetes, osteopenia/osteoporosis, and glaucoma.  As appropriate for age/gender, discussed screening for colorectal cancer, prostate cancer, breast cancer, and cervical cancer. Checklist reviewing preventive services available has been given to the patient.    Reviewed patients plan of care and provided an AVS. The Basic Care Plan (routine screening as documented in Health Maintenance) for Maryam meets the Care Plan requirement. This Care Plan has been established and reviewed with the daughter.          JACKIE Zayas CNP  [facility]ERIAT[facility]    Identified Health Risks:  I have reviewed Opioid Use Disorder and Substance Use Disorder risk factors and made any needed referrals.     Electronically signed by:  JACKIE Zayas CNP

## 2024-12-11 ENCOUNTER — ASSISTED LIVING VISIT (OUTPATIENT)
Dept: GERIATRICS | Facility: CLINIC | Age: 89
End: 2024-12-11
Payer: COMMERCIAL

## 2024-12-11 DIAGNOSIS — I10 BENIGN ESSENTIAL HYPERTENSION: ICD-10-CM

## 2024-12-11 DIAGNOSIS — F01.518 VASCULAR DEMENTIA WITH BEHAVIOR DISTURBANCE (H): Primary | ICD-10-CM

## 2024-12-11 DIAGNOSIS — G47.00 INSOMNIA, UNSPECIFIED TYPE: ICD-10-CM

## 2024-12-11 DIAGNOSIS — S81.811A NONINFECTED SKIN TEAR OF RIGHT LOWER EXTREMITY, INITIAL ENCOUNTER: ICD-10-CM

## 2024-12-11 DIAGNOSIS — R60.0 BILATERAL LOWER EXTREMITY EDEMA: ICD-10-CM

## 2024-12-11 DIAGNOSIS — S81.812S SKIN TEAR OF LOWER LEG WITHOUT COMPLICATION, LEFT, SEQUELA: ICD-10-CM

## 2024-12-11 NOTE — LETTER
12/11/2024      Maryam Saavedra  C/o Saray Saavedra  [address]  [facility] [address]        [facility]  Chief Complaint   Patient presents with     Wellness Visit     [place] Medical Record Number:  [id number]  Place of Service where encounter took place:  [facility] [[id number]]    SUBJECTIVE:   Maryam is a 89 year old who presents for Preventive Visit.    Are you in the first 12 months of your Medicare coverage?  No    HPI      Have you ever done Advance Care Planning? (For example, a Health Directive, POLST, or a discussion with a medical provider or your loved ones about your wishes): Yes, advance care planning is on file.     Fall risk  Fallen 2 or more times in the past year?: (!) Yes    Cognitive Screening Not appropriate due to known dementia    Do you have sleep apnea, excessive snoring or daytime drowsiness? : no    Reviewed and updated as needed this visit by clinical staff   Tobacco  Allergies  Meds   Med Hx  Surg Hx  Fam Hx          Reviewed and updated as needed this visit by Provider                  Social History     Tobacco Use     Smoking status: Former     Types: Cigarettes     Start date: 7/24/1998     Smokeless tobacco: Former   Substance Use Topics     Alcohol use: Not Currently     Comment: 1-2 drinks per month             11/20/2019     9:31 AM   Alcohol Use   Prescreen: >3 drinks/day or >7 drinks/week? No        Patient-reported     Do you have a current opioid prescription? No  Do you use any other controlled substances or medications that are not prescribed by a provider? None    -------------------------------------    Current providers sharing in care for this patient include:   Patient Care Team:  aSndro Canseco APRN CNP as PCP - General (Internal Medicine)  Georgie Bae MD as MD (Internal Medicine)  Jennifer Gooden as Nursing Assistant (Geriatric Medicine)  Sandro Canseco APRN CNP as Assigned PCP  ([facility]), [facility] - Danii Montaño, RN as Lead Care Coordinator (Primary Care - CC)  Amber Olivera as Case Management Specialist    The following health maintenance items are reviewed in Epic and correct as of today:  Health Maintenance   Topic Date Due     MICROALBUMIN  Never done     PARATHYROID  Never done     PHOSPHORUS  Never done     ZOSTER IMMUNIZATION (1 of 2) Never done     PHQ-2 (once per calendar year)  01/01/2024     LIPID  02/09/2024     MEDICARE ANNUAL WELLNESS VISIT  08/09/2024     BMP  09/14/2024     HEMOGLOBIN  09/05/2025     FALL RISK ASSESSMENT  12/11/2025     ADVANCE CARE PLANNING  08/21/2028     DTAP/TDAP/TD IMMUNIZATION (3 - Td or Tdap) 11/11/2033     INFLUENZA VACCINE  Completed     Pneumococcal Vaccine: 65+ Years  Completed     URINALYSIS  Completed     ALK PHOS  Completed     RSV VACCINE  Completed     COVID-19 Vaccine  Completed     HPV IMMUNIZATION  Aged Out     MENINGITIS IMMUNIZATION  Aged Out     RSV MONOCLONAL ANTIBODY  Aged Out     Labs reviewed in EPIC  Up to date. Could have Shingrex series if family agreeable     Declined with goals of care  Pertinent mammograms are reviewed under the imaging tab.    Review of Systems  CONSTITUTIONAL: NEGATIVE for fever, chills, change in weight  ENT/MOUTH: NEGATIVE for ear, mouth and throat problems  RESP: NEGATIVE for significant cough or SOB  CV: NEGATIVE for chest pain, palpitations or peripheral edema    OBJECTIVE:   /68   Pulse 62   Temp 97.6  F (36.4  C)   Resp 12   Ht 1.549 m (5' 1\")   Wt 56.9 kg (125 lb 6.4 oz)   SpO2 96%   BMI 23.69 kg/m   Estimated body mass index is 23.69 kg/m  as calculated from the following:    Height as of this encounter: 1.549 m (5' 1\").    Weight as of this encounter: 56.9 kg (125 lb 6.4 oz).  Physical Exam  GENERAL APPEARANCE:  Alert, cooperative but confused at baseline  ENT:  Mouth and posterior oropharynx normal, dry mucous membranes  RESP:  respiratory effort and palpation of " chest normal, lungs clear to auscultation   CV:  Palpation and auscultation of heart done , regular rate and rhythm, no murmur  ABDOMEN:  normal bowel sounds, soft, nontender  M/S:   BLE weakness baseline ambulating with wc,  bent knees, farrow wraps  (off), leaning right in WC  SKIN: skin tears bilateral to LE with dusky flaps not intact. Bloody wound bed, no warmth   NEURO:   Cranial nerves 2-12 are normal tested and grossly at patient's baseline  PSYCH:  insight and judgement impaired, memory impaired , affect and mood normal    Diagnostic Test Results:  Labs reviewed in Epic    CBC RESULTS:   Recent Labs   Lab Test 09/05/24  0857 09/14/23  0929   WBC 5.7 5.4   RBC 3.89 4.20   HGB 11.4* 12.2   HCT 36.4 39.2   MCV 94 93   MCH 29.3 29.0   MCHC 31.3* 31.1*   RDW 14.1 13.9    252       Last Basic Metabolic Panel:  Recent Labs   Lab Test 09/05/24  0857 09/14/23  0929    142   POTASSIUM 4.1 4.1   CHLORIDE 105 102   NOHEMY 9.7 9.8*   CO2 24 25   BUN 23.2* 27.1*   CR 0.85 0.81   * 149*       Liver Function Studies -   Recent Labs   Lab Test 09/05/24  0857 09/14/23  0929   PROTTOTAL 7.2 7.4   ALBUMIN 4.1 4.0   BILITOTAL 0.2 0.2   ALKPHOS 115 117*   AST 20 23   ALT 11 14       TSH   Date Value Ref Range Status   09/05/2024 4.84 (H) 0.30 - 4.20 uIU/mL Final   02/09/2023 3.68 0.30 - 4.20 uIU/mL Final   02/10/2022 2.96 0.40 - 4.00 mU/L Final   01/06/2021 5.73 (A) 0.40 - 4.00 mU/L Final   11/25/2017 4.20 (H) 0.40 - 4.00 mU/L Final       Lab Results   Component Value Date    A1C 5.6 07/13/2018    A1C 5.9 05/14/2018       ASSESSMENT / PLAN:       ICD-10-CM    1. Vascular dementia with behavior disturbance (H)  F01.518       2. Insomnia, unspecified type  G47.00       3. Bilateral lower extremity edema  R60.0       4. Benign essential hypertension  I10       5. Skin tear of lower leg without complication, left, sequela  S81.602S       6. Noninfected skin tear of right lower extremity, initial encounter  S81.818H            New orders today:   -house protocol wound care to both LE wounds until resolved. Notify provider for increased redness, drainage, pain     Patient has been advised of split billing requirements and indicates understanding: No      COUNSELING:  Reviewed preventive health counseling, as reflected in patient instructions       Healthy diet/nutrition       Fall risk prevention        She reports that she has quit smoking. Her smoking use included cigarettes. She started smoking about 26 years ago. She has quit using smokeless tobacco.      Appropriate preventive services were discussed with this patient, including applicable screening as appropriate for cardiovascular disease, diabetes, osteopenia/osteoporosis, and glaucoma.  As appropriate for age/gender, discussed screening for colorectal cancer, prostate cancer, breast cancer, and cervical cancer. Checklist reviewing preventive services available has been given to the patient.    Reviewed patients plan of care and provided an AVS. The Basic Care Plan (routine screening as documented in Health Maintenance) for Maryam meets the Care Plan requirement. This Care Plan has been established and reviewed with the daughter.          JACKIE Zayas CNP  [facility] GERIATRICS    Identified Health Risks:  I have reviewed Opioid Use Disorder and Substance Use Disorder risk factors and made any needed referrals.     Electronically signed by:  JACKIE Zayas CNP       Sincerely,        JACKIE Zayas CNP

## 2024-12-11 NOTE — LETTER
Maryam cai orders:     New orders today:   -house protocol wound care to both LE wounds until resolved. Notify provider for increased redness, drainage, pain           Electronically signed by:  JACKIE Zayas CNP

## 2024-12-24 ENCOUNTER — PATIENT OUTREACH (OUTPATIENT)
Dept: GERIATRIC MEDICINE | Facility: CLINIC | Age: 89
End: 2024-12-24
Payer: COMMERCIAL

## 2024-12-24 NOTE — PROGRESS NOTES
[facility] Care Coordination Contact    12/23/24 Rec'd vm from member's daughter Saray stating that the AL facility is requesting tabbed incontinent products for night time use.  12/24/24 Secure email sent to [facility] to place referral for tabbed products, per daughter size medium.  Thuy Reeder RN, BC  Manager [facility] Care Coordinator   [phone]  (Fax)    12/24/24 Rec'd email from [facility] that they have added incont supplies to monthly order, plan is that the order will go out on 1/8/2025.  Thuy Reeder RN, BC  Manager [facility] Care Coordinator   [phone]  (Fax)

## 2025-01-29 ENCOUNTER — ASSISTED LIVING VISIT (OUTPATIENT)
Dept: GERIATRICS | Facility: CLINIC | Age: OVER 89
End: 2025-01-29
Payer: COMMERCIAL

## 2025-01-29 VITALS
HEART RATE: 58 BPM | OXYGEN SATURATION: 96 % | DIASTOLIC BLOOD PRESSURE: 108 MMHG | TEMPERATURE: 97 F | BODY MASS INDEX: 24.39 KG/M2 | RESPIRATION RATE: 18 BRPM | WEIGHT: 129.2 LBS | SYSTOLIC BLOOD PRESSURE: 132 MMHG | HEIGHT: 61 IN

## 2025-01-29 DIAGNOSIS — T14.8XXA OPEN WOUND OF SKIN: Primary | ICD-10-CM

## 2025-01-29 DIAGNOSIS — N18.32 STAGE 3B CHRONIC KIDNEY DISEASE (H): ICD-10-CM

## 2025-01-29 DIAGNOSIS — G30.9 MIXED DEMENTIA (H): ICD-10-CM

## 2025-01-29 DIAGNOSIS — F02.80 MIXED DEMENTIA (H): ICD-10-CM

## 2025-01-29 DIAGNOSIS — F01.50 MIXED DEMENTIA (H): ICD-10-CM

## 2025-01-29 NOTE — PROGRESS NOTES
"[facility] [facility]ATR[facility]  [place] Medical Record Number:  [id number]  Place of Service where encounter took place:  [facility] [[id number]]  Chief Complaint   Patient presents with    RECHECK       HPI:    Maryam Saavedra  is a 89 year old (6/9/1932), who is being seen today for an episodic care visit.  HPI information obtained from: {FGS HPI:282007}. Today's concern is:  {[facility]GS DX:934643}    Past Medical and Surgical History reviewed in Epic today.    MEDICATIONS:  {Providers Please double check the med list (in the plan section >> meds & orders tab) and Discontinue any of the meds flagged by the TC to be discontinued}  Current Outpatient Medications   Medication Sig Dispense Refill    acetaminophen (TYLENOL) 325 MG tablet TAKE 2 TABLETS (650MG) BY MOUTH TWICE DAILY 360 tablet 97    amLODIPine (NORVASC) 2.5 MG tablet Take 1 tablet (2.5 mg) by mouth at bedtime 30 tablet 11    NYSTOP 213846 UNIT/GM powder APPLY TOPICALLY TO AFFECTED AREA(S) TWICE DAILY AFTER WASH/PAT DRY FOR 14 DAYS;THEN AS NEEDED 60 g PRN    OLANZapine (ZYPREXA) 5 MG tablet TAKE ONE-HALF TABLET (2.5MG) BY MOUTH ONCE DAILY;& MAY TAKE ONE-HALF TABLET (2.5MG) DAILY AS NEEDED 90 tablet 97    senna-docusate (SENEXON-S) 8.6-50 MG tablet TAKE ONE TABLET BY MOUTH THREE TIMES A WEEK AT BEDTIME 36 tablet 97    THERATEARS 0.25 % SOLN INSTILL ONE DROP IN EACH EYE TWICE DAILY 15 mL PRN     ***    REVIEW OF SYSTEMS:  {ROS FGS:903002}    Objective:  BP (!) 132/108   Pulse 58   Temp 97  F (36.1  C)   Resp 18   Ht 1.549 m (5' 1\")   Wt 58.6 kg (129 lb 3.2 oz)   SpO2 96%   BMI 24.41 kg/m    Exam:  {Nursing home physical exam :523000}    Labs:   {fgslab:381818}    ASSESSMENT/PLAN:  {FGS DX:214461}    {fgsorders:622589}  ***    {fgstime1:010340}  Electronically signed by:  Jennifer Gooden ***  {Providers Please double check the med list (in the plan section >> meds & orders tab) and Discontinue any of the meds flagged by the TC to " be discontinued}

## 2025-01-29 NOTE — LETTER
1/29/2025      Maryam Saavedra  C/o Saray Saavedra  [address] [place] [address]        No notes on file      Sincerely,        JACKIE Zayas CNP    Electronically signed

## 2025-02-20 DIAGNOSIS — Z71.85 VACCINE COUNSELING: Primary | ICD-10-CM

## 2025-02-20 RX ORDER — ZOSTER VACCINE RECOMBINANT, ADJUVANTED 50 MCG/0.5
1 KIT INTRAMUSCULAR ONCE
Qty: 2 EACH | Refills: 0 | Status: SHIPPED | OUTPATIENT
Start: 2025-02-20 | End: 2025-02-20

## 2025-02-20 NOTE — PROGRESS NOTES
Resident daughter ok with 2 part shingles vaccine    Plan:     zoster vaccine recombinant adjuvanted (SHINGRIX) injection Inject 0.5 mLs into the muscle once for 1 dose. Give seconded dose 3 months after first injection.       Electronically signed by:  JACKIE Zayas CNP

## 2025-03-11 VITALS
WEIGHT: 128.1 LBS | HEART RATE: 53 BPM | OXYGEN SATURATION: 97 % | SYSTOLIC BLOOD PRESSURE: 189 MMHG | RESPIRATION RATE: 16 BRPM | DIASTOLIC BLOOD PRESSURE: 74 MMHG | HEIGHT: 61 IN | BODY MASS INDEX: 24.19 KG/M2 | TEMPERATURE: 97.4 F

## 2025-03-11 NOTE — PROGRESS NOTES
[facility]  [facility] Record Number:  [id number]  Place of Service where encounter took place:  [facility] [[id number]]  Chief Complaint   Patient presents with    RECHECK       HPI:    Maryam Saavedra  is a 89 year old (6/9/1932), who is being seen today for an episodic care visit.  HPI information obtained from: facility chart records, facility staff, patient report, and [facility] chart review. Today's concern is:    Recheck from prior visit of slow healing open wound on left shin. In chart review, pt noted to have recent HTN with /74 on 3/11. Currently on Norvasc 2.5mg daily. Prior labs noted elevated TSH and low hgb. Today staff reports pt is having loose diarrhea (3x so far today, one incontinent on pt), nauseous, and has a runny nose. Aide reports the stool appeared to have small seeds in it. However, upon exam stool was loose, liquid - no overt seeds noted. Staff requested COVID swab, COVID swab pending per staff discretion. Pt otherwise has been doing well. Infrequent use of PRN olanzapine for agitation, last used 2/15.     Past Medical and Surgical History reviewed in Epic today.    MEDICATIONS:  Current Outpatient Medications   Medication Sig Dispense Refill    acetaminophen (TYLENOL) 325 MG tablet TAKE 2 TABLETS (650MG) BY MOUTH TWICE DAILY 360 tablet 97    amLODIPine (NORVASC) 2.5 MG tablet Take 1 tablet (2.5 mg) by mouth at bedtime 30 tablet 11    NYSTOP 110762 UNIT/GM powder APPLY TOPICALLY TO AFFECTED AREA(S) TWICE DAILY AFTER WASH/PAT DRY FOR 14 DAYS;THEN AS NEEDED 60 g PRN    OLANZapine (ZYPREXA) 5 MG tablet TAKE ONE-HALF TABLET (2.5MG) BY MOUTH ONCE DAILY;& MAY TAKE ONE-HALF TABLET (2.5MG) DAILY AS NEEDED 90 tablet 97    senna-docusate (SENEXON-S) 8.6-50 MG tablet TAKE ONE TABLET BY MOUTH THREE TIMES A WEEK AT BEDTIME 36 tablet 97    THERATEARS 0.25 % SOLN INSTILL ONE DROP IN EACH EYE TWICE DAILY 15 mL PRN       REVIEW OF SYSTEMS:  4  "point ROS including Respiratory, CV, GI and , other than that noted in the HPI,  is negative    Objective:  BP (!) 189/74   Pulse 53   Temp 97.4  F (36.3  C)   Resp 16   Ht 1.549 m (5' 1\")   Wt 58.1 kg (128 lb 1.6 oz)   SpO2 97%   BMI 24.20 kg/m    Exam:  GENERAL APPEARANCE:  Alert, appears ill, in slight distress due to GI illness, cooperative  RESP:  respiratory effort and palpation of chest normal, lungs clear to auscultation , no respiratory distress  CV:  Palpation and auscultation of heart done , trace edema in BLE, tachycardic , no murmur  ABDOMEN:  normal bowel sounds, soft, nontender, no hepatosplenomegaly or other masses, loose watery brown diarrhea, no guarding or rebound  SKIN:  Inspection of skin and subcutaneous tissue- skin tear on left shin and right upper shin, wound healing well, no signs of infection , bandages replaced, no other noted skin tears    Labs:   Lab Requisition on 09/05/2024   Component Date Value Ref Range Status    WBC Count 09/05/2024 5.7  4.0 - 11.0 10e3/uL Final    RBC Count 09/05/2024 3.89  3.80 - 5.20 10e6/uL Final    Hemoglobin 09/05/2024 11.4 (L)  11.7 - 15.7 g/dL Final    Hematocrit 09/05/2024 36.4  35.0 - 47.0 % Final    MCV 09/05/2024 94  78 - 100 fL Final    MCH 09/05/2024 29.3  26.5 - 33.0 pg Final    MCHC 09/05/2024 31.3 (L)  31.5 - 36.5 g/dL Final    RDW 09/05/2024 14.1  10.0 - 15.0 % Final    Platelet Count 09/05/2024 217  150 - 450 10e3/uL Final    Glucose 09/05/2024 106 (H)  70 - 99 mg/dL Final    Sodium 09/05/2024 143  135 - 145 mmol/L Final    Potassium 09/05/2024 4.1  3.4 - 5.3 mmol/L Final    Chloride 09/05/2024 105  98 - 107 mmol/L Final    Carbon Dioxide (CO2) 09/05/2024 24  22 - 29 mmol/L Final    Anion Gap 09/05/2024 14  7 - 15 mmol/L Final    Urea Nitrogen 09/05/2024 23.2 (H)  8.0 - 23.0 mg/dL Final    Creatinine 09/05/2024 0.85  0.51 - 0.95 mg/dL Final    GFR Estimate 09/05/2024 64  >60 mL/min/1.73m2 Final    Calcium 09/05/2024 9.7  8.8 - 10.4 " mg/dL Final    Reference intervals for this test were updated on 7/16/2024 to reflect our healthy population more accurately. There may be differences in the flagging of prior results with similar values performed with this method. Those prior results can be interpreted in the context of the updated reference intervals.    Protein Total 09/05/2024 7.2  6.4 - 8.3 g/dL Final    Albumin 09/05/2024 4.1  3.5 - 5.2 g/dL Final    Bilirubin Total 09/05/2024 0.2  <=1.2 mg/dL Final    Alkaline Phosphatase 09/05/2024 115  40 - 150 U/L Final    AST 09/05/2024 20  0 - 45 U/L Final    ALT 09/05/2024 11  0 - 50 U/L Final    Bilirubin Direct 09/05/2024 <0.20  0.00 - 0.30 mg/dL Final    TSH 09/05/2024 4.84 (H)  0.30 - 4.20 uIU/mL Final       ASSESSMENT/PLAN:  (K52.9) Gastroenteritis  (primary encounter diagnosis)  Comment: Symptoms began today (3/12)  -Continue hydration for GI illness  -Holding stool meds for 2-3 days while having diarrhea    (S81.802D) Open wound of left lower extremity, subsequent encounter  Comment: Healing wound on left shin  -Applied new bandage today, facility managing    (S81.811D) Noninfected skin tear of right lower extremity, subsequent encounter  Comment: Skin tear on upper right shin  -Applied new bandage today, facility managing    (R60.0) Bilateral lower extremity edema  Comment: Compression stockings on  -Continue compression stockings and elevation    (I10) Benign essential hypertension  Comment: Mixed HTN episodes  -Plan to check BP and HR for 4 days (at various times). 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

## 2025-03-12 ENCOUNTER — ASSISTED LIVING VISIT (OUTPATIENT)
Dept: GERIATRICS | Facility: CLINIC | Age: OVER 89
End: 2025-03-12
Payer: COMMERCIAL

## 2025-03-12 DIAGNOSIS — I10 BENIGN ESSENTIAL HYPERTENSION: ICD-10-CM

## 2025-03-12 DIAGNOSIS — S81.811D NONINFECTED SKIN TEAR OF RIGHT LOWER EXTREMITY, SUBSEQUENT ENCOUNTER: ICD-10-CM

## 2025-03-12 DIAGNOSIS — G30.9 MIXED DEMENTIA (H): ICD-10-CM

## 2025-03-12 DIAGNOSIS — K52.9 GASTROENTERITIS: Primary | ICD-10-CM

## 2025-03-12 DIAGNOSIS — N18.32 STAGE 3B CHRONIC KIDNEY DISEASE (H): ICD-10-CM

## 2025-03-12 DIAGNOSIS — S81.802D OPEN WOUND OF LEFT LOWER EXTREMITY, SUBSEQUENT ENCOUNTER: ICD-10-CM

## 2025-03-12 DIAGNOSIS — R79.89 ELEVATED TSH: ICD-10-CM

## 2025-03-12 DIAGNOSIS — R60.0 BILATERAL LOWER EXTREMITY EDEMA: ICD-10-CM

## 2025-03-12 DIAGNOSIS — F01.50 MIXED DEMENTIA (H): ICD-10-CM

## 2025-03-12 DIAGNOSIS — F02.80 MIXED DEMENTIA (H): ICD-10-CM

## 2025-03-12 PROCEDURE — 99349 HOME/RES VST EST MOD MDM 40: CPT | Performed by: NURSE PRACTITIONER

## 2025-03-12 NOTE — LETTER
3/12/2025      Maryam Saavedra  C/o Saray Saavedra  [address] [place] [address]        [facility]  [facility] Record Number:  [id number]  Place of Service where encounter took place:  [facility] [[id number]]  Chief Complaint   Patient presents with     RECHECK       HPI:    Maryam Saavedra  is a 89 year old (6/9/1932), who is being seen today for an episodic care visit.  HPI information obtained from: facility chart records, facility staff, patient report, and [facility] chart review. Today's concern is:    Recheck from prior visit of slow healing open wound on left shin. In chart review, pt noted to have recent HTN with /74 on 3/11. Currently on Norvasc 2.5mg daily. Prior labs noted elevated TSH and low hgb. Today staff reports pt is having loose diarrhea (3x so far today, one incontinent on pt), nauseous, and has a runny nose. Aide reports the stool appeared to have small seeds in it. However, upon exam stool was loose, liquid - no overt seeds noted. Staff requested COVID swab, COVID swab pending per staff discretion. Pt otherwise has been doing well. Infrequent use of PRN olanzapine for agitation, last used 2/15.     Past Medical and Surgical History reviewed in Epic today.    MEDICATIONS:  Current Outpatient Medications   Medication Sig Dispense Refill     acetaminophen (TYLENOL) 325 MG tablet TAKE 2 TABLETS (650MG) BY MOUTH TWICE DAILY 360 tablet 97     amLODIPine (NORVASC) 2.5 MG tablet Take 1 tablet (2.5 mg) by mouth at bedtime 30 tablet 11     NYSTOP 761655 UNIT/GM powder APPLY TOPICALLY TO AFFECTED AREA(S) TWICE DAILY AFTER WASH/PAT DRY FOR 14 DAYS;THEN AS NEEDED 60 g PRN     OLANZapine (ZYPREXA) 5 MG tablet TAKE ONE-HALF TABLET (2.5MG) BY MOUTH ONCE DAILY;& MAY TAKE ONE-HALF TABLET (2.5MG) DAILY AS NEEDED 90 tablet 97     senna-docusate (SENEXON-S) 8.6-50 MG tablet TAKE ONE TABLET BY MOUTH THREE TIMES A WEEK AT BEDTIME 36 tablet 97      "THERATEARS 0.25 % SOLN INSTILL ONE DROP IN EACH EYE TWICE DAILY 15 mL PRN       REVIEW OF SYSTEMS:  4 point ROS including Respiratory, CV, GI and , other than that noted in the HPI,  is negative    Objective:  BP (!) 189/74   Pulse 53   Temp 97.4  F (36.3  C)   Resp 16   Ht 1.549 m (5' 1\")   Wt 58.1 kg (128 lb 1.6 oz)   SpO2 97%   BMI 24.20 kg/m    Exam:  GENERAL APPEARANCE:  Alert, appears ill, in slight distress due to GI illness, cooperative  RESP:  respiratory effort and palpation of chest normal, lungs clear to auscultation , no respiratory distress  CV:  Palpation and auscultation of heart done , trace edema in BLE, tachycardic , no murmur  ABDOMEN:  normal bowel sounds, soft, nontender, no hepatosplenomegaly or other masses, loose watery brown diarrhea, no guarding or rebound  SKIN:  Inspection of skin and subcutaneous tissue- skin tear on left shin and right upper shin, wound healing well, no signs of infection , bandages replaced, no other noted skin tears    Labs:   Lab Requisition on 09/05/2024   Component Date Value Ref Range Status     WBC Count 09/05/2024 5.7  4.0 - 11.0 10e3/uL Final     RBC Count 09/05/2024 3.89  3.80 - 5.20 10e6/uL Final     Hemoglobin 09/05/2024 11.4 (L)  11.7 - 15.7 g/dL Final     Hematocrit 09/05/2024 36.4  35.0 - 47.0 % Final     MCV 09/05/2024 94  78 - 100 fL Final     MCH 09/05/2024 29.3  26.5 - 33.0 pg Final     MCHC 09/05/2024 31.3 (L)  31.5 - 36.5 g/dL Final     RDW 09/05/2024 14.1  10.0 - 15.0 % Final     Platelet Count 09/05/2024 217  150 - 450 10e3/uL Final     Glucose 09/05/2024 106 (H)  70 - 99 mg/dL Final     Sodium 09/05/2024 143  135 - 145 mmol/L Final     Potassium 09/05/2024 4.1  3.4 - 5.3 mmol/L Final     Chloride 09/05/2024 105  98 - 107 mmol/L Final     Carbon Dioxide (CO2) 09/05/2024 24  22 - 29 mmol/L Final     Anion Gap 09/05/2024 14  7 - 15 mmol/L Final     Urea Nitrogen 09/05/2024 23.2 (H)  8.0 - 23.0 mg/dL Final     Creatinine 09/05/2024 0.85  " 0.51 - 0.95 mg/dL Final     GFR Estimate 09/05/2024 64  >60 mL/min/1.73m2 Final     Calcium 09/05/2024 9.7  8.8 - 10.4 mg/dL Final    Reference intervals for this test were updated on 7/16/2024 to reflect our healthy population more accurately. There may be differences in the flagging of prior results with similar values performed with this method. Those prior results can be interpreted in the context of the updated reference intervals.     Protein Total 09/05/2024 7.2  6.4 - 8.3 g/dL Final     Albumin 09/05/2024 4.1  3.5 - 5.2 g/dL Final     Bilirubin Total 09/05/2024 0.2  <=1.2 mg/dL Final     Alkaline Phosphatase 09/05/2024 115  40 - 150 U/L Final     AST 09/05/2024 20  0 - 45 U/L Final     ALT 09/05/2024 11  0 - 50 U/L Final     Bilirubin Direct 09/05/2024 <0.20  0.00 - 0.30 mg/dL Final     TSH 09/05/2024 4.84 (H)  0.30 - 4.20 uIU/mL Final       ASSESSMENT/PLAN:  (K52.9) Gastroenteritis  (primary encounter diagnosis)  Comment: Symptoms began today (3/12)  -Continue hydration for GI illness  -Holding stool meds for 2-3 days while having diarrhea    (S81.802D) Open wound of left lower extremity, subsequent encounter  Comment: Healing wound on left shin  -Applied new bandage today, facility managing    (S81.811D) Noninfected skin tear of right lower extremity, subsequent encounter  Comment: Skin tear on upper right shin  -Applied new bandage today, facility managing    (R60.0) Bilateral lower extremity edema  Comment: Compression stockings on  -Continue compression stockings and elevation    (I10) Benign essential hypertension  Comment: Mixed HTN episodes  -Plan to check BP and HR for 4 days (at various times). 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

## 2025-03-12 NOTE — LETTER
Maryam Saavedra orders:     -Hold Senna S until diarrhea is resolved    -encourage banana and small frequent sips of fluids throughout the day to prevent dehydration with gastroenteritis        Electronically signed by:  JACKIE Zayas CNP

## 2025-03-18 ENCOUNTER — HOSPITAL ENCOUNTER (EMERGENCY)
Facility: CLINIC | Age: OVER 89
Discharge: HOME | End: 2025-03-18
Attending: EMERGENCY MEDICINE | Admitting: EMERGENCY MEDICINE
Payer: COMMERCIAL

## 2025-03-18 ENCOUNTER — APPOINTMENT (OUTPATIENT)
Dept: CT IMAGING | Facility: CLINIC | Age: OVER 89
End: 2025-03-18
Attending: EMERGENCY MEDICINE
Payer: COMMERCIAL

## 2025-03-18 ENCOUNTER — TELEPHONE (OUTPATIENT)
Dept: WOUND CARE | Facility: CLINIC | Age: OVER 89
End: 2025-03-18
Payer: COMMERCIAL

## 2025-03-18 VITALS
HEART RATE: 75 BPM | SYSTOLIC BLOOD PRESSURE: 151 MMHG | DIASTOLIC BLOOD PRESSURE: 74 MMHG | TEMPERATURE: 97 F | RESPIRATION RATE: 24 BRPM | OXYGEN SATURATION: 96 %

## 2025-03-18 DIAGNOSIS — S81.811A LACERATION OF RIGHT LOWER LEG, INITIAL ENCOUNTER: ICD-10-CM
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
## 2025-03-18 PROCEDURE — 70450 CT HEAD/BRAIN W/O DYE: CPT

## 2025-03-18 PROCEDURE — 99284 EMERGENCY DEPT VISIT MOD MDM: CPT | Mod: 25

## 2025-03-18 PROCEDURE — 12002 RPR S/N/AX/GEN/TRNK2.6-7.5CM: CPT

## 2025-03-18 ASSESSMENT — ACTIVITIES OF DAILY LIVING (ADL)
ADLS_ACUITY_SCORE: 47
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
## 2025-03-18 NOTE — TELEPHONE ENCOUNTER
[facility] The ER notes indicate the wound was sutured closed. The patients daughter, Saray, was called to determine if a wound is still present or if there are only sutures. The daughter was not sure the status of the leg but is going to visit the patient this afternoon and will get more information at that time. Discussed that if sutures are present and there is not a hematoma concern the patients PCP should manage the sutures but if there is a wound/hematoma the patient could be seen at [facility]. Saray states there is another wound on the right leg that the patient has had for some time that she might need to be seen for. She will assess this this afternoon and will call [facility] if an appointment is needed.

## 2025-03-18 NOTE — ED NOTES
Report called to Graciela, Nurse at [facility]. Arrangement to transport back. VM left for Daughter Saray about ED visit and care provided.

## 2025-03-18 NOTE — ED PROVIDER NOTES
Emergency Department Note      History of Present Illness     Chief Complaint   Fall    History is limited due to the condition of the patient.     HPI   Maryam Saavedra is a 89 year old female with a history of dementia, hypertension, CKD stage 3b, and frequent falls amongst others presenting to the ED via EMS from her memory care facility following a fall. The patient states that earlier this evening she fell from bed. She is unsure why she fell. She states she did not hit her head though has poor recollection of the fall. She presents to the ED with an open laceration to the right shin. She denies use of blood thinning medications. Denies surgical history or known medication allergies.    Independent Historian   None    Review of External Notes   Reviewed [facility] - last TDaP 11/11/23.  Assisted living Visit 3/12/2025: Heart Rate 53    Past Medical History     Medical History and Problem List   Hypertension  Chronic low back pain  Osteoarthritis   Vascular dementia   Physical deconditioning  Anemia due to blood loss  Cognitive impairment  Bilateral lower extremity edema  Falls frequently  CKD stage 3b    Medications   Tylenol  Norvasc  Nystop  Zyprexa  Senexon    Surgical History   Past Surgical History:   Procedure Laterality Date    ARTHROPLASTY HIP ANTERIOR Right 7/24/2018    Procedure: ARTHROPLASTY HIP ANTERIOR;  RIGHT DIRECT ANTERIOR TOTAL HIP ARTHROPLASTY;  Surgeon: Jimbo Phillips MD;  Location:  OR    ARTHROPLASTY KNEE Left 5/12/2020    Procedure: LEFT TOTAL KNEE ARTHROPLASTY;  Surgeon: Jimbo Phillips MD;  Location:  OR    EYE SURGERY  cataracts    MOHS MICROGRAPHIC PROCEDURE      Left lower eyelid     MOHS MICROGRAPHIC PROCEDURE Left 10/27/2016    Procedure: MOHS MICROGRAPHIC PROCEDURE;  Surgeon: Jose Torrez MD;  Location: [facility]    MOHS MICROGRAPHIC PROCEDURE Right 5/24/2018    Procedure: MOHS MICROGRAPHIC PROCEDURE;  RIGHT MEDIAL CANTHUS MOHS CLOSURE;  Surgeon: Jose Torrez MD;   Location: [facility]    ORTHOPEDIC SURGERY      bilateral wrists       Physical Exam     Patient Vitals for the past 24 hrs:   BP Temp Temp src Pulse Resp SpO2   03/18/25 0300 (!) 152/73 -- -- (!) 49 10 --   03/18/25 0231 (!) 171/84 97  F (36.1  C) Temporal -- -- 96 %     Physical Exam  Eyes:  The pupils are equal and round    Conjunctivae and sclerae are normal  ENT:    The nose is normal    Pinnae are normal    The oropharynx is normal  Neck:  Normal range of motion    There is no rigidity noted    There is no midline cervical spine tenderness    Trachea is in the midline  CV:  Bradycardic and regular    No edema  Resp:  Lungs are clear    Non-labored    No rales    No wheezing   GI:  Abdomen is soft, there is no rigidity    No distension    No rebound tenderness   MS:  Normal muscular tone    No asymmetric leg swelling  Skin:  Y-shaped wound on right lower leg shin  Neuro:   Awake, alert.  Poor short-term memory    Speech is normal and fluent.    Face is symmetric.     Moves all extremities  Psych: Normal affect.  Appropriate interactions.    Diagnostics     Lab Results   Labs Ordered and Resulted from Time of ED Arrival to Time of ED Departure - No data to display    Imaging   CT Head w/o Contrast   Final Result   IMPRESSION:   1.  No acute intracranial abnormality.      2.  Age-related and chronic ischemic changes.      3.  Findings suggesting the possibility of normal-pressure hydrocephalus again noted.        Independent Interpretation   None    ED Course      Medications Administered   Medications - No data to display    Procedures   Procedures     Laceration Repair      Procedure: Laceration Repair    Indication: Laceration    Consent: Verbal    Tetanus status reviewed    Location: Right Lower extremity     Length: 4 cm    Preparation: Irrigation with Sterile Saline.    Anesthesia/Sedation: Bupivacaine with Epinephrine - 0.5%      Treatment/Exploration: Wound explored, no foreign bodies found     Closure: The  wound was closed with one layer. Skin/superficial layer was closed with 7 x 4-0 Nylon using Interrupted sutures.     Patient Status: The patient tolerated the procedure well: Yes. There were no complications.      Discussion of Management   None    ED Course   ED Course as of 03/18/25 0415   Tue Mar 18, 2025   0240 I obtained history and examined the patient as noted above.   0415 I performed the laceration repair procedure noted above.       Additional Documentation  None    Medical Decision Making / Diagnosis     CMS Diagnoses: None    MIPS       None    MDM   Maryam Saavedra is a 89 year old female who presents after being found at the side of her bed.  She lives in [facility] and does not recall exactly how she got there.  She notes that she has a wound on her right lower extremity.  On exam she appears pleasantly confused.  She has no signs of head trauma but given that she was found on the floor and does not recall what happened a CT scan of her brain was performed.  The CT scan was negative aside from the ventriculomegaly that has been seen previously.  Provided information regarding this in the discharge paperwork and need for follow-up.  I attempted to contact the several family members who are listed as emergency contacts without any answers.  In addition the right lower extremity wound was anesthetized, cleansed and then repaired.  I recommended referral to wound care clinic for the location and size of the wound.  Otherwise patient is appropriate for discharge.  Vital signs show some bradycardia.  Upon review of prior records this has been present previously.    Disposition   The patient was discharged.     Diagnosis     ICD-10-CM    1. Laceration of right lower leg, initial encounter  S81.811A       2. Fall, initial encounter  W19.XXXA            Discharge Medications   New Prescriptions    No medications on file     Scribe Disclosure:  ISUSANNE, am serving as a scribe at 2:44 AM on 3/18/2025  to document services personally performed by Juliano Ng MD based on my observations and the provider's statements to me.        Juliano Ng MD  03/18/25 0538

## 2025-03-18 NOTE — ED NOTES
Bed: ED19  Expected date: 3/18/25  Expected time: 2:15 AM  Means of arrival: Ambulance  Comments:  Vijaya 1 89F fall, leg wound

## 2025-03-18 NOTE — ED TRIAGE NOTES
BIBA from Memory Care. Staff heard a fall, pt was found between bed and w/c. No know blood thinners. No known loss of consciousness. She was wearing compression sleeve on legs and was bleeding through stocking. Wound per EMS is 3in long to RLE shin , non bleeding. A/Ox2. Family did not answer call by facility.

## 2025-03-19 ENCOUNTER — PATIENT OUTREACH (OUTPATIENT)
Dept: GERIATRIC MEDICINE | Facility: CLINIC | Age: OVER 89
End: 2025-03-19
Payer: COMMERCIAL

## 2025-03-19 NOTE — PROGRESS NOTES
[facility] Care Coordination Contact  3/19/2025 CC received notification of Emergency Room visit.  ER visit occurred on 3/19/25  at [facility] with Dx of fall, leg laceration.    CC contacted adult daughter Saray  and left a message requesting a return call.  Email sent to Yessi OLIVEIRA at [facility] to inquire on how member is doing.   Member has a follow-up appointment with PCP: No, member is followed by the onsite [facility] team.   Thuy Reeder RN, BC  Manager [facility] Care Coordinator   [phone]  (Fax)    [id number] Call placed to member's daughter Saray, to follow up on ED visit/fall, request a call back.  Member has had a change in condition: No return follow up from nursing staff at [facility].   New referrals placed: No  Home Visit Needed: No  Support Plan reviewed and updated.  PCP notified of ED visit via EMR.  Thuy Reeder RN, BC  Manager [facility] Care Coordinator   [phone]  (Fax)    3/20/2025 Rec'd vm from member's daughter Saray sharing her mother is doing well, staff are checking site of stitches daily.   Thuy Reeder RN, BC  Manager [facility] Care Coordinator   [phone]  (Fax)

## 2025-03-25 ENCOUNTER — PATIENT OUTREACH (OUTPATIENT)
Dept: GERIATRIC MEDICINE | Facility: CLINIC | Age: OVER 89
End: 2025-03-25
Payer: COMMERCIAL

## 2025-03-25 NOTE — PROGRESS NOTES
[facility] Care Coordination Contact    3/21/2025 Rec'd vm from  member's daughter Saray requesting to hold incont supplies in April. Email sent to [facility] on 3/24/25 to request holding supplies for April.  Thuy Reeder RN, BC  Manager [facility] Care Coordinator   [phone]  (Fax)

## 2025-04-01 VITALS
HEART RATE: 68 BPM | SYSTOLIC BLOOD PRESSURE: 140 MMHG | TEMPERATURE: 97.7 F | WEIGHT: 128.1 LBS | HEIGHT: 61 IN | OXYGEN SATURATION: 92 % | BODY MASS INDEX: 24.19 KG/M2 | DIASTOLIC BLOOD PRESSURE: 78 MMHG | RESPIRATION RATE: 18 BRPM

## 2025-04-01 NOTE — PROGRESS NOTES
[facility]  [place] Medical Record Number:  [id number]  Place of Service where encounter took place:  [facility] [[id number]]  Chief Complaint   Patient presents with    RECHECK     Suture removal       HPI:    Maryam Saavedra  is a 89 year old (6/9/1932), who is being seen today for an episodic care visit.  HPI information obtained from: facility chart records, facility staff, patient report, [facility] chart review, and family/first contact daughter report. Today's concern is:    Seen today for follow up to suture removal of 4cm right leg laceration. Was evaluated in ED on 3/18/25 after unwitnessed fall. She denies pain to the area. Suture instruction removal for 14 days after closure. Laceration noted in a Y shape and edges are not approximated. Wound bed is moist with red and yellow drainage in color. Some sutures are imbedded. History of slow wound healing needed treatment in wound care clinic. PMH includes lymphedema.     Past Medical and Surgical History reviewed in Epic today.    MEDICATIONS:    Current Outpatient Medications   Medication Sig Dispense Refill    doxycycline hyclate (VIBRAMYCIN) 100 MG capsule Take 1 capsule (100 mg) by mouth 2 times daily for 5 days. 10 capsule 0    acetaminophen (TYLENOL) 325 MG tablet TAKE 2 TABLETS (650MG) BY MOUTH TWICE DAILY 360 tablet 97    amLODIPine (NORVASC) 2.5 MG tablet Take 1 tablet (2.5 mg) by mouth at bedtime 30 tablet 11    NYSTOP 684241 UNIT/GM powder APPLY TOPICALLY TO AFFECTED AREA(S) TWICE DAILY AFTER WASH/PAT DRY FOR 14 DAYS;THEN AS NEEDED 60 g PRN    OLANZapine (ZYPREXA) 5 MG tablet TAKE ONE-HALF TABLET (2.5MG) BY MOUTH ONCE DAILY;& MAY TAKE ONE-HALF TABLET (2.5MG) DAILY AS NEEDED 90 tablet 97    senna-docusate (SENEXON-S) 8.6-50 MG tablet TAKE ONE TABLET BY MOUTH THREE TIMES A WEEK AT BEDTIME 36 tablet 97    THERATEARS 0.25 % SOLN INSTILL ONE DROP IN EACH EYE TWICE DAILY 15 mL PRN     REVIEW OF  "SYSTEMS:  Limited secondary to cognitive impairment but today pt reports ok    Objective:  BP (!) 140/78   Pulse 68   Temp 97.7  F (36.5  C)   Resp 18   Ht 1.549 m (5' 1\")   Wt 58.1 kg (128 lb 1.6 oz)   SpO2 92%   BMI 24.20 kg/m    Exam:  GENERAL APPEARANCE:  Alert, calm and pleasant   RESP:  respiratory effort and palpation of chest normal, lungs clear to auscultation   CV:  Palpation and auscultation of heart done , trace edema in BLE, bradycardic   ABDOMEN:  normal bowel sounds, soft, nontender  SKIN: see hpi       Labs:   CBC RESULTS:   Recent Labs   Lab Test 09/05/24  0857 09/14/23  0929   WBC 5.7 5.4   RBC 3.89 4.20   HGB 11.4* 12.2   HCT 36.4 39.2   MCV 94 93   MCH 29.3 29.0   MCHC 31.3* 31.1*   RDW 14.1 13.9    252       Last Basic Metabolic Panel:  Recent Labs   Lab Test 09/05/24  0857 09/14/23  0929    142   POTASSIUM 4.1 4.1   CHLORIDE 105 102   NOHEMY 9.7 9.8*   CO2 24 25   BUN 23.2* 27.1*   CR 0.85 0.81   * 149*       Liver Function Studies -   Recent Labs   Lab Test 09/05/24  0857 09/14/23  0929   PROTTOTAL 7.2 7.4   ALBUMIN 4.1 4.0   BILITOTAL 0.2 0.2   ALKPHOS 115 117*   AST 20 23   ALT 11 14       TSH   Date Value Ref Range Status   09/05/2024 4.84 (H) 0.30 - 4.20 uIU/mL Final   02/09/2023 3.68 0.30 - 4.20 uIU/mL Final   02/10/2022 2.96 0.40 - 4.00 mU/L Final   01/06/2021 5.73 (A) 0.40 - 4.00 mU/L Final   11/25/2017 4.20 (H) 0.40 - 4.00 mU/L Final       Lab Results   Component Value Date    A1C 5.6 07/13/2018    A1C 5.9 05/14/2018     ASSESSMENT/PLAN:  (T14.8XXA) Open wound  (primary encounter diagnosis)  (Z48.02) Visit for suture removal  (L08.9) Local infection of skin and subcutaneous tissue  Comment: healing  Plan:   -updated wound care orders  -doxycycline 100 mg po BID given s/s of infection   -discussed follow up in wound care clinic if not improving with daughter   -will see her again next week     (G91.2) NPH (normal pressure hydrocephalus) (H)  Comment: reviewed " imaging results with daughter  Plan:   -no plans to follow up with neuro. Resident WC at baseline, dementia, goals of care      Electronically signed by:  JACKIE Zayas CNP

## 2025-04-02 ENCOUNTER — ASSISTED LIVING VISIT (OUTPATIENT)
Dept: GERIATRICS | Facility: CLINIC | Age: OVER 89
End: 2025-04-02
Payer: COMMERCIAL

## 2025-04-02 DIAGNOSIS — G91.2 NPH (NORMAL PRESSURE HYDROCEPHALUS) (H): ICD-10-CM

## 2025-04-02 DIAGNOSIS — Z48.02 VISIT FOR SUTURE REMOVAL: ICD-10-CM

## 2025-04-02 DIAGNOSIS — T14.8XXA OPEN WOUND: Primary | ICD-10-CM

## 2025-04-02 DIAGNOSIS — L08.9 LOCAL INFECTION OF SKIN AND SUBCUTANEOUS TISSUE: ICD-10-CM

## 2025-04-02 PROCEDURE — 99349 HOME/RES VST EST MOD MDM 40: CPT | Performed by: NURSE PRACTITIONER

## 2025-04-02 RX ORDER — DOXYCYCLINE 100 MG/1
100 CAPSULE ORAL 2 TIMES DAILY
Qty: 10 CAPSULE | Refills: 0 | Status: SHIPPED | OUTPATIENT
Start: 2025-04-02 | End: 2025-04-07

## 2025-04-02 NOTE — LETTER
4/2/2025      Maryam Saavedra  C/o Saray Saavedra  [address] [place] [address]        [place] [facility]  [facility] Medical Record Number:  [id number]  Place of Service where encounter took place:  [facility] [[id number]]  Chief Complaint   Patient presents with     RECHECK     Suture removal       HPI:    Maryam Saavedra  is a 89 year old (6/9/1932), who is being seen today for an episodic care visit.  HPI information obtained from: facility chart records, facility staff, patient report, [facility] chart review, and family/first contact daughter report. Today's concern is:    Seen today for follow up to suture removal of 4cm right leg laceration. Was evaluated in ED on 3/18/25 after unwitnessed fall. She denies pain to the area. Suture instruction removal for 14 days after closure. Laceration noted in a Y shape and edges are not approximated. Wound bed is moist with red and yellow drainage in color. Some sutures are imbedded. History of slow wound healing needed treatment in wound care clinic. PMH includes lymphedema.     Past Medical and Surgical History reviewed in Epic today.    MEDICATIONS:    Current Outpatient Medications   Medication Sig Dispense Refill     doxycycline hyclate (VIBRAMYCIN) 100 MG capsule Take 1 capsule (100 mg) by mouth 2 times daily for 5 days. 10 capsule 0     acetaminophen (TYLENOL) 325 MG tablet TAKE 2 TABLETS (650MG) BY MOUTH TWICE DAILY 360 tablet 97     amLODIPine (NORVASC) 2.5 MG tablet Take 1 tablet (2.5 mg) by mouth at bedtime 30 tablet 11     NYSTOP 527159 UNIT/GM powder APPLY TOPICALLY TO AFFECTED AREA(S) TWICE DAILY AFTER WASH/PAT DRY FOR 14 DAYS;THEN AS NEEDED 60 g PRN     OLANZapine (ZYPREXA) 5 MG tablet TAKE ONE-HALF TABLET (2.5MG) BY MOUTH ONCE DAILY;& MAY TAKE ONE-HALF TABLET (2.5MG) DAILY AS NEEDED 90 tablet 97     senna-docusate (SENEXON-S) 8.6-50 MG tablet TAKE ONE TABLET BY MOUTH THREE TIMES A WEEK AT BEDTIME 36 tablet  "97     THERATEARS 0.25 % SOLN INSTILL ONE DROP IN EACH EYE TWICE DAILY 15 mL PRN     REVIEW OF SYSTEMS:  Limited secondary to cognitive impairment but today pt reports ok    Objective:  BP (!) 140/78   Pulse 68   Temp 97.7  F (36.5  C)   Resp 18   Ht 1.549 m (5' 1\")   Wt 58.1 kg (128 lb 1.6 oz)   SpO2 92%   BMI 24.20 kg/m    Exam:  GENERAL APPEARANCE:  Alert, calm and pleasant   RESP:  respiratory effort and palpation of chest normal, lungs clear to auscultation   CV:  Palpation and auscultation of heart done , trace edema in BLE, bradycardic   ABDOMEN:  normal bowel sounds, soft, nontender  SKIN: see hpi       Labs:   CBC RESULTS:   Recent Labs   Lab Test 09/05/24  0857 09/14/23  0929   WBC 5.7 5.4   RBC 3.89 4.20   HGB 11.4* 12.2   HCT 36.4 39.2   MCV 94 93   MCH 29.3 29.0   MCHC 31.3* 31.1*   RDW 14.1 13.9    252       Last Basic Metabolic Panel:  Recent Labs   Lab Test 09/05/24  0857 09/14/23  0929    142   POTASSIUM 4.1 4.1   CHLORIDE 105 102   NOHEMY 9.7 9.8*   CO2 24 25   BUN 23.2* 27.1*   CR 0.85 0.81   * 149*       Liver Function Studies -   Recent Labs   Lab Test 09/05/24  0857 09/14/23  0929   PROTTOTAL 7.2 7.4   ALBUMIN 4.1 4.0   BILITOTAL 0.2 0.2   ALKPHOS 115 117*   AST 20 23   ALT 11 14       TSH   Date Value Ref Range Status   09/05/2024 4.84 (H) 0.30 - 4.20 uIU/mL Final   02/09/2023 3.68 0.30 - 4.20 uIU/mL Final   02/10/2022 2.96 0.40 - 4.00 mU/L Final   01/06/2021 5.73 (A) 0.40 - 4.00 mU/L Final   11/25/2017 4.20 (H) 0.40 - 4.00 mU/L Final       Lab Results   Component Value Date    A1C 5.6 07/13/2018    A1C 5.9 05/14/2018     ASSESSMENT/PLAN:  (T14.8XXA) Open wound  (primary encounter diagnosis)  (Z48.02) Visit for suture removal  (L08.9) Local infection of skin and subcutaneous tissue  Comment: healing  Plan:   -updated wound care orders  -doxycycline 100 mg po BID given s/s of infection   -discussed follow up in wound care clinic if not improving with daughter   -will see " her again next week     (G91.2) NPH (normal pressure hydrocephalus) (H)  Comment: reviewed imaging results with daughter  Plan:   -no plans to follow up with neuro. Resident WC at baseline, dementia, goals of care      Electronically signed by:  JACKIE Zayas CNP             Sincerely,        JACKIE Zayas CNP    Electronically signed

## 2025-04-02 NOTE — LETTER
Maryam Saavedra orders:     -begin     doxycycline hyclate (VIBRAMYCIN) 100 MG capsule Take 1 capsule (100 mg) by mouth 2 times daily for 5 days for skin infection          -change wound care to wash/pat dry, apply non-stick to right lower extremity wound, secure with gauze or like. Do 3x week        Electronically signed by:  JACKIE Zayas CNP

## 2025-04-08 VITALS
BODY MASS INDEX: 23.9 KG/M2 | HEIGHT: 61 IN | SYSTOLIC BLOOD PRESSURE: 157 MMHG | WEIGHT: 126.6 LBS | RESPIRATION RATE: 14 BRPM | OXYGEN SATURATION: 96 % | DIASTOLIC BLOOD PRESSURE: 80 MMHG | HEART RATE: 65 BPM | TEMPERATURE: 96.8 F

## 2025-04-08 NOTE — PROGRESS NOTES
[facility] [facility]  [place] Medical Record Number:  [id number]  Place of Service where encounter took place:  [facility] [[id number]]  Chief Complaint   Patient presents with    RECHECK       HPI:    Maryam Saavedra  is a 89 year old (6/9/1932), who is being seen today for an episodic care visit.  HPI information obtained from: facility chart records, facility staff, patient report, and [facility] chart review. Today's concern is:    Seen today for follow up to suture removal of 4cm right leg laceration. Was evaluated in ED on 3/18/25 after unwitnessed fall. She denies pain to the area. Suture instruction removal for 14 days after closure. Laceration noted in a Y shape and edges are not approximated. Wound bed was moist with red and yellow drainage in color. Some sutures imbedded. History of slow wound healing needed treatment in wound care clinic. PMH includes lymphedema. Finished a course of doxycycline 4/7. No s/s of infection. No slough in wound bed, no warmth, redness to surrounding tissue. Scant bloody drainage in wound bed. Additional 2  sutures that have migrated up removed today. Unclear if she has retained any additional sutures.     Past Medical and Surgical History reviewed in Epic today.    MEDICATIONS:    Current Outpatient Medications   Medication Sig Dispense Refill    acetaminophen (TYLENOL) 325 MG tablet TAKE 2 TABLETS (650MG) BY MOUTH TWICE DAILY 360 tablet 97    amLODIPine (NORVASC) 2.5 MG tablet Take 1 tablet (2.5 mg) by mouth at bedtime 30 tablet 11    NYSTOP 162726 UNIT/GM powder APPLY TOPICALLY TO AFFECTED AREA(S) TWICE DAILY AFTER WASH/PAT DRY FOR 14 DAYS;THEN AS NEEDED 60 g PRN    OLANZapine (ZYPREXA) 5 MG tablet TAKE ONE-HALF TABLET (2.5MG) BY MOUTH ONCE DAILY;& MAY TAKE ONE-HALF TABLET (2.5MG) DAILY AS NEEDED 90 tablet 97    senna-docusate (SENEXON-S) 8.6-50 MG tablet TAKE ONE TABLET BY MOUTH THREE TIMES A WEEK AT BEDTIME 36 tablet 97     "THERATEARS 0.25 % SOLN INSTILL ONE DROP IN EACH EYE TWICE DAILY 15 mL PRN     REVIEW OF SYSTEMS:  Limited secondary to cognitive impairment but today pt reports ok    Objective:  BP (!) 157/80   Pulse 65   Temp 96.8  F (36  C)   Resp 14   Ht 1.549 m (5' 1\")   Wt 57.4 kg (126 lb 9.6 oz)   SpO2 96%   BMI 23.92 kg/m    Exam:  GENERAL APPEARANCE:  Alert, calm and pleasant   RESP:  respiratory effort and palpation of chest normal, lungs clear to auscultation   CV:  Palpation and auscultation of heart done , trace edema in BLE, bradycardic   ABDOMEN:  normal bowel sounds, soft, nontender  SKIN: see hpi    Labs:   CBC RESULTS:   Recent Labs   Lab Test 09/05/24  0857 09/14/23  0929   WBC 5.7 5.4   RBC 3.89 4.20   HGB 11.4* 12.2   HCT 36.4 39.2   MCV 94 93   MCH 29.3 29.0   MCHC 31.3* 31.1*   RDW 14.1 13.9    252       Last Basic Metabolic Panel:  Recent Labs   Lab Test 09/05/24  0857 09/14/23  0929    142   POTASSIUM 4.1 4.1   CHLORIDE 105 102   NOHEMY 9.7 9.8*   CO2 24 25   BUN 23.2* 27.1*   CR 0.85 0.81   * 149*       Liver Function Studies -   Recent Labs   Lab Test 09/05/24  0857 09/14/23  0929   PROTTOTAL 7.2 7.4   ALBUMIN 4.1 4.0   BILITOTAL 0.2 0.2   ALKPHOS 115 117*   AST 20 23   ALT 11 14       TSH   Date Value Ref Range Status   09/05/2024 4.84 (H) 0.30 - 4.20 uIU/mL Final   02/09/2023 3.68 0.30 - 4.20 uIU/mL Final   02/10/2022 2.96 0.40 - 4.00 mU/L Final   01/06/2021 5.73 (A) 0.40 - 4.00 mU/L Final   11/25/2017 4.20 (H) 0.40 - 4.00 mU/L Final       Lab Results   Component Value Date    A1C 5.6 07/13/2018    A1C 5.9 05/14/2018     ASSESSMENT/PLAN:  (T14.8XXA) Open wound  (primary encounter diagnosis)  (Z48.02) Visit for suture removal  Comment: healing  Plan:   -continue wound care orders until healed  -doxycycline course completed  -discussed follow up in wound care clinic if not improving with daughter             Electronically signed by:  JACKIE Zayas CNP           "

## 2025-04-09 ENCOUNTER — ASSISTED LIVING VISIT (OUTPATIENT)
Dept: GERIATRICS | Facility: CLINIC | Age: OVER 89
End: 2025-04-09
Payer: COMMERCIAL

## 2025-04-09 DIAGNOSIS — T14.8XXA OPEN WOUND: Primary | ICD-10-CM

## 2025-04-09 DIAGNOSIS — Z48.02 VISIT FOR SUTURE REMOVAL: ICD-10-CM

## 2025-04-09 PROCEDURE — 99349 HOME/RES VST EST MOD MDM 40: CPT | Performed by: NURSE PRACTITIONER

## 2025-04-09 NOTE — LETTER
4/9/2025      Maryam Saavedra  C/o Saray Saavedra  [address]   [facility] [address]        [facility]  [facility] Record Number:  [id number]  Place of Service where encounter took place:  [facility] [[id number]]  Chief Complaint   Patient presents with     RECHECK       HPI:    Maryam Saavedra  is a 89 year old (6/9/1932), who is being seen today for an episodic care visit.  HPI information obtained from: facility chart records, facility staff, patient report, and [facility] chart review. Today's concern is:    Seen today for follow up to suture removal of 4cm right leg laceration. Was evaluated in ED on 3/18/25 after unwitnessed fall. She denies pain to the area. Suture instruction removal for 14 days after closure. Laceration noted in a Y shape and edges are not approximated. Wound bed was moist with red and yellow drainage in color. Some sutures imbedded. History of slow wound healing needed treatment in wound care clinic. PMH includes lymphedema. Finished a course of doxycycline 4/7. No s/s of infection. No slough in wound bed, no warmth, redness to surrounding tissue. Scant bloody drainage in wound bed. Additional 2  sutures that have migrated up removed today. Unclear if she has retained any additional sutures.     Past Medical and Surgical History reviewed in Epic today.    MEDICATIONS:    Current Outpatient Medications   Medication Sig Dispense Refill     acetaminophen (TYLENOL) 325 MG tablet TAKE 2 TABLETS (650MG) BY MOUTH TWICE DAILY 360 tablet 97     amLODIPine (NORVASC) 2.5 MG tablet Take 1 tablet (2.5 mg) by mouth at bedtime 30 tablet 11     NYSTOP 877634 UNIT/GM powder APPLY TOPICALLY TO AFFECTED AREA(S) TWICE DAILY AFTER WASH/PAT DRY FOR 14 DAYS;THEN AS NEEDED 60 g PRN     OLANZapine (ZYPREXA) 5 MG tablet TAKE ONE-HALF TABLET (2.5MG) BY MOUTH ONCE DAILY;& MAY TAKE ONE-HALF TABLET (2.5MG) DAILY AS NEEDED 90 tablet 97     senna-docusate  "(SENEXON-S) 8.6-50 MG tablet TAKE ONE TABLET BY MOUTH THREE TIMES A WEEK AT BEDTIME 36 tablet 97     THERATEARS 0.25 % SOLN INSTILL ONE DROP IN EACH EYE TWICE DAILY 15 mL PRN     REVIEW OF SYSTEMS:  Limited secondary to cognitive impairment but today pt reports ok    Objective:  BP (!) 157/80   Pulse 65   Temp 96.8  F (36  C)   Resp 14   Ht 1.549 m (5' 1\")   Wt 57.4 kg (126 lb 9.6 oz)   SpO2 96%   BMI 23.92 kg/m    Exam:  GENERAL APPEARANCE:  Alert, calm and pleasant   RESP:  respiratory effort and palpation of chest normal, lungs clear to auscultation   CV:  Palpation and auscultation of heart done , trace edema in BLE, bradycardic   ABDOMEN:  normal bowel sounds, soft, nontender  SKIN: see hpi    Labs:   CBC RESULTS:   Recent Labs   Lab Test 09/05/24  0857 09/14/23  0929   WBC 5.7 5.4   RBC 3.89 4.20   HGB 11.4* 12.2   HCT 36.4 39.2   MCV 94 93   MCH 29.3 29.0   MCHC 31.3* 31.1*   RDW 14.1 13.9    252       Last Basic Metabolic Panel:  Recent Labs   Lab Test 09/05/24  0857 09/14/23  0929    142   POTASSIUM 4.1 4.1   CHLORIDE 105 102   NOHEMY 9.7 9.8*   CO2 24 25   BUN 23.2* 27.1*   CR 0.85 0.81   * 149*       Liver Function Studies -   Recent Labs   Lab Test 09/05/24  0857 09/14/23  0929   PROTTOTAL 7.2 7.4   ALBUMIN 4.1 4.0   BILITOTAL 0.2 0.2   ALKPHOS 115 117*   AST 20 23   ALT 11 14       TSH   Date Value Ref Range Status   09/05/2024 4.84 (H) 0.30 - 4.20 uIU/mL Final   02/09/2023 3.68 0.30 - 4.20 uIU/mL Final   02/10/2022 2.96 0.40 - 4.00 mU/L Final   01/06/2021 5.73 (A) 0.40 - 4.00 mU/L Final   11/25/2017 4.20 (H) 0.40 - 4.00 mU/L Final       Lab Results   Component Value Date    A1C 5.6 07/13/2018    A1C 5.9 05/14/2018     ASSESSMENT/PLAN:  (T14.8XXA) Open wound  (primary encounter diagnosis)  (Z48.02) Visit for suture removal  Comment: healing  Plan:   -continue wound care orders until healed  -doxycycline course completed  -discussed follow up in wound care clinic if not improving " with daughter             Electronically signed by:  JACKIE Zayas CNP             Sincerely,        JACKIE Zayas CNP    Electronically signed

## 2025-04-10 ENCOUNTER — PATIENT OUTREACH (OUTPATIENT)
Dept: GERIATRIC MEDICINE | Facility: CLINIC | Age: OVER 89
End: 2025-04-10
Payer: COMMERCIAL

## 2025-04-10 NOTE — PROGRESS NOTES
[facility] Care Coordination Contact    Rec'd vm member's daughter Saray to report the following requests for incontinent supplies  Pull Ups decrease by 1 package per month  Tabs overnight, 1 package per month.   Information shared with [facility].  Thuy Reeder RN, BC  Manager [facility] Care Coordinator   [phone]  (Fax)

## 2025-05-06 ENCOUNTER — ASSISTED LIVING VISIT (OUTPATIENT)
Dept: GERIATRICS | Facility: CLINIC | Age: OVER 89
End: 2025-05-06
Payer: COMMERCIAL

## 2025-05-06 VITALS
HEART RATE: 64 BPM | SYSTOLIC BLOOD PRESSURE: 149 MMHG | DIASTOLIC BLOOD PRESSURE: 70 MMHG | OXYGEN SATURATION: 96 % | TEMPERATURE: 98.4 F | WEIGHT: 129.2 LBS | BODY MASS INDEX: 24.39 KG/M2 | RESPIRATION RATE: 16 BRPM | HEIGHT: 61 IN

## 2025-05-06 DIAGNOSIS — G30.9 MIXED DEMENTIA (H): Primary | ICD-10-CM

## 2025-05-06 DIAGNOSIS — F02.80 MIXED DEMENTIA (H): Primary | ICD-10-CM

## 2025-05-06 DIAGNOSIS — I10 BENIGN ESSENTIAL HYPERTENSION: ICD-10-CM

## 2025-05-06 DIAGNOSIS — F01.50 MIXED DEMENTIA (H): Primary | ICD-10-CM

## 2025-05-06 DIAGNOSIS — G91.2 NPH (NORMAL PRESSURE HYDROCEPHALUS) (H): ICD-10-CM

## 2025-05-06 NOTE — PROGRESS NOTES
[facility]  Episodic care visit note  May 6, 2025      Chief Complaint   Patient presents with    RECHECK       HPI:  Maryam Saavedra is a 89 year old  (6/9/1932) who is being seen today for an episodic care visit at [facility] [[id number]]. PMHx significant for dementia, NPH, HTN    HPI information obtained from: patient, facility staff, facility chart records, and [facility] chart review. Patient was seen today for routine follow up. Patient was seen in the dining room. Just finished breakfast, and is visiting with other residents. Feels okay. Denies any bothersome symptoms including pain or difficulty breathing.       ALLERGIES:    Allergies   Allergen Reactions    Ace Inhibitors Angioedema    Cyclobenzaprine Angioedema        Past Medical, Surgical, Family and Social History: Reviewed and updated in EPIC    MEDICATIONS:  Post Discharge Medication Reconciliation Status: discharge medications reconciled, continue medications without change.     Current Outpatient Medications   Medication Sig Dispense Refill    acetaminophen (TYLENOL) 325 MG tablet TAKE 2 TABLETS (650MG) BY MOUTH TWICE DAILY 360 tablet 97    amLODIPine (NORVASC) 2.5 MG tablet Take 1 tablet (2.5 mg) by mouth at bedtime 30 tablet 11    NYSTOP 310505 UNIT/GM powder APPLY TOPICALLY TO AFFECTED AREA(S) TWICE DAILY AFTER WASH/PAT DRY FOR 14 DAYS;THEN AS NEEDED 60 g PRN    OLANZapine (ZYPREXA) 5 MG tablet TAKE ONE-HALF TABLET (2.5MG) BY MOUTH ONCE DAILY;& MAY TAKE ONE-HALF TABLET (2.5MG) DAILY AS NEEDED 90 tablet 97    THERATEARS 0.25 % SOLN INSTILL ONE DROP IN EACH EYE TWICE DAILY 15 mL PRN     Medications reviewed.  Medications reconciled to facility chart and changes were made to reflect current medications as identified as above med list. Below are the changes that were made:   Medications stopped since last EPIC medication reconciliation:   There are no discontinued medications.  Medications started  "since last [facility] medication reconciliation:  No orders of the defined types were placed in this encounter.    REVIEW OF SYSTEMS:  4 point ROS completed. Pertinent positives and negatives noted in HPI. All other systems negative     PHYSICAL EXAM:   BP (!) 149/70   Pulse 64   Temp 98.4  F (36.9  C)   Resp 16   Ht 1.549 m (5' 1\")   Wt 58.6 kg (129 lb 3.2 oz)   SpO2 96%   BMI 24.41 kg/m     Constitutional: Appears comfortable, NAD  Respiratory: Breathing non-labored.   Musculoskeletal: Normal muscle bulk and tone  Neuro: Alert, pleasantly confused, GONZALEZ  Psych: Calm and cooperative. Impaired memory, insight, and judgment    LAB[facility] & IMAGING: [facility]  Most Recent 3 CBC's:  Recent Labs   Lab Test 09/05/24  0857 09/14/23  0929 02/09/23  0706   WBC 5.7 5.4 5.2   HGB 11.4* 12.2 11.0*   MCV 94 93 91    252 269     Most Recent 3 BMP's:  Recent Labs   Lab Test 09/05/24  0857 09/14/23  0929 04/13/23  0800    142 145   POTASSIUM 4.1 4.1 4.9   CHLORIDE 105 102 105   CO2 24 25 30*   BUN 23.2* 27.1* 25.3*   CR 0.85 0.81 0.91   ANIONGAP 14 15 10   NOHEMY 9.7 9.8* 10.0*   * 149* 92         ASSESSMENT & PLAN:     Mixed dementia (H)  NPH (normal pressure hydrocephalus) (H)  Benign essential hypertension    - chronic conditions stable  - continue medications as prescribed    Orders: No new orders    Electronically signed by: Crystal Krishna MD        "

## 2025-05-06 NOTE — LETTER
5/6/2025      Maryam Saavedra  C/o Saray Saavedra  [address] [place] [address]        [facility]  Episodic care visit note  May 6, 2025      Chief Complaint   Patient presents with     RECHECK       HPI:  Maryam Saavedra is a 89 year old  (6/9/1932) who is being seen today for an episodic care visit at [facility] [[id number]]. PMHx significant for dementia, NPH, HTN    HPI information obtained from: patient, facility staff, facility chart records, and [facility] chart review. Patient was seen today for routine follow up. Patient was seen in the dining room. Just finished breakfast, and is visiting with other residents. Feels okay. Denies any bothersome symptoms including pain or difficulty breathing.       ALLERGIES:    Allergies   Allergen Reactions     Ace Inhibitors Angioedema     Cyclobenzaprine Angioedema        Past Medical, Surgical, Family and Social History: Reviewed and updated in EPIC    MEDICATIONS:  Post Discharge Medication Reconciliation Status: discharge medications reconciled, continue medications without change.     Current Outpatient Medications   Medication Sig Dispense Refill     acetaminophen (TYLENOL) 325 MG tablet TAKE 2 TABLETS (650MG) BY MOUTH TWICE DAILY 360 tablet 97     amLODIPine (NORVASC) 2.5 MG tablet Take 1 tablet (2.5 mg) by mouth at bedtime 30 tablet 11     NYSTOP [id number] UNIT/GM powder APPLY TOPICALLY TO AFFECTED AREA(S) TWICE DAILY AFTER WASH/PAT DRY FOR 14 DAYS;THEN AS NEEDED 60 g PRN     OLANZapine (ZYPREXA) 5 MG tablet TAKE ONE-HALF TABLET (2.5MG) BY MOUTH ONCE DAILY;& MAY TAKE ONE-HALF TABLET (2.5MG) DAILY AS NEEDED 90 tablet 97     THERATEARS 0.25 % SOLN INSTILL ONE DROP IN EACH EYE TWICE DAILY 15 mL PRN     Medications reviewed.  Medications reconciled to facility chart and changes were made to reflect current medications as identified as above med list. Below are the changes that were made:   Medications stopped since  "last EPIC medication reconciliation:   There are no discontinued medications.  Medications started since last [facility] medication reconciliation:  No orders of the defined types were placed in this encounter.    REVIEW OF SYSTEMS:  4 point ROS completed. Pertinent positives and negatives noted in HPI. All other systems negative     PHYSICAL EXAM:   BP (!) 149/70   Pulse 64   Temp 98.4  F (36.9  C)   Resp 16   Ht 1.549 m (5' 1\")   Wt 58.6 kg (129 lb 3.2 oz)   SpO2 96%   BMI 24.41 kg/m     Constitutional: Appears comfortable, NAD  Respiratory: Breathing non-labored.   Musculoskeletal: Normal muscle bulk and tone  Neuro: Alert, pleasantly confused, GONZALEZ  Psych: Calm and cooperative. Impaired memory, insight, and judgment    LABS & IMAGING: Reviewed in Epic and/or [facility]  Most Recent 3 CBC's:  Recent Labs   Lab Test 09/05/24  0857 09/14/23  0929 02/09/23  0706   WBC 5.7 5.4 5.2   HGB 11.4* 12.2 11.0*   MCV 94 93 91    252 269     Most Recent 3 BMP's:  Recent Labs   Lab Test 09/05/24  0857 09/14/23  0929 04/13/23  0800    142 145   POTASSIUM 4.1 4.1 4.9   CHLORIDE 105 102 105   CO2 24 25 30*   BUN 23.2* 27.1* 25.3*   CR 0.85 0.81 0.91   ANIONGAP 14 15 10   NOHEMY 9.7 9.8* 10.0*   * 149* 92         ASSESSMENT & PLAN:     Mixed dementia (H)  NPH (normal pressure hydrocephalus) (H)  Benign essential hypertension    - chronic conditions stable  - continue medications as prescribed    Orders: No new orders    Electronically signed by: Crystal Krishna MD          Sincerely,        Crystal Krishna MD    Electronically signed  "

## 2025-05-13 ENCOUNTER — PATIENT OUTREACH (OUTPATIENT)
Dept: GERIATRIC MEDICINE | Facility: CLINIC | Age: OVER 89
End: 2025-05-13
Payer: COMMERCIAL

## 2025-05-13 NOTE — PROGRESS NOTES
[facility] Care Coordination Contact      [facility] Six-Month Telephone Assessment    6 month telephone assessment completed on 5/13/2025.  Epic notes reviewed.   Email sent to [facility] nursing department to inquire on how member is doing.   Rec'd email from Philly OLIVEIRA to report that member has had wounds on her shins most of the year which are dressed 3 times a week. Philly report that member had a fall on 01/02/25 at 2:22 am and 03/18/25 at 1:29 am, both times she was attempting to transfer herself from her bed to her wheelchair. There are no changes to her cares.  The only change is having to administer the PRN Olanzapine for agitation in the early evenings.     Call placed to member's daughter Saray. Per Saray her mother has been the same with a gradual decline. Saray shared that her mother will occasional wince when she is grabbing onto something or reaching up, stating that she is not sure if it is the shoulder pain or back pain. Saray shared that she believes her  mother is comfortable most of the time.   Saray will be taking her mother to the dentist tomorrow, she expressed concern about increasing difficulty with transferring and her sister will accompany the visit to help with transfers if needed. Saray is unsure if she will continue to take her mother to the dentist and inquired on onsite care. Per Philly at [facility] they do not provide onsite dental care. Explained that CC can assist with calling the dental benefit to inquire on dental offices that provide care in a w/c.     Saray inquired on [facility] and delivery of supplies. Explained that recently [facility] made a change that they will need to confirm monthly on supply deliveries. Per Saray, she has not rec'd any calls from [facility]. Secure email sent to [facility] that they should be contacting member's daughter Saray monthly regarding supplies.     ER visits: Yes -  [facility]  Hospitalizations: No  TCU stays: No  Significant health  status changes: None reported   Falls/Injuries: Yes: see note above.  ADL/IADL changes: No  Changes in services: No    Caregiver Assessment follow up:  NA     Goals: See Support Plan for goal progress documentation.     Will see member in 6 months for an annual health risk assessment.   Encouraged member to call CC with any questions or concerns in the meantime.   Thuy Reeder RN, BC  Manager [facility] Care Coordinator   [phone]  (Fax)

## 2025-05-23 NOTE — PROGRESS NOTES
LAST VISIT: 03/18/2025  NEXT VISIT: 11/06/2025   Has cough and congestion as many on [facility] unit.    1. Begin dextromethorphan-guaiFENesin (MUCINEX DM)  MG 12 hr tablet    Take 1 tablet by mouth every 12 hours for 7 days     JACKIE Zayas CNP on 11/30/2021 at 3:45 PM

## 2025-05-27 ENCOUNTER — PATIENT OUTREACH (OUTPATIENT)
Dept: GERIATRIC MEDICINE | Facility: CLINIC | Age: OVER 89
End: 2025-05-27
Payer: COMMERCIAL

## 2025-05-27 NOTE — PROGRESS NOTES
[facility] Care Coordination Contact    5/23/2025 Rec'd tele call from member's daughter Saray, she shared that her mother has ample supplies of briefs and requests to change to 1 package per month.   Saray also shared that she could  not get her mother to the dentist and would like assistance with scheduling a dental appt that has w/c access.     Saray also inquired on transportation to take her mother to her home for social visits. CC provided education on [employer], explained that the application will need to be partially filled by the PCP. Saray stated understanding, explained that CC can communicate to the PCP if she needs assistance.    5/27/2025 Email sent to [facility] to inform of the above. Task sent to CHW team to assist with scheduling dental appt.  Thuy Reeder RN, BC  Manager [facility] Care Coordinator   [phone]  (Fax)

## 2025-05-27 NOTE — PROGRESS NOTES
[facility] Care Coordination Contact    Message received from galindo Reeder.  The member's daughter requested a dental appointment.  CHW called member's daughter (Saray) to assist in scheduling the appointment and left a voicemail with CHW contact information. CHW will follow up.      COURT Grey  [facility]  [phone]

## 2025-05-28 ENCOUNTER — PATIENT OUTREACH (OUTPATIENT)
Dept: GERIATRIC MEDICINE | Facility: CLINIC | Age: OVER 89
End: 2025-05-28
Payer: COMMERCIAL

## 2025-05-28 NOTE — PROGRESS NOTES
[facility] Care Coordination Contact    The member's daughter (Saray) call back regarding member's dental appointment.   The CHW called member's daughter again to provide the following clinic information:    [facility]  Address: [facility] [address]th floor,   [place] [id number]  Phone: [phone]    Date: Friday, June 13/2025  Time: 11:00 AM    COURT Grey  [facility]  [phone]

## 2025-06-03 ENCOUNTER — PATIENT OUTREACH (OUTPATIENT)
Dept: GERIATRIC MEDICINE | Facility: CLINIC | Age: OVER 89
End: 2025-06-03
Payer: COMMERCIAL

## 2025-06-03 NOTE — PROGRESS NOTES
[facility] Care Coordination Contact    Arranged transportation thru [facility] -[phone] for the below appt:  Appt Date & Time: 06/13/2025 11am   Clinic Name & Address:  [facility] [address], [facility] [address]  Transportation Provider: [employer] [phone]   time:  10:15a - 10:45a   Return ride  time: will call    Notified EZEKIEL So and CHW of  time.    Korin Choi    Care Management Specialist   [facility]  [phone]

## 2025-08-13 ENCOUNTER — TELEPHONE (OUTPATIENT)
Dept: GERIATRICS | Facility: CLINIC | Age: OVER 89
End: 2025-08-13
Payer: COMMERCIAL

## 2025-08-13 RX ORDER — POLYETHYLENE GLYCOL 3350 17 G/17G
1 POWDER, FOR SOLUTION ORAL DAILY
COMMUNITY

## 2025-08-13 RX ORDER — AMOXICILLIN 250 MG
1 CAPSULE ORAL 2 TIMES DAILY PRN
COMMUNITY

## (undated) DEVICE — SOL WATER IRRIG 1000ML BOTTLE 2F7114

## (undated) DEVICE — SU ETHIBOND 2 CT-1 27" D9523

## (undated) DEVICE — GLOVE PROTEXIS W/NEU-THERA 7.5  2D73TE75

## (undated) DEVICE — GLOVE PROTEXIS BLUE W/NEU-THERA 8.0  2D73EB80

## (undated) DEVICE — MANIFOLD NEPTUNE 4 PORT 700-20

## (undated) DEVICE — DRSG STERI STRIP 1/2X4" R1547

## (undated) DEVICE — DRAPE C-ARM 60X42" 1013

## (undated) DEVICE — DRAPE IOBAN INCISE 23X17" 6650EZ

## (undated) DEVICE — BLADE CLIPPER 4412A

## (undated) DEVICE — LINEN TOWEL PACK X5 5464

## (undated) DEVICE — SOL NACL 0.9% IRRIG 1000ML BOTTLE 2F7124

## (undated) DEVICE — DRSG GAUZE 4X4" 3033

## (undated) DEVICE — GLOVE PROTEXIS POWDER FREE 7.5 ORTHOPEDIC 2D73ET75

## (undated) DEVICE — BONE CEMENT MIXEVAC III HI VAC KIT  0206-015-000

## (undated) DEVICE — SU VICRYL 1 CTX 36" J371H

## (undated) DEVICE — PREP CHLORAPREP 26ML TINTED ORANGE  260815

## (undated) DEVICE — SUCTION IRR SYSTEM W/O TIP INTERPULSE HANDPIECE 0210-100-000

## (undated) DEVICE — BLADE SAW SAGITTAL STRK 25X79.5X1.24MM 4/2000 2108-318-000

## (undated) DEVICE — DRAPE CONVERTORS U-DRAPE 60X72" 8476

## (undated) DEVICE — DRSG ADAPTIC 3X8" 6113

## (undated) DEVICE — TOURNIQUET CUFF 30" STERILE

## (undated) DEVICE — ESU EYE HIGH TEMP 65410-183

## (undated) DEVICE — DRSG STERI STRIP 1/4X3" R1541

## (undated) DEVICE — STPL SKIN 35W ROTATING HEAD PRW35

## (undated) DEVICE — SU VICRYL 2-0 CT-1 27" UND J259H

## (undated) DEVICE — GLOVE PROTEXIS W/NEU-THERA 8.0  2D73TE80

## (undated) DEVICE — WRAP EZY KNEE

## (undated) DEVICE — SOLUTION WOUND CLEANSING 3/4OZ 10% PVP EA-L3011FB-50

## (undated) DEVICE — EYE PREP BETADINE 5% SOLUTION 30ML 0065-0411-30

## (undated) DEVICE — IMM KNEE 20" 0814-2660

## (undated) DEVICE — SOL ADH LIQUID BENZOIN SWAB 0.6ML C1544

## (undated) DEVICE — SOL NACL 0.9% INJ 1000ML BAG 07983-09

## (undated) DEVICE — PACK TOTAL HIP W/POUCH SOP15HPFSM

## (undated) DEVICE — DECANTER BAG 2002S

## (undated) DEVICE — PACK TOTAL KNEE SOP15TKFSD

## (undated) DEVICE — DRAPE SHEET REV FOLD 3/4 9349

## (undated) DEVICE — ESU GROUND PAD E7506

## (undated) DEVICE — SU VICRYL 0 CT-1 CR 8X18" J740D

## (undated) DEVICE — ESU GROUND PAD UNIVERSAL W/O CORD

## (undated) DEVICE — SU MONOCRYL 3-0 PS-2 27" Y427H

## (undated) DEVICE — ESU PENCIL W/SMOKE EVAC NEPTUNE STRYKER 0703-046-000

## (undated) DEVICE — SU CHROMIC 6-0 G-1 18" 790G

## (undated) DEVICE — BLADE SAW RECIP STRK LONG 70X12.5X0.9MM 0277-096-278

## (undated) DEVICE — DRSG TELFA 2X3"

## (undated) DEVICE — CAST PADDING 6" STERILE 9046S

## (undated) DEVICE — SOL NACL 0.9% IRRIG 3000ML BAG 2B7477

## (undated) DEVICE — SOL NACL 0.9% IRRIG 1000ML BOTTLE 07138-09

## (undated) DEVICE — NDL SPINAL 18GA 3.5" 405184

## (undated) DEVICE — PACK OCULOPLATIC SEN15OCFSD

## (undated) DEVICE — DRAPE STERI TOWEL LG 1010

## (undated) DEVICE — SYR 50ML LL W/O NDL 309653

## (undated) DEVICE — SU PLAIN 6-0 G-1DA 18" 770G

## (undated) DEVICE — DRSG ABDOMINAL 07 1/2X8" 7197D

## (undated) DEVICE — BONE CLEANING TIP INTERPULSE  0210-010-000

## (undated) DEVICE — GOWN IMPERVIOUS SPECIALTY XLG/XLONG 32474

## (undated) DEVICE — SOL BENZOIN 0.5OZ

## (undated) RX ORDER — FENTANYL CITRATE 50 UG/ML
INJECTION, SOLUTION INTRAMUSCULAR; INTRAVENOUS
Status: DISPENSED
Start: 2020-05-12

## (undated) RX ORDER — PROPOFOL 10 MG/ML
INJECTION, EMULSION INTRAVENOUS
Status: DISPENSED
Start: 2018-07-24

## (undated) RX ORDER — GLYCOPYRROLATE 0.2 MG/ML
INJECTION, SOLUTION INTRAMUSCULAR; INTRAVENOUS
Status: DISPENSED
Start: 2020-05-12

## (undated) RX ORDER — FENTANYL CITRATE 50 UG/ML
INJECTION, SOLUTION INTRAMUSCULAR; INTRAVENOUS
Status: DISPENSED
Start: 2018-07-24

## (undated) RX ORDER — NEOSTIGMINE METHYLSULFATE 1 MG/ML
VIAL (ML) INJECTION
Status: DISPENSED
Start: 2020-05-12

## (undated) RX ORDER — LIDOCAINE HYDROCHLORIDE 20 MG/ML
INJECTION, SOLUTION EPIDURAL; INFILTRATION; INTRACAUDAL; PERINEURAL
Status: DISPENSED
Start: 2020-05-12

## (undated) RX ORDER — FENTANYL CITRATE 50 UG/ML
INJECTION, SOLUTION INTRAMUSCULAR; INTRAVENOUS
Status: DISPENSED
Start: 2018-05-24

## (undated) RX ORDER — DEXAMETHASONE SODIUM PHOSPHATE 4 MG/ML
INJECTION, SOLUTION INTRA-ARTICULAR; INTRALESIONAL; INTRAMUSCULAR; INTRAVENOUS; SOFT TISSUE
Status: DISPENSED
Start: 2018-05-24

## (undated) RX ORDER — PROPOFOL 10 MG/ML
INJECTION, EMULSION INTRAVENOUS
Status: DISPENSED
Start: 2020-05-12

## (undated) RX ORDER — ONDANSETRON 2 MG/ML
INJECTION INTRAMUSCULAR; INTRAVENOUS
Status: DISPENSED
Start: 2018-05-24

## (undated) RX ORDER — DEXAMETHASONE SODIUM PHOSPHATE 4 MG/ML
INJECTION, SOLUTION INTRA-ARTICULAR; INTRALESIONAL; INTRAMUSCULAR; INTRAVENOUS; SOFT TISSUE
Status: DISPENSED
Start: 2020-05-12

## (undated) RX ORDER — DEXAMETHASONE SODIUM PHOSPHATE 4 MG/ML
INJECTION, SOLUTION INTRA-ARTICULAR; INTRALESIONAL; INTRAMUSCULAR; INTRAVENOUS; SOFT TISSUE
Status: DISPENSED
Start: 2018-07-24

## (undated) RX ORDER — ONDANSETRON 2 MG/ML
INJECTION INTRAMUSCULAR; INTRAVENOUS
Status: DISPENSED
Start: 2018-07-24

## (undated) RX ORDER — CEFAZOLIN SODIUM 1 G/3ML
INJECTION, POWDER, FOR SOLUTION INTRAMUSCULAR; INTRAVENOUS
Status: DISPENSED
Start: 2018-07-24

## (undated) RX ORDER — GLYCOPYRROLATE 0.2 MG/ML
INJECTION, SOLUTION INTRAMUSCULAR; INTRAVENOUS
Status: DISPENSED
Start: 2018-07-24

## (undated) RX ORDER — CEFAZOLIN SODIUM 2 G/100ML
INJECTION, SOLUTION INTRAVENOUS
Status: DISPENSED
Start: 2018-07-24

## (undated) RX ORDER — MORPHINE SULFATE 1 MG/ML
INJECTION, SOLUTION EPIDURAL; INTRATHECAL; INTRAVENOUS
Status: DISPENSED
Start: 2020-05-12

## (undated) RX ORDER — CEFAZOLIN SODIUM 2 G/100ML
INJECTION, SOLUTION INTRAVENOUS
Status: DISPENSED
Start: 2020-05-12

## (undated) RX ORDER — ONDANSETRON 2 MG/ML
INJECTION INTRAMUSCULAR; INTRAVENOUS
Status: DISPENSED
Start: 2020-05-12

## (undated) RX ORDER — NEOSTIGMINE METHYLSULFATE 1 MG/ML
VIAL (ML) INJECTION
Status: DISPENSED
Start: 2018-07-24

## (undated) RX ORDER — LIDOCAINE HYDROCHLORIDE 20 MG/ML
INJECTION, SOLUTION EPIDURAL; INFILTRATION; INTRACAUDAL; PERINEURAL
Status: DISPENSED
Start: 2018-07-24